# Patient Record
Sex: FEMALE | Race: BLACK OR AFRICAN AMERICAN | NOT HISPANIC OR LATINO | Employment: FULL TIME | URBAN - METROPOLITAN AREA
[De-identification: names, ages, dates, MRNs, and addresses within clinical notes are randomized per-mention and may not be internally consistent; named-entity substitution may affect disease eponyms.]

---

## 2020-10-21 ENCOUNTER — OFFICE VISIT (OUTPATIENT)
Dept: PRIMARY CARE CLINIC | Facility: CLINIC | Age: 40
End: 2020-10-21
Payer: MEDICAID

## 2020-10-21 VITALS
HEIGHT: 67 IN | BODY MASS INDEX: 38.63 KG/M2 | HEART RATE: 58 BPM | RESPIRATION RATE: 18 BRPM | SYSTOLIC BLOOD PRESSURE: 108 MMHG | WEIGHT: 246.13 LBS | OXYGEN SATURATION: 98 % | TEMPERATURE: 98 F | DIASTOLIC BLOOD PRESSURE: 64 MMHG

## 2020-10-21 DIAGNOSIS — Z11.4 ENCOUNTER FOR SCREENING FOR HIV: ICD-10-CM

## 2020-10-21 DIAGNOSIS — Z13.6 SCREENING FOR CARDIOVASCULAR CONDITION: ICD-10-CM

## 2020-10-21 DIAGNOSIS — M06.9 RHEUMATOID ARTHRITIS, INVOLVING UNSPECIFIED SITE, UNSPECIFIED WHETHER RHEUMATOID FACTOR PRESENT: Primary | ICD-10-CM

## 2020-10-21 DIAGNOSIS — L30.9 ECZEMA, UNSPECIFIED TYPE: ICD-10-CM

## 2020-10-21 DIAGNOSIS — L21.9 SEBORRHEIC DERMATITIS OF SCALP: ICD-10-CM

## 2020-10-21 DIAGNOSIS — Z00.00 WELL ADULT HEALTH CHECK: ICD-10-CM

## 2020-10-21 DIAGNOSIS — Z11.59 NEED FOR HEPATITIS C SCREENING TEST: ICD-10-CM

## 2020-10-21 DIAGNOSIS — E66.9 OBESITY, CLASS II, BMI 35-39.9: ICD-10-CM

## 2020-10-21 PROCEDURE — 99204 PR OFFICE/OUTPT VISIT, NEW, LEVL IV, 45-59 MIN: ICD-10-PCS | Mod: S$PBB,,, | Performed by: STUDENT IN AN ORGANIZED HEALTH CARE EDUCATION/TRAINING PROGRAM

## 2020-10-21 PROCEDURE — 99205 OFFICE O/P NEW HI 60 MIN: CPT | Mod: PBBFAC,PN | Performed by: STUDENT IN AN ORGANIZED HEALTH CARE EDUCATION/TRAINING PROGRAM

## 2020-10-21 PROCEDURE — 99999 PR PBB SHADOW E&M-NEW PATIENT-LVL V: ICD-10-PCS | Mod: PBBFAC,,, | Performed by: STUDENT IN AN ORGANIZED HEALTH CARE EDUCATION/TRAINING PROGRAM

## 2020-10-21 PROCEDURE — 99999 PR PBB SHADOW E&M-NEW PATIENT-LVL V: CPT | Mod: PBBFAC,,, | Performed by: STUDENT IN AN ORGANIZED HEALTH CARE EDUCATION/TRAINING PROGRAM

## 2020-10-21 PROCEDURE — 99204 OFFICE O/P NEW MOD 45 MIN: CPT | Mod: S$PBB,,, | Performed by: STUDENT IN AN ORGANIZED HEALTH CARE EDUCATION/TRAINING PROGRAM

## 2020-10-21 NOTE — PROGRESS NOTES
Subjective:           Patient ID: Giovanni Pena is a 40 y.o. female who presents today with a chief complaint of to establish care.    Chief Complaint:   Establish Care (Patient here to establish care, HX of RA)      History of Present Illness:    States that she was dx with RA in 2017, has been struggling with her weight.      Says that she was about 200lb in the last 6 months.    Today, was 246.    Working from home, tries to get to the gym for a hour 3 times a week. She is doing cardio at the gym and weights.     Diet: states that she usually does not eat in the morning, tried eggs in the AM, salads in the PM.  Feels her diet is pretty good.      States that her RA is affecting her elbows and knees.    Was previously taking Ibuprofen for the swelling and took about a month of Methotrexate, this was in 2017 in [place].    States that she gets a sharp pain in her mid-chest that lasts for a minute or two while at rest.       Review of Systems   Constitutional: Negative for chills, fatigue and fever.   HENT: Negative for congestion, postnasal drip, sinus pressure and sinus pain.    Respiratory: Negative for cough, shortness of breath and wheezing.    Cardiovascular: Positive for chest pain (gets a sharp pain in her chest). Negative for palpitations and leg swelling.   Gastrointestinal: Negative for constipation, diarrhea, nausea and vomiting.   Genitourinary: Negative for difficulty urinating, frequency, hematuria and menstrual problem.   Musculoskeletal: Positive for arthralgias (primarly to elbows and knees bilaterally). Negative for back pain, gait problem and joint swelling.   Skin: Positive for rash.   Neurological: Negative for dizziness, tremors, seizures, light-headedness, numbness and headaches.   Psychiatric/Behavioral: Negative for agitation, sleep disturbance and suicidal ideas. The patient is not nervous/anxious.            Objective:        Vitals:    10/21/20 1050   BP: 108/64   BP Location: Right arm  "  Patient Position: Sitting   BP Method: Large (Manual)   Pulse: (!) 58   Resp: 18   Temp: 97.8 °F (36.6 °C)   TempSrc: Temporal   SpO2: 98%   Weight: 111.7 kg (246 lb 2.3 oz)   Height: 5' 7" (1.702 m)       Body mass index is 38.55 kg/m².      Physical Exam  Constitutional:       General: She is not in acute distress.     Appearance: Normal appearance. She is obese.      Comments: As per BMI.   HENT:      Head: Normocephalic.      Right Ear: Tympanic membrane and external ear normal.      Left Ear: Tympanic membrane and external ear normal.      Nose: Nose normal. No rhinorrhea.      Mouth/Throat:      Mouth: Mucous membranes are moist.      Pharynx: No oropharyngeal exudate or posterior oropharyngeal erythema.   Eyes:      General:         Right eye: No discharge.         Left eye: No discharge.      Extraocular Movements: Extraocular movements intact.      Pupils: Pupils are equal, round, and reactive to light.   Cardiovascular:      Rate and Rhythm: Normal rate and regular rhythm.      Heart sounds: No murmur. No gallop.    Pulmonary:      Effort: Pulmonary effort is normal. No respiratory distress.      Breath sounds: Normal breath sounds. No wheezing.   Abdominal:      General: Abdomen is flat. Bowel sounds are normal. There is no distension.      Palpations: Abdomen is soft.   Musculoskeletal:         General: No swelling or deformity.      Right lower leg: No edema.      Left lower leg: No edema.   Skin:     General: Skin is warm.      Capillary Refill: Capillary refill takes less than 2 seconds.      Coloration: Skin is not jaundiced.   Neurological:      General: No focal deficit present.      Mental Status: She is alert and oriented to person, place, and time.      Motor: No weakness.   Psychiatric:         Mood and Affect: Mood normal.             No results found for: NA, K, CL, CO2, BUN, CREATININE, GLUCOSE, ANIONGAP  No results found for: HGBA1C  No results found for: BNP, BNPTRIAGEBLO    No results " found for: WBC, HGB, HCT, PLT, GRAN  No results found for: CHOL, HDL, LDLCALC, TRIG     No current outpatient medications on file.     No outpatient encounter medications on file as of 10/21/2020.     No facility-administered encounter medications on file as of 10/21/2020.           Assessment:       1. Rheumatoid arthritis, involving unspecified site, unspecified whether rheumatoid factor present    2. Eczema, unspecified type    3. Seborrheic dermatitis of scalp    4. Obesity, Class II, BMI 35-39.9    5. Screening for cardiovascular condition    6. Encounter for screening for HIV    7. Need for hepatitis C screening test    8. Well adult health check           Plan:       Rheumatoid arthritis, involving unspecified site, unspecified whether rheumatoid factor present  -     CBC auto differential; Future; Expected date: 10/21/2020  -     Comprehensive Metabolic Panel; Future; Expected date: 10/21/2020  -     Ambulatory referral/consult to Rheumatology; Future; Expected date: 10/28/2020   - patient states bilateral elbows and bilateral knees mostly affected.  Feels that tissue was worse due to her increased weight.  Is working on weight loss, going to the gym 3 times weekly.   - agrees to a referral to speak to our nutritionist.   - also requesting a referral to Rheumatology which has been sent.   - previously diagnosed and treated for rheumatoid arthritis in [place], patient is to call back to clinic with her previous provider's name her clinic pain so that we can elicit their notes.    Eczema, unspecified type   - historic issue not in current exacerbation.     Seborrheic dermatitis of scalp   - historic issue not in current exacerbation.     Obesity, Class II, BMI 35-39.9  -     Ambulatory referral/consult to Diabetes Education; Future; Expected date: 10/28/2020  -     TSH; Future; Expected date: 10/21/2020  -     T4, Free; Future; Expected date: 10/21/2020   - currently trying to lose weight, but has gained appx  50 lb in the last 6 months.   -     Screening for cardiovascular condition  -     Lipid Panel; Future; Expected date: 10/21/2020   - risk due to obesity, getting screening panel.    Encounter for screening for HIV  -     HIV 1/2 Ag/Ab (4th Gen); Future; Expected date: 10/21/2020   - obtaining for general health maintenance as well as to no of risks before starting DMARD.     Need for hepatitis C screening test  -     Hepatitis C Antibody; Future; Expected date: 10/21/2020   - obtaining for general health maintenance as well as to no of risks before starting DMARD.     Well adult health check  -     CBC auto differential; Future; Expected date: 10/21/2020  -     Comprehensive Metabolic Panel; Future; Expected date: 10/21/2020  -     TSH; Future; Expected date: 10/21/2020  -     T4, Free; Future; Expected date: 10/21/2020   - patient is due for Pap, scheduling for this appointment in 1 month in our clinic.

## 2020-10-21 NOTE — LETTER
October 21, 2020    Giovanni Pena  [address] [facility] [address]             [facility] [address]  Primary Care  [address], [facility] [address]  [facility] [address]  Phone: [phone]  Fax: [phone]   October 21, 2020     Patient: Giovanni Pena   YOB: 1980   Date of Visit: 10/21/2020       To Whom it May Concern:    Giovanni Pena was seen in my clinic on 10/21/2020. She may return to work on 10/21/2020.    Please excuse her from any  work missed.    If you have any questions or concerns, please don't hesitate to call.    Sincerely,         Miquel Mishra MD

## 2020-10-21 NOTE — PATIENT INSTRUCTIONS
Living with Rheumatoid Arthritis    Rheumatoid arthritis (RA) is a chronic disease, but it doesn't have to keep you from being active. It is an autoimmune disease and your immune system attacks the lining of your joints. You can help control RA with exercise and a healthy lifestyle. Be sure to see your healthcare provider for scheduled checkups and lab work. At some point, you may be referred to a rheumatologist (a healthcare provider who specializes in arthritis and related diseases).  Make exercise part of your life  Gentle exercise can help lessen your pain. Keep the following in mind:  · Choose exercises that improve joint motion and make your muscles stronger. Your healthcare provider or a physical therapist may suggest a few.  · Most people should exercise for at least 30 minutes a day on most days of the week. This can be broken up into shorter periods throughout the day.  · Try walking, riding a bike, or doing exercises in a warm pool. Look for programs in your community for people with arthritis.   · Dont push yourself too hard at first. Slowly build up over time.  · Make sure you warm up for 5 to 10 minutes each time you exercise. Stretching and flexibility exercises are often helpful.   · If pain and stiffness increase, don't exercise as hard or as long.  Watch your weight  If you weigh more than you should, your weight-bearing joints are under extra pressure. This makes your symptoms worse. To reduce pain and stiffness, try shedding a few of those extra pounds. The tips below may help:  · Start a weight-loss program with the help of your healthcare provider.  · Ask your friends and family for support.  · Join a weight-loss group.  Learn ways to cope  Most people with long-term conditions find it a challenge to deal with the emotions that often go along with their conditions. With rheumatoid arthritis, there is also pain.   · Work with your healthcare provider on ways to lessen pain. Medicines, use of  heat and cold, and other methods are available.  · Learn to relax. Although it may not be easy, it does help lessen stress, anxiety, and pain. Simple deep-breathing exercises, meditation, and yoga are examples of relaxation techniques.  · Depression is common with long-term conditions. If you feel depressed, make sure you talk with your healthcare provider. Again, treatments, like medicine and counseling, are available.  Try to make your day easier  There are things you can do every day to protect your joints:  · Learn to balance rest with activity. Even on days when you have few symptoms, rest is still important.  · Ask friends and family members for help. Help with simple things can make a big difference for you. For example, you might ask someone to change a light bulb, or take out your weekly garbage.  · Use assistive devices, which are special tools that reduce strain and protect joints. For example:  ¨ Long-handled reachers or grabbers for reaching high and low  ¨ Jar-openers, two-handled cups, and button --all of these devices help to protect your fingers, hands, and wrists  ¨ Large  for pencils, pens, kitchen and garden tools  The Arthritis Foundation has many additional suggestions about protecting your joints. Go to their website at http://www.arthritis.org.  Use mobility and other aids  People with arthritis and other problems affecting the joints often use mobility aids, to help with walking. For example, they may use canes or walkers. They may also use splints or braces to support joints. Talk with your healthcare provider or therapist about these aids. For instance, you might benefit from:  · Use a cane to ease knee or hip pain and help prevent falls  · Splints for your wrists or other joints  · A brace to support a weak knee joint  Date Last Reviewed: 2/14/2016  © 8855-4841 [employer]. [address] [place] [address], [place] [address]. All rights reserved. This information is not  intended as a substitute for professional medical care. Always follow your healthcare professional's instructions.

## 2020-10-22 ENCOUNTER — TELEPHONE (OUTPATIENT)
Dept: PRIMARY CARE CLINIC | Facility: CLINIC | Age: 40
End: 2020-10-22

## 2020-10-22 NOTE — TELEPHONE ENCOUNTER
----- Message from Neda José sent at 10/22/2020 12:47 PM CDT -----  Contact: Patient [phone]  Patient is requesting to speak with you but would not leave details as to what it is concerning.    Please call and advise.    Thank You

## 2020-11-06 ENCOUNTER — CLINICAL SUPPORT (OUTPATIENT)
Dept: DIABETES | Facility: CLINIC | Age: 40
End: 2020-11-06
Payer: MEDICAID

## 2020-11-06 VITALS — WEIGHT: 246.25 LBS | BODY MASS INDEX: 38.65 KG/M2 | HEIGHT: 67 IN

## 2020-11-06 DIAGNOSIS — E66.9 OBESITY, CLASS II, BMI 35-39.9: ICD-10-CM

## 2020-11-06 PROCEDURE — 99999 PR PBB SHADOW E&M-EST. PATIENT-LVL II: CPT | Mod: PBBFAC,,, | Performed by: DIETITIAN, REGISTERED

## 2020-11-06 PROCEDURE — 99212 OFFICE O/P EST SF 10 MIN: CPT | Mod: PBBFAC,PN | Performed by: DIETITIAN, REGISTERED

## 2020-11-06 PROCEDURE — 99999 PR PBB SHADOW E&M-EST. PATIENT-LVL II: ICD-10-PCS | Mod: PBBFAC,,, | Performed by: DIETITIAN, REGISTERED

## 2020-11-06 PROCEDURE — 97802 MEDICAL NUTRITION INDIV IN: CPT | Mod: PBBFAC,PN | Performed by: DIETITIAN, REGISTERED

## 2020-11-09 NOTE — PROGRESS NOTES
"Referring Physician:Miquel Mishra MD     Reason for visit:  Chief Complaint   Patient presents with    Obesity    Weight Loss        :1980     Allergies Reviewed  Meds Reviewed    Anthropometrics  Weight:111.7 kg (246 lb 4.1 oz)  Height:5' 7" (1.702 m)  BMI:Body mass index is 38.57 kg/m².   IBW: 135 lb    Meds:  No outpatient medications prior to visit.     No facility-administered medications prior to visit.          Labs: *  LABORATORY:   A1C: No results found for: HGBA1C  Cholesterol:   Lab Results   Component Value Date    CHOL 200 10/21/2020     HDL:    Lab Results   Component Value Date    HDL 50 10/21/2020     LDL:    Lab Results   Component Value Date    LDLCALC 135 (H) 10/21/2020     Trig:    Lab Results   Component Value Date    TRIG 76 10/21/2020     HDL Cholesterol:   Lab Results   Component Value Date    CHOLHDL 25.0 10/21/2020     BUN:      BUN   Date Value Ref Range Status   10/21/2020 12 6 - 20 mg/dL Final     Micro/Cr Ratio:    Creatinine   Date Value Ref Range Status   10/21/2020 0.7 0.5 - 1.4 mg/dL Final     Protein:    Total Protein   Date Value Ref Range Status   10/21/2020 7.9 6.0 - 8.4 g/dL Final     AST:    AST   Date Value Ref Range Status   10/21/2020 13 (L) 15 - 41 U/L Final         ALT:    ALT   Date Value Ref Range Status   10/21/2020 12 (L) 14 - 54 U/L Final     No current outpatient medications on file prior to visit.     No current facility-administered medications on file prior to visit.      Estimated Nutrition Needs: 1500 Kcals/day( 13.5kcal/kg),  113g protein( 1g/kg)     Diet Hx: Eating one meal a day, eating out often, snacking on junk food, cut back on soda, increased water intake     Breakfast: coffee  Lunch: skipped  Dinner: [employer]    Assessment: obesity    Nutrition Diagnosis:   Nutrition Problem  Obesity, Class II    Related to (etiology):   Decreased energy needs  Disordered eating pattern  Excessive energy intake  Food- and " nutrition-related knowledge deficit  Not ready for diet/lifestyle change    Signs and Symptoms (as evidenced by):   BMI more than normative standard for age and sex: Obese Class II: 35 to 39.9 (adults)   Waist circumference more than normative standard for age and sex    Interventions/Recommendations (treatment strategy):  Overconsumption of high-fat and/or energy-dense food or beverage  Large portions of food (portion size more than twice than recommended)  Estimated excessive energy intake    Nutrition Diagnosis Status:   New    Recommendations: Multiple small meals a day, 1200 kcal diet, continue exercise      Consultation Time:60 minutes.    Follow Up:1 months.

## 2020-11-16 ENCOUNTER — TELEPHONE (OUTPATIENT)
Dept: PRIMARY CARE CLINIC | Facility: CLINIC | Age: 40
End: 2020-11-16

## 2020-11-16 NOTE — TELEPHONE ENCOUNTER
----- Message from Juliette Jeong sent at 11/16/2020  3:58 PM CST -----  Contact: Patient, [phone]  Calling to get a note too not go to work due to she fell and has arthritis and is having trouble morving. Please advise. Thanks.

## 2020-11-17 ENCOUNTER — OFFICE VISIT (OUTPATIENT)
Dept: PRIMARY CARE CLINIC | Facility: CLINIC | Age: 40
End: 2020-11-17
Payer: MEDICAID

## 2020-11-17 VITALS
OXYGEN SATURATION: 99 % | HEART RATE: 65 BPM | DIASTOLIC BLOOD PRESSURE: 80 MMHG | TEMPERATURE: 98 F | RESPIRATION RATE: 18 BRPM | WEIGHT: 246.25 LBS | HEIGHT: 67 IN | BODY MASS INDEX: 38.65 KG/M2 | SYSTOLIC BLOOD PRESSURE: 110 MMHG

## 2020-11-17 DIAGNOSIS — Z00.00 HEALTH CARE MAINTENANCE: ICD-10-CM

## 2020-11-17 DIAGNOSIS — S63.501A WRIST SPRAIN, RIGHT, INITIAL ENCOUNTER: Primary | ICD-10-CM

## 2020-11-17 DIAGNOSIS — M25.562 ACUTE PAIN OF LEFT KNEE: ICD-10-CM

## 2020-11-17 PROCEDURE — 99999 PR PBB SHADOW E&M-EST. PATIENT-LVL IV: ICD-10-PCS | Mod: PBBFAC,,, | Performed by: STUDENT IN AN ORGANIZED HEALTH CARE EDUCATION/TRAINING PROGRAM

## 2020-11-17 PROCEDURE — 99214 OFFICE O/P EST MOD 30 MIN: CPT | Mod: S$PBB,,, | Performed by: STUDENT IN AN ORGANIZED HEALTH CARE EDUCATION/TRAINING PROGRAM

## 2020-11-17 PROCEDURE — 99214 PR OFFICE/OUTPT VISIT, EST, LEVL IV, 30-39 MIN: ICD-10-PCS | Mod: S$PBB,,, | Performed by: STUDENT IN AN ORGANIZED HEALTH CARE EDUCATION/TRAINING PROGRAM

## 2020-11-17 PROCEDURE — 99999 PR PBB SHADOW E&M-EST. PATIENT-LVL IV: CPT | Mod: PBBFAC,,, | Performed by: STUDENT IN AN ORGANIZED HEALTH CARE EDUCATION/TRAINING PROGRAM

## 2020-11-17 PROCEDURE — 99214 OFFICE O/P EST MOD 30 MIN: CPT | Mod: PBBFAC,PN | Performed by: STUDENT IN AN ORGANIZED HEALTH CARE EDUCATION/TRAINING PROGRAM

## 2020-11-17 RX ORDER — MELOXICAM 15 MG/1
15 TABLET ORAL DAILY
Qty: 30 TABLET | Refills: 0 | Status: SHIPPED | OUTPATIENT
Start: 2020-11-17 | End: 2020-12-14 | Stop reason: CLARIF

## 2020-11-17 NOTE — PATIENT INSTRUCTIONS
I would like you to take meloxicam 15 mg daily for the next 10 days to reduce inflammation.  You can continue taking this medication afterward if you are still having discomfort.  Do not take other NSAIDs such as ibuprofen while on this medication.    A wrist brace has been sent to [facility] which should be used while sleeping, driving or active.  It can be removed while your at rest to allow some movement in the wrist.  Avoid movements which cause sharp pain.    Area can be iced for 15 min 4-5 times daily for the 1st 48 hr.  After that you may use warm or cool compresses for pain relief.    Elevate above the level of heart periodically through the day to reduce inflammation.    Return to clinic for any new sharp pains.  For any progressive weakness in the extremities.  For any recurrence falls, new numbness tingling or persistent sharp pains.    Return to clinic if not improved in the next 7-10 days.

## 2020-11-17 NOTE — LETTER
November 17, 2020      [facility] [address]  [address], [facility] [address]  [place] [address]  Phone: [phone]  Fax: [phone]       Patient: Giovanni Pena   YOB: 1980  Date of Visit: 11/17/2020    To Whom It May Concern:    Barbie Pena  was at [facility] on 11/17/2020. She may return to work on 11/19/2020 without restrictions. If you have any questions or concerns, or if I can be of further assistance, please do not hesitate to contact me.    Sincerely,    Miquel Mishra MD

## 2020-11-17 NOTE — PROGRESS NOTES
Subjective:           Patient ID: Giovanni Pena is a 40 y.o. female who presents today with a chief complaint of fall.    Chief Complaint:   Fall (states fell yesterday and caught herself with her hands and legs and has alot of stiffness in hands and knees today, adds has hx of arthritis)    History of Present Illness:    Ms. Pena is a 40-year-old female presenting today for follow-up after a fall.  Reports she fell caught herself with her hands and legs has stiffness in joints.  Has history of arthritis to knees and hands previously.      At her last visit patient discussed disappointment at her weight gain.  Was 246 lb at that time and currently, reports that 6 months ago was only 200 lb.  Does work out 3 times a week.    Today, reports she tripped over a cord on the floor and tried to protect her face, feel forward and hit on her hands and knees.     Is having swelling and soreness to the area.  Use some ibuprofen that helped a bit.  But does not feel that is helping enough with her stiffness and having issues typing with the right hand due to pain/stiffness.     States her referral to Rheum was denied, stating they are not taking new patients.  Says also reports that her previous office where the tests were run is closed now.  She did get the labs done through [facility] so though perhaps she could get the labs from there.    Review of Systems   Constitutional: Negative for chills, fatigue and fever.   HENT: Negative for congestion, postnasal drip, sinus pressure and sinus pain.    Respiratory: Negative for cough, shortness of breath and wheezing.    Cardiovascular: Negative for chest pain (gets a sharp pain in her chest), palpitations and leg swelling.   Gastrointestinal: Negative for constipation, diarrhea, nausea and vomiting.   Genitourinary: Negative for difficulty urinating, frequency, hematuria and menstrual problem.   Musculoskeletal: Positive for arthralgias (primarly to elbows and knees bilaterally) and  "gait problem. Negative for back pain and joint swelling.   Skin: Positive for rash.   Neurological: Negative for dizziness, tremors, seizures, light-headedness, numbness and headaches.   Psychiatric/Behavioral: Negative for agitation, sleep disturbance and suicidal ideas. The patient is not nervous/anxious.            Objective:        Vitals:    11/17/20 0914   BP: 110/80   BP Location: Left arm   Patient Position: Sitting   BP Method: Large (Manual)   Pulse: 65   Resp: 18   Temp: 97.9 °F (36.6 °C)   TempSrc: Other (see comments)   SpO2: 99%   Weight: 111.7 kg (246 lb 4.1 oz)   Height: 5' 7" (1.702 m)       Body mass index is 38.57 kg/m².      Physical Exam  Constitutional:       General: She is not in acute distress.     Appearance: Normal appearance. She is obese.      Comments: As per BMI.   HENT:      Head: Normocephalic.      Right Ear: Tympanic membrane and external ear normal.      Left Ear: Tympanic membrane and external ear normal.      Nose: Nose normal. No rhinorrhea.      Mouth/Throat:      Mouth: Mucous membranes are moist.      Pharynx: No oropharyngeal exudate or posterior oropharyngeal erythema.   Eyes:      General:         Right eye: No discharge.         Left eye: No discharge.      Extraocular Movements: Extraocular movements intact.      Pupils: Pupils are equal, round, and reactive to light.   Cardiovascular:      Rate and Rhythm: Normal rate and regular rhythm.      Heart sounds: No murmur. No gallop.    Pulmonary:      Effort: Pulmonary effort is normal. No respiratory distress.      Breath sounds: Normal breath sounds. No wheezing.   Abdominal:      General: Abdomen is flat. Bowel sounds are normal. There is no distension.      Palpations: Abdomen is soft.   Musculoskeletal:         General: Tenderness present. No swelling or deformity.      Right lower leg: No edema.      Left lower leg: No edema.      Comments: Patient left hand and wrist exam within normal limits.  Right hand is tender " to palpation specifically over the 3rd digit.  Patient has weekend , 4/5.  Patient has pain with flexion at wrist and extension.  Does have good mobility in right thumb.  Has good pulses to the right hand.  In good capillary refill.  No specific point tenderness at any location, however is generally tender throughout.    Right knee exam was also normal, no crepitus, weakness or point tenderness.  Left knee exam was abnormal with generalized tenderness, worse inferior to the patella.  Patient's ACL PCL were both intact to exam.  No pain with grind testing gauging meniscus.  Patient does have a very slow gait, protecting the left knee from any flexion while weight-bearing.   Skin:     General: Skin is warm.      Capillary Refill: Capillary refill takes less than 2 seconds.      Coloration: Skin is not jaundiced.   Neurological:      General: No focal deficit present.      Mental Status: She is alert and oriented to person, place, and time.      Motor: No weakness.   Psychiatric:         Mood and Affect: Mood normal.             Lab Results   Component Value Date     10/21/2020    K 3.6 10/21/2020     10/21/2020    CO2 26 10/21/2020    BUN 12 10/21/2020    CREATININE 0.7 10/21/2020    ANIONGAP 8 10/21/2020     No results found for: HGBA1C  No results found for: BNP, BNPTRIAGEBLO    Lab Results   Component Value Date    WBC 6.00 10/21/2020    HGB 12.0 10/21/2020    HCT 36.0 (L) 10/21/2020     10/21/2020    GRAN 3.6 10/21/2020    GRAN 59.3 10/21/2020     Lab Results   Component Value Date    CHOL 200 10/21/2020    HDL 50 10/21/2020    LDLCALC 135 (H) 10/21/2020    TRIG 76 10/21/2020          Current Outpatient Medications:     meloxicam (MOBIC) 15 MG tablet, Take 1 tablet (15 mg total) by mouth once daily., Disp: 30 tablet, Rfl: 0     Outpatient Encounter Medications as of 11/17/2020   Medication Sig Dispense Refill    meloxicam (MOBIC) 15 MG tablet Take 1 tablet (15 mg total) by mouth once  daily. 30 tablet 0     No facility-administered encounter medications on file as of 11/17/2020.           Assessment:       1. Wrist sprain, right, initial encounter    2. Acute pain of left knee    3. Health care maintenance           Plan:       Wrist sprain, right, initial encounter  -     meloxicam (MOBIC) 15 MG tablet; Take 1 tablet (15 mg total) by mouth once daily.  Dispense: 30 tablet; Refill: 0  -     WRIST BRACE FOR HOME USE   -meloxicam 15 mg daily for the next 10 days to reduce inflammation.  You can continue taking this medication afterward if you are still having discomfort.  Do not take other NSAIDs such as ibuprofen while on this medication.   -  Wrist brace has been sent to [facility] which should be used while sleeping, driving or active.  It can be removed while your at rest to allow some movement in the wrist.  Avoid movements which cause sharp pain.   -  Area can be iced for 15 min 4-5 times daily for the 1st 48 hr.  After that you may use warm or cool compresses for pain relief.      Acute pain of left knee  -     meloxicam (MOBIC) 15 MG tablet; Take 1 tablet (15 mg total) by mouth once daily.  Dispense: 30 tablet; Refill: 0   - providing work note for the next 2 days for right wrist and left knee pain.   - advised to go easy on the walking and ice the knee periodically for 1st 48 hr use meloxicam as needed for pain inflammation.  Return to clinic if not improving in the next 7-10 days.     Health Maintenance:   - should get Pap done, scheduling for next month.

## 2020-11-19 ENCOUNTER — TELEPHONE (OUTPATIENT)
Dept: PRIMARY CARE CLINIC | Facility: CLINIC | Age: 40
End: 2020-11-19

## 2020-11-19 NOTE — TELEPHONE ENCOUNTER
----- Message from Juliette Jeong sent at 11/19/2020  8:26 AM CST -----  Contact: Patient, [phone]  Caller is requesting an earlier appt than we can schedule.  Caller declined first available appointment listed below. Caller will not accept being placed on the wait list and is requesting a message be sent to the provider.  When is the next available appointment:  11/24/2020  Reason for the appointment:  Follow up, still in pain  Patient preference of timeframe to be scheduled:  Today around 11:00 or 3:00  Comments:  Please call her. Thanks.

## 2020-11-19 NOTE — TELEPHONE ENCOUNTER
Spoke with patient, notified of work excuse for today and tomorrow. Verbalized to patient if she is not better by Monday to call for a follow up appointment to get imaging. Patient verbalized understanding. Work excuse completed and printed for patient to .

## 2020-11-19 NOTE — LETTER
November 19, 2020      [facility] [address]  [address], [address]  [place] [address]  Phone: [phone]  Fax: [phone]       Patient: Giovanni Pena   YOB: 1980  Date of Visit: 11/19/2020    To Whom It May Concern:    Barbie Pena  was at [facility] on 11/19/2020. Please excuse patient from work starting 11/19/2020 - 11/22/2020. She may return to work on 11/23/2020 with no restrictions. If you have any questions or concerns, or if I can be of further assistance, please do not hesitate to contact me.    Sincerely,    Alicia Banda LPN

## 2020-11-19 NOTE — TELEPHONE ENCOUNTER
Spoke with patient, she states that she is due to return back to work today and can not return because of her hand. She is in a lot of pain and can not work. Her shift starts at 4:00. Patient states she will make a follow up appointment with you but needs a work excuse. Please advise.

## 2020-11-30 ENCOUNTER — PATIENT OUTREACH (OUTPATIENT)
Dept: ADMINISTRATIVE | Facility: OTHER | Age: 40
End: 2020-11-30

## 2020-12-04 ENCOUNTER — PATIENT OUTREACH (OUTPATIENT)
Dept: ADMINISTRATIVE | Facility: HOSPITAL | Age: 40
End: 2020-12-04

## 2020-12-17 DIAGNOSIS — M25.562 ACUTE PAIN OF LEFT KNEE: ICD-10-CM

## 2020-12-17 DIAGNOSIS — S63.501A WRIST SPRAIN, RIGHT, INITIAL ENCOUNTER: ICD-10-CM

## 2020-12-18 RX ORDER — MELOXICAM 15 MG/1
TABLET ORAL
Qty: 30 TABLET | Refills: 0 | Status: SHIPPED | OUTPATIENT
Start: 2020-12-18 | End: 2021-09-11 | Stop reason: ALTCHOICE

## 2020-12-18 NOTE — PROGRESS NOTES
Refill Routing Note   Medication(s) are not appropriate for processing by [facility] for the following reason(s):     - Outside of protocol  [facility] action(s):  Route        Medication reconciliation completed: No   Automatic Epic Generated Protocol Data:        Requested Prescriptions   Pending Prescriptions Disp Refills    meloxicam (MOBIC) 15 MG tablet [Pharmacy Med Name: MELOXICAM 15 MG TABLET] 30 tablet 0     Sig: TAKE 1 TABLET BY MOUTH EVERY DAY       NSAIDs Protocol Failed - 12/17/2020  3:30 AM        Failed - Active on medication list        Passed - Patient not currently pregnant        Passed - No positive pregnancy test in past 12 months         Passed - Serum Creatinine less than 1.4 on file in the past 12 months     Lab Results   Component Value Date    CREATININE 0.7 10/21/2020     Lab Results   Component Value Date    ESTGFRAFRICA >60.0 10/21/2020               Passed - Visit with authorizing provider in past 12 months or upcoming 90 days        Passed - HGB greater than 10 or HCT greater than 30 in past 12 months        Passed - AST in past 12 months      Lab Results   Component Value Date    AST 13 (L) 10/21/2020              Passed - Serum Potassium less than 5.2 on file in the past 12 months     Lab Results   Component Value Date    K 3.6 10/21/2020                  Passed - Blood Pressure below 139/89 on file in past 12 months      BP Readings from Last 3 Encounters:   12/14/20 115/83   11/17/20 110/80   10/21/20 108/64             Passed - ALT less than 95 in past 12 months     Lab Results   Component Value Date    ALT 12 (L) 10/21/2020                    Appointments  past 12m or future 3m with PCP    Date Provider   Last Visit   11/17/2020 Miquel Mishra MD   Next Visit   12/30/2020 Miquel Mishra MD   ED visits in past 90 days: 1        Note composed:6:53 PM 12/17/2020

## 2020-12-30 ENCOUNTER — OFFICE VISIT (OUTPATIENT)
Dept: PRIMARY CARE CLINIC | Facility: CLINIC | Age: 40
End: 2020-12-30
Payer: MEDICAID

## 2020-12-30 ENCOUNTER — OFFICE VISIT (OUTPATIENT)
Dept: OBSTETRICS AND GYNECOLOGY | Facility: CLINIC | Age: 40
End: 2020-12-30
Payer: MEDICAID

## 2020-12-30 VITALS
OXYGEN SATURATION: 98 % | TEMPERATURE: 98 F | HEART RATE: 69 BPM | WEIGHT: 249.13 LBS | BODY MASS INDEX: 39.1 KG/M2 | RESPIRATION RATE: 18 BRPM | SYSTOLIC BLOOD PRESSURE: 108 MMHG | DIASTOLIC BLOOD PRESSURE: 64 MMHG | HEIGHT: 67 IN

## 2020-12-30 VITALS — DIASTOLIC BLOOD PRESSURE: 64 MMHG | WEIGHT: 249 LBS | SYSTOLIC BLOOD PRESSURE: 110 MMHG | BODY MASS INDEX: 39 KG/M2

## 2020-12-30 DIAGNOSIS — S63.501D SPRAIN OF RIGHT WRIST, SUBSEQUENT ENCOUNTER: Primary | ICD-10-CM

## 2020-12-30 DIAGNOSIS — Z01.419 ENCOUNTER FOR WELL WOMAN EXAM WITH ROUTINE GYNECOLOGICAL EXAM: Primary | ICD-10-CM

## 2020-12-30 DIAGNOSIS — M25.562 LEFT KNEE PAIN, UNSPECIFIED CHRONICITY: ICD-10-CM

## 2020-12-30 DIAGNOSIS — Z09 HOSPITAL DISCHARGE FOLLOW-UP: ICD-10-CM

## 2020-12-30 DIAGNOSIS — Z00.00 HEALTH CARE MAINTENANCE: ICD-10-CM

## 2020-12-30 PROCEDURE — 99385 PREV VISIT NEW AGE 18-39: CPT | Mod: S$PBB,,, | Performed by: NURSE PRACTITIONER

## 2020-12-30 PROCEDURE — 99385 PR PREVENTIVE VISIT,NEW,18-39: ICD-10-PCS | Mod: S$PBB,,, | Performed by: NURSE PRACTITIONER

## 2020-12-30 PROCEDURE — 99999 PR PBB SHADOW E&M-EST. PATIENT-LVL III: CPT | Mod: PBBFAC,,, | Performed by: NURSE PRACTITIONER

## 2020-12-30 PROCEDURE — 88175 CYTOPATH C/V AUTO FLUID REDO: CPT

## 2020-12-30 PROCEDURE — 87624 HPV HI-RISK TYP POOLED RSLT: CPT

## 2020-12-30 PROCEDURE — 99999 PR PBB SHADOW E&M-EST. PATIENT-LVL IV: ICD-10-PCS | Mod: PBBFAC,,, | Performed by: STUDENT IN AN ORGANIZED HEALTH CARE EDUCATION/TRAINING PROGRAM

## 2020-12-30 PROCEDURE — 99214 OFFICE O/P EST MOD 30 MIN: CPT | Mod: PBBFAC,PN | Performed by: STUDENT IN AN ORGANIZED HEALTH CARE EDUCATION/TRAINING PROGRAM

## 2020-12-30 PROCEDURE — 99214 OFFICE O/P EST MOD 30 MIN: CPT | Mod: S$PBB,,, | Performed by: STUDENT IN AN ORGANIZED HEALTH CARE EDUCATION/TRAINING PROGRAM

## 2020-12-30 PROCEDURE — 99999 PR PBB SHADOW E&M-EST. PATIENT-LVL III: ICD-10-PCS | Mod: PBBFAC,,, | Performed by: NURSE PRACTITIONER

## 2020-12-30 PROCEDURE — 99999 PR PBB SHADOW E&M-EST. PATIENT-LVL IV: CPT | Mod: PBBFAC,,, | Performed by: STUDENT IN AN ORGANIZED HEALTH CARE EDUCATION/TRAINING PROGRAM

## 2020-12-30 PROCEDURE — 99214 PR OFFICE/OUTPT VISIT, EST, LEVL IV, 30-39 MIN: ICD-10-PCS | Mod: S$PBB,,, | Performed by: STUDENT IN AN ORGANIZED HEALTH CARE EDUCATION/TRAINING PROGRAM

## 2020-12-30 PROCEDURE — 99213 OFFICE O/P EST LOW 20 MIN: CPT | Mod: PBBFAC,27,PN | Performed by: NURSE PRACTITIONER
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
## 2020-12-30 NOTE — PATIENT INSTRUCTIONS
Continue to use NSIADs and hydrate for your hand/wrist pain, but it appears to be doing well at this point.    Get your f/u with OBGYN to have your Pap completed.

## 2020-12-30 NOTE — PROGRESS NOTES
Chief Complaint: Well Woman Exam     HPI:      Giovanni Pena is a 40 y.o.  who presents today for well woman exam.  LMP: Patient's last menstrual period was 2020 (exact date).   Patient is currently sexually active with a single male partner. She is currently using bilateral tubal ligation for contraception. She declines STD screening today. Ms. Pena confirms that she is safe at home.  Ms. Pena denies abnormal vaginal bleeding, discharge, pelvic pain, urinary problems, or changes in appetite.    Previous Pap:  no abnormalities (~2 years ago) Denies hx of abnormal paps      Family History   Problem Relation Age of Onset    Hypertension Maternal Grandmother     Coronary artery disease Maternal Grandmother     Breast cancer Neg Hx     Colon cancer Neg Hx     Ovarian cancer Neg Hx     Endometrial cancer Neg Hx      OB History        5    Para   4    Term   4            AB   1    Living           SAB        TAB   1    Ectopic        Multiple        Live Births                     ROS:     GENERAL: Denies unintentional weight gain or weight loss. Feeling well overall.   SKIN: Denies rash or lesions.   HEENT: Denies headaches, or vision changes.   CARDIOVASCULAR: Denies palpitations or chest pain.   RESPIRATORY: Denies shortness of breath or dyspnea on exertion.  BREASTS: Denies pain, lumps, or nipple discharge.   ABDOMEN: Denies abdominal pain, constipation, diarrhea, nausea, vomiting, change in appetite.  URINARY: Denies frequency, dysuria, hematuria.  NEUROLOGIC: Denies syncope or weakness.   PSYCHIATRIC: Denies depression, anxiety or mood swings.    Physical Exam:      PHYSICAL EXAM:  /64   Wt 112.9 kg (249 lb 0.2 oz)   LMP 2020 (Exact Date)   BMI 39.00 kg/m²   Body mass index is 39 kg/m².     APPEARANCE: Well nourished, well developed, in no acute distress.  PSYCH: Appropriate mood and affect.  SKIN: No acne or hirsutism  NECK: Neck symmetric without masses or  thyromegaly  NODES: No inguinal, axillary, or supraclavicular lymph node enlargement  ABDOMEN: Soft.  No tenderness or masses.    CARDIOVASCULAR: No edema of peripheral extremities  BREASTS: Symmetrical, no skin changes or visible lesions.  No palpable masses or nipple discharge bilaterally.  PELVIC: Normal external genitalia without lesions.  Normal hair distribution.  Adequate perineal body, normal urethral meatus.  Vagina moist and well rugated without lesions. + copious clear/ cloudy discharge.  Cervix pink, without lesions, discharge or tenderness.  No significant cystocele or rectocele.  Bimanual exam shows uterus to be normal size, regular, mobile and nontender.  Adnexa without masses or tenderness.      Assessment/Plan:     Encounter for well woman exam with routine gynecological exam  -     Liquid-Based Pap Smear, Screening  -     HPV High Risk Genotypes, PCR    Health care maintenance  -     Ambulatory referral/consult to Obstetrics / Gynecology        Counseling:     Patient was counseled today on current ASCCP pap guidelines, the recommendation for yearly pelvic exams, healthy diet and exercise routines, breast self awareness.She is to see her PCP for other health maintenance.

## 2020-12-30 NOTE — LETTER
December 30, 2020      Miquel Mishra MD  [address]  [address]  [facility] [address]           [facility] [address]  [address] [facility] [address]  [facility] [address]  Phone: [phone]  Fax: [phone]          Patient: Giovanni Pena   MR Number: [id number]   YOB: 1980   Date of Visit: 12/30/2020       Dear Dr. Miquel Mishra:    Thank you for referring Giovanni Pena to me for evaluation. Attached you will find relevant portions of my assessment and plan of care.    If you have questions, please do not hesitate to call me. I look forward to following Giovanni Pena along with you.    Sincerely,    Beth Fields, JERZY    Enclosure  CC:  No Recipients    If you would like to receive this communication electronically, please contact [email or web address] or [phone] to request more information on [employer] Link access.    For providers and/or their staff who would like to refer a patient to [facility], please contact us through our one-stop-shop provider referral line, [facility], at [phone].    If you feel you have received this communication in error or would no longer like to receive these types of communications, please e-mail [email or web address]

## 2021-01-19 LAB
HPV HR 12 DNA SPEC QL NAA+PROBE: NEGATIVE
HPV16 AG SPEC QL: NEGATIVE
HPV18 DNA SPEC QL NAA+PROBE: NEGATIVE

## 2021-01-31 DIAGNOSIS — U07.1 COVID-19 VIRUS DETECTED: ICD-10-CM

## 2021-02-02 LAB
FINAL PATHOLOGIC DIAGNOSIS: NORMAL
Lab: NORMAL

## 2021-02-03 ENCOUNTER — TELEPHONE (OUTPATIENT)
Dept: OBSTETRICS AND GYNECOLOGY | Facility: CLINIC | Age: 41
End: 2021-02-03

## 2021-02-08 ENCOUNTER — TELEPHONE (OUTPATIENT)
Dept: PRIMARY CARE CLINIC | Facility: CLINIC | Age: 41
End: 2021-02-08

## 2021-03-26 ENCOUNTER — TELEPHONE (OUTPATIENT)
Dept: PRIMARY CARE CLINIC | Facility: CLINIC | Age: 41
End: 2021-03-26

## 2021-03-31 ENCOUNTER — IMMUNIZATION (OUTPATIENT)
Dept: PRIMARY CARE CLINIC | Facility: CLINIC | Age: 41
End: 2021-03-31
Payer: MEDICAID

## 2021-03-31 DIAGNOSIS — Z23 NEED FOR VACCINATION: Primary | ICD-10-CM

## 2021-03-31 PROCEDURE — 91300 COVID-19, MRNA, LNP-S, PF, 30 MCG/0.3 ML DOSE VACCINE: CPT | Mod: PBBFAC,PN

## 2021-04-22 ENCOUNTER — IMMUNIZATION (OUTPATIENT)
Dept: PRIMARY CARE CLINIC | Facility: CLINIC | Age: 41
End: 2021-04-22

## 2021-04-22 DIAGNOSIS — Z23 NEED FOR VACCINATION: Primary | ICD-10-CM

## 2021-04-22 PROCEDURE — 91300 COVID-19, MRNA, LNP-S, PF, 30 MCG/0.3 ML DOSE VACCINE: ICD-10-PCS | Mod: S$GLB,,, | Performed by: EMERGENCY MEDICINE

## 2021-04-22 PROCEDURE — 0002A COVID-19, MRNA, LNP-S, PF, 30 MCG/0.3 ML DOSE VACCINE: CPT | Mod: CV19,S$GLB,, | Performed by: EMERGENCY MEDICINE

## 2021-04-22 PROCEDURE — 0002A COVID-19, MRNA, LNP-S, PF, 30 MCG/0.3 ML DOSE VACCINE: ICD-10-PCS | Mod: CV19,S$GLB,, | Performed by: EMERGENCY MEDICINE

## 2021-04-22 PROCEDURE — 91300 COVID-19, MRNA, LNP-S, PF, 30 MCG/0.3 ML DOSE VACCINE: CPT | Mod: S$GLB,,, | Performed by: EMERGENCY MEDICINE

## 2021-08-03 ENCOUNTER — CLINICAL SUPPORT (OUTPATIENT)
Dept: PRIMARY CARE CLINIC | Facility: CLINIC | Age: 41
End: 2021-08-03
Payer: MEDICAID

## 2021-08-03 DIAGNOSIS — N30.90 CYSTITIS: Primary | ICD-10-CM

## 2021-08-03 LAB
B-HCG UR QL: NEGATIVE
BILIRUB SERPL-MCNC: ABNORMAL MG/DL
BLOOD URINE, POC: ABNORMAL
CLARITY, POC UA: CLEAR
COLOR, POC UA: YELLOW
CTP QC/QA: YES
GLUCOSE UR QL STRIP: NORMAL
KETONES UR QL STRIP: ABNORMAL
LEUKOCYTE ESTERASE URINE, POC: ABNORMAL
NITRITE, POC UA: ABNORMAL
PH, POC UA: 5
PROTEIN, POC: ABNORMAL
SPECIFIC GRAVITY, POC UA: 1.01
UROBILINOGEN, POC UA: NORMAL

## 2021-08-03 PROCEDURE — 81025 URINE PREGNANCY TEST: CPT | Mod: PBBFAC,PN

## 2021-08-03 PROCEDURE — 99211 OFF/OP EST MAY X REQ PHY/QHP: CPT | Mod: PBBFAC,PN

## 2021-08-03 PROCEDURE — 99999 PR PBB SHADOW E&M-EST. PATIENT-LVL I: CPT | Mod: PBBFAC,,,

## 2021-08-03 PROCEDURE — 99999 PR PBB SHADOW E&M-EST. PATIENT-LVL I: ICD-10-PCS | Mod: PBBFAC,,,

## 2021-08-03 PROCEDURE — 87086 URINE CULTURE/COLONY COUNT: CPT | Performed by: STUDENT IN AN ORGANIZED HEALTH CARE EDUCATION/TRAINING PROGRAM

## 2021-08-03 PROCEDURE — 81002 URINALYSIS NONAUTO W/O SCOPE: CPT | Mod: PBBFAC,PN

## 2021-08-03 RX ORDER — NITROFURANTOIN (MACROCRYSTALS) 100 MG/1
100 CAPSULE ORAL EVERY 12 HOURS
Qty: 10 CAPSULE | Refills: 0 | Status: SHIPPED | OUTPATIENT
Start: 2021-08-03 | End: 2021-08-08

## 2021-08-05 LAB
BACTERIA UR CULT: NORMAL
BACTERIA UR CULT: NORMAL

## 2021-09-10 ENCOUNTER — OFFICE VISIT (OUTPATIENT)
Dept: PRIMARY CARE CLINIC | Facility: CLINIC | Age: 41
End: 2021-09-10
Payer: MEDICAID

## 2021-09-10 VITALS
SYSTOLIC BLOOD PRESSURE: 90 MMHG | RESPIRATION RATE: 20 BRPM | DIASTOLIC BLOOD PRESSURE: 60 MMHG | TEMPERATURE: 99 F | BODY MASS INDEX: 39.81 KG/M2 | HEART RATE: 69 BPM | OXYGEN SATURATION: 95 % | WEIGHT: 254.19 LBS

## 2021-09-10 DIAGNOSIS — N30.00 ACUTE CYSTITIS WITHOUT HEMATURIA: Primary | ICD-10-CM

## 2021-09-10 DIAGNOSIS — B96.89 BACTERIAL VAGINOSIS: ICD-10-CM

## 2021-09-10 DIAGNOSIS — N76.0 BACTERIAL VAGINOSIS: ICD-10-CM

## 2021-09-10 PROCEDURE — 99213 OFFICE O/P EST LOW 20 MIN: CPT | Mod: PBBFAC,PN | Performed by: INTERNAL MEDICINE

## 2021-09-10 PROCEDURE — 99999 PR PBB SHADOW E&M-EST. PATIENT-LVL III: ICD-10-PCS | Mod: PBBFAC,,, | Performed by: INTERNAL MEDICINE

## 2021-09-10 PROCEDURE — 99213 PR OFFICE/OUTPT VISIT, EST, LEVL III, 20-29 MIN: ICD-10-PCS | Mod: S$PBB,,, | Performed by: INTERNAL MEDICINE

## 2021-09-10 PROCEDURE — 99999 PR PBB SHADOW E&M-EST. PATIENT-LVL III: CPT | Mod: PBBFAC,,, | Performed by: INTERNAL MEDICINE

## 2021-09-10 PROCEDURE — 99213 OFFICE O/P EST LOW 20 MIN: CPT | Mod: S$PBB,,, | Performed by: INTERNAL MEDICINE

## 2021-09-10 RX ORDER — METRONIDAZOLE 7.5 MG/G
1 GEL VAGINAL 2 TIMES DAILY
Qty: 70 G | Refills: 1 | Status: SHIPPED | OUTPATIENT
Start: 2021-09-10 | End: 2021-09-10

## 2021-09-10 RX ORDER — CIPROFLOXACIN 500 MG/1
500 TABLET ORAL 2 TIMES DAILY
Qty: 14 TABLET | Refills: 0 | Status: SHIPPED | OUTPATIENT
Start: 2021-09-10 | End: 2021-09-17

## 2021-09-10 RX ORDER — METRONIDAZOLE 500 MG/1
500 TABLET ORAL EVERY 12 HOURS
Qty: 20 TABLET | Refills: 0 | Status: SHIPPED | OUTPATIENT
Start: 2021-09-10 | End: 2021-11-15

## 2021-09-10 RX ORDER — CIPROFLOXACIN 500 MG/1
500 TABLET ORAL 2 TIMES DAILY
Qty: 14 TABLET | Refills: 0 | Status: SHIPPED | OUTPATIENT
Start: 2021-09-10 | End: 2021-09-10 | Stop reason: SDUPTHER

## 2021-09-10 RX ORDER — METRONIDAZOLE 500 MG/1
500 TABLET ORAL EVERY 12 HOURS
Qty: 20 TABLET | Refills: 0 | Status: SHIPPED | OUTPATIENT
Start: 2021-09-10 | End: 2021-09-10

## 2021-09-10 RX ORDER — METRONIDAZOLE 7.5 MG/G
1 GEL VAGINAL 2 TIMES DAILY
Qty: 70 G | Refills: 1 | Status: SHIPPED | OUTPATIENT
Start: 2021-09-10 | End: 2021-11-15

## 2021-09-11 RX ORDER — LACTOBACILLUS COMBINATION NO.4 3B CELL
CAPSULE ORAL
Qty: 90 CAPSULE | Refills: 3 | Status: SHIPPED | OUTPATIENT
Start: 2021-09-11 | End: 2021-12-27 | Stop reason: CLARIF

## 2021-10-28 ENCOUNTER — TELEPHONE (OUTPATIENT)
Dept: PRIMARY CARE CLINIC | Facility: CLINIC | Age: 41
End: 2021-10-28
Payer: MEDICAID

## 2021-10-28 DIAGNOSIS — Z01.818 PREOP EXAMINATION: Primary | ICD-10-CM

## 2021-11-11 ENCOUNTER — TELEPHONE (OUTPATIENT)
Dept: PRIMARY CARE CLINIC | Facility: CLINIC | Age: 41
End: 2021-11-11
Payer: MEDICAID

## 2021-11-11 DIAGNOSIS — Z01.818 PREOP TESTING: Primary | ICD-10-CM

## 2021-11-15 ENCOUNTER — OFFICE VISIT (OUTPATIENT)
Dept: PRIMARY CARE CLINIC | Facility: CLINIC | Age: 41
End: 2021-11-15
Payer: MEDICAID

## 2021-11-15 VITALS
BODY MASS INDEX: 41.16 KG/M2 | OXYGEN SATURATION: 100 % | WEIGHT: 262.25 LBS | HEIGHT: 67 IN | RESPIRATION RATE: 16 BRPM | HEART RATE: 74 BPM | TEMPERATURE: 98 F | DIASTOLIC BLOOD PRESSURE: 68 MMHG | SYSTOLIC BLOOD PRESSURE: 110 MMHG

## 2021-11-15 DIAGNOSIS — E16.1 INCREASED INSULIN LEVEL: Primary | ICD-10-CM

## 2021-11-15 DIAGNOSIS — L65.9 THINNING HAIR: ICD-10-CM

## 2021-11-15 PROCEDURE — 99214 OFFICE O/P EST MOD 30 MIN: CPT | Mod: S$PBB,,, | Performed by: STUDENT IN AN ORGANIZED HEALTH CARE EDUCATION/TRAINING PROGRAM

## 2021-11-15 PROCEDURE — 99999 PR PBB SHADOW E&M-EST. PATIENT-LVL III: CPT | Mod: PBBFAC,,, | Performed by: STUDENT IN AN ORGANIZED HEALTH CARE EDUCATION/TRAINING PROGRAM

## 2021-11-15 PROCEDURE — 99213 OFFICE O/P EST LOW 20 MIN: CPT | Mod: PBBFAC,PN | Performed by: STUDENT IN AN ORGANIZED HEALTH CARE EDUCATION/TRAINING PROGRAM

## 2021-11-15 PROCEDURE — 99999 PR PBB SHADOW E&M-EST. PATIENT-LVL III: ICD-10-PCS | Mod: PBBFAC,,, | Performed by: STUDENT IN AN ORGANIZED HEALTH CARE EDUCATION/TRAINING PROGRAM

## 2021-11-15 PROCEDURE — 99214 PR OFFICE/OUTPT VISIT, EST, LEVL IV, 30-39 MIN: ICD-10-PCS | Mod: S$PBB,,, | Performed by: STUDENT IN AN ORGANIZED HEALTH CARE EDUCATION/TRAINING PROGRAM

## 2022-01-03 ENCOUNTER — TELEPHONE (OUTPATIENT)
Dept: PRIMARY CARE CLINIC | Facility: CLINIC | Age: 42
End: 2022-01-03
Payer: MEDICAID

## 2022-01-03 NOTE — TELEPHONE ENCOUNTER
----- Message from Sri Tao sent at 1/3/2022  8:24 AM CST -----  Contact: Pt [phone]  Patient is requesting a call back to have a prescription authorized that she received from the ER ([place]) last night. RX [facility]     Please call and advise.    Thank You

## 2022-01-04 ENCOUNTER — TELEPHONE (OUTPATIENT)
Dept: PRIMARY CARE CLINIC | Facility: CLINIC | Age: 42
End: 2022-01-04
Payer: MEDICAID

## 2022-01-04 DIAGNOSIS — I82.3 EMBOLISM AND THROMBOSIS OF RENAL VEIN: Primary | ICD-10-CM

## 2022-01-04 NOTE — PROGRESS NOTES
Placing order for patient's Eliquis as she had clots noted in hospital, and was able to have medication filled from hospital Rx due to requirement of prior auth.    Strongly advise patient to keep appt with [facility] on 2/1/2022.

## 2022-01-04 NOTE — TELEPHONE ENCOUNTER
----- Message from Grace Riley sent at 1/4/2022  1:08 PM CST -----  Contact: pt  Pt calling to speak with tarsha ruiz about her pre authorization for her medication.Please advise

## 2022-01-04 NOTE — TELEPHONE ENCOUNTER
----- Message from Christophe Joshi sent at 1/4/2022  3:32 PM CST -----  Contact: [phone] pt  Pt needs an authorization for apixaban (ELIQUIS) 5 mg Tab, receive  authorization to [phone] or [employer]. Once approved the office can Fax it to [employer] # [phone]. Please call and advise

## 2022-01-04 NOTE — TELEPHONE ENCOUNTER
Patient was given #60 tablets on 1/1/22, but only authorized to take 1 tablet BID.    [facility] ED wanted her to take 2 tablets BID for 7 days which does need authorization.   And then go to 1 tablet BID.    I did receive authorization PA#[id number] today, but [facility] said it won't go through.  He will try again tomorrow.  Patient should have enough with the original fill to make it until her Hematology appt on 2/1/22.

## 2022-01-10 ENCOUNTER — PATIENT MESSAGE (OUTPATIENT)
Dept: PRIMARY CARE CLINIC | Facility: CLINIC | Age: 42
End: 2022-01-10
Payer: MEDICAID

## 2022-01-10 ENCOUNTER — TELEPHONE (OUTPATIENT)
Dept: PRIMARY CARE CLINIC | Facility: CLINIC | Age: 42
End: 2022-01-10
Payer: MEDICAID

## 2022-01-10 NOTE — TELEPHONE ENCOUNTER
----- Message from Linda Santizo sent at 1/10/2022  3:27 PM CST -----  [facility]  Contact: patient [phone]  Patient would like a call back to discuss medication that was prescribed.  Please call to discuss

## 2022-01-10 NOTE — TELEPHONE ENCOUNTER
Patient states that she is on her period , but today it is extra heavy & clotty.  She is wondering if it could be the Eliquis causing her cycle to be this way?

## 2022-01-19 ENCOUNTER — OFFICE VISIT (OUTPATIENT)
Dept: PRIMARY CARE CLINIC | Facility: CLINIC | Age: 42
End: 2022-01-19
Payer: MEDICAID

## 2022-01-19 VITALS
RESPIRATION RATE: 16 BRPM | HEIGHT: 67 IN | DIASTOLIC BLOOD PRESSURE: 66 MMHG | BODY MASS INDEX: 35.68 KG/M2 | SYSTOLIC BLOOD PRESSURE: 110 MMHG | TEMPERATURE: 98 F | HEART RATE: 90 BPM | WEIGHT: 227.31 LBS | OXYGEN SATURATION: 99 %

## 2022-01-19 DIAGNOSIS — E87.6 HYPOKALEMIA: ICD-10-CM

## 2022-01-19 DIAGNOSIS — Z98.890 STATUS POST GASTRIC SURGERY: Primary | ICD-10-CM

## 2022-01-19 DIAGNOSIS — I81 PORTAL VEIN THROMBOSIS: ICD-10-CM

## 2022-01-19 DIAGNOSIS — I82.3 EMBOLISM AND THROMBOSIS OF RENAL VEIN: ICD-10-CM

## 2022-01-19 PROCEDURE — 99214 OFFICE O/P EST MOD 30 MIN: CPT | Mod: PBBFAC,PN | Performed by: STUDENT IN AN ORGANIZED HEALTH CARE EDUCATION/TRAINING PROGRAM

## 2022-01-19 PROCEDURE — 99214 OFFICE O/P EST MOD 30 MIN: CPT | Mod: S$PBB,,, | Performed by: STUDENT IN AN ORGANIZED HEALTH CARE EDUCATION/TRAINING PROGRAM

## 2022-01-19 PROCEDURE — 3008F BODY MASS INDEX DOCD: CPT | Mod: CPTII,,, | Performed by: STUDENT IN AN ORGANIZED HEALTH CARE EDUCATION/TRAINING PROGRAM

## 2022-01-19 PROCEDURE — 3078F DIAST BP <80 MM HG: CPT | Mod: CPTII,,, | Performed by: STUDENT IN AN ORGANIZED HEALTH CARE EDUCATION/TRAINING PROGRAM

## 2022-01-19 PROCEDURE — 3078F PR MOST RECENT DIASTOLIC BLOOD PRESSURE < 80 MM HG: ICD-10-PCS | Mod: CPTII,,, | Performed by: STUDENT IN AN ORGANIZED HEALTH CARE EDUCATION/TRAINING PROGRAM

## 2022-01-19 PROCEDURE — 1160F PR REVIEW ALL MEDS BY PRESCRIBER/CLIN PHARMACIST DOCUMENTED: ICD-10-PCS | Mod: CPTII,,, | Performed by: STUDENT IN AN ORGANIZED HEALTH CARE EDUCATION/TRAINING PROGRAM

## 2022-01-19 PROCEDURE — 1160F RVW MEDS BY RX/DR IN RCRD: CPT | Mod: CPTII,,, | Performed by: STUDENT IN AN ORGANIZED HEALTH CARE EDUCATION/TRAINING PROGRAM

## 2022-01-19 PROCEDURE — 3074F SYST BP LT 130 MM HG: CPT | Mod: CPTII,,, | Performed by: STUDENT IN AN ORGANIZED HEALTH CARE EDUCATION/TRAINING PROGRAM

## 2022-01-19 PROCEDURE — 3074F PR MOST RECENT SYSTOLIC BLOOD PRESSURE < 130 MM HG: ICD-10-PCS | Mod: CPTII,,, | Performed by: STUDENT IN AN ORGANIZED HEALTH CARE EDUCATION/TRAINING PROGRAM

## 2022-01-19 PROCEDURE — 99214 PR OFFICE/OUTPT VISIT, EST, LEVL IV, 30-39 MIN: ICD-10-PCS | Mod: S$PBB,,, | Performed by: STUDENT IN AN ORGANIZED HEALTH CARE EDUCATION/TRAINING PROGRAM

## 2022-01-19 PROCEDURE — 99999 PR PBB SHADOW E&M-EST. PATIENT-LVL IV: ICD-10-PCS | Mod: PBBFAC,,, | Performed by: STUDENT IN AN ORGANIZED HEALTH CARE EDUCATION/TRAINING PROGRAM

## 2022-01-19 PROCEDURE — 1159F PR MEDICATION LIST DOCUMENTED IN MEDICAL RECORD: ICD-10-PCS | Mod: CPTII,,, | Performed by: STUDENT IN AN ORGANIZED HEALTH CARE EDUCATION/TRAINING PROGRAM

## 2022-01-19 PROCEDURE — 3008F PR BODY MASS INDEX (BMI) DOCUMENTED: ICD-10-PCS | Mod: CPTII,,, | Performed by: STUDENT IN AN ORGANIZED HEALTH CARE EDUCATION/TRAINING PROGRAM

## 2022-01-19 PROCEDURE — 99999 PR PBB SHADOW E&M-EST. PATIENT-LVL IV: CPT | Mod: PBBFAC,,, | Performed by: STUDENT IN AN ORGANIZED HEALTH CARE EDUCATION/TRAINING PROGRAM

## 2022-01-19 PROCEDURE — 1159F MED LIST DOCD IN RCRD: CPT | Mod: CPTII,,, | Performed by: STUDENT IN AN ORGANIZED HEALTH CARE EDUCATION/TRAINING PROGRAM

## 2022-01-19 RX ORDER — PANTOPRAZOLE SODIUM 40 MG/1
40 TABLET, DELAYED RELEASE ORAL DAILY
COMMUNITY
End: 2022-04-11

## 2022-01-19 NOTE — PROGRESS NOTES
"Subjective:           Patient ID: Giovanni Pena is a 41 y.o. female who presents today with a chief complaint of embolism.    Chief Complaint:   Follow-up (Embolism and thrombosis of renal vein)      History of Present Illness:    42yo female presenting for f/u of embolism and thrombosis.     Patient had gastric bypass in December 2021. Had a renal vein embolus, was started on apixaban twice a day, has appointment with Hematology on February 1, 2022.    CT abd and pelvis from [facility] on 12/30/2021 shows,   "Ill-defined hypoattenuation within the region of the joy hepatis most concerning for renal vein thrombosis with intrahepatic extension into the portal veins with additional thrombus apparent in the SMV and splenic vein. This appears to result in regional attenuation differences within the liver. A mass within the joy hepatis cannot be fully excluded. "      Elequis 5mg BID was started daily.   Has an appt with Hematology on 2/1/2022.    States was having a heavier mestural flow and was longer as well; but has since stopped.     States she is still getting used to dietary adjustments for her eating.       Review of Systems   Constitutional: Negative for activity change, fatigue, fever and unexpected weight change.   HENT: Negative for congestion, nosebleeds, sinus pressure and sneezing.    Respiratory: Negative for cough, shortness of breath and wheezing.    Cardiovascular: Negative for chest pain, palpitations and leg swelling.   Gastrointestinal: Positive for abdominal distention and abdominal pain. Negative for constipation, diarrhea and nausea.   Genitourinary: Negative for difficulty urinating and dysuria.   Musculoskeletal: Negative for back pain and gait problem.   Skin: Negative for pallor and rash.   Neurological: Negative for weakness, numbness and headaches.   Psychiatric/Behavioral: Negative for agitation. The patient is not nervous/anxious.            Objective:        Vitals:    01/19/22 1359   BP: " "110/66   BP Location: Right arm   Patient Position: Sitting   BP Method: Medium (Manual)   Pulse: 104   Resp: 16   Temp: 97.6 °F (36.4 °C)   TempSrc: Oral   SpO2: 99%   Weight: 103.1 kg (227 lb 4.7 oz)   Height: 5' 7" (1.702 m)       Body mass index is 35.6 kg/m².      Physical Exam  Vitals reviewed.   Constitutional:       General: She is not in acute distress.     Appearance: Normal appearance.   HENT:      Head: Normocephalic and atraumatic.      Right Ear: External ear normal.      Left Ear: External ear normal.      Nose: No rhinorrhea.      Mouth/Throat:      Mouth: Mucous membranes are moist.   Eyes:      Extraocular Movements: Extraocular movements intact.      Conjunctiva/sclera: Conjunctivae normal.   Cardiovascular:      Rate and Rhythm: Normal rate.   Pulmonary:      Effort: Pulmonary effort is normal. No respiratory distress.   Musculoskeletal:      Right lower leg: No edema.      Left lower leg: No edema.   Skin:     Coloration: Skin is not jaundiced.      Findings: No bruising.   Neurological:      General: No focal deficit present.      Mental Status: She is alert and oriented to person, place, and time.      Motor: No weakness.      Gait: Gait normal.   Psychiatric:         Mood and Affect: Mood normal.             Lab Results   Component Value Date     (L) 12/27/2021    K 3.9 12/27/2021     12/27/2021    CO2 17 (L) 12/27/2021    BUN 8 12/27/2021    CREATININE 0.9 12/27/2021    ANIONGAP 18 (H) 12/27/2021     Lab Results   Component Value Date    HGBA1C 5.0 11/18/2021     No results found for: BNP, BNPTRIAGEBLO    Lab Results   Component Value Date    WBC 7.27 12/27/2021    HGB 13.9 12/27/2021    HCT 43.7 12/27/2021     12/27/2021    GRAN 4.6 12/27/2021    GRAN 63.6 12/27/2021     Lab Results   Component Value Date    CHOL 184 12/30/2021    CHOL 200 10/21/2020    HDL 38 (L) 12/30/2021    HDL 50 10/21/2020    LDLCALC 130 (H) 12/30/2021    LDLCALC 135 (H) 10/21/2020    TRIG 78 " 12/30/2021    TRIG 76 10/21/2020          Current Outpatient Medications:     apixaban (ELIQUIS) 5 mg Tab, Take 1 tablet (5 mg total) by mouth 2 (two) times daily., Disp: 60 tablet, Rfl: 0    pantoprazole (PROTONIX) 40 MG tablet, Take 40 mg by mouth once daily., Disp: , Rfl:     prochlorperazine (COMPAZINE) 10 MG tablet, Take 1 tablet (10 mg total) by mouth every 6 (six) hours as needed., Disp: 15 tablet, Rfl: 0     Outpatient Encounter Medications as of 1/19/2022   Medication Sig Dispense Refill    apixaban (ELIQUIS) 5 mg Tab Take 1 tablet (5 mg total) by mouth 2 (two) times daily. 60 tablet 0    pantoprazole (PROTONIX) 40 MG tablet Take 40 mg by mouth once daily.      prochlorperazine (COMPAZINE) 10 MG tablet Take 1 tablet (10 mg total) by mouth every 6 (six) hours as needed. 15 tablet 0     No facility-administered encounter medications on file as of 1/19/2022.          Assessment:       1. Status post gastric surgery    2. 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 Hypokalemia           Plan:       Status post gastric surgery  -     Ambulatory referral/consult to Nutrition Services; Future; Expected date: 01/26/2022    Portal vein thrombosis    Embolism and thrombosis of renal vein    Hypokalemia  -     Comprehensive Metabolic Panel; Future; Expected date: 01/19/2022         Hx of Portal and Renal Vein Thrombus:    - will remain on the Elequis 5mg BID at this time.   - has an appt with Hepatology in 2 weeks.   - has been doing well to this point, had a heavier menstrual cycle form the anticoagulant, but has since resolved.     Hypokalemia:   - patient's potassium was low at last check at hospital.   - rechecking today. May consider supplementation if needed.    Status post gastric sleeve:   - patient had limited nutritional Education from surgical team performed gastric sleeve in [place].  Sending referral to nutritionist here to discuss how she should be proceeding with her  postsurgical diet at this time.

## 2022-01-19 NOTE — PATIENT INSTRUCTIONS
Hx of Portal and Renal Vein Thrombus:    - will remain on the Elequis 5mg BID at this time.   - has an appt with Hepatology in 2 weeks.   - has been doing well to this point, had a heavier menstrual cycle form the anticoagulant, but has since resolved.     Hypokalemia:   - patient's potassium was low at last check at hospital.   - rechecking today. May consider supplementation if needed.    Status post gastric sleeve:   - patient had limited nutritional Education from surgical team performed gastric sleeve in [place].  Sending referral to nutritionist here to discuss how she should be proceeding with her postsurgical diet at this time.

## 2022-02-08 ENCOUNTER — PATIENT OUTREACH (OUTPATIENT)
Dept: ADMINISTRATIVE | Facility: OTHER | Age: 42
End: 2022-02-08
Payer: MEDICAID

## 2022-02-09 ENCOUNTER — TELEPHONE (OUTPATIENT)
Dept: PRIMARY CARE CLINIC | Facility: CLINIC | Age: 42
End: 2022-02-09
Payer: MEDICAID

## 2022-02-09 NOTE — TELEPHONE ENCOUNTER
----- Message from Nettie Meng sent at 2/9/2022  3:07 PM CST -----  Contact: self/[phone]  Caller is requesting an earlier appointment then we can schedule.  Caller is requesting a message be sent to the provider.  If this is for urgent care symptoms, did you offer other providers at this location, providers at other locations, or [facility]? (yes, no, n/a):    If this is for the patients physical, did you offer to schedule next available and put on wait list, or to see NP or PA for their physical?  (yes, no, n/a):    When is the next available appointment with their provider:  7-18-22  Reason for the appointment:  pt has stomach pain after surgery  Patient preference of timeframe to be scheduled:  ASAP  Would the patient like a call back, or a response through their [facility] portal?:  call back  Comments:       Please advise

## 2022-02-10 ENCOUNTER — CLINICAL SUPPORT (OUTPATIENT)
Dept: DIABETES | Facility: CLINIC | Age: 42
End: 2022-02-10
Payer: MEDICAID

## 2022-02-10 DIAGNOSIS — E66.9 OBESITY, CLASS II, BMI 35-39.9: Primary | ICD-10-CM

## 2022-02-10 DIAGNOSIS — Z98.890 STATUS POST GASTRIC SURGERY: ICD-10-CM

## 2022-02-10 PROCEDURE — 99999 PR PBB SHADOW E&M-EST. PATIENT-LVL II: ICD-10-PCS | Mod: PBBFAC,,, | Performed by: DIETITIAN, REGISTERED

## 2022-02-10 PROCEDURE — 99212 OFFICE O/P EST SF 10 MIN: CPT | Mod: PBBFAC,PN | Performed by: DIETITIAN, REGISTERED

## 2022-02-10 PROCEDURE — 97802 MEDICAL NUTRITION INDIV IN: CPT | Mod: PBBFAC,PN | Performed by: DIETITIAN, REGISTERED

## 2022-02-10 PROCEDURE — 99999 PR PBB SHADOW E&M-EST. PATIENT-LVL II: CPT | Mod: PBBFAC,,, | Performed by: DIETITIAN, REGISTERED

## 2022-02-10 NOTE — LETTER
February 10, 2022      [facility]  [address]  [facility] [address]  Phone: [phone]  Fax: [phone]       Patient: Giovanni Pena   YOB: 1980  Date of Visit: 02/10/2022    To Whom It May Concern:    Barbie Pena  was at [facility] on 02/10/2022. The patient may return to work/school on 2/11/2022 with no restrictions. If you have any questions or concerns, or if I can be of further assistance, please do not hesitate to contact me.    Sincerely,    Jacqui Gil RD, CDE

## 2022-02-11 ENCOUNTER — OFFICE VISIT (OUTPATIENT)
Dept: PRIMARY CARE CLINIC | Facility: CLINIC | Age: 42
End: 2022-02-11
Payer: MEDICAID

## 2022-02-11 VITALS
OXYGEN SATURATION: 95 % | TEMPERATURE: 98 F | RESPIRATION RATE: 16 BRPM | SYSTOLIC BLOOD PRESSURE: 102 MMHG | BODY MASS INDEX: 35.14 KG/M2 | WEIGHT: 223.88 LBS | DIASTOLIC BLOOD PRESSURE: 72 MMHG | HEIGHT: 67 IN | HEART RATE: 71 BPM

## 2022-02-11 DIAGNOSIS — N89.8 DISCHARGE FROM THE VAGINA: ICD-10-CM

## 2022-02-11 DIAGNOSIS — Z98.890 H/O UMBILICAL HERNIA REPAIR: ICD-10-CM

## 2022-02-11 DIAGNOSIS — I81 PORTAL VEIN THROMBOSIS: ICD-10-CM

## 2022-02-11 DIAGNOSIS — I82.3 EMBOLISM AND THROMBOSIS OF RENAL VEIN: ICD-10-CM

## 2022-02-11 DIAGNOSIS — R10.13 EPIGASTRIC PAIN: Primary | ICD-10-CM

## 2022-02-11 DIAGNOSIS — Z87.19 H/O UMBILICAL HERNIA REPAIR: ICD-10-CM

## 2022-02-11 PROCEDURE — 3078F DIAST BP <80 MM HG: CPT | Mod: CPTII,,, | Performed by: STUDENT IN AN ORGANIZED HEALTH CARE EDUCATION/TRAINING PROGRAM

## 2022-02-11 PROCEDURE — 3008F BODY MASS INDEX DOCD: CPT | Mod: CPTII,,, | Performed by: STUDENT IN AN ORGANIZED HEALTH CARE EDUCATION/TRAINING PROGRAM

## 2022-02-11 PROCEDURE — 99214 OFFICE O/P EST MOD 30 MIN: CPT | Mod: S$PBB,,, | Performed by: STUDENT IN AN ORGANIZED HEALTH CARE EDUCATION/TRAINING PROGRAM

## 2022-02-11 PROCEDURE — 99214 OFFICE O/P EST MOD 30 MIN: CPT | Mod: PBBFAC,PN | Performed by: STUDENT IN AN ORGANIZED HEALTH CARE EDUCATION/TRAINING PROGRAM

## 2022-02-11 PROCEDURE — 1160F PR REVIEW ALL MEDS BY PRESCRIBER/CLIN PHARMACIST DOCUMENTED: ICD-10-PCS | Mod: CPTII,,, | Performed by: STUDENT IN AN ORGANIZED HEALTH CARE EDUCATION/TRAINING PROGRAM

## 2022-02-11 PROCEDURE — 87481 CANDIDA DNA AMP PROBE: CPT | Mod: 59 | Performed by: STUDENT IN AN ORGANIZED HEALTH CARE EDUCATION/TRAINING PROGRAM

## 2022-02-11 PROCEDURE — 1159F PR MEDICATION LIST DOCUMENTED IN MEDICAL RECORD: ICD-10-PCS | Mod: CPTII,,, | Performed by: STUDENT IN AN ORGANIZED HEALTH CARE EDUCATION/TRAINING PROGRAM

## 2022-02-11 PROCEDURE — 99214 PR OFFICE/OUTPT VISIT, EST, LEVL IV, 30-39 MIN: ICD-10-PCS | Mod: S$PBB,,, | Performed by: STUDENT IN AN ORGANIZED HEALTH CARE EDUCATION/TRAINING PROGRAM

## 2022-02-11 PROCEDURE — 99999 PR PBB SHADOW E&M-EST. PATIENT-LVL IV: ICD-10-PCS | Mod: PBBFAC,,, | Performed by: STUDENT IN AN ORGANIZED HEALTH CARE EDUCATION/TRAINING PROGRAM

## 2022-02-11 PROCEDURE — 3074F SYST BP LT 130 MM HG: CPT | Mod: CPTII,,, | Performed by: STUDENT IN AN ORGANIZED HEALTH CARE EDUCATION/TRAINING PROGRAM

## 2022-02-11 PROCEDURE — 3078F PR MOST RECENT DIASTOLIC BLOOD PRESSURE < 80 MM HG: ICD-10-PCS | Mod: CPTII,,, | Performed by: STUDENT IN AN ORGANIZED HEALTH CARE EDUCATION/TRAINING PROGRAM

## 2022-02-11 PROCEDURE — 3074F PR MOST RECENT SYSTOLIC BLOOD PRESSURE < 130 MM HG: ICD-10-PCS | Mod: CPTII,,, | Performed by: STUDENT IN AN ORGANIZED HEALTH CARE EDUCATION/TRAINING PROGRAM

## 2022-02-11 PROCEDURE — 1160F RVW MEDS BY RX/DR IN RCRD: CPT | Mod: CPTII,,, | Performed by: STUDENT IN AN ORGANIZED HEALTH CARE EDUCATION/TRAINING PROGRAM

## 2022-02-11 PROCEDURE — 1159F MED LIST DOCD IN RCRD: CPT | Mod: CPTII,,, | Performed by: STUDENT IN AN ORGANIZED HEALTH CARE EDUCATION/TRAINING PROGRAM

## 2022-02-11 PROCEDURE — 3008F PR BODY MASS INDEX (BMI) DOCUMENTED: ICD-10-PCS | Mod: CPTII,,, | Performed by: STUDENT IN AN ORGANIZED HEALTH CARE EDUCATION/TRAINING PROGRAM

## 2022-02-11 PROCEDURE — 99999 PR PBB SHADOW E&M-EST. PATIENT-LVL IV: CPT | Mod: PBBFAC,,, | Performed by: STUDENT IN AN ORGANIZED HEALTH CARE EDUCATION/TRAINING PROGRAM
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
## 2022-02-11 NOTE — LETTER
February 11, 2022      [facility] [address], [facility] [address]  [facility] [address]  Phone: [phone]  Fax: [phone]       Patient: Giovanni Pena   YOB: 1980  Date of Visit: 02/11/2022    To Whom It May Concern:    Barbie Pena  was at [facility] on 02/11/2022. The patient may return to work/school on 02/11/2022 with no restrictions. If you have any questions or concerns, or if I can be of further assistance, please do not hesitate to contact me.    Sincerely,    Dr. Miquel Mishra M.D.
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
## 2022-02-15 VITALS — BODY MASS INDEX: 34.84 KG/M2 | HEIGHT: 67 IN | WEIGHT: 222 LBS

## 2022-02-15 NOTE — PROGRESS NOTES
PCP: Miquel Mishra MD  REFERRING PROVIDER:  Miquel Mishra MD      HISTORY OF PRESENT ILLNESS:  41 y.o. female patient is in clinic today for bariatric surgery after visit. She was seen in the ED for evaluation of generalized weakness, dehydration, and a change in appetite beginning after she had undergone bariatric surgery performed in [place] December 8th. She states she is on regular foods and swallowing a MVI and Calcium Supplement when able to.    Patient denies polydipsia, polyuria, polyphagia, blurred vision and hypoglycemia.    VITAL SIGNS:  There were no vitals filed for this visit.  IBW: 135 lbs +/-10%, AdjIBW: 170 lbs/77.2kg and Actual Weight 222 lbs    ALLERGIES & MEDICATIONS: Reviewed and Reconciled  MEDICAL/SURGICAL & FAMILY HISTORY: Reviewed and Reconciled    LABORATORY:  A1C:   Hemoglobin A1C   Date Value Ref Range Status   11/18/2021 5.0 4.0 - 5.6 % Final     Comment:     ADA Screening Guidelines:  5.7-6.4%  Consistent with prediabetes  >or=6.5%  Consistent with diabetes    High levels of fetal hemoglobin interfere with the HbA1C  assay. Heterozygous hemoglobin variants (HbS, HgC, etc)do  not significantly interfere with this assay.   However, presence of multiple variants may affect accuracy.       Chol:   Cholesterol   Date Value Ref Range Status   12/30/2021 184 <200 mg/dL Final   10/21/2020 200 80 - 200 mg/dL Final     Comment:     The National Cholesterol Education Program (NCEP) has set the  following guidelines (reference ranges) for Cholesterol:  Optimal.....................<200 mg/dL  Borderline High.............200-239 mg/dL  High........................> or = 240 mg/dL       Trig:   Triglycerides   Date Value Ref Range Status   12/30/2021 78 <150 mg/dL Final   10/21/2020 76 30 - 150 mg/dL Final     Comment:     The National Cholesterol Education Program (NCEP) has set the  following guidelines (reference values) for triglycerides:  Normal......................<150  mg/dL  Borderline High.............150-199 mg/dL  High........................200-499 mg/dL       HDL:   HDL   Date Value Ref Range Status   12/30/2021 38 (L) 40 - 59 mg/dL Final   10/21/2020 50 40 - 75 mg/dL Final     Comment:     The National Cholesterol Education Program (NCEP) has set the  following guidelines (reference values) for HDL Cholesterol:  Low...............<40 mg/dL  Optimal...........>60 mg/dL       LDL: No components found for: LDL   BUN:      BUN   Date Value Ref Range Status   02/14/2022 5 (L) 6 - 20 mg/dL Final     AST:    AST   Date Value Ref Range Status   02/14/2022 19 10 - 40 U/L Final         ALT:    ALT   Date Value Ref Range Status   02/14/2022 13 10 - 44 U/L Final     Micro/Cr Ratio:    Creatinine   Date Value Ref Range Status   02/14/2022 0.7 0.5 - 1.4 mg/dL Final       ACTIVITY LEVEL: Aerobic none. Resistance none.    NUTRITION INTAKE: Meal patterns include 3 meals, Patient is not limiting carbohydrates, saturated fat or sodium. Inadequate intakes of fruits, vegetables, whole grains.  B - Eggs or nothing  S - nothing  L - occasionally a protein drink  S - nothing  D - meat, salad, starch  S - nothing  Beverages - occasionally a protein drink, water  DIning out - not often    MNT ASSESSMENT:   600-1200 calories,  grams protein daily  Less than 30 grams carb/meal, less than 15 grams carb/snack  increase fruit 2 serv/d, vegetables 2+ cups/d, whole grains 3+serv/d, lowfat dairy 3+serv/d  low-fat, low-sodium  150 min physical activity per week, moderate intensity, as tolerated    PLAN:    Reviewed MNT guidelines for obesity and weight management.   Encouraged daily self-monitoring of food & activity patterns.    Provided pre & post surgery meal-planning instruction via bariatric diet manual, foodlists, plate method, food models, food labels and/or ADA booklet. Reviewed micro/macronutrient effect on weight management. Discussed carbohydrate counting and spacing techniques with  emphasis on supplementation necessary for altered metabolism. Reviewed principles of energy metabolism to assist weight and health management.                                                          Discussed physical activity with review of benefits, methods, precautions.    Discussed bx strategies for improving, strategies for improving social & environmental support of lifestyle changes.     Education provided:   Discussed protein sources like eggs, low-fat cottage cheese, greek yogurt, sliced deli turkey, low-fat sliced cheese, protein drinks and protein bars, foods to avoid/limit such as starchy carbohydrates (bread, rice, pasta, potatoes, corn, grits, oatmeal, etc), planning to have 4-6 small meals a day rather than 3 large meals, measure portion sizes, use smaller bowls and plates, limit eating out to once a week and make low fat, low carbohydrate choices, include fruits and vegetables in daily meal plan, avoid/limit candy and desserts, low fat options (baked, broiled, grilled, and boiled instead of fried, sauteed, creamed), increase physical activity, chew food thoroughly before swallowing, sip beverages don't chug or gulp, no liquids 30 minutes before, during and 30 minutes after meals  Reviewed pre-op liquid diet, protein supplement suggestions, sugar free clear liquids allowed in unlimited amounts, progression of diet after Bariatric surgery, Phase 1 liquid (high protein liquids for 1-2 weeks after surgery), phase 2 pureed (for the next 2 weeks phase 1 items and add pureed foods and soft scrambled eggs, always focus on getting protein in first), phase 3 soft (all the items from phases 1 and 2 add soft foods that can easily be mashed with a fork, add cooked vegetables and fruits that are soft, no skins) and phase 4 solid (add back one food item at a time, focus on getting protein in first, can add back raw vegetables, lettuce, unsalted nuts and seeds, limit fruit to no more than 2 servings a day, and  protein bars), reviewed life long nutrition guidelines like eating slowly, eat and drink small amounts at a time, stop eating and drinking when you feel full, chew food thoroughly before swallowing, drink adequate fluids, eat protein rich foods first, keep food choices sugar free and low in fat, avoid starchy carbohydrates, reviewed common nutritional problem and prevention tips, encouraged physical activity with permission from physician, reviewed required vitamin/mineral supplements and where to purchase, resources for bariatric patients and helpful apps for bariatric patients, and ten tips for healthy and conscious eati ng.     · Reviewed Nutrition Before and After Surgery materials  · Reviewed Pre-op Liquid Protein Diet  · Reviewed protein supplement suggestions and where to purchase them  · Reviewed dietary progression after bariatric surgery  · Bariatric high protein liquid diet  · Bariatric high protein pureed diet  · Bariatric high protein soft diet  · Regular Bariatric diet  · Reviewed lifelong nutrition guidelines after weight loss surgery  · Reviewed common nutritional problems and prevention tips  · Reviewed physical activity recommendations  · Reviewed required vitamin/mineral supplements  · Reviewed resources for bariatric patients and helpful apps  · Reviewed tips for healthy and conscious eating  · Reviewed and patient verbalized understanding of and signed bariatric nutrition core points and contract agreement  · Patient completed [facility] nutrition and eating habits questionnaire      GOALS: Self monitoring: daily food & activity journal. Meal plan-90% accuracy, Physical activity-150 minutes per week.   FU: 3 months      Thank you for the opportunity to work with your patient.

## 2022-02-16 ENCOUNTER — PATIENT MESSAGE (OUTPATIENT)
Dept: PRIMARY CARE CLINIC | Facility: CLINIC | Age: 42
End: 2022-02-16
Payer: MEDICAID

## 2022-02-16 LAB
BACTERIAL VAGINOSIS DNA: POSITIVE
CANDIDA GLABRATA DNA: NEGATIVE
CANDIDA KRUSEI DNA: NEGATIVE
CANDIDA RRNA VAG QL PROBE: NEGATIVE
T VAGINALIS RRNA GENITAL QL PROBE: NEGATIVE

## 2022-02-17 DIAGNOSIS — N76.0 BACTERIAL VAGINOSIS: Primary | ICD-10-CM

## 2022-02-17 DIAGNOSIS — B96.89 BACTERIAL VAGINOSIS: Primary | ICD-10-CM

## 2022-02-17 RX ORDER — METRONIDAZOLE 500 MG/1
500 TABLET ORAL EVERY 12 HOURS
Qty: 14 TABLET | Refills: 0 | Status: SHIPPED | OUTPATIENT
Start: 2022-02-17 | End: 2022-02-24

## 2022-02-18 ENCOUNTER — PATIENT MESSAGE (OUTPATIENT)
Dept: PRIMARY CARE CLINIC | Facility: CLINIC | Age: 42
End: 2022-02-18
Payer: MEDICAID

## 2022-02-23 DIAGNOSIS — D84.9 IMMUNOSUPPRESSED STATUS: ICD-10-CM

## 2022-02-28 ENCOUNTER — OFFICE VISIT (OUTPATIENT)
Dept: PRIMARY CARE CLINIC | Facility: CLINIC | Age: 42
End: 2022-02-28
Payer: MEDICAID

## 2022-02-28 VITALS
DIASTOLIC BLOOD PRESSURE: 72 MMHG | OXYGEN SATURATION: 99 % | BODY MASS INDEX: 33.89 KG/M2 | HEIGHT: 67 IN | SYSTOLIC BLOOD PRESSURE: 114 MMHG | TEMPERATURE: 98 F | RESPIRATION RATE: 18 BRPM | HEART RATE: 68 BPM | WEIGHT: 215.94 LBS

## 2022-02-28 DIAGNOSIS — R17 ELEVATED BILIRUBIN: ICD-10-CM

## 2022-02-28 DIAGNOSIS — I82.3 EMBOLISM AND THROMBOSIS OF RENAL VEIN: ICD-10-CM

## 2022-02-28 DIAGNOSIS — I81 PORTAL VEIN THROMBOSIS: Primary | ICD-10-CM

## 2022-02-28 PROCEDURE — 3078F PR MOST RECENT DIASTOLIC BLOOD PRESSURE < 80 MM HG: ICD-10-PCS | Mod: CPTII,,, | Performed by: STUDENT IN AN ORGANIZED HEALTH CARE EDUCATION/TRAINING PROGRAM

## 2022-02-28 PROCEDURE — 3074F PR MOST RECENT SYSTOLIC BLOOD PRESSURE < 130 MM HG: ICD-10-PCS | Mod: CPTII,,, | Performed by: STUDENT IN AN ORGANIZED HEALTH CARE EDUCATION/TRAINING PROGRAM

## 2022-02-28 PROCEDURE — 3008F BODY MASS INDEX DOCD: CPT | Mod: CPTII,,, | Performed by: STUDENT IN AN ORGANIZED HEALTH CARE EDUCATION/TRAINING PROGRAM

## 2022-02-28 PROCEDURE — 1160F PR REVIEW ALL MEDS BY PRESCRIBER/CLIN PHARMACIST DOCUMENTED: ICD-10-PCS | Mod: CPTII,,, | Performed by: STUDENT IN AN ORGANIZED HEALTH CARE EDUCATION/TRAINING PROGRAM

## 2022-02-28 PROCEDURE — 99214 PR OFFICE/OUTPT VISIT, EST, LEVL IV, 30-39 MIN: ICD-10-PCS | Mod: S$PBB,,, | Performed by: STUDENT IN AN ORGANIZED HEALTH CARE EDUCATION/TRAINING PROGRAM

## 2022-02-28 PROCEDURE — 3074F SYST BP LT 130 MM HG: CPT | Mod: CPTII,,, | Performed by: STUDENT IN AN ORGANIZED HEALTH CARE EDUCATION/TRAINING PROGRAM

## 2022-02-28 PROCEDURE — 3008F PR BODY MASS INDEX (BMI) DOCUMENTED: ICD-10-PCS | Mod: CPTII,,, | Performed by: STUDENT IN AN ORGANIZED HEALTH CARE EDUCATION/TRAINING PROGRAM

## 2022-02-28 PROCEDURE — 99999 PR PBB SHADOW E&M-EST. PATIENT-LVL IV: ICD-10-PCS | Mod: PBBFAC,,, | Performed by: STUDENT IN AN ORGANIZED HEALTH CARE EDUCATION/TRAINING PROGRAM

## 2022-02-28 PROCEDURE — 3078F DIAST BP <80 MM HG: CPT | Mod: CPTII,,, | Performed by: STUDENT IN AN ORGANIZED HEALTH CARE EDUCATION/TRAINING PROGRAM

## 2022-02-28 PROCEDURE — 1159F MED LIST DOCD IN RCRD: CPT | Mod: CPTII,,, | Performed by: STUDENT IN AN ORGANIZED HEALTH CARE EDUCATION/TRAINING PROGRAM

## 2022-02-28 PROCEDURE — 1159F PR MEDICATION LIST DOCUMENTED IN MEDICAL RECORD: ICD-10-PCS | Mod: CPTII,,, | Performed by: STUDENT IN AN ORGANIZED HEALTH CARE EDUCATION/TRAINING PROGRAM

## 2022-02-28 PROCEDURE — 1160F RVW MEDS BY RX/DR IN RCRD: CPT | Mod: CPTII,,, | Performed by: STUDENT IN AN ORGANIZED HEALTH CARE EDUCATION/TRAINING PROGRAM

## 2022-02-28 PROCEDURE — 99214 OFFICE O/P EST MOD 30 MIN: CPT | Mod: PBBFAC,PN | Performed by: STUDENT IN AN ORGANIZED HEALTH CARE EDUCATION/TRAINING PROGRAM

## 2022-02-28 PROCEDURE — 99999 PR PBB SHADOW E&M-EST. PATIENT-LVL IV: CPT | Mod: PBBFAC,,, | Performed by: STUDENT IN AN ORGANIZED HEALTH CARE EDUCATION/TRAINING PROGRAM

## 2022-02-28 PROCEDURE — 99214 OFFICE O/P EST MOD 30 MIN: CPT | Mod: S$PBB,,, | Performed by: STUDENT IN AN ORGANIZED HEALTH CARE EDUCATION/TRAINING PROGRAM

## 2022-02-28 NOTE — PATIENT INSTRUCTIONS
Portal Vein Thrombus:    - patient with portal vein thrombus noted in Dec.   - taking Elequis BID for last 2 months, US showed interval improvement.   - checking CMP due to increased TBili and lower Albumin on last CMP.   - would recked CT at 90 days (end of March) then likely stop Elequis if resolved.   - trying to get patient in with [facility] but does not currently have an appt until May.

## 2022-02-28 NOTE — PROGRESS NOTES
Subjective:           Patient ID: Giovanni Pena is a 41 y.o. female who presents today with a chief complaint of abd pain and blood clot.    Chief Complaint:   Abdominal Pain      History of Present Illness:    40yo female presenting for f/u on blood clot in hepatic artery.     Was last seen for appt on 2/11, was having abdominal discomfort at that time.      Was then reported as having stomach pain at the midline.  States she can feel a buldge.  Has a firm feeling.  Pain 5/10.   Today states that it feels very similar.  Maybe a bit less firm, but similar.     Has been taking a daily Protonix since surgery .    Nausea has resolved, still not vomiting.     Had CT at [facility] on 12/30 showing renal vein thrombosis with intrahepatic extension into the portal veins with additional thrombus apparent in the SMV and splenic vein.  Was started on elequis 5mg BID at this time.     On 2/17 had US: Diminutive portal vein, nonspecific may be sequela of previous thrombosis.  No evidence for renal vein thrombosis.    Has an appt on 5/3/2022 with Hematology/Oncology.  Had a TE with them.       Review of Systems   Constitutional: Negative for activity change, fatigue and fever.   HENT: Negative for congestion, nosebleeds, sinus pressure and sneezing.    Respiratory: Negative for cough, shortness of breath and wheezing.    Cardiovascular: Negative for chest pain, palpitations and leg swelling.   Gastrointestinal: Positive for abdominal distention and abdominal pain. Negative for blood in stool, constipation, diarrhea and nausea.   Genitourinary: Negative for difficulty urinating, dysuria and hematuria.   Musculoskeletal: Negative for back pain and gait problem.   Skin: Negative for pallor and rash.   Neurological: Negative for weakness, numbness and headaches.   Psychiatric/Behavioral: Negative for agitation. The patient is not nervous/anxious.            Objective:        Vitals:    02/28/22 1128   BP: 114/72   BP Location: Right arm  "  Patient Position: Sitting   BP Method: Medium (Manual)   Pulse: 68   Resp: 18   Temp: 97.9 °F (36.6 °C)   TempSrc: Oral   SpO2: 99%   Weight: 98 kg (215 lb 15.1 oz)   Height: 5' 7" (1.702 m)       Body mass index is 33.82 kg/m².      Physical Exam  Constitutional:       General: She is not in acute distress.     Appearance: Normal appearance. She is obese.   HENT:      Head: Normocephalic and atraumatic.      Right Ear: External ear normal.      Left Ear: External ear normal.      Nose: No rhinorrhea.      Mouth/Throat:      Mouth: Mucous membranes are moist.   Eyes:      Extraocular Movements: Extraocular movements intact.      Conjunctiva/sclera: Conjunctivae normal.   Cardiovascular:      Rate and Rhythm: Normal rate and regular rhythm.      Heart sounds: No murmur heard.  Pulmonary:      Effort: Pulmonary effort is normal. No respiratory distress.   Abdominal:      General: There is distension.      Tenderness: There is abdominal tenderness. There is no right CVA tenderness, left CVA tenderness or guarding.      Comments: Tender at midline of abdomen.  No mass palpable.    Musculoskeletal:      Right lower leg: No edema.      Left lower leg: No edema.   Skin:     Capillary Refill: Capillary refill takes less than 2 seconds.      Coloration: Skin is not jaundiced.      Findings: No bruising.   Neurological:      General: No focal deficit present.      Mental Status: She is alert and oriented to person, place, and time.      Motor: No weakness.      Gait: Gait normal.   Psychiatric:         Mood and Affect: Mood normal.             Lab Results   Component Value Date     02/14/2022    K 3.0 (L) 02/14/2022     02/14/2022    CO2 26 02/14/2022    BUN 5 (L) 02/14/2022    CREATININE 0.7 02/14/2022    ANIONGAP 10 02/14/2022     Lab Results   Component Value Date    HGBA1C 5.0 11/18/2021     No results found for: BNP, BNPTRIAGEBLO    Lab Results   Component Value Date    WBC 6.15 02/14/2022    HGB 10.7 (L) " 02/14/2022    HCT 34.6 (L) 02/14/2022     02/14/2022    GRAN 3.8 02/14/2022    GRAN 61.1 02/14/2022     Lab Results   Component Value Date    CHOL 184 12/30/2021    CHOL 200 10/21/2020    HDL 38 (L) 12/30/2021    HDL 50 10/21/2020    LDLCALC 130 (H) 12/30/2021    LDLCALC 135 (H) 10/21/2020    TRIG 78 12/30/2021    TRIG 76 10/21/2020          Current Outpatient Medications:     HYDROcodone-acetaminophen (NORCO) 5-325 mg per tablet, Take 1 tablet by mouth every 8 (eight) hours as needed for Pain., Disp: 10 tablet, Rfl: 0    ondansetron (ZOFRAN-ODT) 4 MG TbDL, Take 1 tablet (4 mg total) by mouth 3 (three) times daily. (Patient taking differently: Take 4 mg by mouth every 8 (eight) hours as needed.), Disp: 10 tablet, Rfl: 0    pantoprazole (PROTONIX) 40 MG tablet, Take 40 mg by mouth once daily., Disp: , Rfl:     prochlorperazine (COMPAZINE) 10 MG tablet, Take 1 tablet (10 mg total) by mouth every 6 (six) hours as needed., Disp: 15 tablet, Rfl: 0    apixaban (ELIQUIS) 5 mg Tab, Take 1 tablet (5 mg total) by mouth 2 (two) times daily., Disp: 60 tablet, Rfl: 0     Outpatient Encounter Medications as of 2/28/2022   Medication Sig Dispense Refill    HYDROcodone-acetaminophen (NORCO) 5-325 mg per tablet Take 1 tablet by mouth every 8 (eight) hours as needed for Pain. 10 tablet 0    ondansetron (ZOFRAN-ODT) 4 MG TbDL Take 1 tablet (4 mg total) by mouth 3 (three) times daily. (Patient taking differently: Take 4 mg by mouth every 8 (eight) hours as needed.) 10 tablet 0    pantoprazole (PROTONIX) 40 MG tablet Take 40 mg by mouth once daily.      prochlorperazine (COMPAZINE) 10 MG tablet Take 1 tablet (10 mg total) by mouth every 6 (six) hours as needed. 15 tablet 0    [DISCONTINUED] apixaban (ELIQUIS) 5 mg Tab Take 1 tablet (5 mg total) by mouth 2 (two) times daily. 60 tablet 0    apixaban (ELIQUIS) 5 mg Tab Take 1 tablet (5 mg total) by mouth 2 (two) times daily. 60 tablet 0     No facility-administered  encounter medications on file as of 2/28/2022.          Assessment:       1. 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 Elevated bilirubin           Plan:       Portal vein thrombosis  -     apixaban (ELIQUIS) 5 mg Tab; Take 1 tablet (5 mg total) by mouth 2 (two) times daily.  Dispense: 60 tablet; Refill: 0  -     CT Abdomen Pelvis With Contrast; Future; Expected date: 03/28/2022  -     Comprehensive Metabolic Panel; Future; Expected date: 02/28/2022    Embolism and thrombosis of renal vein  -     apixaban (ELIQUIS) 5 mg Tab; Take 1 tablet (5 mg total) by mouth 2 (two) times daily.  Dispense: 60 tablet; Refill: 0  -     CT Abdomen Pelvis With Contrast; Future; Expected date: 03/28/2022    Elevated bilirubin  -     Comprehensive Metabolic Panel; Future; Expected date: 02/28/2022       Portal Vein Thrombus:    - patient with portal vein thrombus noted in Dec.   - taking Elequis BID for last 2 months, US showed interval improvement.   - checking CMP due to increased TBili and lower Albumin on last CMP.   - would recked CT at 90 days (end of March) then likely stop Elequis if resolved.   - trying to get patient in with [facility] but does not currently have an appt until May.

## 2022-03-07 DIAGNOSIS — E87.6 HYPOKALEMIA: Primary | ICD-10-CM

## 2022-03-07 RX ORDER — POTASSIUM CHLORIDE 20 MEQ/1
20 TABLET, EXTENDED RELEASE ORAL DAILY
Qty: 30 TABLET | Refills: 0 | Status: SHIPPED | OUTPATIENT
Start: 2022-03-07 | End: 2022-04-11

## 2022-03-29 DIAGNOSIS — I82.3 EMBOLISM AND THROMBOSIS OF RENAL VEIN: ICD-10-CM

## 2022-03-29 DIAGNOSIS — I81 PORTAL VEIN THROMBOSIS: ICD-10-CM

## 2022-03-31 RX ORDER — APIXABAN 5 MG/1
TABLET, FILM COATED ORAL
Qty: 60 TABLET | Refills: 0 | OUTPATIENT
Start: 2022-03-31

## 2022-04-02 DIAGNOSIS — E87.6 HYPOKALEMIA: ICD-10-CM

## 2022-04-02 NOTE — TELEPHONE ENCOUNTER
No new care gaps identified.  Powered by [employer]. Reference number: [id number].   4/02/2022 7:31:32 AM CDT

## 2022-04-04 RX ORDER — POTASSIUM CHLORIDE 20 MEQ/1
TABLET, EXTENDED RELEASE ORAL
Qty: 90 TABLET | Refills: 3 | OUTPATIENT
Start: 2022-04-04

## 2022-04-04 NOTE — TELEPHONE ENCOUNTER
Refill Routing Note   Medication(s) are not appropriate for processing by [facility] for the following reason(s):      - Medication is a new start (<3 months)    [facility] action(s):  Defer          Medication reconciliation completed: No     Appointments  past 12m or future 3m with PCP    Date Provider   Last Visit   2/28/2022 Miquel Mishra MD   Next Visit   4/11/2022 Miquel Mishra MD   ED visits in past 90 days: 1        Note composed:12:17 PM 04/04/2022

## 2022-04-11 ENCOUNTER — OFFICE VISIT (OUTPATIENT)
Dept: PRIMARY CARE CLINIC | Facility: CLINIC | Age: 42
End: 2022-04-11
Payer: MEDICAID

## 2022-04-11 VITALS
DIASTOLIC BLOOD PRESSURE: 70 MMHG | TEMPERATURE: 98 F | HEART RATE: 60 BPM | RESPIRATION RATE: 16 BRPM | OXYGEN SATURATION: 98 % | BODY MASS INDEX: 31.58 KG/M2 | HEIGHT: 67 IN | WEIGHT: 201.19 LBS | SYSTOLIC BLOOD PRESSURE: 98 MMHG

## 2022-04-11 DIAGNOSIS — K21.9 GASTROESOPHAGEAL REFLUX DISEASE, UNSPECIFIED WHETHER ESOPHAGITIS PRESENT: Primary | ICD-10-CM

## 2022-04-11 DIAGNOSIS — E87.6 HYPOKALEMIA: ICD-10-CM

## 2022-04-11 DIAGNOSIS — M79.89 LEFT LEG SWELLING: ICD-10-CM

## 2022-04-11 DIAGNOSIS — I82.3 EMBOLISM AND THROMBOSIS OF RENAL VEIN: ICD-10-CM

## 2022-04-11 PROCEDURE — 3008F BODY MASS INDEX DOCD: CPT | Mod: CPTII,,, | Performed by: STUDENT IN AN ORGANIZED HEALTH CARE EDUCATION/TRAINING PROGRAM

## 2022-04-11 PROCEDURE — 99214 OFFICE O/P EST MOD 30 MIN: CPT | Mod: PBBFAC,PN | Performed by: STUDENT IN AN ORGANIZED HEALTH CARE EDUCATION/TRAINING PROGRAM

## 2022-04-11 PROCEDURE — 3078F DIAST BP <80 MM HG: CPT | Mod: CPTII,,, | Performed by: STUDENT IN AN ORGANIZED HEALTH CARE EDUCATION/TRAINING PROGRAM

## 2022-04-11 PROCEDURE — 99999 PR PBB SHADOW E&M-EST. PATIENT-LVL IV: ICD-10-PCS | Mod: PBBFAC,,, | Performed by: STUDENT IN AN ORGANIZED HEALTH CARE EDUCATION/TRAINING PROGRAM

## 2022-04-11 PROCEDURE — 99999 PR PBB SHADOW E&M-EST. PATIENT-LVL IV: CPT | Mod: PBBFAC,,, | Performed by: STUDENT IN AN ORGANIZED HEALTH CARE EDUCATION/TRAINING PROGRAM

## 2022-04-11 PROCEDURE — 99214 OFFICE O/P EST MOD 30 MIN: CPT | Mod: S$PBB,,, | Performed by: STUDENT IN AN ORGANIZED HEALTH CARE EDUCATION/TRAINING PROGRAM

## 2022-04-11 PROCEDURE — 3074F PR MOST RECENT SYSTOLIC BLOOD PRESSURE < 130 MM HG: ICD-10-PCS | Mod: CPTII,,, | Performed by: STUDENT IN AN ORGANIZED HEALTH CARE EDUCATION/TRAINING PROGRAM

## 2022-04-11 PROCEDURE — 1159F MED LIST DOCD IN RCRD: CPT | Mod: CPTII,,, | Performed by: STUDENT IN AN ORGANIZED HEALTH CARE EDUCATION/TRAINING PROGRAM

## 2022-04-11 PROCEDURE — 3008F PR BODY MASS INDEX (BMI) DOCUMENTED: ICD-10-PCS | Mod: CPTII,,, | Performed by: STUDENT IN AN ORGANIZED HEALTH CARE EDUCATION/TRAINING PROGRAM

## 2022-04-11 PROCEDURE — 1159F PR MEDICATION LIST DOCUMENTED IN MEDICAL RECORD: ICD-10-PCS | Mod: CPTII,,, | Performed by: STUDENT IN AN ORGANIZED HEALTH CARE EDUCATION/TRAINING PROGRAM

## 2022-04-11 PROCEDURE — 3078F PR MOST RECENT DIASTOLIC BLOOD PRESSURE < 80 MM HG: ICD-10-PCS | Mod: CPTII,,, | Performed by: STUDENT IN AN ORGANIZED HEALTH CARE EDUCATION/TRAINING PROGRAM

## 2022-04-11 PROCEDURE — 99214 PR OFFICE/OUTPT VISIT, EST, LEVL IV, 30-39 MIN: ICD-10-PCS | Mod: S$PBB,,, | Performed by: STUDENT IN AN ORGANIZED HEALTH CARE EDUCATION/TRAINING PROGRAM

## 2022-04-11 PROCEDURE — 3074F SYST BP LT 130 MM HG: CPT | Mod: CPTII,,, | Performed by: STUDENT IN AN ORGANIZED HEALTH CARE EDUCATION/TRAINING PROGRAM

## 2022-04-11 RX ORDER — POTASSIUM CHLORIDE 20 MEQ/1
20 TABLET, EXTENDED RELEASE ORAL DAILY
Qty: 30 TABLET | Refills: 3 | Status: SHIPPED | OUTPATIENT
Start: 2022-04-11 | End: 2022-07-19

## 2022-04-11 RX ORDER — FAMOTIDINE 40 MG/1
40 TABLET, FILM COATED ORAL DAILY
Qty: 30 TABLET | Refills: 11 | Status: SHIPPED | OUTPATIENT
Start: 2022-04-11 | End: 2023-02-27 | Stop reason: SDUPTHER

## 2022-04-11 NOTE — PATIENT INSTRUCTIONS
History of embolus:   - patient's emboli appear improved on CT scan, was told she could stop her Eliquis.  Will recheck in 6 months to make sure that clot has not reaccumulated.    Left lower leg swelling:   - patient has swelling to left lower leg, right ankle 26 cm, left ankle 28 cm.    - has history of fracture to that ankle, which may be causing swelling, however also has history of emboli so will get ultrasound to be safe.    Hypokalemia:   - patient's potassium has been low to 2.9, will supplement with 20 mEq of potassium daily for the next month and recheck potassium level.

## 2022-04-11 NOTE — PROGRESS NOTES
"Subjective:           Patient ID: Giovanni Pena is a 41 y.o. female who presents today with a chief complaint of f/u on portal vein thrombus.    Chief Complaint:   Follow-up (Portal vein thrombus)      History of Present Illness:    42yo female had been taking Elequis for 6 months, stopped taking on March 29th after reassuring CT results.    Will recheck image in July.        Review of Systems   Constitutional: Negative for activity change, fatigue and fever.   HENT: Negative for congestion, nosebleeds, sinus pressure and sneezing.    Respiratory: Negative for cough, shortness of breath and wheezing.    Cardiovascular: Negative for chest pain, palpitations and leg swelling.   Gastrointestinal: Positive for abdominal distention and abdominal pain. Negative for blood in stool, constipation, diarrhea and nausea.   Genitourinary: Negative for difficulty urinating, dysuria and hematuria.   Musculoskeletal: Negative for back pain and gait problem.   Skin: Negative for pallor and rash.   Neurological: Negative for weakness, numbness and headaches.   Psychiatric/Behavioral: Negative for agitation. The patient is not nervous/anxious.            Objective:        Vitals:    04/11/22 1110 04/11/22 1153   BP: 98/60 98/70   BP Location: Right arm Right arm   Patient Position: Sitting Sitting   BP Method: Medium (Manual) Medium (Manual)   Pulse: 60    Resp: 16    Temp: 98.4 °F (36.9 °C)    TempSrc: Oral    SpO2: 98%    Weight: 91.3 kg (201 lb 2.7 oz)    Height: 5' 7" (1.702 m)        Body mass index is 31.51 kg/m².      Physical Exam  Constitutional:       General: She is not in acute distress.     Appearance: Normal appearance. She is obese.   HENT:      Head: Normocephalic and atraumatic.      Right Ear: External ear normal.      Left Ear: External ear normal.      Nose: No rhinorrhea.      Mouth/Throat:      Mouth: Mucous membranes are moist.   Eyes:      Extraocular Movements: Extraocular movements intact.      " Conjunctiva/sclera: Conjunctivae normal.   Cardiovascular:      Rate and Rhythm: Normal rate and regular rhythm.      Heart sounds: No murmur heard.  Pulmonary:      Effort: Pulmonary effort is normal. No respiratory distress.   Abdominal:      General: There is no distension.      Tenderness: There is no abdominal tenderness. There is no right CVA tenderness, left CVA tenderness or guarding.      Comments: Tender at midline of abdomen.  No mass palpable.    Musculoskeletal:      Right lower leg: No edema.      Left lower leg: No edema.   Skin:     Capillary Refill: Capillary refill takes less than 2 seconds.      Coloration: Skin is not jaundiced.      Findings: No bruising.   Neurological:      General: No focal deficit present.      Mental Status: She is alert and oriented to person, place, and time.      Motor: No weakness.      Gait: Gait normal.   Psychiatric:         Mood and Affect: Mood normal.             Lab Results   Component Value Date     03/04/2022    K 2.9 (L) 03/04/2022     03/04/2022    CO2 26 03/04/2022    BUN 6 03/04/2022    CREATININE 0.7 03/04/2022    ANIONGAP 11 03/04/2022     Lab Results   Component Value Date    HGBA1C 5.0 11/18/2021     No results found for: BNP, BNPTRIAGEBLO    Lab Results   Component Value Date    WBC 6.15 02/14/2022    HGB 10.7 (L) 02/14/2022    HCT 34.6 (L) 02/14/2022     02/14/2022    GRAN 3.8 02/14/2022    GRAN 61.1 02/14/2022     Lab Results   Component Value Date    CHOL 184 12/30/2021    CHOL 200 10/21/2020    HDL 38 (L) 12/30/2021    HDL 50 10/21/2020    LDLCALC 130 (H) 12/30/2021    LDLCALC 135 (H) 10/21/2020    TRIG 78 12/30/2021    TRIG 76 10/21/2020          Current Outpatient Medications:     famotidine (PEPCID) 40 MG tablet, Take 1 tablet (40 mg total) by mouth once daily., Disp: 30 tablet, Rfl: 11    potassium chloride SA (K-DUR,KLOR-CON) 20 MEQ tablet, Take 1 tablet (20 mEq total) by mouth once daily., Disp: 30 tablet, Rfl: 3      Outpatient Encounter Medications as of 4/11/2022   Medication Sig Dispense Refill    [DISCONTINUED] pantoprazole (PROTONIX) 40 MG tablet Take 40 mg by mouth once daily.      famotidine (PEPCID) 40 MG tablet Take 1 tablet (40 mg total) by mouth once daily. 30 tablet 11    potassium chloride SA (K-DUR,KLOR-CON) 20 MEQ tablet Take 1 tablet (20 mEq total) by mouth once daily. 30 tablet 3    [DISCONTINUED] apixaban (ELIQUIS) 5 mg Tab Take 1 tablet (5 mg total) by mouth 2 (two) times daily. 60 tablet 0    [DISCONTINUED] HYDROcodone-acetaminophen (NORCO) 5-325 mg per tablet Take 1 tablet by mouth every 8 (eight) hours as needed for Pain. 10 tablet 0    [DISCONTINUED] ondansetron (ZOFRAN-ODT) 4 MG TbDL Take 1 tablet (4 mg total) by mouth 3 (three) times daily. (Patient taking differently: Take 4 mg by mouth every 8 (eight) hours as needed.) 10 tablet 0    [DISCONTINUED] potassium chloride SA (K-DUR,KLOR-CON) 20 MEQ tablet Take 1 tablet (20 mEq total) by mouth once daily. 30 tablet 0    [DISCONTINUED] prochlorperazine (COMPAZINE) 10 MG tablet Take 1 tablet (10 mg total) by mouth every 6 (six) hours as needed. 15 tablet 0     No facility-administered encounter medications on file as of 4/11/2022.          Assessment:       1. 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 Left leg swelling    4. Hypokalemia           Plan:       Gastroesophageal reflux disease, unspecified whether esophagitis present  -     famotidine (PEPCID) 40 MG tablet; Take 1 tablet (40 mg total) by mouth once daily.  Dispense: 30 tablet; Refill: 11    Embolism and thrombosis of renal vein    Left leg swelling  -     US Lower Extremity Veins Left; Future; Expected date: 04/11/2022    Hypokalemia  -     potassium chloride SA (K-DUR,KLOR-CON) 20 MEQ tablet; Take 1 tablet (20 mEq total) by mouth once daily.  Dispense: 30 tablet; Refill: 3  -     Basic Metabolic Panel;  Future; Expected date: 04/11/2022      History of embolus:   - patient's emboli appear improved on CT scan, was told she could stop her Eliquis.  Will recheck in 6 months to make sure that clot has not reaccumulated.    Left lower leg swelling:   - patient has swelling to left lower leg, right ankle 26 cm, left ankle 28 cm.    - has history of fracture to that ankle, which may be causing swelling, however also has history of emboli so will get ultrasound to be safe.    Hypokalemia:   - patient's potassium has been low to 2.9, will supplement with 20 mEq of potassium daily for the next month and recheck potassium level.

## 2022-04-16 PROBLEM — J02.9 VIRAL PHARYNGITIS: Status: ACTIVE | Noted: 2022-04-16

## 2022-06-07 ENCOUNTER — TELEPHONE (OUTPATIENT)
Dept: PRIMARY CARE CLINIC | Facility: CLINIC | Age: 42
End: 2022-06-07
Payer: MEDICAID

## 2022-06-07 NOTE — TELEPHONE ENCOUNTER
----- Message from Mirtha Schofield sent at 6/7/2022  9:23 AM CDT -----  Contact: [phone]  Pt is calling to see if she needs a referral for podiatrist please advise and give return call

## 2022-06-08 DIAGNOSIS — M21.611 BILATERAL BUNIONS: Primary | ICD-10-CM

## 2022-06-08 DIAGNOSIS — M21.612 BILATERAL BUNIONS: Primary | ICD-10-CM

## 2022-06-08 NOTE — TELEPHONE ENCOUNTER
----- Message from Juliette Jean-Paul sent at 6/8/2022 10:50 AM CDT -----  Contact: Patient, [phone]  Patient would like to get a referral.  Referral to what specialty:  Podiatry  Does the patient want the referral with a specific physician:  Dr Lucio  [facility]  Reason (be specific):  [place]  Does the patient already have the specialty clinic appointment scheduled:  No  If yes, what date is the appointment scheduled:   N/A  Is the insurance listed in Epic correct? (this is important for a referral):  Yes  Advised patient that once provider approves this either a nurse or  will return their call?:   Would the patient like a call back, or a response through their [facility] portal?:   Call back  Comments:   Please advise. Thanks.

## 2022-06-09 ENCOUNTER — TELEPHONE (OUTPATIENT)
Dept: PODIATRY | Facility: CLINIC | Age: 42
End: 2022-06-09
Payer: MEDICAID

## 2022-06-10 ENCOUNTER — OFFICE VISIT (OUTPATIENT)
Dept: PODIATRY | Facility: CLINIC | Age: 42
End: 2022-06-10
Payer: MEDICAID

## 2022-06-10 VITALS
SYSTOLIC BLOOD PRESSURE: 108 MMHG | BODY MASS INDEX: 30.76 KG/M2 | HEIGHT: 67 IN | WEIGHT: 196 LBS | DIASTOLIC BLOOD PRESSURE: 71 MMHG | HEART RATE: 69 BPM

## 2022-06-10 DIAGNOSIS — M21.611 BILATERAL BUNIONS: ICD-10-CM

## 2022-06-10 DIAGNOSIS — M79.672 FOOT PAIN, BILATERAL: ICD-10-CM

## 2022-06-10 DIAGNOSIS — M21.612 BILATERAL BUNIONS: ICD-10-CM

## 2022-06-10 DIAGNOSIS — M79.671 FOOT PAIN, BILATERAL: ICD-10-CM

## 2022-06-10 DIAGNOSIS — M20.11 VALGUS DEFORMITY OF BOTH GREAT TOES: Primary | ICD-10-CM

## 2022-06-10 DIAGNOSIS — M20.12 VALGUS DEFORMITY OF BOTH GREAT TOES: Primary | ICD-10-CM

## 2022-06-10 PROCEDURE — 3078F DIAST BP <80 MM HG: CPT | Mod: CPTII,,, | Performed by: PODIATRIST

## 2022-06-10 PROCEDURE — 1160F PR REVIEW ALL MEDS BY PRESCRIBER/CLIN PHARMACIST DOCUMENTED: ICD-10-PCS | Mod: CPTII,,, | Performed by: PODIATRIST

## 2022-06-10 PROCEDURE — 3078F PR MOST RECENT DIASTOLIC BLOOD PRESSURE < 80 MM HG: ICD-10-PCS | Mod: CPTII,,, | Performed by: PODIATRIST

## 2022-06-10 PROCEDURE — 3008F PR BODY MASS INDEX (BMI) DOCUMENTED: ICD-10-PCS | Mod: CPTII,,, | Performed by: PODIATRIST

## 2022-06-10 PROCEDURE — 1159F PR MEDICATION LIST DOCUMENTED IN MEDICAL RECORD: ICD-10-PCS | Mod: CPTII,,, | Performed by: PODIATRIST

## 2022-06-10 PROCEDURE — 1159F MED LIST DOCD IN RCRD: CPT | Mod: CPTII,,, | Performed by: PODIATRIST

## 2022-06-10 PROCEDURE — 99999 PR PBB SHADOW E&M-EST. PATIENT-LVL IV: ICD-10-PCS | Mod: PBBFAC,,, | Performed by: PODIATRIST

## 2022-06-10 PROCEDURE — 3074F SYST BP LT 130 MM HG: CPT | Mod: CPTII,,, | Performed by: PODIATRIST

## 2022-06-10 PROCEDURE — 99999 PR PBB SHADOW E&M-EST. PATIENT-LVL IV: CPT | Mod: PBBFAC,,, | Performed by: PODIATRIST

## 2022-06-10 PROCEDURE — 3074F PR MOST RECENT SYSTOLIC BLOOD PRESSURE < 130 MM HG: ICD-10-PCS | Mod: CPTII,,, | Performed by: PODIATRIST

## 2022-06-10 PROCEDURE — 99214 OFFICE O/P EST MOD 30 MIN: CPT | Mod: PBBFAC,PN | Performed by: PODIATRIST

## 2022-06-10 PROCEDURE — 99204 PR OFFICE/OUTPT VISIT, NEW, LEVL IV, 45-59 MIN: ICD-10-PCS | Mod: S$PBB,,, | Performed by: PODIATRIST

## 2022-06-10 PROCEDURE — 1160F RVW MEDS BY RX/DR IN RCRD: CPT | Mod: CPTII,,, | Performed by: PODIATRIST

## 2022-06-10 PROCEDURE — 99204 OFFICE O/P NEW MOD 45 MIN: CPT | Mod: S$PBB,,, | Performed by: PODIATRIST

## 2022-06-10 PROCEDURE — 3008F BODY MASS INDEX DOCD: CPT | Mod: CPTII,,, | Performed by: PODIATRIST

## 2022-06-10 RX ORDER — DICLOFENAC SODIUM 10 MG/G
2 GEL TOPICAL 4 TIMES DAILY
Qty: 100 G | Refills: 2 | Status: SHIPPED | OUTPATIENT
Start: 2022-06-10 | End: 2022-08-23

## 2022-06-10 NOTE — PROGRESS NOTES
Subjective:      Patient ID: Giovanni Pena is a 42 y.o. female.    Chief Complaint: Bunions (Bilateral foot)    Sharp throbbing bump pain with bump by both big toes.  Gradual onset, worsening over past several monthss, aggravated by increased weight bearing, shoe gear, pressure.  No previous medical treatment.  Shoe change, tylenol, otc splints no help.     Review of Systems   Constitutional: Negative for chills, diaphoresis, fever, malaise/fatigue and night sweats.   Cardiovascular: Negative for claudication, cyanosis, leg swelling and syncope.   Skin: Negative for color change, dry skin, nail changes, rash, suspicious lesions and unusual hair distribution.   Musculoskeletal: Positive for joint pain. Negative for falls, joint swelling, muscle cramps, muscle weakness and stiffness.   Gastrointestinal: Negative for constipation, diarrhea, nausea and vomiting.   Neurological: Negative for brief paralysis, disturbances in coordination, focal weakness, numbness, paresthesias, sensory change and tremors.           Objective:      Physical Exam  Constitutional:       General: She is not in acute distress.     Appearance: She is well-developed. She is not diaphoretic.   Cardiovascular:      Pulses:           Popliteal pulses are 2+ on the right side and 2+ on the left side.        Dorsalis pedis pulses are 2+ on the right side and 2+ on the left side.        Posterior tibial pulses are 2+ on the right side and 2+ on the left side.      Comments: Capillary refill 3 seconds all toes/distal feet, all toes/both feet warm to touch.      Negative lymphadenopathy bilateral popliteal fossa and tarsal tunnel.      Negavie lower extremity edema bilateral.    Musculoskeletal:      Right ankle: No swelling, deformity, ecchymosis or lacerations. Normal range of motion. Normal pulse.      Right Achilles Tendon: Normal. No defects. Kelley's test negative.      Comments: Visible and palpable bunion with pain at dorsomedial 1st metatarsal  head right.  Hallux abducted right partially reducible, tracks laterally without being track bound.  No ecchymosis, erythema, edema, or cardinal signs infection or signs of trauma same foot.    Ankle dorsiflexion decreased at <10 degrees bilateral with moderate increase with knee flexion bilateral.    Otherwise, Normal angle, base, station of gait. All ten toes without clubbing, cyanosis, or signs of ischemia.  No pain to palpation bilateral lower extremities.  Range of motion, stability, muscle strength, and muscle tone normal bilateral feet and legs.    Lymphadenopathy:      Lower Body: No right inguinal adenopathy. No left inguinal adenopathy.      Comments: Negative lymphadenopathy bilateral popliteal fossa and tarsal tunnel.    Negative lymphangitic streaking bilateral feet/ankles/legs.   Skin:     General: Skin is warm and dry.      Capillary Refill: Capillary refill takes 2 to 3 seconds.      Coloration: Skin is not pale.      Findings: No abrasion, bruising, burn, ecchymosis, erythema, laceration, lesion or rash.      Nails: There is no clubbing.      Comments:   Skin is normal age and health appropriate color, turgor, texture, and temperature bilateral lower extremities without ulceration, hyperpigmentation, discoloration, masses nodules or cords palpated.  No ecchymosis, erythema, edema, or cardinal signs of infection bilateral lower extremities.     Neurological:      Mental Status: She is alert and oriented to person, place, and time.      Sensory: No sensory deficit.      Motor: No tremor, atrophy or abnormal muscle tone.      Gait: Gait normal.      Deep Tendon Reflexes:      Reflex Scores:       Patellar reflexes are 2+ on the right side and 2+ on the left side.       Achilles reflexes are 2+ on the right side and 2+ on the left side.     Comments: Negative tinel sign to percussion sural, superficial peroneal, deep peroneal, saphenous, and posterior tibial nerves right and left ankles and feet.      Psychiatric:         Behavior: Behavior is cooperative.               Assessment:       Encounter Diagnoses   Name Primary?    Bilateral bunions     Valgus deformity of both great toes Yes    Foot pain, bilateral          Plan:       Giovanni was seen today for bunions.    Diagnoses and all orders for this visit:    Valgus deformity of both great toes  -     X-Ray Foot Complete Bilateral; Future    Bilateral bunions  -     Ambulatory referral/consult to Podiatry  -     X-Ray Foot Complete Bilateral; Future    Foot pain, bilateral  -     X-Ray Foot Complete Bilateral; Future    Other orders  -     diclofenac sodium (VOLTAREN) 1 % Gel; Apply 2 g topically 4 (four) times daily.      I counseled the patient on her conditions, their implications and medical management.    I discussed the chances of recurrence and incomplete relief or greater less re-dress components of the flatfoot and tight ankle.  Patient is not amenable to dressing flat foot and tight ankle surgically this time due to the increased surgical risk and recovery time required.  She is interested in surgically addressing the bunion as soon as medically appropriate.  She does verbally acknowledged increased risk of recurrence and incomplete the in doing so.    Rx voltaren gel    Patient will stretch the tendo achilles complex three times daily as demonstrated in the office.  Literature was dispensed illustrating proper stretching technique.    Patient will obtain over the counter arch supports and wear them in shoes whenever possible.  Athletic shoes intended for walking or running are usually best.    Discussed conservative treatment with shoes of adequate dimensions, material, and style to alleviate symptoms and delay or prevent surgical intervention.    Recommend spot stretch shoes for bump relief.    xrays    1 month          Follow up in about 1 month (around 7/10/2022).

## 2022-06-27 ENCOUNTER — TELEPHONE (OUTPATIENT)
Dept: PODIATRY | Facility: CLINIC | Age: 42
End: 2022-06-27
Payer: MEDICAID

## 2022-06-27 RX ORDER — LIDOCAINE HYDROCHLORIDE 20 MG/ML
JELLY TOPICAL
Qty: 30 ML | Refills: 2 | Status: SHIPPED | OUTPATIENT
Start: 2022-06-27 | End: 2022-07-19

## 2022-06-27 NOTE — TELEPHONE ENCOUNTER
Staff informed patient that a message was sent to Dr. Pyle and waiting for a response.    Patient verbalized understanding.

## 2022-06-27 NOTE — TELEPHONE ENCOUNTER
Spoke with patient and informed her that per Dr. Pyle, he sent lidocaine gel to pharmacy.  Can use with voltaren gel.  Continue ibuprofen also.  Keep follow up.  Unfortunately, there is not really another medication that will make much difference for her condition.    Patient verbalized understanding.          ----- Message from Roshan Pyle DPM sent at 6/27/2022 11:51 AM CDT -----  Regarding: RE: Rx  I sent lidocaine gel to pharmacy.  Can use with voltaren gel.  Continue ibuprofen also.  Keep follow up.  Unfortunately, there is not really another medication that will make much difference for her condition.  ----- Message -----  From: Keara Paez MA  Sent: 6/27/2022  10:49 AM CDT  To: Roshan Pyle DPM  Subject: Rx                                               Please review and advice.    Patient stated that she have been using the diclofenac sodium (VOLTAREN) 1 % Gel and its not working. Is there any other medication that you can prescribe?

## 2022-06-27 NOTE — TELEPHONE ENCOUNTER
"----- Message from Yessenia Joseph sent at 6/27/2022 10:24 AM CDT -----  "Type:  Patient Call Back    Who Called:RACHELLE HENDRICKS [[id number]]    What is the reqeust in detail:Pt requesting call back would like to inform doctor that the medication diclofenac sodium (VOLTAREN) 1 % Gel) isn't working. Please advise    Can the clinic reply by [facility]?no    Best Call Back Number:[phone]      Additional Information:Pt was told to call if medication doesn't work.            "

## 2022-07-08 ENCOUNTER — TELEPHONE (OUTPATIENT)
Dept: PRIMARY CARE CLINIC | Facility: CLINIC | Age: 42
End: 2022-07-08
Payer: MEDICAID

## 2022-07-08 ENCOUNTER — OFFICE VISIT (OUTPATIENT)
Dept: PODIATRY | Facility: CLINIC | Age: 42
End: 2022-07-08
Payer: MEDICAID

## 2022-07-08 VITALS
SYSTOLIC BLOOD PRESSURE: 111 MMHG | HEIGHT: 67 IN | BODY MASS INDEX: 30.76 KG/M2 | DIASTOLIC BLOOD PRESSURE: 67 MMHG | WEIGHT: 196 LBS | HEART RATE: 80 BPM

## 2022-07-08 DIAGNOSIS — M79.671 FOOT PAIN, BILATERAL: ICD-10-CM

## 2022-07-08 DIAGNOSIS — M20.11 VALGUS DEFORMITY OF BOTH GREAT TOES: ICD-10-CM

## 2022-07-08 DIAGNOSIS — M21.612 BILATERAL BUNIONS: Primary | ICD-10-CM

## 2022-07-08 DIAGNOSIS — M21.611 BILATERAL BUNIONS: Primary | ICD-10-CM

## 2022-07-08 DIAGNOSIS — M79.672 FOOT PAIN, BILATERAL: ICD-10-CM

## 2022-07-08 DIAGNOSIS — M20.12 VALGUS DEFORMITY OF BOTH GREAT TOES: ICD-10-CM

## 2022-07-08 PROCEDURE — 3074F PR MOST RECENT SYSTOLIC BLOOD PRESSURE < 130 MM HG: ICD-10-PCS | Mod: CPTII,,, | Performed by: PODIATRIST

## 2022-07-08 PROCEDURE — 3008F PR BODY MASS INDEX (BMI) DOCUMENTED: ICD-10-PCS | Mod: CPTII,,, | Performed by: PODIATRIST

## 2022-07-08 PROCEDURE — 3078F PR MOST RECENT DIASTOLIC BLOOD PRESSURE < 80 MM HG: ICD-10-PCS | Mod: CPTII,,, | Performed by: PODIATRIST

## 2022-07-08 PROCEDURE — 3074F SYST BP LT 130 MM HG: CPT | Mod: CPTII,,, | Performed by: PODIATRIST

## 2022-07-08 PROCEDURE — 99213 PR OFFICE/OUTPT VISIT, EST, LEVL III, 20-29 MIN: ICD-10-PCS | Mod: S$PBB,,, | Performed by: PODIATRIST

## 2022-07-08 PROCEDURE — 1160F RVW MEDS BY RX/DR IN RCRD: CPT | Mod: CPTII,,, | Performed by: PODIATRIST

## 2022-07-08 PROCEDURE — 1160F PR REVIEW ALL MEDS BY PRESCRIBER/CLIN PHARMACIST DOCUMENTED: ICD-10-PCS | Mod: CPTII,,, | Performed by: PODIATRIST

## 2022-07-08 PROCEDURE — 99213 OFFICE O/P EST LOW 20 MIN: CPT | Mod: S$PBB,,, | Performed by: PODIATRIST

## 2022-07-08 PROCEDURE — 1159F MED LIST DOCD IN RCRD: CPT | Mod: CPTII,,, | Performed by: PODIATRIST

## 2022-07-08 PROCEDURE — 99999 PR PBB SHADOW E&M-EST. PATIENT-LVL III: ICD-10-PCS | Mod: PBBFAC,,, | Performed by: PODIATRIST

## 2022-07-08 PROCEDURE — 99999 PR PBB SHADOW E&M-EST. PATIENT-LVL III: CPT | Mod: PBBFAC,,, | Performed by: PODIATRIST

## 2022-07-08 PROCEDURE — 3078F DIAST BP <80 MM HG: CPT | Mod: CPTII,,, | Performed by: PODIATRIST

## 2022-07-08 PROCEDURE — 3008F BODY MASS INDEX DOCD: CPT | Mod: CPTII,,, | Performed by: PODIATRIST

## 2022-07-08 PROCEDURE — 99213 OFFICE O/P EST LOW 20 MIN: CPT | Mod: PBBFAC,PN | Performed by: PODIATRIST

## 2022-07-08 PROCEDURE — 1159F PR MEDICATION LIST DOCUMENTED IN MEDICAL RECORD: ICD-10-PCS | Mod: CPTII,,, | Performed by: PODIATRIST

## 2022-07-08 NOTE — TELEPHONE ENCOUNTER
----- Message from Araceli Wells sent at 7/8/2022  2:24 PM CDT -----  Contact: [phone]  Pt called to advise that she needed an appointment for surgery clearance. Date of surgery not scheduled. Surgery will be scheduled once surgery clearance is obtained. Please Advise

## 2022-07-08 NOTE — PROGRESS NOTES
Subjective:      Patient ID: Giovanni Pena is a 42 y.o. female.    Chief Complaint: Follow-up (Bilateral bunion)    Sharp throbbing bump pain with bump by both big toes.  Gradual onset, not improved over past several weeks-months, aggravated by increased weight bearing, shoe gear, pressure.   Shoe change, modfiicatgions, inserts, tylenol, otc splints, Rx nsaid no help.     X-rays show mild increase in 1st inter metatarsal angle increased hallux abductus angle significantly bunion formation with minimal arthritic changes of 1st MTPJ left more than right without signs of acute injury.    Review of Systems   Constitutional: Negative for chills, diaphoresis, fever, malaise/fatigue and night sweats.   Cardiovascular: Negative for claudication, cyanosis, leg swelling and syncope.   Skin: Negative for color change, dry skin, nail changes, rash, suspicious lesions and unusual hair distribution.   Musculoskeletal: Positive for joint pain. Negative for falls, joint swelling, muscle cramps, muscle weakness and stiffness.   Gastrointestinal: Negative for constipation, diarrhea, nausea and vomiting.   Neurological: Negative for brief paralysis, disturbances in coordination, focal weakness, numbness, paresthesias, sensory change and tremors.           Objective:      Physical Exam  Constitutional:       General: She is not in acute distress.     Appearance: She is well-developed. She is not diaphoretic.   Cardiovascular:      Pulses:           Popliteal pulses are 2+ on the right side and 2+ on the left side.        Dorsalis pedis pulses are 2+ on the right side and 2+ on the left side.        Posterior tibial pulses are 2+ on the right side and 2+ on the left side.      Comments: Capillary refill 3 seconds all toes/distal feet, all toes/both feet warm to touch.      Negative lymphadenopathy bilateral popliteal fossa and tarsal tunnel.      Negavie lower extremity edema bilateral.    Musculoskeletal:      Right ankle: No swelling,  deformity, ecchymosis or lacerations. Normal range of motion. Normal pulse.      Right Achilles Tendon: Normal. No defects. Kelley's test negative.      Comments: Visible and palpable bunion with pain at dorsomedial 1st metatarsal head left more than right.  Hallux abducted left more than right partially reducible, tracks laterally without being track bound.  No ecchymosis, erythema, edema, or cardinal signs infection or signs of trauma same foot.    Ankle dorsiflexion decreased at <10 degrees bilateral with moderate increase with knee flexion bilateral.    Otherwise, Normal angle, base, station of gait. All ten toes without clubbing, cyanosis, or signs of ischemia.  No pain to palpation bilateral lower extremities.  Range of motion, stability, muscle strength, and muscle tone normal bilateral feet and legs.    Lymphadenopathy:      Lower Body: No right inguinal adenopathy. No left inguinal adenopathy.      Comments: Negative lymphadenopathy bilateral popliteal fossa and tarsal tunnel.    Negative lymphangitic streaking bilateral feet/ankles/legs.   Skin:     General: Skin is warm and dry.      Capillary Refill: Capillary refill takes 2 to 3 seconds.      Coloration: Skin is not pale.      Findings: No abrasion, bruising, burn, ecchymosis, erythema, laceration, lesion or rash.      Nails: There is no clubbing.      Comments:   Skin is normal age and health appropriate color, turgor, texture, and temperature bilateral lower extremities without ulceration, hyperpigmentation, discoloration, masses nodules or cords palpated.  No ecchymosis, erythema, edema, or cardinal signs of infection bilateral lower extremities.     Neurological:      Mental Status: She is alert and oriented to person, place, and time.      Sensory: No sensory deficit.      Motor: No tremor, atrophy or abnormal muscle tone.      Gait: Gait normal.      Deep Tendon Reflexes:      Reflex Scores:       Patellar reflexes are 2+ on the right side and  2+ on the left side.       Achilles reflexes are 2+ on the right side and 2+ on the left side.     Comments: Negative tinel sign to percussion sural, superficial peroneal, deep peroneal, saphenous, and posterior tibial nerves right and left ankles and feet.     Psychiatric:         Behavior: Behavior is cooperative.               Assessment:       Encounter Diagnoses   Name Primary?    Bilateral bunions Yes    Valgus deformity of both great toes     Foot pain, bilateral          Plan:       Giovanni was seen today for follow-up.    Diagnoses and all orders for this visit:    Bilateral bunions    Valgus deformity of both great toes    Foot pain, bilateral      I counseled the patient on her conditions, their implications and medical management.    I discussed the chances of recurrence and incomplete relief or greater complications unless we address components of the flatfoot and tight ankle.  Patient is not amenable to dressing flat foot and tight ankle surgically this time due to the increased surgical risk and recovery time required.  She is interested in surgically addressing the bunion as soon as medically appropriate.  She does verbally acknowledged increased risk of recurrence and incomplete resolution of symptoms and deformity in doing so.    Continue Voltaren gel, stretches, inserts, shoe modifications, activity modifications as needed for pain relief and to facilitate function until surgical scheduling can occur.    Counseled on conservative treatments including custom orthotics, shoe and activity change, physical therapy, antiinflammatory, and pain medication, and sometimes injections for symptomatic relief.    Counseled on surgical correction - distal osteotomy 1st metatarsal left foot and hallux osteotomy left foot, risks and benefits, including, but not limited to recurrence, infection, pain, scar, poor cosmesis, loss of function, arthritis, healing in poor position, new or different ulceration or pre  ulcerative callus, nerve pain, nerve damage, numbness, syndromes (RSD), need for future surgical procedures, and or long term use of orthotics, blood clots of leg, lung, heart, brain, death.    Consider carefully, appoint with pcp for surgical clearance, follow here prn as symptoms dictate for consent, post op Rx, and scheduling.     Informed consent obtained.  Will schedule procedure on clearance.          No follow-ups on file.

## 2022-07-19 ENCOUNTER — OFFICE VISIT (OUTPATIENT)
Dept: PRIMARY CARE CLINIC | Facility: CLINIC | Age: 42
End: 2022-07-19
Payer: MEDICAID

## 2022-07-19 VITALS
RESPIRATION RATE: 16 BRPM | HEART RATE: 72 BPM | WEIGHT: 177.38 LBS | BODY MASS INDEX: 27.84 KG/M2 | HEIGHT: 67 IN | DIASTOLIC BLOOD PRESSURE: 68 MMHG | SYSTOLIC BLOOD PRESSURE: 96 MMHG | OXYGEN SATURATION: 95 % | TEMPERATURE: 98 F

## 2022-07-19 DIAGNOSIS — Z12.31 SCREENING MAMMOGRAM, ENCOUNTER FOR: ICD-10-CM

## 2022-07-19 DIAGNOSIS — Z01.818 PREOP EXAMINATION: Primary | ICD-10-CM

## 2022-07-19 DIAGNOSIS — K21.9 GASTROESOPHAGEAL REFLUX DISEASE, UNSPECIFIED WHETHER ESOPHAGITIS PRESENT: ICD-10-CM

## 2022-07-19 DIAGNOSIS — Z23 NEED FOR VACCINATION: ICD-10-CM

## 2022-07-19 DIAGNOSIS — Z13.6 SCREENING FOR CARDIOVASCULAR CONDITION: ICD-10-CM

## 2022-07-19 DIAGNOSIS — K08.89 TOOTH ACHE: ICD-10-CM

## 2022-07-19 PROCEDURE — 3078F DIAST BP <80 MM HG: CPT | Mod: CPTII,,, | Performed by: STUDENT IN AN ORGANIZED HEALTH CARE EDUCATION/TRAINING PROGRAM

## 2022-07-19 PROCEDURE — 3008F BODY MASS INDEX DOCD: CPT | Mod: CPTII,,, | Performed by: STUDENT IN AN ORGANIZED HEALTH CARE EDUCATION/TRAINING PROGRAM

## 2022-07-19 PROCEDURE — 90715 TDAP VACCINE 7 YRS/> IM: CPT | Mod: PBBFAC,PN

## 2022-07-19 PROCEDURE — 1159F MED LIST DOCD IN RCRD: CPT | Mod: CPTII,,, | Performed by: STUDENT IN AN ORGANIZED HEALTH CARE EDUCATION/TRAINING PROGRAM

## 2022-07-19 PROCEDURE — 99999 PR PBB SHADOW E&M-EST. PATIENT-LVL IV: ICD-10-PCS | Mod: PBBFAC,,, | Performed by: STUDENT IN AN ORGANIZED HEALTH CARE EDUCATION/TRAINING PROGRAM

## 2022-07-19 PROCEDURE — 93010 ELECTROCARDIOGRAM REPORT: CPT | Mod: S$PBB,,, | Performed by: INTERNAL MEDICINE

## 2022-07-19 PROCEDURE — 1159F PR MEDICATION LIST DOCUMENTED IN MEDICAL RECORD: ICD-10-PCS | Mod: CPTII,,, | Performed by: STUDENT IN AN ORGANIZED HEALTH CARE EDUCATION/TRAINING PROGRAM

## 2022-07-19 PROCEDURE — 3074F SYST BP LT 130 MM HG: CPT | Mod: CPTII,,, | Performed by: STUDENT IN AN ORGANIZED HEALTH CARE EDUCATION/TRAINING PROGRAM

## 2022-07-19 PROCEDURE — 1160F PR REVIEW ALL MEDS BY PRESCRIBER/CLIN PHARMACIST DOCUMENTED: ICD-10-PCS | Mod: CPTII,,, | Performed by: STUDENT IN AN ORGANIZED HEALTH CARE EDUCATION/TRAINING PROGRAM

## 2022-07-19 PROCEDURE — 3008F PR BODY MASS INDEX (BMI) DOCUMENTED: ICD-10-PCS | Mod: CPTII,,, | Performed by: STUDENT IN AN ORGANIZED HEALTH CARE EDUCATION/TRAINING PROGRAM

## 2022-07-19 PROCEDURE — 99214 OFFICE O/P EST MOD 30 MIN: CPT | Mod: PBBFAC,PN | Performed by: STUDENT IN AN ORGANIZED HEALTH CARE EDUCATION/TRAINING PROGRAM

## 2022-07-19 PROCEDURE — 99999 PR PBB SHADOW E&M-EST. PATIENT-LVL IV: CPT | Mod: PBBFAC,,, | Performed by: STUDENT IN AN ORGANIZED HEALTH CARE EDUCATION/TRAINING PROGRAM

## 2022-07-19 PROCEDURE — 93010 EKG 12-LEAD: ICD-10-PCS | Mod: S$PBB,,, | Performed by: INTERNAL MEDICINE

## 2022-07-19 PROCEDURE — 93005 ELECTROCARDIOGRAM TRACING: CPT | Mod: PBBFAC,PN | Performed by: INTERNAL MEDICINE

## 2022-07-19 PROCEDURE — 99214 PR OFFICE/OUTPT VISIT, EST, LEVL IV, 30-39 MIN: ICD-10-PCS | Mod: S$PBB,,, | Performed by: STUDENT IN AN ORGANIZED HEALTH CARE EDUCATION/TRAINING PROGRAM

## 2022-07-19 PROCEDURE — 3074F PR MOST RECENT SYSTOLIC BLOOD PRESSURE < 130 MM HG: ICD-10-PCS | Mod: CPTII,,, | Performed by: STUDENT IN AN ORGANIZED HEALTH CARE EDUCATION/TRAINING PROGRAM

## 2022-07-19 PROCEDURE — 3078F PR MOST RECENT DIASTOLIC BLOOD PRESSURE < 80 MM HG: ICD-10-PCS | Mod: CPTII,,, | Performed by: STUDENT IN AN ORGANIZED HEALTH CARE EDUCATION/TRAINING PROGRAM

## 2022-07-19 PROCEDURE — 1160F RVW MEDS BY RX/DR IN RCRD: CPT | Mod: CPTII,,, | Performed by: STUDENT IN AN ORGANIZED HEALTH CARE EDUCATION/TRAINING PROGRAM

## 2022-07-19 PROCEDURE — 99214 OFFICE O/P EST MOD 30 MIN: CPT | Mod: S$PBB,,, | Performed by: STUDENT IN AN ORGANIZED HEALTH CARE EDUCATION/TRAINING PROGRAM

## 2022-07-19 RX ORDER — PANTOPRAZOLE SODIUM 40 MG/1
40 TABLET, DELAYED RELEASE ORAL DAILY
Qty: 30 TABLET | Refills: 0 | Status: SHIPPED | OUTPATIENT
Start: 2022-07-19 | End: 2022-08-10 | Stop reason: CLARIF

## 2022-07-19 NOTE — PROGRESS NOTES
Subjective:           Patient ID: Giovanni Pena is a 42 y.o. female who presents today with a chief complaint of pre-op exam.    Chief Complaint:   Pre-op Exam (Left bunion )      History of Present Illness:    43yo female presenting for pre-op clearance for a left foot bunionectomy.    Has tried creams, and orthotics for the feet but that has not adequately addressed her issues.     Has been using Famotidine for GERD and since Gastric Bypass..     Was provided Ibuprofen 800mg recently for a tooth ache and will be getting the tooth pulled later this month.  Advised would increase the H2 blocker to PPI while taking NSAIDs, then after tooth being pulled would wean back down to the H2 blocker (Pepcid/Famotidine).        Any prior reaction to anaesthesia:  No  History of prolonged bleeding after surgery or injury?:  No  High risk surgery?:  No   History of MI, abnormal stress, anginal chest pain, stent, NTG use?: No   History of CHF?: No  History of TIA or stroke?:  No  On insulin?:  No  Pre-op Cr >2 mg/dL?: 0.7    Has had a clot to right kidney after surgery last year, started on 6 months of elequis.      Mallampati score:  Class 4  (class 1 complete visualization to class 4 obscure)    Gupra Perioperative Risk for MI or Cardiac Event:       Review of Systems   Constitutional: Negative for activity change, fatigue, fever and unexpected weight change.   HENT: Positive for mouth sores (tooth pain). Negative for congestion, nosebleeds, sinus pressure and sneezing.    Respiratory: Negative for cough, shortness of breath and wheezing.    Cardiovascular: Negative for chest pain, palpitations and leg swelling.   Gastrointestinal: Negative for abdominal distention, constipation, diarrhea and nausea.   Genitourinary: Negative for difficulty urinating and dysuria.   Musculoskeletal: Positive for gait problem and myalgias (left foot pain when wearing shoes.). Negative for back pain.   Skin: Negative for pallor and rash.  "  Neurological: Negative for weakness, numbness and headaches.   Psychiatric/Behavioral: Negative for agitation. The patient is not nervous/anxious.            Objective:        Vitals:    07/19/22 1412   BP: 96/68   BP Location: Right arm   Patient Position: Sitting   BP Method: Medium (Manual)   Pulse: 72   Resp: 16   Temp: 97.9 °F (36.6 °C)   TempSrc: Oral   SpO2: 95%   Weight: 80.4 kg (177 lb 5.8 oz)   Height: 5' 7" (1.702 m)       Body mass index is 27.78 kg/m².      Physical Exam  Vitals reviewed.   Constitutional:       General: She is not in acute distress.     Appearance: Normal appearance. She is not ill-appearing.   HENT:      Head: Normocephalic and atraumatic.      Right Ear: External ear normal.      Left Ear: External ear normal.      Nose: No rhinorrhea.      Mouth/Throat:      Mouth: Mucous membranes are moist.   Eyes:      Extraocular Movements: Extraocular movements intact.      Conjunctiva/sclera: Conjunctivae normal.   Cardiovascular:      Rate and Rhythm: Normal rate and regular rhythm.      Pulses: Normal pulses.      Heart sounds: No murmur heard.  Pulmonary:      Effort: Pulmonary effort is normal. No respiratory distress.   Musculoskeletal:      Right lower leg: No edema.      Left lower leg: No edema.   Lymphadenopathy:      Cervical: No cervical adenopathy.   Skin:     Capillary Refill: Capillary refill takes less than 2 seconds.      Coloration: Skin is not jaundiced.      Findings: Lesion (bump over MCP on left great toe.) present. No bruising.   Neurological:      General: No focal deficit present.      Mental Status: She is alert and oriented to person, place, and time.      Motor: No weakness.      Gait: Gait normal.   Psychiatric:         Mood and Affect: Mood normal.             Lab Results   Component Value Date     04/11/2022    K 2.8 (L) 04/11/2022     04/11/2022    CO2 24 04/11/2022    BUN 5 (L) 04/11/2022    CREATININE 0.7 04/11/2022    ANIONGAP 12 04/11/2022 "     Lab Results   Component Value Date    HGBA1C 5.0 11/18/2021     No results found for: BNP, BNPTRIAGEBLO    Lab Results   Component Value Date    WBC 6.15 02/14/2022    HGB 10.7 (L) 02/14/2022    HCT 34.6 (L) 02/14/2022     02/14/2022    GRAN 3.8 02/14/2022    GRAN 61.1 02/14/2022     Lab Results   Component Value Date    CHOL 184 12/30/2021    CHOL 200 10/21/2020    HDL 38 (L) 12/30/2021    HDL 50 10/21/2020    LDLCALC 130 (H) 12/30/2021    LDLCALC 135 (H) 10/21/2020    TRIG 78 12/30/2021    TRIG 76 10/21/2020          Current Outpatient Medications:     diclofenac sodium (VOLTAREN) 1 % Gel, Apply 2 g topically 4 (four) times daily., Disp: 100 g, Rfl: 2    famotidine (PEPCID) 40 MG tablet, Take 1 tablet (40 mg total) by mouth once daily., Disp: 30 tablet, Rfl: 11    ibuprofen (ADVIL,MOTRIN) 800 MG tablet, Take 1 tablet (800 mg total) by mouth every 6 (six) hours as needed for Pain., Disp: 20 tablet, Rfl: 0    pantoprazole (PROTONIX) 40 MG tablet, Take 1 tablet (40 mg total) by mouth once daily., Disp: 30 tablet, Rfl: 0     Outpatient Encounter Medications as of 7/19/2022   Medication Sig Dispense Refill    diclofenac sodium (VOLTAREN) 1 % Gel Apply 2 g topically 4 (four) times daily. 100 g 2    famotidine (PEPCID) 40 MG tablet Take 1 tablet (40 mg total) by mouth once daily. 30 tablet 11    ibuprofen (ADVIL,MOTRIN) 800 MG tablet Take 1 tablet (800 mg total) by mouth every 6 (six) hours as needed for Pain. 20 tablet 0    pantoprazole (PROTONIX) 40 MG tablet Take 1 tablet (40 mg total) by mouth once daily. 30 tablet 0    [DISCONTINUED] LIDOcaine HCL 2% (XYLOCAINE) 2 % jelly Apply topically as needed. Apply topically once nightly to affected part of foot/feet. 30 mL 2    [DISCONTINUED] potassium chloride SA (K-DUR,KLOR-CON) 20 MEQ tablet Take 1 tablet (20 mEq total) by mouth once daily. 30 tablet 3     No facility-administered encounter medications on file as of 7/19/2022.          Assessment:        1. Preop examination    2. Tooth ache    3. Gastroesophageal reflux disease, unspecified whether esophagitis present    4. Screening for cardiovascular condition    5. Screening mammogram, encounter for    6. Need for vaccination           Plan:       Preop examination  -     EKG 12-lead  -     CBC Auto Differential; Future; Expected date: 07/19/2022  -     PROTIME-INR; Future; Expected date: 07/19/2022  -     APTT; Future; Expected date: 07/19/2022  -     Comprehensive Metabolic Panel; Future; Expected date: 07/19/2022  -     HCG, QUANTITATIVE, PREGNANCY; Future; Expected date: 07/19/2022    Tooth ache  -     pantoprazole (PROTONIX) 40 MG tablet; Take 1 tablet (40 mg total) by mouth once daily.  Dispense: 30 tablet; Refill: 0    Gastroesophageal reflux disease, unspecified whether esophagitis present  -     pantoprazole (PROTONIX) 40 MG tablet; Take 1 tablet (40 mg total) by mouth once daily.  Dispense: 30 tablet; Refill: 0    Screening for cardiovascular condition  -     EKG 12-lead  -     Comprehensive Metabolic Panel; Future; Expected date: 07/19/2022    Screening mammogram, encounter for  -     Mammo Digital Screening Bilat; Future; Expected date: 07/19/2022    Need for vaccination  -     (In Office Administered) Tdap Vaccine        PreOp Exam:   - patient with left foot pain.   - bunion that is wanting to have removed.  Bunion is noted on exam.   - hx of clot after gastric bypass, but was much more significant surgery.  Will check PT, INR, CBC, CMP and get EKG before clearance.   - getting pregnancy test but has tubal ligation.    - if labs reassuring, would consider patient an acceptable candidate for surgery.    - labs today show a persistent stable anemia, but kidney function is normal, PT/PTT/INR are normal.   - Patient's physical is reassuring.  Labs reassuring.  Medically cleared for surgery on her foot at this time and for next 60 days.     Toothache:   - patient having tooth pain.   - was advised to  use NSAID by dental provider until tooth could be removed.    - advised patient that due to her gastric bypass should increase her H2 blocker to PPI while taking NSAID to reduce change of stomach injury.    - ordered Pantoprazole 40mg daily but says will need prior auth.     Vaccine:   - getting Tdap up to date.    Screening:   - getting mammogram.

## 2022-07-19 NOTE — PROGRESS NOTES
Verified pt ID using name and . PCN allergy. Administered TDAP in right deltoid per physician order using aseptic technique. Aspirated and no blood return noted. Pt tolerated well with no adverse reactions noted.

## 2022-07-20 ENCOUNTER — TELEPHONE (OUTPATIENT)
Dept: PODIATRY | Facility: CLINIC | Age: 42
End: 2022-07-20
Payer: MEDICAID

## 2022-07-20 NOTE — TELEPHONE ENCOUNTER
Staff informed patient that a call will be made when the surgery is scheduled.    Patient verbalized understanding.

## 2022-07-20 NOTE — TELEPHONE ENCOUNTER
Staff informed patient that she will get a callback for the date of surgery after scheduled.    Patient verbalized understanding.

## 2022-07-20 NOTE — TELEPHONE ENCOUNTER
----- Message from Karely Tolentino sent at 7/20/2022 10:37 AM CDT -----  Regarding: Procedure Date  Name of Who is Calling:RACHELLE HENDRICKS [[id number]]          What is the request in detail: Patient is requesting a call back in reference to procedure date           Can the clinic reply by [facility]: no           What Number to Call Back if not in [facility]: [phone]

## 2022-07-22 RX ORDER — SODIUM CHLORIDE 0.9 % (FLUSH) 0.9 %
3 SYRINGE (ML) INJECTION EVERY 6 HOURS PRN
Status: DISCONTINUED | OUTPATIENT
Start: 2022-07-22 | End: 2023-05-08

## 2022-07-22 RX ORDER — CLINDAMYCIN PHOSPHATE 900 MG/50ML
900 INJECTION, SOLUTION INTRAVENOUS
Status: CANCELLED | OUTPATIENT
Start: 2022-07-22

## 2022-07-25 ENCOUNTER — TELEPHONE (OUTPATIENT)
Dept: PRIMARY CARE CLINIC | Facility: CLINIC | Age: 42
End: 2022-07-25
Payer: MEDICAID

## 2022-07-25 NOTE — TELEPHONE ENCOUNTER
----- Message from Malik Riley sent at 7/25/2022  1:10 PM CDT -----  Contact: self [phone]  Pt requesting a call for prior aut stated y'all know.    Please call and advise

## 2022-08-03 ENCOUNTER — HOSPITAL ENCOUNTER (OUTPATIENT)
Dept: RADIOLOGY | Facility: HOSPITAL | Age: 42
Discharge: HOME | End: 2022-08-03
Attending: STUDENT IN AN ORGANIZED HEALTH CARE EDUCATION/TRAINING PROGRAM
Payer: MEDICAID

## 2022-08-03 DIAGNOSIS — Z12.31 SCREENING MAMMOGRAM, ENCOUNTER FOR: ICD-10-CM

## 2022-08-03 PROCEDURE — 77063 BREAST TOMOSYNTHESIS BI: CPT | Mod: TC,PO

## 2022-08-03 PROCEDURE — 77067 SCR MAMMO BI INCL CAD: CPT | Mod: TC,PO

## 2022-08-10 ENCOUNTER — HOSPITAL ENCOUNTER (OUTPATIENT)
Dept: PREADMISSION TESTING | Facility: OTHER | Age: 42
Discharge: HOME | End: 2022-08-10
Attending: PODIATRIST
Payer: MEDICAID

## 2022-08-10 ENCOUNTER — ANESTHESIA EVENT (OUTPATIENT)
Dept: SURGERY | Facility: OTHER | Age: 42
End: 2022-08-10
Payer: MEDICAID

## 2022-08-10 VITALS
OXYGEN SATURATION: 100 % | HEIGHT: 67 IN | TEMPERATURE: 98 F | RESPIRATION RATE: 16 BRPM | DIASTOLIC BLOOD PRESSURE: 66 MMHG | SYSTOLIC BLOOD PRESSURE: 116 MMHG | WEIGHT: 175 LBS | BODY MASS INDEX: 27.47 KG/M2 | HEART RATE: 69 BPM

## 2022-08-10 RX ORDER — SODIUM CHLORIDE, SODIUM LACTATE, POTASSIUM CHLORIDE, CALCIUM CHLORIDE 600; 310; 30; 20 MG/100ML; MG/100ML; MG/100ML; MG/100ML
INJECTION, SOLUTION INTRAVENOUS CONTINUOUS
Status: CANCELLED | OUTPATIENT
Start: 2022-08-10

## 2022-08-10 NOTE — ANESTHESIA PREPROCEDURE EVALUATION
08/10/2022  Giovanni Pena is a 42 y.o., female.      Pre-op Assessment    I have reviewed the Patient Summary Reports.     I have reviewed the Nursing Notes. I have reviewed the NPO Status.   I have reviewed the Medications.     Review of Systems  Anesthesia Hx:  No problems with previous Anesthesia  Denies Family Hx of Anesthesia complications.   Denies Personal Hx of Anesthesia complications.   Social:  Non-Smoker    Hematology/Oncology:     Oncology Normal    -- Anemia: Hematology Comments: hgb 10    Cardiovascular:   Exercise tolerance: good    Pulmonary:  Pulmonary Normal    Renal/:  Renal/ Normal     Hepatic/GI:   GERD, well controlled Finished blood thinners for portal vein thrombosis   Musculoskeletal:   Rheumatoid Arthritis -- no cervical issues   Neurological:  Neurology Normal    Endocrine:  Endocrine Normal    Psych:  Psychiatric Normal           Physical Exam  General: Well nourished and Cooperative    Airway:  Mallampati: I   Mouth Opening: Normal  TM Distance: Normal  Neck ROM: Normal ROM    Dental:  Intact        Anesthesia Plan  Type of Anesthesia, risks & benefits discussed:    Anesthesia Type: MAC  Intra-op Monitoring Plan: Standard ASA Monitors  Post Op Pain Control Plan: multimodal analgesia  Induction:  IV  Airway Plan: Video  Informed Consent: Informed consent signed with the Patient and all parties understand the risks and agree with anesthesia plan.  All questions answered.   ASA Score: 2  Day of Surgery Review of History & Physical: H&P Update referred to the surgeon/provider.    Ready For Surgery From Anesthesia Perspective.     .

## 2022-08-10 NOTE — DISCHARGE INSTRUCTIONS
Information to Prepare you for your Surgery    PRE-ADMIT TESTING -  [phone]    [address] [facility]          Your surgery has been scheduled at [facility]. We are pleased to have the opportunity to serve you. For Further Information please call [phone].    On the day of surgery please report to the Information Desk on the 1st floor.    CONTACT YOUR PHYSICIAN'S OFFICE THE DAY PRIOR TO YOUR SURGERY TO OBTAIN YOUR ARRIVAL TIME.     The evening before surgery do not eat anything after 9 p.m. ( this includes hard candy, chewing gum and mints).  You may only have GATORADE, POWERADE AND WATER  from 9 p.m. until you leave your home.   DO NOT DRINK ANY LIQUIDS ON THE WAY TO THE HOSPITAL.      Why does your anesthesiologist allow you to drink Gatorade/Powerade before surgery?  Gatorade/Powerade helps to increase your comfort before surgery and to decrease your nausea after surgery. The carbohydrates in Gatorade/Powerade help reduce your body's stress response to surgery.  If you are a diabetic-drink only water prior to surgery.      Current Visitor policy(12/27/2021) - Patients may have 2 visitors pre and post procedure. Only 2 visitors will be allowed in the Surgical building with the patient.     SPECIAL MEDICATION INSTRUCTIONS: TAKE medications checked off by the Anesthesiologist on your Medication List.    Angiogram Patients: Take medications as instructed by your physician, including aspirin.     Surgery Patients:    If you take ASPIRIN - Your PHYSICIAN/SURGEON will need to inform you IF/OR when you need to stop taking aspirin prior to your surgery.     Do Not take any medications containing IBUPROFEN.    Do Not Wear any make-up (especially eye make-up) to surgery. Please remove any false eyelashes or eyelash extensions. If you arrive the day of surgery with makeup/eyelashes on you will be required to remove prior to surgery. (There is a risk of corneal  abrasions if eye makeup/eyelash extensions are not removed)      Leave all valuables at home.   Do Not wear any jewelry or watches, including any metal in body piercings. Jewelry must be removed prior to coming to the hospital.  There is a possibility that rings that are unable to be removed may be cut off if they are on the surgical extremity.    Please remove all hair extensions, wigs, clips and any other metal accessories/ ornaments from your hair.  These items may pose a flammable/fire risk in Surgery and must be removed.    Do not shave your surgical area at least 5 days prior to your surgery. The surgical prep will be performed at the hospital according to Infection Control regulations.    Contact Lens must be removed before surgery. Either do not wear the contact lens or bring a case and solution for storage.  Please bring a container for eyeglasses or dentures as required.  Bring any paperwork your physician has provided, such as consent forms,  history and physicals, doctor's orders, etc.   Bring comfortable clothes that are loose fitting to wear upon discharge. Take into consideration the type of surgery being performed.  Maintain your diet as advised per your physician the day prior to surgery.      Adequate rest the night before surgery is advised.   Park in the Parking lot behind the hospital or in the [facility] across the street from the parking lot. Parking is complimentary.  If you will be discharged the same day as your procedure, please arrange for a responsible adult to drive you home or to accompany you if traveling by taxi.   YOU WILL NOT BE PERMITTED TO DRIVE OR TO LEAVE THE HOSPITAL ALONE AFTER SURGERY.   If you are being discharged the same day, it is strongly recommended that you arrange for someone to remain with you for the first 24 hrs following your surgery.    The Surgeon will speak to your family/visitor after your surgery regarding the outcome of your surgery and post op  care.  The Surgeon may speak to you after your surgery, but there is a possibility you may not remember the details.  Please check with your family members regarding the conversation with the Surgeon.    We strongly recommend whoever is bringing you home be present for discharge instructions.  This will ensure a thorough understanding for your post op home care.    ALL CHILDREN MUST ALWAYS BE ACCOMPANIED BY AN ADULT.    Visitors-Refer to current Visitor policy handouts.    Thank you for your cooperation.  The Staff of [facility].            Bathing Instructions with Hibiclens    Shower the evening before and morning of your procedure with Hibiclens:  Wash your face with water and your regular face wash/soap  Apply Hibiclens directly on your skin or on a wet washcloth and wash gently. When showering: Move away from the shower stream when applying Hibiclens to avoid rinsing off too soon.  Rinse thoroughly with warm water  Do not dilute Hibiclens        Dry off as usual, do not use any deodorant, powder, body lotions, perfume, after shave or cologne.

## 2022-08-17 ENCOUNTER — HOSPITAL ENCOUNTER (OUTPATIENT)
Facility: OTHER | Age: 42
Discharge: HOME | End: 2022-08-17
Attending: PODIATRIST | Admitting: PODIATRIST
Payer: MEDICAID

## 2022-08-17 ENCOUNTER — ANESTHESIA (OUTPATIENT)
Dept: SURGERY | Facility: OTHER | Age: 42
End: 2022-08-17
Payer: MEDICAID

## 2022-08-17 VITALS
HEART RATE: 74 BPM | RESPIRATION RATE: 16 BRPM | WEIGHT: 175 LBS | DIASTOLIC BLOOD PRESSURE: 64 MMHG | SYSTOLIC BLOOD PRESSURE: 110 MMHG | TEMPERATURE: 98 F | HEIGHT: 67 IN | OXYGEN SATURATION: 100 % | BODY MASS INDEX: 27.47 KG/M2

## 2022-08-17 DIAGNOSIS — M79.671 FOOT PAIN, BILATERAL: ICD-10-CM

## 2022-08-17 DIAGNOSIS — M20.12 VALGUS DEFORMITY OF BOTH GREAT TOES: ICD-10-CM

## 2022-08-17 DIAGNOSIS — M21.611 BILATERAL BUNIONS: ICD-10-CM

## 2022-08-17 DIAGNOSIS — M79.672 FOOT PAIN, BILATERAL: ICD-10-CM

## 2022-08-17 DIAGNOSIS — M21.612 BILATERAL BUNIONS: ICD-10-CM

## 2022-08-17 DIAGNOSIS — M20.11 VALGUS DEFORMITY OF BOTH GREAT TOES: ICD-10-CM

## 2022-08-17 LAB
B-HCG UR QL: NEGATIVE
CTP QC/QA: YES

## 2022-08-17 PROCEDURE — 88311 DECALCIFY TISSUE: CPT | Performed by: PATHOLOGY

## 2022-08-17 PROCEDURE — 88305 TISSUE EXAM BY PATHOLOGIST: CPT | Performed by: PATHOLOGY

## 2022-08-17 PROCEDURE — 88311 PR  DECALCIFY TISSUE: ICD-10-PCS | Mod: 26,,, | Performed by: PATHOLOGY

## 2022-08-17 PROCEDURE — 27201423 OPTIME MED/SURG SUP & DEVICES STERILE SUPPLY: Performed by: PODIATRIST

## 2022-08-17 PROCEDURE — 28299 PR CORRECT BUNION,DOUBLE OSTEOTOMY: ICD-10-PCS | Mod: LT,,, | Performed by: PODIATRIST

## 2022-08-17 PROCEDURE — 88305 TISSUE EXAM BY PATHOLOGIST: ICD-10-PCS | Mod: 26,,, | Performed by: PATHOLOGY

## 2022-08-17 PROCEDURE — 25000003 PHARM REV CODE 250: Performed by: PODIATRIST

## 2022-08-17 PROCEDURE — 71000016 HC POSTOP RECOV ADDL HR: Performed by: PODIATRIST

## 2022-08-17 PROCEDURE — 88311 DECALCIFY TISSUE: CPT | Mod: 26,,, | Performed by: PATHOLOGY

## 2022-08-17 PROCEDURE — 81025 URINE PREGNANCY TEST: CPT | Performed by: ANESTHESIOLOGY

## 2022-08-17 PROCEDURE — 37000009 HC ANESTHESIA EA ADD 15 MINS: Performed by: PODIATRIST

## 2022-08-17 PROCEDURE — 36000707: Performed by: PODIATRIST

## 2022-08-17 PROCEDURE — 88305 TISSUE EXAM BY PATHOLOGIST: CPT | Mod: 26,,, | Performed by: PATHOLOGY

## 2022-08-17 PROCEDURE — 63600175 PHARM REV CODE 636 W HCPCS: Performed by: NURSE ANESTHETIST, CERTIFIED REGISTERED

## 2022-08-17 PROCEDURE — 25000003 PHARM REV CODE 250: Performed by: NURSE ANESTHETIST, CERTIFIED REGISTERED

## 2022-08-17 PROCEDURE — 28299 COR HLX VLGS DOUBLE OSTEOT: CPT | Mod: LT,,, | Performed by: PODIATRIST

## 2022-08-17 PROCEDURE — 36000706: Performed by: PODIATRIST

## 2022-08-17 PROCEDURE — 01480 ANES OPEN PX LOWER L/A/F NOS: CPT | Performed by: PODIATRIST

## 2022-08-17 PROCEDURE — 37000008 HC ANESTHESIA 1ST 15 MINUTES: Performed by: PODIATRIST

## 2022-08-17 PROCEDURE — 71000015 HC POSTOP RECOV 1ST HR: Performed by: PODIATRIST

## 2022-08-17 PROCEDURE — 63600175 PHARM REV CODE 636 W HCPCS: Performed by: ANESTHESIOLOGY

## 2022-08-17 PROCEDURE — C1713 ANCHOR/SCREW BN/BN,TIS/BN: HCPCS | Performed by: PODIATRIST

## 2022-08-17 DEVICE — SCREW VARIAX NON LOK 2.4X14MM: Type: IMPLANTABLE DEVICE | Site: FOOT | Status: FUNCTIONAL

## 2022-08-17 DEVICE — IMPLANTABLE DEVICE: Type: IMPLANTABLE DEVICE | Site: FOOT | Status: FUNCTIONAL

## 2022-08-17 RX ORDER — PROPOFOL 10 MG/ML
VIAL (ML) INTRAVENOUS
Status: DISCONTINUED | OUTPATIENT
Start: 2022-08-17 | End: 2022-08-17

## 2022-08-17 RX ORDER — CLINDAMYCIN PHOSPHATE 900 MG/50ML
900 INJECTION, SOLUTION INTRAVENOUS
Status: COMPLETED | OUTPATIENT
Start: 2022-08-17 | End: 2022-08-17

## 2022-08-17 RX ORDER — HYDROCODONE BITARTRATE AND ACETAMINOPHEN 7.5; 325 MG/1; MG/1
1 TABLET ORAL EVERY 6 HOURS PRN
Qty: 28 TABLET | Refills: 0 | Status: SHIPPED | OUTPATIENT
Start: 2022-08-17 | End: 2023-04-12

## 2022-08-17 RX ORDER — FENTANYL CITRATE 50 UG/ML
INJECTION, SOLUTION INTRAMUSCULAR; INTRAVENOUS
Status: DISCONTINUED | OUTPATIENT
Start: 2022-08-17 | End: 2022-08-17

## 2022-08-17 RX ORDER — PHENYLEPHRINE HYDROCHLORIDE 10 MG/ML
INJECTION INTRAVENOUS
Status: DISCONTINUED | OUTPATIENT
Start: 2022-08-17 | End: 2022-08-17

## 2022-08-17 RX ORDER — BUPIVACAINE HYDROCHLORIDE 2.5 MG/ML
INJECTION, SOLUTION EPIDURAL; INFILTRATION; INTRACAUDAL
Status: DISCONTINUED | OUTPATIENT
Start: 2022-08-17 | End: 2022-08-17 | Stop reason: HOSPADM

## 2022-08-17 RX ORDER — LIDOCAINE HCL/PF 100 MG/5ML
SYRINGE (ML) INTRAVENOUS
Status: DISCONTINUED | OUTPATIENT
Start: 2022-08-17 | End: 2022-08-17

## 2022-08-17 RX ORDER — SODIUM CHLORIDE, SODIUM LACTATE, POTASSIUM CHLORIDE, CALCIUM CHLORIDE 600; 310; 30; 20 MG/100ML; MG/100ML; MG/100ML; MG/100ML
INJECTION, SOLUTION INTRAVENOUS CONTINUOUS
Status: DISCONTINUED | OUTPATIENT
Start: 2022-08-17 | End: 2022-08-17 | Stop reason: HOSPADM

## 2022-08-17 RX ORDER — PROPOFOL 10 MG/ML
VIAL (ML) INTRAVENOUS CONTINUOUS PRN
Status: DISCONTINUED | OUTPATIENT
Start: 2022-08-17 | End: 2022-08-17

## 2022-08-17 RX ORDER — LIDOCAINE HYDROCHLORIDE 10 MG/ML
INJECTION, SOLUTION EPIDURAL; INFILTRATION; INTRACAUDAL; PERINEURAL
Status: DISCONTINUED | OUTPATIENT
Start: 2022-08-17 | End: 2022-08-17 | Stop reason: HOSPADM

## 2022-08-17 RX ORDER — MIDAZOLAM HYDROCHLORIDE 1 MG/ML
INJECTION INTRAMUSCULAR; INTRAVENOUS
Status: DISCONTINUED | OUTPATIENT
Start: 2022-08-17 | End: 2022-08-17

## 2022-08-17 RX ADMIN — PHENYLEPHRINE HYDROCHLORIDE 100 MCG: 10 INJECTION INTRAVENOUS at 01:08

## 2022-08-17 RX ADMIN — PROPOFOL 50 MG: 10 INJECTION, EMULSION INTRAVENOUS at 01:08

## 2022-08-17 RX ADMIN — SODIUM CHLORIDE, SODIUM LACTATE, POTASSIUM CHLORIDE, AND CALCIUM CHLORIDE: 600; 310; 30; 20 INJECTION, SOLUTION INTRAVENOUS at 02:08

## 2022-08-17 RX ADMIN — PROPOFOL 75 MCG/KG/MIN: 10 INJECTION, EMULSION INTRAVENOUS at 01:08

## 2022-08-17 RX ADMIN — GLYCOPYRROLATE 0.2 MG: 0.2 INJECTION, SOLUTION INTRAMUSCULAR; INTRAVITREAL at 01:08

## 2022-08-17 RX ADMIN — PHENYLEPHRINE HYDROCHLORIDE 100 MCG: 10 INJECTION INTRAVENOUS at 02:08

## 2022-08-17 RX ADMIN — SODIUM CHLORIDE, SODIUM LACTATE, POTASSIUM CHLORIDE, AND CALCIUM CHLORIDE: 600; 310; 30; 20 INJECTION, SOLUTION INTRAVENOUS at 12:08

## 2022-08-17 RX ADMIN — LIDOCAINE HYDROCHLORIDE 20 MG: 20 INJECTION, SOLUTION INTRAVENOUS at 01:08

## 2022-08-17 RX ADMIN — FENTANYL CITRATE 100 MCG: 50 INJECTION, SOLUTION INTRAMUSCULAR; INTRAVENOUS at 01:08

## 2022-08-17 RX ADMIN — MIDAZOLAM HYDROCHLORIDE 2 MG: 1 INJECTION, SOLUTION INTRAMUSCULAR; INTRAVENOUS at 01:08

## 2022-08-17 RX ADMIN — CLINDAMYCIN PHOSPHATE 900 MG: 18 INJECTION, SOLUTION INTRAVENOUS at 01:08

## 2022-08-17 NOTE — H&P
Reviewed encounter 7/8/2022 - no changes .    Consent updated.    Left foot marked as surgical site with agreement from patient, nursing, doctor.

## 2022-08-17 NOTE — OP NOTE
08/17/2022    Surgeon Lashanda    No assist     Preop diagnosis hallux valgus left, bunion left foot, pain left foot    Postop diagnosis same     Pathology bone left foot     Procedure bunion correction left foot with osteotomy of 1st metatarsal and toe (double osteotomy)    Anesthesia local with monitored anesthesia care     Hemostasis pneumatic ankle tourniquet left and anatomic dissection     Estimated blood loss less than 1 mL     Materials 14 mm cortical bone screws x3, 12 mm cortical bone screw x1 left foot    Injection 9 mL each 1% lidocaine plain 0.5% Marcaine plain mixed 50 50     Condition stable     Complications none apparent    Procedure in detail:    Under mild sedation the patient was brought in the operating room and placed on the operating table in the supine position.  Time-out was performed all patient identifiers site markings and procedures were noted to be in agreement all present.      Following IV sedation local anesthesia was obtained about the medial column of the patient's left foot utilizing local injection consisting of a total of 18 mL of one-to-one mixture of 1% lidocaine plain 0.5% Marcaine plain.      The left foot was scrubbed prepped and draped in the usual aseptic manner.  An Esmarch bandage was utilized to exsanguinate the patient's left foot and the pneumatic ankle tourniquet was inflated.      Attention was directed to the dorsal aspect of the patient's left foot where approximately 7 cm linear longitudinal incision was made medial and parallel to the tendon of the extensor hallucis longus.  This was deepened to subcutaneous tissues care being taken to identify and retract all vital neural and vascular structures all bleeders were ligated and cauterized as necessary.      Linear longitudinal capsulotomy was then performed over the 1st metatarsophalangeal joint exposing the head of the 1st metatarsal and the proximal phalanx in the surgical field.      This time a through and  through V type osteotomy was made in the distal metaphyseal region the 1st metatarsal.  The superior arm was made longer to accommodate rigid internal fixation.  The distal fragment was then distracted shifted laterally into a more corrected position and impacted on the shaft of 1st metatarsal.  Following standard AO principles and techniques to by cortical 14 mm bone screws were placed in lag fashion across the osteotomy with excellent compression noted    At this time a long oblique medially based wedge was removed from the proximal phalanx with sagittal saw moving the angulation of the hallux medially into a more corrected position.  This was fixated with 2 bicortical bone screws in lag fashion 114 mm 112 mm.  Excellent compression position was noted.      Intraoperative position was noted with C-arm noted to be acceptable.    Surgical wound was irrigated with copious amounts sterile normal saline the dorsomedial prominence of bone was removed from the distal 1st metatarsal with sagittal bone saw and all rough edges smoothed with a bone rasp.  The wound was again irrigated with copious amounts sterile normal saline.      A medial capsulorrhaphy was performed at the medial aspect of the 1st metatarsal phalangeal joint left this was repaired with 3-0 Vicryl.  Periosteal and capsular structures were then repaired with 3-0 Vicryl.  Subcutaneous tissues were closed with 4-0 Vicryl and the skin closed with 4-0 nylon.      The patient appeared to tolerate the procedure and anesthesia well the pneumatic ankle tourniquet was deflated and a prompt capillary refill was noted to all toes of the patient's left foot.  The wound was dressed with Adaptic covered with a sterile compressive dressing consisting of 4x4s Kerlix and Ace bandage foot and ankle neutral position.  A below-knee Cam boot was placed on the patient's left lower extremity.    She appeared to tolerate the procedure and anesthesia well.  Following a period of  postoperative monitoring should be discharged home on the following written and oral postoperative instructions.  Keep the dressing dry and intact avoid excessive ambulation ice and elevate the left foot when at rest.  Contact Dr. Pyle for all postoperative follow-up care if any problems should arise.  She is discharged home today under her own care with right from a friend with a prescription for Norco 7.5 mg take 1 tab p.o. q.6 hours p.r.n. pain total of 28 tablets to be dispensed.    She will follow up 1 week in the top to this office for re-evaluation, sooner p.r.n..

## 2022-08-17 NOTE — PLAN OF CARE
Giovanni Pena has met all discharge criteria from Phase II. Vital Signs are stable, ambulating  without difficulty. Discharge instructions given, patient verbalized understanding. Discharged from facility via wheelchair in stable condition.

## 2022-08-17 NOTE — BRIEF OP NOTE
[facility] - Surgery ([facility])  Brief Operative Note    Surgery Date: 8/17/2022     Surgeon(s) and Role:     * Roshan Pyle DPM - Primary    Assisting Surgeon: None    Pre-op Diagnosis:  Bilateral bunions [M21.611, M21.612]  Valgus deformity of both great toes [M20.11, M20.12]  Foot pain, bilateral [M79.671, M79.672]    Post-op Diagnosis:  Post-Op Diagnosis Codes:     * Bilateral bunions [M21.611, M21.612]     * Valgus deformity of both great toes [M20.11, M20.12]     * Foot pain, bilateral [M79.671, M79.672]    Procedure(s) (LRB):  BUNIONECTOMY, WITH METATARSAL OSTEOTOMY  distal osteotomy left 1st metatarsal (Left)    Anesthesia: Local MAC    Operative Findings: bunion, hallux valgus    Estimated Blood Loss: * No values recorded between 8/17/2022  1:18 PM and 8/17/2022  3:15 PM *         Specimens:   Specimen (24h ago, onward)             Start     Ordered    08/17/22 1405  Specimen to Pathology, Surgery Orthopedics  Once        Comments: Pre-op Diagnosis: Bilateral bunions [M21.611, M21.612]Valgus deformity of both great toes [M20.11, M20.12]Foot pain, bilateral [M79.671, M79.672]Procedure(s):BUNIONECTOMY, WITH METATARSAL OSTEOTOMY  distal osteotomy left 1st metatarsalOSTEOTOMY, ADELITA  left hallux Number of specimens: 1Name of specimens: 1.  BONE LEFT FOOT     References:    Click here for ordering Quick Tip   Question Answer Comment   Procedure Type: Orthopedics    Specimen Class: Routine/Screening    Which provider would you like to cc? ROSHAN PYLE    Release to patient Immediate        08/17/22 1405                  Discharge Note    OUTCOME: Patient tolerated treatment/procedure well without complication and is now ready for discharge.    DISPOSITION: Home or Self Care    FINAL DIAGNOSIS:  <principal problem not specified>    FOLLOWUP: In clinic    DISCHARGE INSTRUCTIONS:    Discharge Procedure Orders   CAM walker for home use   Order Comments: Nwb left all times     Order Specific Question Answer Comments  "  Height: 5' 7" (1.702 m)    Weight: 79.4 kg (175 lb)    Quantity: 1    Size: l    Length of need (1-99 months): 2      Diet general     Keep surgical extremity elevated     Ice to affected area     Call MD for:  extreme fatigue     Call MD for:  persistent dizziness or light-headedness     Call MD for:  hives     Call MD for:  redness, tenderness, or signs of infection (pain, swelling, redness, odor or green/yellow discharge around incision site)     Call MD for:  difficulty breathing, headache or visual disturbances     Call MD for:  severe uncontrolled pain     Call MD for:  persistent nausea and vomiting     Call MD for:  temperature >100.4     Leave dressing on - Keep it clean, dry, and intact until clinic visit     Weight bearing restrictions (specify)   Order Comments: Total non weight bearing left foot all times in fracture boot with crutches.     "

## 2022-08-17 NOTE — ANESTHESIA POSTPROCEDURE EVALUATION
Anesthesia Post Evaluation    Patient: Giovanni Pena    Procedure(s) Performed: Procedure(s) (LRB):  BUNIONECTOMY, WITH METATARSAL OSTEOTOMY  distal osteotomy left 1st metatarsal (Left)    Final Anesthesia Type: general      Patient location during evaluation: [facility]  Patient participation: Yes- Able to Participate  Level of consciousness: awake and alert and oriented  Post-procedure vital signs: reviewed and stable  Pain management: adequate  Airway patency: patent    PONV status at discharge: No PONV  Anesthetic complications: no      Cardiovascular status: stable, hemodynamically stable and blood pressure returned to baseline  Respiratory status: unassisted, spontaneous ventilation and room air  Hydration status: euvolemic  Follow-up not needed.          Vitals Value Taken Time   /69 08/17/22 1126   Temp 37.3 °C (99.1 °F) 08/17/22 1126   Pulse 74 08/17/22 1126   Resp 16 08/17/22 1126   SpO2 100 % 08/17/22 1126         No case tracking events are documented in the log.      Pain/Jim Score: No data recorded

## 2022-08-19 ENCOUNTER — PATIENT MESSAGE (OUTPATIENT)
Dept: PODIATRY | Facility: CLINIC | Age: 42
End: 2022-08-19
Payer: MEDICAID

## 2022-08-23 ENCOUNTER — APPOINTMENT (OUTPATIENT)
Dept: RADIOLOGY | Facility: OTHER | Age: 42
End: 2022-08-23
Attending: PODIATRIST
Payer: MEDICAID

## 2022-08-23 ENCOUNTER — OFFICE VISIT (OUTPATIENT)
Dept: PODIATRY | Facility: CLINIC | Age: 42
End: 2022-08-23
Payer: MEDICAID

## 2022-08-23 VITALS
HEIGHT: 67 IN | SYSTOLIC BLOOD PRESSURE: 117 MMHG | BODY MASS INDEX: 27.47 KG/M2 | WEIGHT: 175 LBS | HEART RATE: 64 BPM | DIASTOLIC BLOOD PRESSURE: 57 MMHG

## 2022-08-23 DIAGNOSIS — Z98.890 POST-OPERATIVE STATE: ICD-10-CM

## 2022-08-23 DIAGNOSIS — Z98.890 POST-OPERATIVE STATE: Primary | ICD-10-CM

## 2022-08-23 PROCEDURE — 3078F PR MOST RECENT DIASTOLIC BLOOD PRESSURE < 80 MM HG: ICD-10-PCS | Mod: CPTII,,, | Performed by: PODIATRIST

## 2022-08-23 PROCEDURE — 3008F BODY MASS INDEX DOCD: CPT | Mod: CPTII,,, | Performed by: PODIATRIST

## 2022-08-23 PROCEDURE — 73630 X-RAY EXAM OF FOOT: CPT | Mod: TC,LT

## 2022-08-23 PROCEDURE — 99024 POSTOP FOLLOW-UP VISIT: CPT | Mod: ,,, | Performed by: PODIATRIST

## 2022-08-23 PROCEDURE — 3074F PR MOST RECENT SYSTOLIC BLOOD PRESSURE < 130 MM HG: ICD-10-PCS | Mod: CPTII,,, | Performed by: PODIATRIST

## 2022-08-23 PROCEDURE — 1159F PR MEDICATION LIST DOCUMENTED IN MEDICAL RECORD: ICD-10-PCS | Mod: CPTII,,, | Performed by: PODIATRIST

## 2022-08-23 PROCEDURE — 3008F PR BODY MASS INDEX (BMI) DOCUMENTED: ICD-10-PCS | Mod: CPTII,,, | Performed by: PODIATRIST

## 2022-08-23 PROCEDURE — 3078F DIAST BP <80 MM HG: CPT | Mod: CPTII,,, | Performed by: PODIATRIST

## 2022-08-23 PROCEDURE — 99999 PR PBB SHADOW E&M-EST. PATIENT-LVL III: ICD-10-PCS | Mod: PBBFAC,,, | Performed by: PODIATRIST

## 2022-08-23 PROCEDURE — 1159F MED LIST DOCD IN RCRD: CPT | Mod: CPTII,,, | Performed by: PODIATRIST

## 2022-08-23 PROCEDURE — 73630 X-RAY EXAM OF FOOT: CPT | Mod: 26,LT,, | Performed by: RADIOLOGY

## 2022-08-23 PROCEDURE — 73630 XR FOOT COMPLETE 3 VIEW LEFT: ICD-10-PCS | Mod: 26,LT,, | Performed by: RADIOLOGY

## 2022-08-23 PROCEDURE — 99024 PR POST-OP FOLLOW-UP VISIT: ICD-10-PCS | Mod: ,,, | Performed by: PODIATRIST

## 2022-08-23 PROCEDURE — 99999 PR PBB SHADOW E&M-EST. PATIENT-LVL III: CPT | Mod: PBBFAC,,, | Performed by: PODIATRIST

## 2022-08-23 PROCEDURE — 3074F SYST BP LT 130 MM HG: CPT | Mod: CPTII,,, | Performed by: PODIATRIST

## 2022-08-23 PROCEDURE — 99213 OFFICE O/P EST LOW 20 MIN: CPT | Mod: PBBFAC,PN | Performed by: PODIATRIST

## 2022-08-23 RX ORDER — APIXABAN 5 MG/1
TABLET, FILM COATED ORAL
COMMUNITY
Start: 2022-08-02 | End: 2023-05-08

## 2022-08-23 RX ORDER — CLINDAMYCIN HYDROCHLORIDE 150 MG/1
150 CAPSULE ORAL 4 TIMES DAILY
COMMUNITY
Start: 2022-07-17 | End: 2023-05-02

## 2022-08-23 RX ORDER — IBUPROFEN 800 MG/1
800 TABLET ORAL 3 TIMES DAILY
Qty: 90 TABLET | Refills: 0 | Status: SHIPPED | OUTPATIENT
Start: 2022-08-23 | End: 2023-04-12

## 2022-08-23 NOTE — PROGRESS NOTES
Subjective:      Patient ID: Giovanni Pena is a 42 y.o. female.    Chief Complaint: Post-op Evaluation (Bunionectomy)    Minor discomfort stiffness left 1st MTP 1 week status post distal osteotomy and hallux osteotomy left.  Patient is weight-bearing in fracture boot with minimal discomfort swelling.  She she is not been total nonweightbearing as instructed postoperatively.  Has crutches at home.  Denies adverse event pain swelling signs of infection since surgery.  Pain is well managed on current medications.  She is ready to step down to ibuprofen at her own request.    Review of Systems   Constitutional: Negative for chills, decreased appetite, diaphoresis, fever, malaise/fatigue and night sweats.   Musculoskeletal: Positive for joint pain and joint swelling.           Objective:      Physical Exam  Constitutional:       General: She is not in acute distress.     Appearance: She is well-developed. She is not diaphoretic.   Cardiovascular:      Pulses:           Popliteal pulses are 2+ on the right side and 2+ on the left side.        Dorsalis pedis pulses are 2+ on the right side and 2+ on the left side.        Posterior tibial pulses are 2+ on the right side and 2+ on the left side.      Comments: Capillary refill 3 seconds all toes/distal feet, all toes/both feet warm to touch.      Negative lymphadenopathy bilateral popliteal fossa and tarsal tunnel.      Negavie lower extremity edema bilateral.    Musculoskeletal:      Right ankle: No swelling, deformity, ecchymosis or lacerations. Normal range of motion. Normal pulse.      Right Achilles Tendon: Normal. No defects. Kelley's test negative.      Comments: Good gross alignment range of motion one-week status post bunionectomy left no apparent complication.  No gross deformity, no signs of trauma.   Lymphadenopathy:      Lower Body: No right inguinal adenopathy. No left inguinal adenopathy.      Comments: Negative lymphadenopathy bilateral popliteal  fossa and tarsal tunnel.    Negative lymphangitic streaking bilateral feet/ankles/legs.   Skin:     General: Skin is warm and dry.      Capillary Refill: Capillary refill takes 2 to 3 seconds.      Coloration: Skin is not pale.      Findings: No abrasion, bruising, burn, ecchymosis, erythema, laceration, lesion or rash.      Nails: There is no clubbing.      Comments:   Incision dorsal first ray left foot well approximated, good skin apposition, sutures intact without gaps, drainage, pus, tracking, fluctuance, malodor, or signs of infection.    Neurological:      Mental Status: She is alert and oriented to person, place, and time.      Sensory: No sensory deficit.      Motor: No tremor, atrophy or abnormal muscle tone.      Gait: Gait normal.      Deep Tendon Reflexes:      Reflex Scores:       Patellar reflexes are 2+ on the right side and 2+ on the left side.       Achilles reflexes are 2+ on the right side and 2+ on the left side.     Comments: Negative allodynia both feet   Psychiatric:         Behavior: Behavior is cooperative.               Assessment:       Encounter Diagnosis   Name Primary?    Post-operative state Yes         Plan:       Giovanni was seen today for post-op evaluation.    Diagnoses and all orders for this visit:    Post-operative state  -     WALKER FOR HOME USE    Other orders  -     ibuprofen (ADVIL,MOTRIN) 800 MG tablet; Take 1 tablet (800 mg total) by mouth 3 (three) times daily.      I counseled the patient on her conditions, their implications and medical management.        Continue total nonweightbearing left all times in fracture boot.  Today we swabbed the wound with alcohol covered with wound foam and sterile dry sterile dressing foot and ankle neutral position.      Prescription for knee walker.  Crutches dispensed today.    We discussed that she should not be bearing weight on the left foot.  She acknowledges she is been doing so against medical advice.  Says she uses crutches at  home.  I have made her aware of the potential complications and she verbally acknowledges them.          Follow up in about 1 week (around 8/30/2022).

## 2022-08-24 LAB
FINAL PATHOLOGIC DIAGNOSIS: NORMAL
Lab: NORMAL

## 2022-08-29 ENCOUNTER — TELEPHONE (OUTPATIENT)
Dept: PODIATRY | Facility: CLINIC | Age: 42
End: 2022-08-29
Payer: MEDICAID

## 2022-08-29 NOTE — TELEPHONE ENCOUNTER
----- Message from Thierno Brand sent at 8/29/2022 10:25 AM CDT -----  Regarding: Change Time  Contact: RACHELLE HENDRICKS [[id number]]  Name of Who is Calling: RACHELLE HENDRICKS [[id number]]      What is the request in detail: Would like to speak with staff in regards to moving time tomorrow. Please advise      Can the clinic reply by [facility]: yes      What [facility] Back [facility]: [phone]

## 2022-08-29 NOTE — TELEPHONE ENCOUNTER
Patient was calling to make sure that she have enough time to make appointment on 8/30 for 10a, do to daughter having appointment on 8/30 for 7:45a at [facility].    Staff informed patient that if she have a problem with making appointment on time to call and reschedule.    Patient verbalized understanding.

## 2022-08-30 ENCOUNTER — OFFICE VISIT (OUTPATIENT)
Dept: PODIATRY | Facility: CLINIC | Age: 42
End: 2022-08-30
Payer: MEDICAID

## 2022-08-30 VITALS
BODY MASS INDEX: 27.47 KG/M2 | HEIGHT: 67 IN | HEART RATE: 72 BPM | WEIGHT: 175 LBS | DIASTOLIC BLOOD PRESSURE: 55 MMHG | SYSTOLIC BLOOD PRESSURE: 118 MMHG

## 2022-08-30 DIAGNOSIS — Z98.890 POST-OPERATIVE STATE: Primary | ICD-10-CM

## 2022-08-30 PROCEDURE — 99999 PR PBB SHADOW E&M-EST. PATIENT-LVL III: CPT | Mod: PBBFAC,,, | Performed by: PODIATRIST

## 2022-08-30 PROCEDURE — 3078F PR MOST RECENT DIASTOLIC BLOOD PRESSURE < 80 MM HG: ICD-10-PCS | Mod: CPTII,,, | Performed by: PODIATRIST

## 2022-08-30 PROCEDURE — 1159F PR MEDICATION LIST DOCUMENTED IN MEDICAL RECORD: ICD-10-PCS | Mod: CPTII,,, | Performed by: PODIATRIST

## 2022-08-30 PROCEDURE — 3074F SYST BP LT 130 MM HG: CPT | Mod: CPTII,,, | Performed by: PODIATRIST

## 2022-08-30 PROCEDURE — 99213 OFFICE O/P EST LOW 20 MIN: CPT | Mod: PBBFAC,PN | Performed by: PODIATRIST

## 2022-08-30 PROCEDURE — 99024 PR POST-OP FOLLOW-UP VISIT: ICD-10-PCS | Mod: ,,, | Performed by: PODIATRIST

## 2022-08-30 PROCEDURE — 99999 PR PBB SHADOW E&M-EST. PATIENT-LVL III: ICD-10-PCS | Mod: PBBFAC,,, | Performed by: PODIATRIST

## 2022-08-30 PROCEDURE — 1159F MED LIST DOCD IN RCRD: CPT | Mod: CPTII,,, | Performed by: PODIATRIST

## 2022-08-30 PROCEDURE — 3078F DIAST BP <80 MM HG: CPT | Mod: CPTII,,, | Performed by: PODIATRIST

## 2022-08-30 PROCEDURE — 99024 POSTOP FOLLOW-UP VISIT: CPT | Mod: ,,, | Performed by: PODIATRIST

## 2022-08-30 PROCEDURE — 3074F PR MOST RECENT SYSTOLIC BLOOD PRESSURE < 130 MM HG: ICD-10-PCS | Mod: CPTII,,, | Performed by: PODIATRIST

## 2022-08-30 PROCEDURE — 3008F BODY MASS INDEX DOCD: CPT | Mod: CPTII,,, | Performed by: PODIATRIST

## 2022-08-30 PROCEDURE — 3008F PR BODY MASS INDEX (BMI) DOCUMENTED: ICD-10-PCS | Mod: CPTII,,, | Performed by: PODIATRIST

## 2022-08-30 NOTE — PROGRESS NOTES
Subjective:      Patient ID: Giovanni Pena is a 42 y.o. female.    Chief Complaint: Post Op (Post Op Bunionectomy)    Minor discomfort stiffness left 1st MTP 2 week status post distal osteotomy and hallux osteotomy left.  Patient is NON weight-bearing in fracture boot with minimal discomfort swelling. USING CRUTCHES.  Denies adverse event pain swelling signs of infection since surgery.  Pain is well managed on current medications.  She is ready to step down to ibuprofen at her own request.    Review of Systems   Constitutional: Negative for chills, decreased appetite, diaphoresis, fever, malaise/fatigue and night sweats.   Musculoskeletal:  Positive for joint pain and joint swelling.         Objective:      Physical Exam  Constitutional:       General: She is not in acute distress.     Appearance: She is well-developed. She is not diaphoretic.   Cardiovascular:      Pulses:           Popliteal pulses are 2+ on the right side and 2+ on the left side.        Dorsalis pedis pulses are 2+ on the right side and 2+ on the left side.        Posterior tibial pulses are 2+ on the right side and 2+ on the left side.      Comments: Capillary refill 3 seconds all toes/distal feet, all toes/both feet warm to touch.      Negative lymphadenopathy bilateral popliteal fossa and tarsal tunnel.      Negavie lower extremity edema bilateral.    Musculoskeletal:      Right ankle: No swelling, deformity, ecchymosis or lacerations. Normal range of motion. Normal pulse.      Right Achilles Tendon: Normal. No defects. Kelley's test negative.      Comments: Good gross alignment range of motion one-week status post bunionectomy left no apparent complication.  No gross deformity, no signs of trauma.   Lymphadenopathy:      Lower Body: No right inguinal adenopathy. No left inguinal adenopathy.      Comments: Negative lymphadenopathy bilateral popliteal fossa and tarsal tunnel.    Negative lymphangitic streaking bilateral  feet/ankles/legs.   Skin:     General: Skin is warm and dry.      Capillary Refill: Capillary refill takes 2 to 3 seconds.      Coloration: Skin is not pale.      Findings: No abrasion, bruising, burn, ecchymosis, erythema, laceration, lesion or rash.      Nails: There is no clubbing.      Comments:   Incision dorsal first ray left foot well approximated, good skin apposition, sutures intact without gaps, drainage, pus, tracking, fluctuance, malodor, or signs of infection.    Neurological:      Mental Status: She is alert and oriented to person, place, and time.      Sensory: No sensory deficit.      Motor: No tremor, atrophy or abnormal muscle tone.      Gait: Gait normal.      Deep Tendon Reflexes:      Reflex Scores:       Patellar reflexes are 2+ on the right side and 2+ on the left side.       Achilles reflexes are 2+ on the right side and 2+ on the left side.     Comments: Negative allodynia both feet   Psychiatric:         Behavior: Behavior is cooperative.             Assessment:       Encounter Diagnosis   Name Primary?    Post-operative state Yes         Plan:       Giovanni was seen today for post op.    Diagnoses and all orders for this visit:    Post-operative state  -     X-Ray Foot Complete Left; Future    I counseled the patient on her conditions, their implications and medical management.    Removed sutures without event.    Continue total nonweightbearing left all times in fracture boot.        Xrays next visit              No follow-ups on file.

## 2022-09-06 ENCOUNTER — TELEPHONE (OUTPATIENT)
Dept: PRIMARY CARE CLINIC | Facility: CLINIC | Age: 42
End: 2022-09-06
Payer: MEDICAID

## 2022-09-06 DIAGNOSIS — I81 PORTAL VEIN THROMBOSIS: Primary | ICD-10-CM

## 2022-09-06 NOTE — TELEPHONE ENCOUNTER
----- Message from Miquel Mishra MD sent at 3/31/2022  5:21 PM CDT -----  Regarding: repeat CT for clearance of portal vein clot      repeat CT for clearance of portal vein clot at 6 months from last test in September.

## 2022-09-06 NOTE — TELEPHONE ENCOUNTER
Patient had portal vein clot.  CT scan in Feb of 2022 showed resolution, was recommended to repeat in 6 months.  Should have CT scan in to evaluate this month.

## 2022-09-12 ENCOUNTER — PATIENT MESSAGE (OUTPATIENT)
Dept: PRIMARY CARE CLINIC | Facility: CLINIC | Age: 42
End: 2022-09-12
Payer: MEDICAID

## 2022-09-13 ENCOUNTER — TELEPHONE (OUTPATIENT)
Dept: PODIATRY | Facility: CLINIC | Age: 42
End: 2022-09-13

## 2022-09-13 ENCOUNTER — OFFICE VISIT (OUTPATIENT)
Dept: PODIATRY | Facility: CLINIC | Age: 42
End: 2022-09-13
Payer: MEDICAID

## 2022-09-13 ENCOUNTER — APPOINTMENT (OUTPATIENT)
Dept: RADIOLOGY | Facility: OTHER | Age: 42
End: 2022-09-13
Attending: PODIATRIST
Payer: MEDICAID

## 2022-09-13 VITALS
HEART RATE: 80 BPM | BODY MASS INDEX: 27.47 KG/M2 | SYSTOLIC BLOOD PRESSURE: 107 MMHG | WEIGHT: 175 LBS | DIASTOLIC BLOOD PRESSURE: 63 MMHG | HEIGHT: 67 IN

## 2022-09-13 DIAGNOSIS — Z98.890 POST-OPERATIVE STATE: Primary | ICD-10-CM

## 2022-09-13 DIAGNOSIS — Z98.890 POST-OPERATIVE STATE: ICD-10-CM

## 2022-09-13 PROCEDURE — 73630 X-RAY EXAM OF FOOT: CPT | Mod: TC,LT

## 2022-09-13 PROCEDURE — 1159F PR MEDICATION LIST DOCUMENTED IN MEDICAL RECORD: ICD-10-PCS | Mod: CPTII,,, | Performed by: PODIATRIST

## 2022-09-13 PROCEDURE — 99024 PR POST-OP FOLLOW-UP VISIT: ICD-10-PCS | Mod: ,,, | Performed by: PODIATRIST

## 2022-09-13 PROCEDURE — 1160F RVW MEDS BY RX/DR IN RCRD: CPT | Mod: CPTII,,, | Performed by: PODIATRIST

## 2022-09-13 PROCEDURE — 73630 XR FOOT COMPLETE 3 VIEW LEFT: ICD-10-PCS | Mod: 26,LT,, | Performed by: RADIOLOGY

## 2022-09-13 PROCEDURE — 3008F BODY MASS INDEX DOCD: CPT | Mod: CPTII,,, | Performed by: PODIATRIST

## 2022-09-13 PROCEDURE — 3008F PR BODY MASS INDEX (BMI) DOCUMENTED: ICD-10-PCS | Mod: CPTII,,, | Performed by: PODIATRIST

## 2022-09-13 PROCEDURE — 3078F PR MOST RECENT DIASTOLIC BLOOD PRESSURE < 80 MM HG: ICD-10-PCS | Mod: CPTII,,, | Performed by: PODIATRIST

## 2022-09-13 PROCEDURE — 3078F DIAST BP <80 MM HG: CPT | Mod: CPTII,,, | Performed by: PODIATRIST

## 2022-09-13 PROCEDURE — 99999 PR PBB SHADOW E&M-EST. PATIENT-LVL III: CPT | Mod: PBBFAC,,, | Performed by: PODIATRIST

## 2022-09-13 PROCEDURE — 1159F MED LIST DOCD IN RCRD: CPT | Mod: CPTII,,, | Performed by: PODIATRIST

## 2022-09-13 PROCEDURE — 3074F PR MOST RECENT SYSTOLIC BLOOD PRESSURE < 130 MM HG: ICD-10-PCS | Mod: CPTII,,, | Performed by: PODIATRIST

## 2022-09-13 PROCEDURE — 73630 X-RAY EXAM OF FOOT: CPT | Mod: 26,LT,, | Performed by: RADIOLOGY

## 2022-09-13 PROCEDURE — 99213 OFFICE O/P EST LOW 20 MIN: CPT | Mod: PBBFAC,PN | Performed by: PODIATRIST

## 2022-09-13 PROCEDURE — 99024 POSTOP FOLLOW-UP VISIT: CPT | Mod: ,,, | Performed by: PODIATRIST

## 2022-09-13 PROCEDURE — 99999 PR PBB SHADOW E&M-EST. PATIENT-LVL III: ICD-10-PCS | Mod: PBBFAC,,, | Performed by: PODIATRIST

## 2022-09-13 PROCEDURE — 3074F SYST BP LT 130 MM HG: CPT | Mod: CPTII,,, | Performed by: PODIATRIST

## 2022-09-13 PROCEDURE — 1160F PR REVIEW ALL MEDS BY PRESCRIBER/CLIN PHARMACIST DOCUMENTED: ICD-10-PCS | Mod: CPTII,,, | Performed by: PODIATRIST

## 2022-09-13 NOTE — PROGRESS NOTES
Subjective:      Patient ID: Giovanni Pena is a 42 y.o. female.    Chief Complaint: Post Op (Post Op Bunionectomy)    Minor discomfort stiffness left 1st MTP 4 week status post distal osteotomy and hallux osteotomy left.  Patient is NON weight-bearing in fracture boot with minimal discomfort swelling. USING CRUTCHES.  Denies adverse event pain swelling signs of infection since surgery.  Pain is minimal and well managed with over-the-counter medication.  Today's x-rays show good position bone and hardware with bridging of osteotomy sites    Review of Systems   Constitutional: Negative for chills, decreased appetite, diaphoresis, fever, malaise/fatigue and night sweats.   Musculoskeletal:  Positive for joint swelling. Negative for joint pain.         Objective:      Physical Exam  Constitutional:       General: She is not in acute distress.     Appearance: She is well-developed. She is not diaphoretic.   Cardiovascular:      Pulses:           Popliteal pulses are 2+ on the right side and 2+ on the left side.        Dorsalis pedis pulses are 2+ on the right side and 2+ on the left side.        Posterior tibial pulses are 2+ on the right side and 2+ on the left side.      Comments: Capillary refill 3 seconds all toes/distal feet, all toes/both feet warm to touch.      Negative lymphadenopathy bilateral popliteal fossa and tarsal tunnel.      Negavie lower extremity edema bilateral.    Musculoskeletal:      Right ankle: No swelling, deformity, ecchymosis or lacerations. Normal range of motion. Normal pulse.      Right Achilles Tendon: Normal. No defects. Kelley's test negative.      Comments: Good gross alignment range of motion status post bunionectomy left no apparent complication.  No gross deformity, no signs of trauma.   Lymphadenopathy:      Lower Body: No right inguinal adenopathy. No left inguinal adenopathy.      Comments: Negative lymphadenopathy bilateral popliteal fossa and tarsal tunnel.    Negative  lymphangitic streaking bilateral feet/ankles/legs.   Skin:     General: Skin is warm and dry.      Capillary Refill: Capillary refill takes 2 to 3 seconds.      Coloration: Skin is not pale.      Findings: No abrasion, bruising, burn, ecchymosis, erythema, laceration, lesion or rash.      Nails: There is no clubbing.      Comments:   Incision dorsal first ray left foot well healed withotu symptom.   Neurological:      Mental Status: She is alert and oriented to person, place, and time.      Sensory: No sensory deficit.      Motor: No tremor, atrophy or abnormal muscle tone.      Gait: Gait normal.      Deep Tendon Reflexes:      Reflex Scores:       Patellar reflexes are 2+ on the right side and 2+ on the left side.       Achilles reflexes are 2+ on the right side and 2+ on the left side.     Comments: Negative allodynia both feet   Psychiatric:         Behavior: Behavior is cooperative.             Assessment:       Encounter Diagnosis   Name Primary?    Post-operative state Yes         Plan:       Giovanni was seen today for post op.    Diagnoses and all orders for this visit:    Post-operative state    I counseled the patient on her conditions, their implications and medical management.    Patient may begin gradual return to weight-bearing activity without crutches in the fracture boot.    Two weeks.    Xrays next visit              No follow-ups on file.

## 2022-09-13 NOTE — TELEPHONE ENCOUNTER
[facility] stated that the patient could not take Diclofenac and Ibuprofen 800 together.    Staff informed Pancho that Diclofenac could be discontinued.     [facility]        ----- Message from Salvatore Kelley sent at 9/13/2022 12:31 PM CDT -----   Name of Who is Calling:     What is the request in detail:  pharmacy calling in reference to  drug /condition interaction for medication  Diclofenac  / patient has  EVT  and is taking  ibuprofen    800 Please contact to further discuss and advise      Can the clinic reply by [facility]:     What Number to Call Back if not in [facility]:  pancho LORA/ [phone]

## 2022-09-14 ENCOUNTER — TELEPHONE (OUTPATIENT)
Dept: PRIMARY CARE CLINIC | Facility: CLINIC | Age: 42
End: 2022-09-14
Payer: MEDICAID

## 2022-09-27 ENCOUNTER — PATIENT MESSAGE (OUTPATIENT)
Dept: PRIMARY CARE CLINIC | Facility: CLINIC | Age: 42
End: 2022-09-27
Payer: MEDICAID

## 2023-02-22 ENCOUNTER — TELEPHONE (OUTPATIENT)
Dept: PRIMARY CARE CLINIC | Facility: CLINIC | Age: 43
End: 2023-02-22
Payer: MEDICAID

## 2023-02-22 NOTE — TELEPHONE ENCOUNTER
Patient requesting an appointment for Friday or Monday.  I do not have one either day.  She said she will try something over the counter instead.

## 2023-02-22 NOTE — TELEPHONE ENCOUNTER
----- Message from Araceli Wells sent at 2/22/2023 11:27 AM CST -----  Contact: [phone]  Pt called to speak to the provider or nurse in reference to getting something for a yeast infection. Please Advise       [id number] [place] - [address]  [address] [place] [address]  Phone: [phone] Fax: [phone]

## 2023-02-27 DIAGNOSIS — K21.9 GASTROESOPHAGEAL REFLUX DISEASE, UNSPECIFIED WHETHER ESOPHAGITIS PRESENT: ICD-10-CM

## 2023-02-27 RX ORDER — FAMOTIDINE 40 MG/1
40 TABLET, FILM COATED ORAL DAILY
Qty: 30 TABLET | Refills: 11 | Status: SHIPPED | OUTPATIENT
Start: 2023-02-27 | End: 2023-04-12

## 2023-02-27 NOTE — TELEPHONE ENCOUNTER
No new care gaps identified.  [facility] Embedded Care Gaps. Reference number: [id number]. 2/27/2023   2:47:10 PM CST

## 2023-02-27 NOTE — TELEPHONE ENCOUNTER
----- Message from Malik Osvaldo sent at 2/27/2023 10:54 AM CST -----  Contact: self [phone]  Requesting an RX refill or new RX.  Is this a refill or new RX: refill  RX name and strength (copy/paste from chart):  famotidine (PEPCID) 40 MG tablet  Is this a 30 day or 90 day RX:   Pharmacy name and phone # (copy/paste from chart):    [id number] - [place] - [address]  [address] [place] [address]  Phone: [phone] Fax: [phone]      The doctors have asked that we provide their patients with the following 2 reminders -- prescription refills can take up to 72 hours, and a friendly reminder that in the future you can use your [facility] account to request refills: yes    Please call and advise

## 2023-03-27 ENCOUNTER — E-VISIT (OUTPATIENT)
Dept: PRIMARY CARE CLINIC | Facility: CLINIC | Age: 43
End: 2023-03-27
Payer: MEDICAID

## 2023-03-27 ENCOUNTER — TELEPHONE (OUTPATIENT)
Dept: PRIMARY CARE CLINIC | Facility: CLINIC | Age: 43
End: 2023-03-27
Payer: MEDICAID

## 2023-03-27 DIAGNOSIS — N89.8 VAGINAL DISCHARGE: Primary | ICD-10-CM

## 2023-03-27 PROCEDURE — 99423 OL DIG E/M SVC 21+ MIN: CPT | Mod: ,,, | Performed by: STUDENT IN AN ORGANIZED HEALTH CARE EDUCATION/TRAINING PROGRAM

## 2023-03-27 PROCEDURE — 99423 PR E&M, ONLINE DIGIT, EST, < 7 DAYS,  21+ MINS: ICD-10-PCS | Mod: ,,, | Performed by: STUDENT IN AN ORGANIZED HEALTH CARE EDUCATION/TRAINING PROGRAM

## 2023-03-27 NOTE — TELEPHONE ENCOUNTER
Deamerlene Davila,         We received your message requesting treatment. To manage this, we can offer to see you in person, through a virtual visit on video, or through an E-Visit. Based on your message, I feel that your condition can be managed quickly and easily through the E-Visit if this is agreeable with you.  To initiate the E-Visit process in [facility], click here.         What is an E-Visit?  An E-Visit offers clinic care without scheduling a virtual or in-person visit.    What can I expect during an E-Visit?  Once you have agreed to an E-Visit, you will be prompted to complete the E-Visit questionnaire, which will include clinically based questions about yourself, symptoms, and needs. This can take 10-20 minutes to complete.  Like an in-person visit, your care team will evaluate, make a diagnosis, and respond with a care plan and recommendations including testing and medications as indicated within 24 business hours Monday to Friday.  If it becomes clear that you need to be seen virtually or in-person after the E-Visit, your care team will communicate further steps.    Can I use E-Visits for urgent care?  E-Visits should be used only for non-urgent medical conditions or requests.  If you need urgent medical care, please contact your clinic by phone, schedule a same-day appointment online or find a nearby [facility].  For serious medical emergencies, please call 911 immediately.    Will I be billed for an E-Visit?  If the E-Visit results in a clinical evaluation, it will be billable to your insurance.  Because this could be a billable visit, you may also be asked for your insurance details and credit card information, as you would for any other visit or copay.  Much of this is stored in your account, so it should be easy to access or update if needed. You may receive a bill for this service, including any applicable copay amount, after the service is rendered.  If your insurance does not cover the  E-Visit, you will receive a bill after the service is rendered    Please allow up to 24 business hours (Monday-Friday) for a reply. At any point in the E-Visit process, if you have not received a response within 72 hours, please call your clinic.      To initiate the E-Visit process in [facility], click here.

## 2023-03-27 NOTE — TELEPHONE ENCOUNTER
----- Message from Sri Tao sent at 3/27/2023  8:09 AM CDT -----  Contact: Pt [phone]  1MEDICALADVICE     Patient is calling for Medical Advice regarding: bladder infection    How long has patient had these symptoms: 3 days    Pharmacy name and phone#:   [id number] [place]  [address]  [address] [address]  Phone: [phone] Fax: [phone]    Would like response via [employer]:  Call back    Comments:    Please call and advise.    Thank You

## 2023-03-27 NOTE — Clinical Note
Orders placed for urine, and swabs to be collected in clinic.  Please call to set this collection on 3/28.

## 2023-03-28 NOTE — TELEPHONE ENCOUNTER
----- Message from Miquel Mishra MD sent at 3/28/2023 12:03 AM CDT -----  Orders placed for urine, and swabs to be collected in clinic.  Please call to set this collection on 3/28.

## 2023-03-28 NOTE — PROGRESS NOTES
"    Patient ID: Giovanni Pena is a 42 y.o. female.    Chief Complaint: No chief complaint on file.    The patient initiated a request through [employer] on 3/27/2023 for evaluation and management with a chief complaint of No chief complaint on file.     I evaluated the questionnaire submission on vaginal discharge.    No questionnaires on file.    Recent Labs Obtained:  No visits with results within 7 Day(s) from this visit.   Latest known visit with results is:   Admission on 08/17/2022, Discharged on 08/17/2022   Component Date Value Ref Range Status    POC Preg Test, Ur 08/17/2022 Negative  Negative Final     Acceptable 08/17/2022 Yes   Final    Final Pathologic Diagnosis 08/17/2022    Final                    Value:[facility] DIAGNOSIS:  BONE, LEFT FOOT, BUNIONECTOMY WITH OSTEOTOMY:  Fragments of benign bone and fibrocartilagenous tissue.  Negative for malignancy.  Josesito Logan M.D.  Report attached.  Performing site:  [place] [address]  "Disclaimer:  This case diagnosis was rendered completely by the outside  consultation pathologist and the case is electronically signed by an [facility]  pathologist listed below solely to release the report into the medical  record."      Interp By Kervin Henriquez M.D., Signed on 08/24/2022 at 12:44    Disclaimer 08/17/2022    Final                    Value:Unless the case is a 'gross only' or additional testing only, the final  diagnosis for each specimen is based on a microscopic examination of  appropriate tissue sections.         Encounter Diagnosis   Name Primary?    Vaginal discharge Yes        Orders Placed This Encounter   Procedures    VAGINOSIS SCREEN BY DNA PROBE           Order Specific Question:   Release to patient     Answer:   Immediate    C. trachomatis/N. gonorrhoeae by AMP DNA [facility]; Vagina     Order Specific Question:   Sources by Resulting Lab:     Answer:   [facility]     Order Specific Question:   Source:    "  Answer:   Vagina    Urinalysis, Reflex to Urine Culture Urine, Clean Catch     Standing Status:   Future     Standing Expiration Date:   2024     Order Specific Question:   Preferred Collection Type     Answer:   Urine, Clean Catch     Order Specific Question:   Specimen Source     Answer:   Urine            No follow-ups on file.    Plan:    Vaginal Discharge:   - starting on Floconazole 150mg on day 1 and day 3.   - Patient with possible yeast infection, Urinalysis does not strongly suggest cystitis.  Will still have result of G/C and affirm result and adjust treatment if needed.       E-Visit Time Trackin min on 3/27, 5 min on 3/28 and 8 min on 3/29.  21 minutes.

## 2023-03-29 ENCOUNTER — TELEPHONE (OUTPATIENT)
Dept: PODIATRY | Facility: CLINIC | Age: 43
End: 2023-03-29
Payer: MEDICAID

## 2023-03-29 ENCOUNTER — CLINICAL SUPPORT (OUTPATIENT)
Dept: PRIMARY CARE CLINIC | Facility: CLINIC | Age: 43
End: 2023-03-29
Payer: MEDICAID

## 2023-03-29 DIAGNOSIS — N89.8 VAGINAL DISCHARGE: ICD-10-CM

## 2023-03-29 DIAGNOSIS — R30.0 DYSURIA: Primary | ICD-10-CM

## 2023-03-29 LAB
BACTERIA #/AREA URNS HPF: NORMAL /HPF
BILIRUB UR QL STRIP: NEGATIVE
CLARITY UR: CLEAR
COLOR UR: YELLOW
GLUCOSE UR QL STRIP: NEGATIVE
HGB UR QL STRIP: ABNORMAL
HYALINE CASTS #/AREA URNS LPF: 1 /LPF
KETONES UR QL STRIP: NEGATIVE
LEUKOCYTE ESTERASE UR QL STRIP: ABNORMAL
MICROSCOPIC COMMENT: NORMAL
NITRITE UR QL STRIP: NEGATIVE
PH UR STRIP: 6 [PH] (ref 5–8)
PROT UR QL STRIP: ABNORMAL
RBC #/AREA URNS HPF: 1 /HPF (ref 0–4)
SP GR UR STRIP: 1.02 (ref 1–1.03)
SQUAMOUS #/AREA URNS HPF: 5 /HPF
URN SPEC COLLECT METH UR: ABNORMAL
UROBILINOGEN UR STRIP-ACNC: ABNORMAL EU/DL
WBC #/AREA URNS HPF: 5 /HPF (ref 0–5)

## 2023-03-29 PROCEDURE — 87491 CHLMYD TRACH DNA AMP PROBE: CPT | Performed by: STUDENT IN AN ORGANIZED HEALTH CARE EDUCATION/TRAINING PROGRAM

## 2023-03-29 PROCEDURE — 81514 NFCT DS BV&VAGINITIS DNA ALG: CPT | Performed by: STUDENT IN AN ORGANIZED HEALTH CARE EDUCATION/TRAINING PROGRAM

## 2023-03-29 PROCEDURE — 87591 N.GONORRHOEAE DNA AMP PROB: CPT | Performed by: STUDENT IN AN ORGANIZED HEALTH CARE EDUCATION/TRAINING PROGRAM

## 2023-03-29 RX ORDER — FLUCONAZOLE 150 MG/1
TABLET ORAL
Qty: 2 TABLET | Refills: 0 | Status: SHIPPED | OUTPATIENT
Start: 2023-03-29 | End: 2023-05-08

## 2023-03-29 NOTE — PROGRESS NOTES
Patient came in on the nurse schedule for a BV swab, GC swab, and UA. Patient was identified by name and . Specimens were collected and processed for lab.

## 2023-03-29 NOTE — LETTER
March 29, 2023      [facility] [address], [facility] [address]  [facility] [address]  Phone: [phone]  Fax: [phone]       Patient: Giovanni Pena   YOB: 1980  Date of Visit: 03/29/2023    To Whom It May Concern:    Barbie Pena  was at [facility] on 03/29/2023. The patient may return to work/school on 03/30/2023 with no restrictions. If you have any questions or concerns, or if I can be of further assistance, please do not hesitate to contact me.    Sincerely,    Dr. Miquel Mishra M.D.

## 2023-03-31 LAB
BACTERIAL VAGINOSIS DNA: POSITIVE
C TRACH DNA SPEC QL NAA+PROBE: DETECTED
CANDIDA GLABRATA DNA: NEGATIVE
CANDIDA KRUSEI DNA: NEGATIVE
CANDIDA RRNA VAG QL PROBE: NEGATIVE
N GONORRHOEA DNA SPEC QL NAA+PROBE: NOT DETECTED
T VAGINALIS RRNA GENITAL QL PROBE: POSITIVE

## 2023-04-03 ENCOUNTER — PATIENT MESSAGE (OUTPATIENT)
Dept: PRIMARY CARE CLINIC | Facility: CLINIC | Age: 43
End: 2023-04-03
Payer: MEDICAID

## 2023-04-03 DIAGNOSIS — A59.9 TRICHOMONAS INFECTION: ICD-10-CM

## 2023-04-03 DIAGNOSIS — B96.89 BACTERIAL VAGINOSIS: ICD-10-CM

## 2023-04-03 DIAGNOSIS — N76.0 BACTERIAL VAGINOSIS: ICD-10-CM

## 2023-04-03 DIAGNOSIS — A74.9 CHLAMYDIA INFECTION: Primary | ICD-10-CM

## 2023-04-03 RX ORDER — METRONIDAZOLE 500 MG/1
500 TABLET ORAL EVERY 12 HOURS
Qty: 14 TABLET | Refills: 0 | Status: SHIPPED | OUTPATIENT
Start: 2023-04-03 | End: 2023-04-10

## 2023-04-03 RX ORDER — AZITHROMYCIN 500 MG/1
1000 TABLET, FILM COATED ORAL DAILY
Qty: 2 TABLET | Refills: 0 | Status: SHIPPED | OUTPATIENT
Start: 2023-04-03 | End: 2023-04-04

## 2023-04-04 ENCOUNTER — PATIENT MESSAGE (OUTPATIENT)
Dept: PRIMARY CARE CLINIC | Facility: CLINIC | Age: 43
End: 2023-04-04
Payer: MEDICAID

## 2023-04-12 ENCOUNTER — E-VISIT (OUTPATIENT)
Dept: PRIMARY CARE CLINIC | Facility: CLINIC | Age: 43
End: 2023-04-12
Payer: MEDICAID

## 2023-04-12 DIAGNOSIS — K44.9 HIATAL HERNIA: ICD-10-CM

## 2023-04-12 DIAGNOSIS — R07.89 MID STERNAL CHEST PAIN: ICD-10-CM

## 2023-04-12 DIAGNOSIS — K21.9 GASTROESOPHAGEAL REFLUX DISEASE, UNSPECIFIED WHETHER ESOPHAGITIS PRESENT: Primary | ICD-10-CM

## 2023-04-12 PROCEDURE — 99421 OL DIG E/M SVC 5-10 MIN: CPT | Mod: 95,,, | Performed by: STUDENT IN AN ORGANIZED HEALTH CARE EDUCATION/TRAINING PROGRAM

## 2023-04-12 PROCEDURE — 99421 PR E&M, ONLINE DIGIT, EST, < 7 DAYS, 5-10 MINS: ICD-10-PCS | Mod: 95,,, | Performed by: STUDENT IN AN ORGANIZED HEALTH CARE EDUCATION/TRAINING PROGRAM

## 2023-04-12 RX ORDER — OMEPRAZOLE 40 MG/1
40 CAPSULE, DELAYED RELEASE ORAL DAILY
Qty: 30 CAPSULE | Refills: 2 | Status: SHIPPED | OUTPATIENT
Start: 2023-04-12 | End: 2023-06-20

## 2023-04-12 NOTE — PATIENT INSTRUCTIONS
"    GERD:    - starting on PPI daily for now.    - Symptoms suggestive of acid reflux.   - Recommend patient limit or eliminate triggers including:   Carbonated beverages, caffeine, alcohol, tobacco use, spicy foods, fatty foods or large meals.    - Additionally patient should remain upright for 30 minutes after eating.    - Should attempt to maintain or achieve a healthy body weight as extra weight puts pressure on the stomach and increases reflux symptoms.    - Treatment options include H2 blockers (such as Famotidine/Pepcid or Ranitidone/Zantac) or PPI (such as Omeprazole/Prilosec, Pantoprazole/Protonix, or Esomepraxole/Nexium), or Sucralfate/Carafate as treatment options.  - The preferred first line treatment would be an H2 blocker at 40mg daily, as these meds have less side effects and can be taken chronically with less concern.  - If H2 blockers do not work, could try a PPI as an alternative, but if using chronically should supplement calcium and Vitamin D to limit risk of osteoporosis.     Patient can try nonprescription supplements over the counter to see if they help - Papaya enzyme or Aloe Vera concentrate    Consider stopping DAIRY (milk, yogurt, cheese) for a month to see if this is causing your symptoms.     Let provider know if s/s do not improve on therapy.    If you are not better, we may consider a combination of meds or a referral to gastroenterology for further testing or  EGD     ===========================    There have been some concerns taking these PPI (proton pump inhibitor) medications (Prilosec, Nexium, Protonix) daily for years on end.     Every now & then , see if you can take a "holiday" from the PPI medications & try the less strong over the counter antacid medications like Pepcid or TUMS.     "

## 2023-04-12 NOTE — PROGRESS NOTES
Patient ID: Giovanni Pena is a 42 y.o. female.    Chief Complaint: Gastroesophageal Reflux    The patient initiated a request through [employer] on 4/12/2023 for evaluation and management with a chief complaint of Gastroesophageal Reflux     Hx of GERD, discussed with patient on 4/11/2022 at previous appt.    Will trial PPI for a time, but is not ideal long term med.    It needing as chronic med will add vitamin D and Calcium supplements.       I evaluated the questionnaire submission on 4/12/23.    [facility] P[facility] Evisit Heartburn    4/12/2023 12:55 PM CDT - Filed by Patient   Do you agree to participate in an E-Visit? Yes   If you have any of the following symptoms, please present to your local ER or call 911:  I acknowledge   What is the main issue that you would like for your doctor to address today? Bad heartburn   Are you able to take your vital signs? No   Are you currently pregnant, could you be pregnant, or are you breast feeding? None of the above   How long have you had heartburn? A month to several months   How often do you experience heartburn? Several times a day   Where do you feel the heartburn? In the middle of my chest   How long does your heartburn last? It lasts less than 20 minutes   Does your heartburn wake you from sleep? Yes   Does your heartburn limit your ability to do thing you need to do? No   Does your heartburn change depending on whether you are sitting, standing, or lying down? Yes   Which of the following are you experiencing: Frequent belching;  Stomach fluid entering the back of your throat   Do you have any of the following? None of the above   Do you have trouble or pain with  swallowing? No   While eating, do you feel full quicker than usual? Yes   Do you have any of the following? None of the above   Which of the following makes the heartburn worse? Eating certain foods;  Lying down   Do you have any of the following? None of the above   Have you lost weight lately without  trying? No   Have you ever been told you have the following? Hiatal hernia   Have you seen a healthcare provider for your heartburn? I have never seen a provider for heartburn   Have you taken any medicines to relieve your heartburn in the past? Antacids (Tums, Rolaids, Maalox, other);  H2 Blockers (Pepcid, Tagament, Zantac, Axid)   Have the medicines provided relief? No   Have you had any of the following for heartburn? None of the above   Provide any information you feel is important to your history not asked above    Please attach any relevant images or files          Recent Labs Obtained:  No visits with results within 7 Day(s) from this visit.   Latest known visit with results is:   Clinical Support on 03/29/2023   Component Date Value Ref Range Status    Specimen UA 03/29/2023 Urine, Clean Catch   Final    Color, UA 03/29/2023 Yellow  Yellow, Straw, Iza Final    Appearance, UA 03/29/2023 Clear  Clear Final    pH, UA 03/29/2023 6.0  5.0 - 8.0 Final    Specific Gravity, UA 03/29/2023 1.025  1.005 - 1.030 Final    Protein, UA 03/29/2023 1+ (A)  Negative Final    Comment: Recommend a 24 hour urine protein or a urine   protein/creatinine ratio if globulin induced proteinuria is  clinically suspected.      Glucose, UA 03/29/2023 Negative  Negative Final    Ketones, UA 03/29/2023 Negative  Negative Final    Bilirubin (UA) 03/29/2023 Negative  Negative Final    Occult Blood UA 03/29/2023 2+ (A)  Negative Final    Nitrite, UA 03/29/2023 Negative  Negative Final    Urobilinogen, UA 03/29/2023 2.0-3.0 (A)  Negative EU/dL Final    Leukocytes, UA 03/29/2023 Trace (A)  Negative Final    RBC, UA 03/29/2023 1  0 - 4 /hpf Final    WBC, UA 03/29/2023 5  0 - 5 /hpf Final    Bacteria 03/29/2023 Occasional  None-Occ /hpf Final    Squam Epithel, UA 03/29/2023 5  /hpf Final    Hyaline Casts, UA 03/29/2023 1  0-1/lpf /lpf Final    Microscopic Comment 03/29/2023 SEE COMMENT   Final    Comment: Other formed elements not mentioned in  the report are not   present in the microscopic examination.          Encounter Diagnoses   Name Primary?    Gastroesophageal reflux disease, unspecified whether esophagitis present Yes    Mid sternal chest pain     Hiatal hernia         No orders of the defined types were placed in this encounter.     Medications Ordered This Encounter   Medications    omeprazole (PRILOSEC) 40 MG capsule     Sig: Take 1 capsule (40 mg total) by mouth once daily.     Dispense:  30 capsule     Refill:  2        Follow up if symptoms worsen or fail to improve.      E-Visit Time Tracking: 10 minutes

## 2023-05-02 ENCOUNTER — OFFICE VISIT (OUTPATIENT)
Dept: OBSTETRICS AND GYNECOLOGY | Facility: CLINIC | Age: 43
End: 2023-05-02
Payer: MEDICAID

## 2023-05-02 ENCOUNTER — LAB VISIT (OUTPATIENT)
Dept: LAB | Facility: OTHER | Age: 43
End: 2023-05-02
Attending: OBSTETRICS & GYNECOLOGY
Payer: MEDICAID

## 2023-05-02 VITALS
DIASTOLIC BLOOD PRESSURE: 68 MMHG | BODY MASS INDEX: 25.26 KG/M2 | WEIGHT: 160.94 LBS | SYSTOLIC BLOOD PRESSURE: 110 MMHG | HEIGHT: 67 IN

## 2023-05-02 DIAGNOSIS — Z11.3 SCREENING FOR STD (SEXUALLY TRANSMITTED DISEASE): ICD-10-CM

## 2023-05-02 DIAGNOSIS — Z11.3 SCREENING FOR STD (SEXUALLY TRANSMITTED DISEASE): Primary | ICD-10-CM

## 2023-05-02 LAB
HAV IGM SERPL QL IA: NORMAL
HBV CORE IGM SERPL QL IA: NORMAL
HBV SURFACE AG SERPL QL IA: NORMAL
HCV AB SERPL QL IA: NORMAL
HIV 1+2 AB+HIV1 P24 AG SERPL QL IA: NORMAL
RPR SER QL: NORMAL

## 2023-05-02 PROCEDURE — 1159F PR MEDICATION LIST DOCUMENTED IN MEDICAL RECORD: ICD-10-PCS | Mod: CPTII,,, | Performed by: OBSTETRICS & GYNECOLOGY

## 2023-05-02 PROCEDURE — 3008F PR BODY MASS INDEX (BMI) DOCUMENTED: ICD-10-PCS | Mod: CPTII,,, | Performed by: OBSTETRICS & GYNECOLOGY

## 2023-05-02 PROCEDURE — 99999 PR PBB SHADOW E&M-EST. PATIENT-LVL III: ICD-10-PCS | Mod: PBBFAC,,, | Performed by: OBSTETRICS & GYNECOLOGY

## 2023-05-02 PROCEDURE — 3078F PR MOST RECENT DIASTOLIC BLOOD PRESSURE < 80 MM HG: ICD-10-PCS | Mod: CPTII,,, | Performed by: OBSTETRICS & GYNECOLOGY

## 2023-05-02 PROCEDURE — 86592 SYPHILIS TEST NON-TREP QUAL: CPT | Performed by: OBSTETRICS & GYNECOLOGY

## 2023-05-02 PROCEDURE — 80074 ACUTE HEPATITIS PANEL: CPT | Performed by: OBSTETRICS & GYNECOLOGY

## 2023-05-02 PROCEDURE — 3008F BODY MASS INDEX DOCD: CPT | Mod: CPTII,,, | Performed by: OBSTETRICS & GYNECOLOGY

## 2023-05-02 PROCEDURE — 3074F SYST BP LT 130 MM HG: CPT | Mod: CPTII,,, | Performed by: OBSTETRICS & GYNECOLOGY

## 2023-05-02 PROCEDURE — 87591 N.GONORRHOEAE DNA AMP PROB: CPT | Performed by: OBSTETRICS & GYNECOLOGY

## 2023-05-02 PROCEDURE — 99213 PR OFFICE/OUTPT VISIT, EST, LEVL III, 20-29 MIN: ICD-10-PCS | Mod: S$PBB,,, | Performed by: OBSTETRICS & GYNECOLOGY

## 2023-05-02 PROCEDURE — 3078F DIAST BP <80 MM HG: CPT | Mod: CPTII,,, | Performed by: OBSTETRICS & GYNECOLOGY

## 2023-05-02 PROCEDURE — 81514 NFCT DS BV&VAGINITIS DNA ALG: CPT | Performed by: OBSTETRICS & GYNECOLOGY

## 2023-05-02 PROCEDURE — 99213 OFFICE O/P EST LOW 20 MIN: CPT | Mod: S$PBB,,, | Performed by: OBSTETRICS & GYNECOLOGY

## 2023-05-02 PROCEDURE — 36415 COLL VENOUS BLD VENIPUNCTURE: CPT | Performed by: OBSTETRICS & GYNECOLOGY

## 2023-05-02 PROCEDURE — 99213 OFFICE O/P EST LOW 20 MIN: CPT | Mod: PBBFAC,PN | Performed by: OBSTETRICS & GYNECOLOGY

## 2023-05-02 PROCEDURE — 1159F MED LIST DOCD IN RCRD: CPT | Mod: CPTII,,, | Performed by: OBSTETRICS & GYNECOLOGY

## 2023-05-02 PROCEDURE — 99999 PR PBB SHADOW E&M-EST. PATIENT-LVL III: CPT | Mod: PBBFAC,,, | Performed by: OBSTETRICS & GYNECOLOGY

## 2023-05-02 PROCEDURE — 87389 HIV-1 AG W/HIV-1&-2 AB AG IA: CPT | Performed by: OBSTETRICS & GYNECOLOGY

## 2023-05-02 PROCEDURE — 3074F PR MOST RECENT SYSTOLIC BLOOD PRESSURE < 130 MM HG: ICD-10-PCS | Mod: CPTII,,, | Performed by: OBSTETRICS & GYNECOLOGY

## 2023-05-02 NOTE — PROGRESS NOTES
"Past medical, surgical, social, family, and obstetric histories; medications; prior records and results; and available outside records were reviewed and updated in the EMR.  Pertinent findings were noted below.    Reason for Visit   Vaginal Discharge and Vulvar Itch    HPI   42 y.o. female     Patient's last menstrual period was 2023.    C/o vaginal discharge and itching x2 weeks. Used new soap. Desires to get STD check.    Contraception: s/p BTL  Pap: 2021, NILM/HPV(-)  Mammogram:  BIRADS1, T-C=11%    Exam   /68   Ht 5' 7" (1.702 m)   Wt 73 kg (160 lb 15 oz)   LMP 2023   BMI 25.21 kg/m²     Physical Exam  Genitourinary:      Vulva normal.      Right Labia: No rash, lesions or Bartholin's cyst.     Left Labia: No lesions, Bartholin's cyst or rash.     Vaginal discharge present.      No vaginal bleeding.        Right Adnexa: not tender and not full.     Left Adnexa: not tender and not full.     No cervical motion tenderness, discharge or lesion.      Uterus is not enlarged or tender.      Pelvic exam was performed with patient in the lithotomy position.   Exam conducted with a chaperone present.     Assessment and Plan   Screening for STD (sexually transmitted disease)  -     Vaginosis Screen by DNA Probe  -     C. trachomatis/N. gonorrhoeae by AMP DNA [facility]; Cervicovaginal  -     HIV 1/2 Ag/Ab (4th Gen); Future; Expected date: 2023  -     RPR; Future; Expected date: 2023  -     Hepatitis Panel, Acute; Future; Expected date: 2023      Desires full STD testing (blood + swabs). Will treat based on results. Vulvar care guidelines discussed.    "

## 2023-05-03 LAB
BACTERIAL VAGINOSIS DNA: NEGATIVE
C TRACH DNA SPEC QL NAA+PROBE: NOT DETECTED
CANDIDA GLABRATA DNA: NEGATIVE
CANDIDA KRUSEI DNA: NEGATIVE
CANDIDA RRNA VAG QL PROBE: NEGATIVE
N GONORRHOEA DNA SPEC QL NAA+PROBE: NOT DETECTED
T VAGINALIS RRNA GENITAL QL PROBE: NEGATIVE

## 2023-05-08 ENCOUNTER — PATIENT MESSAGE (OUTPATIENT)
Dept: PRIMARY CARE CLINIC | Facility: CLINIC | Age: 43
End: 2023-05-08

## 2023-05-08 ENCOUNTER — OFFICE VISIT (OUTPATIENT)
Dept: PRIMARY CARE CLINIC | Facility: CLINIC | Age: 43
End: 2023-05-08
Payer: MEDICAID

## 2023-05-08 VITALS
RESPIRATION RATE: 18 BRPM | DIASTOLIC BLOOD PRESSURE: 72 MMHG | HEIGHT: 67 IN | TEMPERATURE: 98 F | HEART RATE: 69 BPM | BODY MASS INDEX: 25.64 KG/M2 | WEIGHT: 163.38 LBS | SYSTOLIC BLOOD PRESSURE: 122 MMHG | OXYGEN SATURATION: 96 %

## 2023-05-08 DIAGNOSIS — N89.8 VAGINAL DISCHARGE: Primary | ICD-10-CM

## 2023-05-08 PROBLEM — J02.9 VIRAL PHARYNGITIS: Status: RESOLVED | Noted: 2022-04-16 | Resolved: 2023-05-08

## 2023-05-08 LAB
BILIRUB SERPL-MCNC: NEGATIVE MG/DL
BLOOD URINE, POC: NORMAL
COLOR, POC UA: NORMAL
GLUCOSE UR QL STRIP: NORMAL
KETONES UR QL STRIP: NEGATIVE
LEUKOCYTE ESTERASE URINE, POC: NEGATIVE
NITRITE, POC UA: NEGATIVE
PH, POC UA: 5
PROTEIN, POC: 30
SPECIFIC GRAVITY, POC UA: 1.02
UROBILINOGEN, POC UA: NORMAL

## 2023-05-08 PROCEDURE — 3078F DIAST BP <80 MM HG: CPT | Mod: CPTII,,, | Performed by: FAMILY MEDICINE

## 2023-05-08 PROCEDURE — 1159F PR MEDICATION LIST DOCUMENTED IN MEDICAL RECORD: ICD-10-PCS | Mod: CPTII,,, | Performed by: FAMILY MEDICINE

## 2023-05-08 PROCEDURE — 1160F PR REVIEW ALL MEDS BY PRESCRIBER/CLIN PHARMACIST DOCUMENTED: ICD-10-PCS | Mod: CPTII,,, | Performed by: FAMILY MEDICINE

## 2023-05-08 PROCEDURE — 1160F RVW MEDS BY RX/DR IN RCRD: CPT | Mod: CPTII,,, | Performed by: FAMILY MEDICINE

## 2023-05-08 PROCEDURE — 3008F BODY MASS INDEX DOCD: CPT | Mod: CPTII,,, | Performed by: FAMILY MEDICINE

## 2023-05-08 PROCEDURE — 3078F PR MOST RECENT DIASTOLIC BLOOD PRESSURE < 80 MM HG: ICD-10-PCS | Mod: CPTII,,, | Performed by: FAMILY MEDICINE

## 2023-05-08 PROCEDURE — 3074F SYST BP LT 130 MM HG: CPT | Mod: CPTII,,, | Performed by: FAMILY MEDICINE

## 2023-05-08 PROCEDURE — 81001 URINALYSIS AUTO W/SCOPE: CPT | Mod: PBBFAC,PN | Performed by: FAMILY MEDICINE

## 2023-05-08 PROCEDURE — 1159F MED LIST DOCD IN RCRD: CPT | Mod: CPTII,,, | Performed by: FAMILY MEDICINE

## 2023-05-08 PROCEDURE — 99999 PR PBB SHADOW E&M-EST. PATIENT-LVL III: ICD-10-PCS | Mod: PBBFAC,,, | Performed by: FAMILY MEDICINE

## 2023-05-08 PROCEDURE — 99213 OFFICE O/P EST LOW 20 MIN: CPT | Mod: PBBFAC,PN | Performed by: FAMILY MEDICINE

## 2023-05-08 PROCEDURE — 3008F PR BODY MASS INDEX (BMI) DOCUMENTED: ICD-10-PCS | Mod: CPTII,,, | Performed by: FAMILY MEDICINE

## 2023-05-08 PROCEDURE — 99214 OFFICE O/P EST MOD 30 MIN: CPT | Mod: S$PBB,,, | Performed by: FAMILY MEDICINE

## 2023-05-08 PROCEDURE — 81514 NFCT DS BV&VAGINITIS DNA ALG: CPT | Performed by: FAMILY MEDICINE

## 2023-05-08 PROCEDURE — 99999 PR PBB SHADOW E&M-EST. PATIENT-LVL III: CPT | Mod: PBBFAC,,, | Performed by: FAMILY MEDICINE

## 2023-05-08 PROCEDURE — 99214 PR OFFICE/OUTPT VISIT, EST, LEVL IV, 30-39 MIN: ICD-10-PCS | Mod: S$PBB,,, | Performed by: FAMILY MEDICINE

## 2023-05-08 PROCEDURE — 3074F PR MOST RECENT SYSTOLIC BLOOD PRESSURE < 130 MM HG: ICD-10-PCS | Mod: CPTII,,, | Performed by: FAMILY MEDICINE

## 2023-05-08 PROCEDURE — 87086 URINE CULTURE/COLONY COUNT: CPT | Performed by: FAMILY MEDICINE

## 2023-05-08 RX ORDER — METRONIDAZOLE 500 MG/1
500 TABLET ORAL EVERY 12 HOURS
Qty: 14 TABLET | Refills: 0 | Status: SHIPPED | OUTPATIENT
Start: 2023-05-08 | End: 2023-05-15

## 2023-05-08 NOTE — PROGRESS NOTES
"Subjective:       Patient ID: Giovanni Pena is a 42 y.o. female.    Chief Complaint: Vaginal Discharge (Started on 5/5) and Cloudy urine    BV, chlamydia and trich in late March, treated. No sexual activity in 3-4 weeks. Repeat testing by GYN last week negative. Says she gets frequent recurrences of BV    Vaginal Discharge  The patient's primary symptoms include vaginal discharge. The patient's pertinent negatives include no genital itching, genital lesions, genital odor, genital rash or vaginal bleeding. This is a recurrent problem. The current episode started in the past 7 days. The patient is experiencing no pain. She is not pregnant. Associated symptoms include discolored urine. Pertinent negatives include no abdominal pain, dysuria, fever, frequency, hematuria, nausea or vomiting.   Review of Systems   Constitutional:  Negative for fever.   Gastrointestinal:  Negative for abdominal pain, nausea and vomiting.   Genitourinary:  Positive for vaginal discharge. Negative for dysuria, frequency and hematuria.     Objective:      Vitals:    05/08/23 1441   BP: 122/72   BP Location: Left arm   Patient Position: Sitting   BP Method: Medium (Manual)   Pulse: 69   Resp: 18   Temp: 98.3 °F (36.8 °C)   TempSrc: Temporal   SpO2: 96%   Weight: 74.1 kg (163 lb 5.8 oz)   Height: 5' 7" (1.702 m)     Physical Exam  Vitals and nursing note reviewed.   Constitutional:       General: She is not in acute distress.     Appearance: Normal appearance. She is well-developed.   HENT:      Head: Normocephalic and atraumatic.   Cardiovascular:      Rate and Rhythm: Normal rate and regular rhythm.      Heart sounds: Normal heart sounds.   Pulmonary:      Effort: Pulmonary effort is normal.      Breath sounds: Normal breath sounds.   Abdominal:      General: Bowel sounds are normal. There is no distension.      Tenderness: There is no abdominal tenderness. There is no right CVA tenderness or left CVA tenderness.   Musculoskeletal:      " Right lower leg: No edema.      Left lower leg: No edema.   Skin:     General: Skin is warm and dry.   Neurological:      Mental Status: She is alert and oriented to person, place, and time.   Psychiatric:         Mood and Affect: Mood normal.         Behavior: Behavior normal.       Lab Results   Component Value Date    WBC 4.04 07/19/2022    HGB 10.4 (L) 07/19/2022    HCT 32.7 (L) 07/19/2022     07/19/2022    CHOL 184 12/30/2021    TRIG 78 12/30/2021    HDL 38 (L) 12/30/2021    ALT 20 07/19/2022    AST 22 07/19/2022     07/19/2022    K 3.9 07/19/2022     07/19/2022    CREATININE 0.8 07/19/2022    BUN 12 07/19/2022    CO2 24 07/19/2022    TSH 0.796 11/18/2021    INR 1.1 07/19/2022    HGBA1C 5.0 11/18/2021      Assessment:       1. Vaginal discharge        Plan:       Vaginal discharge  -     POCT urinalysis, dipstick or tablet reag  -     VAGINOSIS SCREEN BY DNA PROBE  -     metroNIDAZOLE (FLAGYL) 500 MG tablet; Take 1 tablet (500 mg total) by mouth every 12 (twelve) hours. for 7 days  Dispense: 14 tablet; Refill: 0  -     CULTURE, URINE  Given history of recurrent BV, will treat empirically with 7-day course of metronidazole    Medication List with Changes/Refills   New Medications    METRONIDAZOLE (FLAGYL) 500 MG TABLET    Take 1 tablet (500 mg total) by mouth every 12 (twelve) hours. for 7 days   Current Medications    OMEPRAZOLE (PRILOSEC) 40 MG CAPSULE    Take 1 capsule (40 mg total) by mouth once daily.   Discontinued Medications    ELIQUIS 5 MG TAB    TAKE 1 TABLET 2 (TWO) TIMES DAILY BY MOUTH INDICATIONS: PORTAL VEIN THROMBOSIS    FLUCONAZOLE (DIFLUCAN) 150 MG TAB    Take 1 tablet on day 1 and a 2nd tablet on day 3.

## 2023-05-08 NOTE — LETTER
May 8, 2023      [facility] [address], [place] [address]  [facility] [address]  Phone: [phone]  Fax: [phone]       Patient: Giovanni Pena   YOB: 1980  Date of Visit: 05/08/2023    To Whom It May Concern:    Barbie Pena  was at [facility] on 05/08/2023. She may return to work on 05/09/2023 with no restrictions. If you have any questions or concerns, or if I can be of further assistance, please do not hesitate to contact me.    Sincerely,    Nayla Snider MA

## 2023-05-10 LAB
BACTERIA UR CULT: NO GROWTH
BACTERIAL VAGINOSIS DNA: NEGATIVE
CANDIDA GLABRATA DNA: NEGATIVE
CANDIDA KRUSEI DNA: NEGATIVE
CANDIDA RRNA VAG QL PROBE: NEGATIVE
T VAGINALIS RRNA GENITAL QL PROBE: NEGATIVE

## 2023-06-12 ENCOUNTER — OCCUPATIONAL HEALTH (OUTPATIENT)
Dept: URGENT CARE | Facility: CLINIC | Age: 43
End: 2023-06-12

## 2023-06-12 PROCEDURE — 80305 DRUG TEST PRSMV DIR OPT OBS: CPT | Mod: S$GLB,,,

## 2023-06-12 PROCEDURE — 80305 PR DRUG SCREEN - 1: ICD-10-PCS | Mod: S$GLB,,,

## 2023-06-20 DIAGNOSIS — K44.9 HIATAL HERNIA: ICD-10-CM

## 2023-06-20 DIAGNOSIS — R07.89 MID STERNAL CHEST PAIN: ICD-10-CM

## 2023-06-20 RX ORDER — OMEPRAZOLE 40 MG/1
CAPSULE, DELAYED RELEASE ORAL
Qty: 90 CAPSULE | Refills: 0 | Status: SHIPPED | OUTPATIENT
Start: 2023-06-20 | End: 2023-10-11 | Stop reason: SDUPTHER

## 2023-06-20 NOTE — TELEPHONE ENCOUNTER
No care due was identified.  [facility] Embedded Care Due Messages. Reference number: [id number].   6/20/2023 2:44:03 PM CDT

## 2023-06-20 NOTE — TELEPHONE ENCOUNTER
Refill Routing Note   Medication(s) are not appropriate for processing by [facility] for the following reason(s):      New or recently adjusted medication    [facility] action(s):  Defer Care Due:  None identified          Appointments  past 12m or future 3m with PCP    Date Provider   Last Visit   7/19/2022 Miquel Mishra MD   Next Visit   Visit date not found Miquel Mishra MD   ED visits in past 90 days: 0        Note composed:5:25 PM 06/20/2023           Eucrisa Counseling: Patient may experience a mild burning sensation during topical application. Eucrisa is not approved in children less than 2 years of age.

## 2023-07-03 ENCOUNTER — TELEPHONE (OUTPATIENT)
Dept: PRIMARY CARE CLINIC | Facility: CLINIC | Age: 43
End: 2023-07-03
Payer: MEDICAID

## 2023-07-03 NOTE — TELEPHONE ENCOUNTER
----- Message from Cherise Crook sent at 7/3/2023  9:51 AM CDT -----  Contact: [phone] Patient  1MEDICALADVICE     Patient is calling for Medical Advice regarding: px UTI, Burning and painful when urinate    How long has patient had these symptoms: 2 days    Pharmacy name and phone#:  [id number] [place] - [address]  [address] [address]  Phone: [phone] Fax: [phone]        Would like response via [employer]:  Call Back Please. Pt is asking if the doctor can call something in please. Thank you.     Comments:

## 2023-07-03 NOTE — TELEPHONE ENCOUNTER
----- Message from Cherise Crook sent at 7/3/2023  9:47 AM CDT -----  Contact: [phone] Patient  Pt is calling in regards to omeprazole (PRILOSEC) 40 MG capsule needing a PA. Please call and advise. Thank you

## 2023-07-05 ENCOUNTER — OFFICE VISIT (OUTPATIENT)
Dept: PRIMARY CARE CLINIC | Facility: CLINIC | Age: 43
End: 2023-07-05
Payer: MEDICAID

## 2023-07-05 DIAGNOSIS — N89.8 VAGINAL DISCHARGE: ICD-10-CM

## 2023-07-05 DIAGNOSIS — R30.0 DYSURIA: Primary | ICD-10-CM

## 2023-07-05 PROCEDURE — 99214 OFFICE O/P EST MOD 30 MIN: CPT | Mod: 95,,, | Performed by: STUDENT IN AN ORGANIZED HEALTH CARE EDUCATION/TRAINING PROGRAM

## 2023-07-05 PROCEDURE — 1160F RVW MEDS BY RX/DR IN RCRD: CPT | Mod: CPTII,95,, | Performed by: STUDENT IN AN ORGANIZED HEALTH CARE EDUCATION/TRAINING PROGRAM

## 2023-07-05 PROCEDURE — 1159F MED LIST DOCD IN RCRD: CPT | Mod: CPTII,95,, | Performed by: STUDENT IN AN ORGANIZED HEALTH CARE EDUCATION/TRAINING PROGRAM

## 2023-07-05 PROCEDURE — 99214 PR OFFICE/OUTPT VISIT, EST, LEVL IV, 30-39 MIN: ICD-10-PCS | Mod: 95,,, | Performed by: STUDENT IN AN ORGANIZED HEALTH CARE EDUCATION/TRAINING PROGRAM

## 2023-07-05 PROCEDURE — 1159F PR MEDICATION LIST DOCUMENTED IN MEDICAL RECORD: ICD-10-PCS | Mod: CPTII,95,, | Performed by: STUDENT IN AN ORGANIZED HEALTH CARE EDUCATION/TRAINING PROGRAM

## 2023-07-05 PROCEDURE — 1160F PR REVIEW ALL MEDS BY PRESCRIBER/CLIN PHARMACIST DOCUMENTED: ICD-10-PCS | Mod: CPTII,95,, | Performed by: STUDENT IN AN ORGANIZED HEALTH CARE EDUCATION/TRAINING PROGRAM

## 2023-07-05 RX ORDER — SULFAMETHOXAZOLE AND TRIMETHOPRIM 800; 160 MG/1; MG/1
1 TABLET ORAL 2 TIMES DAILY
Qty: 6 TABLET | Refills: 0 | Status: SHIPPED | OUTPATIENT
Start: 2023-07-05 | End: 2023-07-08

## 2023-07-05 NOTE — PROGRESS NOTES
The patient location is: [place]  The chief complaint leading to consultation is: Concerns for UTI    Visit type: audiovisual    Face to Face time with patient: 7 minutes  9 minutes of total time spent on the encounter, which includes face to face time and non-face to face time preparing to see the patient (eg, review of tests), Obtaining and/or reviewing separately obtained history, Documenting clinical information in the electronic or other health record, Independently interpreting results (not separately reported) and communicating results to the patient/family/caregiver, or Care coordination (not separately reported).         Each patient to whom he or she provides medical services by telemedicine is:  (1) informed of the relationship between the physician and patient and the respective role of any other health care provider with respect to management of the patient; and (2) notified that he or she may decline to receive medical services by telemedicine and may withdraw from such care at any time.    Notes:     HPI:  Patient reports that when using rest room having burning. Symptoms began 2 days ago. No blood in urine. Hasn't tried anything to date for relief. Patient reports white discharge that began about 3 days ago. Not thick or clumpy.       Onset?: 2 days ago  Pain with urination?: burning  Increased frequency/urgency?: Yes  Recurrent?: Yes, has been a while  Bubble bath, bath bomb, douching?: None  Urine color?: Dark yellow  Blood in urine?: No  Constipation?: No  Interventions/medications?: None  Reliable birth control?: has tubal liagation      Review of Systems   Constitutional:  Negative for chills, diaphoresis, fatigue and fever.   Respiratory:  Negative for chest tightness and shortness of breath.    Cardiovascular:  Negative for chest pain and palpitations.   Gastrointestinal:  Negative for abdominal pain, diarrhea, nausea and vomiting.   Skin:  Negative for rash and wound.      Physical  Exam  Vitals reviewed.   Constitutional:       General: She is not in acute distress.     Appearance: Normal appearance. She is not ill-appearing or diaphoretic.   HENT:      Head: Normocephalic and atraumatic.   Eyes:      General:         Right eye: No discharge.         Left eye: No discharge.      Conjunctiva/sclera: Conjunctivae normal.   Cardiovascular:      Rate and Rhythm: Normal rate.   Pulmonary:      Effort: Pulmonary effort is normal.   Musculoskeletal:         General: No deformity.      Cervical back: Neck supple. No rigidity.   Skin:     Coloration: Skin is not jaundiced or pale.   Neurological:      General: No focal deficit present.      Mental Status: She is alert and oriented to person, place, and time.   Psychiatric:         Mood and Affect: Mood normal.         Behavior: Behavior normal.         Thought Content: Thought content normal.         Judgment: Judgment normal.           Plan:  Dysuria  Vaginal discharge  -     VAGINOSIS SCREEN BY DNA PROBE  -     C. trachomatis/N. gonorrhoeae by AMP DNA [facility]; Vagina; Future; Expected date: 07/05/2023  -     URINALYSIS; Future; Expected date: 07/05/2023  -     Urine culture; Future; Expected date: 07/05/2023  -     sulfamethoxazole-trimethoprim 800-160mg (BACTRIM DS) 800-160 mg Tab; Take 1 tablet by mouth 2 (two) times daily. for 3 days  Dispense: 6 tablet; Refill: 0  - Will have nurse visit scheduled for self swab with vaginal discharge  - Will culture urine to ensure sensitivity to chosen antibiotic    RTC PRN

## 2023-07-10 ENCOUNTER — CLINICAL SUPPORT (OUTPATIENT)
Dept: PRIMARY CARE CLINIC | Facility: CLINIC | Age: 43
End: 2023-07-10
Payer: MEDICAID

## 2023-07-10 DIAGNOSIS — N89.8 VAGINAL DISCHARGE: ICD-10-CM

## 2023-07-10 DIAGNOSIS — R30.0 DYSURIA: ICD-10-CM

## 2023-07-10 LAB
BACTERIA #/AREA URNS HPF: NORMAL /HPF
BILIRUB UR QL STRIP: NEGATIVE
CLARITY UR: CLEAR
COLOR UR: YELLOW
GLUCOSE UR QL STRIP: NEGATIVE
HGB UR QL STRIP: NEGATIVE
KETONES UR QL STRIP: NEGATIVE
LEUKOCYTE ESTERASE UR QL STRIP: ABNORMAL
MICROSCOPIC COMMENT: NORMAL
NITRITE UR QL STRIP: NEGATIVE
PH UR STRIP: 6 [PH] (ref 5–8)
PROT UR QL STRIP: NEGATIVE
RBC #/AREA URNS HPF: 1 /HPF (ref 0–4)
SP GR UR STRIP: 1.01 (ref 1–1.03)
SQUAMOUS #/AREA URNS HPF: 4 /HPF
URN SPEC COLLECT METH UR: ABNORMAL
UROBILINOGEN UR STRIP-ACNC: NEGATIVE EU/DL
WBC #/AREA URNS HPF: 2 /HPF (ref 0–5)

## 2023-07-10 PROCEDURE — 87591 N.GONORRHOEAE DNA AMP PROB: CPT | Performed by: STUDENT IN AN ORGANIZED HEALTH CARE EDUCATION/TRAINING PROGRAM

## 2023-07-10 PROCEDURE — 87086 URINE CULTURE/COLONY COUNT: CPT | Performed by: STUDENT IN AN ORGANIZED HEALTH CARE EDUCATION/TRAINING PROGRAM

## 2023-07-10 NOTE — PROGRESS NOTES
Patient was seen by virtual visit with Dr. Freire and was told to come into office for test. Patient came in and did a Gc/chlamydia swab, urinalysis, urine culture and BV swab. No complications with specimens per patient.

## 2023-07-11 LAB
C TRACH DNA SPEC QL NAA+PROBE: NOT DETECTED
N GONORRHOEA DNA SPEC QL NAA+PROBE: DETECTED

## 2023-07-12 ENCOUNTER — PATIENT MESSAGE (OUTPATIENT)
Dept: PRIMARY CARE CLINIC | Facility: CLINIC | Age: 43
End: 2023-07-12
Payer: MEDICAID

## 2023-07-12 DIAGNOSIS — A54.9 GONORRHEA: Primary | ICD-10-CM

## 2023-07-12 DIAGNOSIS — Z88.0 PENICILLIN ALLERGY: ICD-10-CM

## 2023-07-12 LAB — BACTERIA UR CULT: NORMAL

## 2023-07-12 RX ORDER — CEFTRIAXONE 500 MG/1
500 INJECTION, POWDER, FOR SOLUTION INTRAMUSCULAR; INTRAVENOUS
Status: COMPLETED | OUTPATIENT
Start: 2023-07-12 | End: 2023-07-13

## 2023-07-12 RX ORDER — AZITHROMYCIN 500 MG/1
1000 TABLET, FILM COATED ORAL ONCE
Qty: 2 TABLET | Refills: 0 | Status: SHIPPED | OUTPATIENT
Start: 2023-07-12 | End: 2023-07-12

## 2023-07-12 RX ORDER — DIPHENHYDRAMINE HCL 25 MG
25 CAPSULE ORAL
Status: COMPLETED | OUTPATIENT
Start: 2023-07-12 | End: 2023-07-13

## 2023-07-13 ENCOUNTER — CLINICAL SUPPORT (OUTPATIENT)
Dept: PRIMARY CARE CLINIC | Facility: CLINIC | Age: 43
End: 2023-07-13
Payer: MEDICAID

## 2023-07-13 DIAGNOSIS — A54.9 GONORRHEA: Primary | ICD-10-CM

## 2023-07-13 PROCEDURE — 96372 THER/PROPH/DIAG INJ SC/IM: CPT | Mod: PBBFAC,PN

## 2023-07-13 RX ADMIN — CEFTRIAXONE SODIUM 500 MG: 1 INJECTION, POWDER, FOR SOLUTION INTRAMUSCULAR; INTRAVENOUS at 09:07

## 2023-07-13 RX ADMIN — DIPHENHYDRAMINE HYDROCHLORIDE 25 MG: 25 CAPSULE ORAL at 09:07

## 2023-07-13 NOTE — PROGRESS NOTES
After obtaining consent, and per orders of Dr. Freire, injection of Rocephin 500mg given by Ruba Ward. Patient instructed to remain in clinic for 20 minutes afterwards, and to report any adverse reaction to me immediately. No complications with injection or site.    Also gave patient Dephenhydramine HCI 25mg table. No problems or complications taking medication.

## 2023-07-20 ENCOUNTER — TELEPHONE (OUTPATIENT)
Dept: PRIMARY CARE CLINIC | Facility: CLINIC | Age: 43
End: 2023-07-20

## 2023-07-20 DIAGNOSIS — Z12.31 BREAST CANCER SCREENING BY MAMMOGRAM: Primary | ICD-10-CM

## 2023-07-20 NOTE — TELEPHONE ENCOUNTER
----- Message from Bonny Sanchez sent at 7/19/2023  1:36 PM CDT -----  Contact: Mobile [phone]  Caller is requesting to schedule their annual screening mammogram appointment. Order is not listed in Epic.  Please enter order and contact patient to schedule.  Would the patient like a call back, or a response through their [facility] portal?:   Call

## 2023-08-09 ENCOUNTER — HOSPITAL ENCOUNTER (OUTPATIENT)
Dept: RADIOLOGY | Facility: CLINIC | Age: 43
Discharge: HOME | End: 2023-08-09
Attending: STUDENT IN AN ORGANIZED HEALTH CARE EDUCATION/TRAINING PROGRAM
Payer: MEDICAID

## 2023-08-09 DIAGNOSIS — Z12.31 BREAST CANCER SCREENING BY MAMMOGRAM: ICD-10-CM

## 2023-08-09 PROCEDURE — 77063 BREAST TOMOSYNTHESIS BI: CPT | Mod: 26,,, | Performed by: RADIOLOGY

## 2023-08-09 PROCEDURE — 77067 SCR MAMMO BI INCL CAD: CPT | Mod: TC,PO

## 2023-08-09 PROCEDURE — 77063 MAMMO DIGITAL SCREENING BILAT WITH TOMO: ICD-10-PCS | Mod: 26,,, | Performed by: RADIOLOGY

## 2023-08-09 PROCEDURE — 77067 MAMMO DIGITAL SCREENING BILAT WITH TOMO: ICD-10-PCS | Mod: 26,,, | Performed by: RADIOLOGY

## 2023-08-09 PROCEDURE — 77067 SCR MAMMO BI INCL CAD: CPT | Mod: 26,,, | Performed by: RADIOLOGY

## 2023-08-25 ENCOUNTER — E-VISIT (OUTPATIENT)
Dept: FAMILY MEDICINE | Facility: CLINIC | Age: 43
End: 2023-08-25
Payer: MEDICAID

## 2023-08-25 DIAGNOSIS — N76.0 ACUTE VAGINITIS: Primary | ICD-10-CM

## 2023-08-25 PROCEDURE — 99421 PR E&M, ONLINE DIGIT, EST, < 7 DAYS, 5-10 MINS: ICD-10-PCS | Mod: ,,, | Performed by: STUDENT IN AN ORGANIZED HEALTH CARE EDUCATION/TRAINING PROGRAM

## 2023-08-25 PROCEDURE — 99421 OL DIG E/M SVC 5-10 MIN: CPT | Mod: ,,, | Performed by: STUDENT IN AN ORGANIZED HEALTH CARE EDUCATION/TRAINING PROGRAM

## 2023-08-25 NOTE — PROGRESS NOTES
Patient ID: Giovanni Pena is a 43 y.o. female.    Chief Complaint: Vaginal Discharge (Entered automatically based on patient selection in Patient Portal.)          274}  The patient initiated a request through [facility] on 8/25/2023 for evaluation and management with a chief complaint of Vaginal Discharge (Entered automatically based on patient selection in Patient Portal.)     I evaluated the questionnaire submission on 08/25/2023 .    [facility]s Peq Evisit Vaginal Discharge    8/25/2023  8:33 AM CDT - Filed by Patient   Do you agree to participate in an E-Visit? Yes   If you have any of the following symptoms,  please do not complete an E-Visit,  schedule an appointment with your provider: I acknowledge   What is the main issue that you would like for your doctor to address today? Vaginal discharge/odor   Are you able to take your vital signs? No   Are you currently pregnant, could you be pregnant, or are you breast feeding? None of the above   Which of the following are you experiencing? Vaginal discharge    Are you having pain while passing urine? No, I have no pain while urinating.   Which of the following applies to your vaginal discharge? I have a yellow/green discharge.;  I have a foul-smelling discharge.    Which of the following are you experiencing? Pain on intercourse   Do you have any sores on your genitals? No    Have you taken antibiotics recently? I have not been on any antibiotics    Do you use any of the following? Douches   Which of the following applies to your menstrual period? I had a menstrual period in the last 2 weeks.   Have you had similar symptoms in the past? Yes, I have had had similar symptoms more than once before.   When you had similar symptoms in the past, did any of the following work? None of the above   Have you had a fever? No   During the last 2 months, have you had sexual contact with a specific person for the first time? Yes   Has a person with whom you have had sexual contact  been recently told they have a disease possibly acquired through sex? No   Provide any information you feel is important to your history not asked above The vaginal discharge/odor has not changed since  my last visit. Same symtoms no change.   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   Latest known visit with results is:   Clinical Support on 07/10/2023   Component Date Value Ref Range Status    Urine Culture, Routine 07/10/2023 No significant growth   Final    Specimen UA 07/10/2023 Urine, Clean Catch   Final    Color, UA 07/10/2023 Yellow  Yellow, Straw, Iza Final    Appearance, UA 07/10/2023 Clear  Clear Final    pH, UA 07/10/2023 6.0  5.0 - 8.0 Final    Specific Gravity, UA 07/10/2023 1.015  1.005 - 1.030 Final    Protein, UA 07/10/2023 Negative  Negative Final    Comment: Recommend a 24 hour urine protein or a urine   protein/creatinine ratio if globulin induced proteinuria is  clinically suspected.      Glucose, UA 07/10/2023 Negative  Negative Final    Ketones, UA 07/10/2023 Negative  Negative Final    Bilirubin (UA) 07/10/2023 Negative  Negative Final    Occult Blood UA 07/10/2023 Negative  Negative Final    Nitrite, UA 07/10/2023 Negative  Negative Final    Urobilinogen, UA 07/10/2023 Negative  Negative EU/dL Final    Leukocytes, UA 07/10/2023 2+ (A)  Negative Final    Chlamydia, Amplified DNA 07/10/2023 Not Detected  Not Detected Final    N gonorrhoeae, amplified DNA 07/10/2023 Detected (A)  Not Detected Final    RBC, UA 07/10/2023 1  0 - 4 /hpf Final    WBC, UA 07/10/2023 2   0 - 5 /hpf Final    Bacteria 07/10/2023 Rare  None-Occ /hpf Final    Squam Epithel, UA 07/10/2023 4  /hpf Final    Microscopic Comment 07/10/2023 SEE COMMENT   Final    Comment: Other formed elements not mentioned in the report are not   present in the microscopic examination.          Encounter Diagnosis   Name Primary?    Acute vaginitis Yes        Orders Placed This Encounter   Procedures    C. trachomatis/N. gonorrhoeae by AMP DNA     Standing Status:   Future     Standing Expiration Date:   10/23/2024     Order Specific Question:   Source:     Answer:   Urine    Vaginosis Screen by DNA Probe     Standing Status:   Future     Standing Expiration Date:   10/23/2024     Order Specific Question:   Release to patient     Answer:   Immediate            E-Visit Time Tracking:    Day 1 Time (in minutes): 8     Total Time (in minutes): 8       274}

## 2023-09-08 ENCOUNTER — TELEPHONE (OUTPATIENT)
Dept: OBSTETRICS AND GYNECOLOGY | Facility: CLINIC | Age: 43
End: 2023-09-08
Payer: MEDICAID

## 2023-09-08 ENCOUNTER — E-VISIT (OUTPATIENT)
Dept: FAMILY MEDICINE | Facility: CLINIC | Age: 43
End: 2023-09-08
Payer: MEDICAID

## 2023-09-08 DIAGNOSIS — N92.2 EXCESSIVE MENSTRUATION AT PUBERTY: Primary | ICD-10-CM

## 2023-09-08 DIAGNOSIS — N76.0 ACUTE VAGINITIS: ICD-10-CM

## 2023-09-08 PROCEDURE — 99422 PR E&M, ONLINE DIGIT, EST, < 7 DAYS,  11-20 MINS: ICD-10-PCS | Mod: ,,, | Performed by: STUDENT IN AN ORGANIZED HEALTH CARE EDUCATION/TRAINING PROGRAM

## 2023-09-08 PROCEDURE — 99422 OL DIG E/M SVC 11-20 MIN: CPT | Mod: ,,, | Performed by: STUDENT IN AN ORGANIZED HEALTH CARE EDUCATION/TRAINING PROGRAM

## 2023-09-08 NOTE — TELEPHONE ENCOUNTER
Spoke with pt ans she says she call  to the time that Beth comes back and schedule then when she opens her schedule. Pt LAN

## 2023-09-08 NOTE — PROGRESS NOTES
Patient ID: Giovanni Pena is a 43 y.o. female.    Chief Complaint: Vaginal Discharge (Entered automatically based on patient selection in Patient Portal.)          274}  The patient initiated a request through [facility] on 9/8/2023 for evaluation and management with a chief complaint of Vaginal Discharge (Entered automatically based on patient selection in Patient Portal.)     I evaluated the questionnaire submission on 09/08/2023 .    Ohs Peq Evisit Vaginal Discharge    9/8/2023  7:20 AM CDT - Filed by Patient   Do you agree to participate in an E-Visit? Yes   If you have any of the following symptoms,  please do not complete an E-Visit,  schedule an appointment with your provider: I acknowledge   What is the main issue that you would like for your doctor to address today? Menstral cycle really heavy, clotting like   Are you able to take your vital signs? No   Are you currently pregnant, could you be pregnant, or are you breast feeding? None of the above   Which of the following are you experiencing? Vaginal bleeding    Are you having pain while passing urine? No, I have no pain while urinating.   Which of the following applies to your vaginal discharge? I have no discharge.    Which of the following are you experiencing? None of the above   Do you have any sores on your genitals? No    Have you taken antibiotics recently? I have not been on any antibiotics    Do you use any of the following? None of the above   Which of the following applies to your menstrual period? I am having a menstrual period now.   Have you had similar symptoms in the past? No, I have never had these symptoms.   Have you had a fever? No   During the last 2 months, have you had sexual contact with a specific person for the first time? Yes   Has a person with whom you have had sexual contact been recently told they have a disease possibly acquired through sex? No   Provide any information you feel is important to your history not asked  above This e-visit is for extremely heavy menstrual bleeding/clotting.   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   Latest known visit with results is:   Lab Visit on 08/25/2023   Component Date Value Ref Range Status    Chlamydia, Amplified DNA 08/25/2023 Not Detected  Not Detected Final    N gonorrhoeae, amplified DNA 08/25/2023 Not Detected  Not Detected Final       Encounter Diagnoses   Name Primary?    Acute vaginitis     Excessive menstruation at puberty Yes        Orders Placed This Encounter   Procedures    US Pelvis Complete Non OB     Standing Status:   Future     Standing Expiration Date:   9/8/2024     Order Specific Question:   May the Radiologist modify the order per protocol to meet the clinical needs of the patient?     Answer:   Yes     Order Specific Question:   Release to patient     Answer:   Immediate    Pregnancy, urine rapid     Standing Status:   Future     Standing Expiration Date:   11/6/2024     Order Specific Question:   Specimen Source     Answer:   Urine    CBC Auto Differential     Standing Status:   Future     Standing Expiration Date:   9/8/2024    Ferritin     Standing Status:   Future     Standing Expiration Date:   9/7/2024    Basic Metabolic Panel     Standing Status:   Future     Standing Expiration Date:   9/8/2024    Iron and TIBC     Standing Status:   Future     Standing Expiration Date:   9/8/2024    TSH     Standing Status:   Future     Standing Expiration Date:   11/6/2024    HCG, QUANTITATIVE, PREGNANCY     Standing Status:   Future      Standing Expiration Date:   11/6/2024     Order Specific Question:   Release to patient     Answer:   Immediate    Ambulatory referral/consult to Gynecology     Standing Status:   Future     Standing Expiration Date:   10/8/2024     Referral Priority:   Routine     Referral Type:   Consultation     Referral Reason:   Specialty Services Required     Requested Specialty:   Gynecology    Ambulatory referral/consult to Gynecology     Standing Status:   Future     Standing Expiration Date:   10/8/2024     Referral Priority:   Routine     Referral Type:   Consultation     Referral Reason:   Specialty Services Required     Requested Specialty:   Gynecology     Number of Visits Requested:   1            E-Visit Time Tracking:    Day 1 Time (in minutes): 12     Total Time (in minutes): 12       274}

## 2023-09-18 ENCOUNTER — PATIENT MESSAGE (OUTPATIENT)
Dept: PRIMARY CARE CLINIC | Facility: CLINIC | Age: 43
End: 2023-09-18
Payer: MEDICAID

## 2023-09-20 ENCOUNTER — APPOINTMENT (OUTPATIENT)
Dept: RADIOLOGY | Facility: OTHER | Age: 43
End: 2023-09-20
Attending: PODIATRIST
Payer: MEDICAID

## 2023-09-20 ENCOUNTER — OFFICE VISIT (OUTPATIENT)
Dept: PODIATRY | Facility: CLINIC | Age: 43
End: 2023-09-20
Payer: MEDICAID

## 2023-09-20 VITALS
DIASTOLIC BLOOD PRESSURE: 51 MMHG | SYSTOLIC BLOOD PRESSURE: 112 MMHG | HEIGHT: 67 IN | BODY MASS INDEX: 25.64 KG/M2 | HEART RATE: 80 BPM | WEIGHT: 163.38 LBS

## 2023-09-20 DIAGNOSIS — M20.10 VALGUS DEFORMITY OF GREAT TOE, UNSPECIFIED LATERALITY: ICD-10-CM

## 2023-09-20 DIAGNOSIS — M79.671 FOOT PAIN, RIGHT: ICD-10-CM

## 2023-09-20 DIAGNOSIS — M20.10 VALGUS DEFORMITY OF GREAT TOE, UNSPECIFIED LATERALITY: Primary | ICD-10-CM

## 2023-09-20 DIAGNOSIS — M21.611 BUNION, RIGHT: ICD-10-CM

## 2023-09-20 PROCEDURE — 99999 PR PBB SHADOW E&M-EST. PATIENT-LVL III: ICD-10-PCS | Mod: PBBFAC,,, | Performed by: PODIATRIST

## 2023-09-20 PROCEDURE — 99214 PR OFFICE/OUTPT VISIT, EST, LEVL IV, 30-39 MIN: ICD-10-PCS | Mod: S$PBB,,, | Performed by: PODIATRIST

## 2023-09-20 PROCEDURE — 3074F PR MOST RECENT SYSTOLIC BLOOD PRESSURE < 130 MM HG: ICD-10-PCS | Mod: CPTII,,, | Performed by: PODIATRIST

## 2023-09-20 PROCEDURE — 73630 X-RAY EXAM OF FOOT: CPT | Mod: TC,RT

## 2023-09-20 PROCEDURE — 73630 X-RAY EXAM OF FOOT: CPT | Mod: 26,RT,, | Performed by: RADIOLOGY

## 2023-09-20 PROCEDURE — 99213 OFFICE O/P EST LOW 20 MIN: CPT | Mod: PBBFAC,PN | Performed by: PODIATRIST

## 2023-09-20 PROCEDURE — 3008F PR BODY MASS INDEX (BMI) DOCUMENTED: ICD-10-PCS | Mod: CPTII,,, | Performed by: PODIATRIST

## 2023-09-20 PROCEDURE — 3078F DIAST BP <80 MM HG: CPT | Mod: CPTII,,, | Performed by: PODIATRIST

## 2023-09-20 PROCEDURE — 99214 OFFICE O/P EST MOD 30 MIN: CPT | Mod: S$PBB,,, | Performed by: PODIATRIST

## 2023-09-20 PROCEDURE — 73630 XR FOOT COMPLETE 3 VIEW RIGHT: ICD-10-PCS | Mod: 26,RT,, | Performed by: RADIOLOGY

## 2023-09-20 PROCEDURE — 1159F MED LIST DOCD IN RCRD: CPT | Mod: CPTII,,, | Performed by: PODIATRIST

## 2023-09-20 PROCEDURE — 3074F SYST BP LT 130 MM HG: CPT | Mod: CPTII,,, | Performed by: PODIATRIST

## 2023-09-20 PROCEDURE — 99999 PR PBB SHADOW E&M-EST. PATIENT-LVL III: CPT | Mod: PBBFAC,,, | Performed by: PODIATRIST

## 2023-09-20 PROCEDURE — 1159F PR MEDICATION LIST DOCUMENTED IN MEDICAL RECORD: ICD-10-PCS | Mod: CPTII,,, | Performed by: PODIATRIST

## 2023-09-20 PROCEDURE — 3078F PR MOST RECENT DIASTOLIC BLOOD PRESSURE < 80 MM HG: ICD-10-PCS | Mod: CPTII,,, | Performed by: PODIATRIST

## 2023-09-20 PROCEDURE — 3008F BODY MASS INDEX DOCD: CPT | Mod: CPTII,,, | Performed by: PODIATRIST

## 2023-09-20 RX ORDER — FERROUS SULFATE 325(65) MG
1 TABLET ORAL EVERY OTHER DAY
COMMUNITY
Start: 2023-09-09 | End: 2024-09-12

## 2023-09-20 RX ORDER — MELOXICAM 15 MG/1
15 TABLET ORAL DAILY
Qty: 30 TABLET | Refills: 0 | Status: ON HOLD | OUTPATIENT
Start: 2023-09-20 | End: 2023-09-25 | Stop reason: HOSPADM

## 2023-09-20 NOTE — PROGRESS NOTES
Subjective:      Patient ID: Giovanni Pena is a 43 y.o. female.    Chief Complaint: Foot Pain (R foot)    Sharp deep aching pain right big toe joint.  Present for years chronically with good and bad periods.  Most recent exacerbation Gradual onset, worsening over past several months, aggravated by increased weight bearing, shoe gear, pressure.  Ibuprofen periodically over-the-counter use no pain relief.  She is tried the previous medical management for the left side of the fullness stretches inserts athletic type shoes none of which have given significant relief.  Patient had prior bunionectomy left with which she is pleased.  Shoe like to have same procedure on the right if possible.    Review of Systems   Constitutional: Negative for chills, diaphoresis, fever, malaise/fatigue and night sweats.   Cardiovascular:  Negative for claudication, cyanosis, leg swelling and syncope.   Skin:  Negative for color change, dry skin, nail changes, rash, suspicious lesions and unusual hair distribution.   Musculoskeletal:  Positive for joint pain. Negative for falls, joint swelling, muscle cramps, muscle weakness and stiffness.   Gastrointestinal:  Negative for constipation, diarrhea, nausea and vomiting.   Neurological:  Negative for brief paralysis, disturbances in coordination, focal weakness, numbness, paresthesias, sensory change and tremors.         Objective:      Physical Exam  Constitutional:       General: She is not in acute distress.     Appearance: She is well-developed. She is not diaphoretic.   Cardiovascular:      Pulses:           Popliteal pulses are 2+ on the right side and 2+ on the left side.        Dorsalis pedis pulses are 2+ on the right side and 2+ on the left side.        Posterior tibial pulses are 2+ on the right side and 2+ on the left side.      Comments: Capillary refill 3 seconds all toes/distal feet, all toes/both feet warm to touch.      Negative lymphadenopathy bilateral popliteal fossa  and tarsal tunnel.      Negavie lower extremity edema bilateral.    Musculoskeletal:      Right ankle: No swelling, deformity, ecchymosis or lacerations. Normal range of motion. Normal pulse.      Right Achilles Tendon: Normal. No defects. Kelley's test negative.      Comments: Visible and palpable bunion with pain at dorsomedial 1st metatarsal head right.  Hallux abducted right partially reducible, tracks laterally without being track bound.  No ecchymosis, erythema, edema, or cardinal signs infection or signs of trauma same foot.    Otherwise, Normal angle, base, station of gait. All ten toes without clubbing, cyanosis, or signs of ischemia.  No pain to palpation bilateral lower extremities.  Range of motion, stability, muscle strength, and muscle tone normal bilateral feet and legs.    Lymphadenopathy:      Lower Body: No right inguinal adenopathy. No left inguinal adenopathy.      Comments: Negative lymphadenopathy bilateral popliteal fossa and tarsal tunnel.    Negative lymphangitic streaking bilateral feet/ankles/legs.   Skin:     General: Skin is warm and dry.      Capillary Refill: Capillary refill takes 2 to 3 seconds.      Coloration: Skin is not pale.      Findings: No abrasion, bruising, burn, ecchymosis, erythema, laceration, lesion or rash.      Nails: There is no clubbing.      Comments:   Skin is normal age and health appropriate color, turgor, texture, and temperature bilateral lower extremities without ulceration, hyperpigmentation, discoloration, masses nodules or cords palpated.  No ecchymosis, erythema, edema, or cardinal signs of infection bilateral lower extremities.     Neurological:      Mental Status: She is alert and oriented to person, place, and time.      Sensory: No sensory deficit.      Motor: No tremor, atrophy or abnormal muscle tone.      Gait: Gait normal.      Deep Tendon Reflexes:      Reflex Scores:       Patellar reflexes are 2+ on the right side and 2+ on the left side.        Achilles reflexes are 2+ on the right side and 2+ on the left side.     Comments: Negative tinel sign to percussion sural, superficial peroneal, deep peroneal, saphenous, and posterior tibial nerves right and left ankles and feet.    Negative allodynia both feet   Psychiatric:         Behavior: Behavior is cooperative.           Assessment:       Encounter Diagnoses   Name Primary?    Valgus deformity of great toe, unspecified laterality Yes    Foot pain, right          Plan:       Giovanni was seen today for foot pain.    Diagnoses and all orders for this visit:    Valgus deformity of great toe, unspecified laterality  -     X-Ray Foot Complete Right; Future    Foot pain, right  -     X-Ray Foot Complete Right; Future      I counseled the patient on her conditions, their implications and medical management.        Patient will stretch the tendo achilles complex three times daily as demonstrated in the office.  Literature was dispensed illustrating proper stretching technique.    Patient will obtain over the counter arch supports and wear them in shoes whenever possible.  Athletic shoes intended for walking or running are usually best.    The patient was advised that NSAID-type medications have two very important potential side effects: gastrointestinal irritation including hemorrhage and renal injuries. She was asked to take the medication with food and to stop if she experiences any GI upset. I asked her to call for vomiting, abdominal pain or black/bloody stools. The patient expresses understanding of these issues and questions were answered.    Discussed conservative treatment with shoes of adequate dimensions, material, and style to alleviate symptoms and delay or prevent surgical intervention.    Meloxicam    Xrays right    Patient verbally verified not pregnant, not nursing, not trying.          No follow-ups on file.

## 2023-09-21 ENCOUNTER — TELEPHONE (OUTPATIENT)
Dept: PRIMARY CARE CLINIC | Facility: CLINIC | Age: 43
End: 2023-09-21
Payer: MEDICAID

## 2023-09-21 ENCOUNTER — HOSPITAL ENCOUNTER (INPATIENT)
Facility: HOSPITAL | Age: 43
LOS: 4 days | Discharge: HOME | DRG: 443 | End: 2023-09-25
Attending: EMERGENCY MEDICINE | Admitting: HOSPITALIST
Payer: MEDICAID

## 2023-09-21 DIAGNOSIS — D64.9 ANEMIA, UNSPECIFIED TYPE: Primary | ICD-10-CM

## 2023-09-21 DIAGNOSIS — R17 JAUNDICE: ICD-10-CM

## 2023-09-21 DIAGNOSIS — R07.9 CHEST PAIN: ICD-10-CM

## 2023-09-21 DIAGNOSIS — D50.9 MICROCYTIC HYPOCHROMIC ANEMIA: ICD-10-CM

## 2023-09-21 PROBLEM — E87.6 HYPOKALEMIA: Status: ACTIVE | Noted: 2023-09-21

## 2023-09-21 PROBLEM — D73.5 SPLENIC INFARCT: Status: ACTIVE | Noted: 2023-09-21

## 2023-09-21 LAB
ABO + RH BLD: NORMAL
ALBUMIN SERPL BCP-MCNC: 3.3 G/DL (ref 3.5–5.2)
ALP SERPL-CCNC: 97 U/L (ref 55–135)
ALT SERPL W/O P-5'-P-CCNC: 20 U/L (ref 10–44)
ANION GAP SERPL CALC-SCNC: 10 MMOL/L (ref 8–16)
APTT PPP: 23 SEC (ref 21–32)
AST SERPL-CCNC: 60 U/L (ref 10–40)
B-HCG UR QL: NEGATIVE
BASOPHILS # BLD AUTO: 0.02 K/UL (ref 0–0.2)
BASOPHILS # BLD AUTO: 0.03 K/UL (ref 0–0.2)
BASOPHILS NFR BLD: 0.4 % (ref 0–1.9)
BASOPHILS NFR BLD: 0.7 % (ref 0–1.9)
BILIRUB DIRECT SERPL-MCNC: 0.4 MG/DL (ref 0.1–0.3)
BILIRUB SERPL-MCNC: 7 MG/DL (ref 0.1–1)
BLD GP AB SCN CELLS X3 SERPL QL: NORMAL
BUN SERPL-MCNC: 8 MG/DL (ref 6–20)
CALCIUM SERPL-MCNC: 8.7 MG/DL (ref 8.7–10.5)
CHLORIDE SERPL-SCNC: 103 MMOL/L (ref 95–110)
CO2 SERPL-SCNC: 24 MMOL/L (ref 23–29)
CREAT SERPL-MCNC: 0.8 MG/DL (ref 0.5–1.4)
CTP QC/QA: YES
DAT IGG-SP REAG RBC-IMP: NORMAL
DIFFERENTIAL METHOD: ABNORMAL
DIFFERENTIAL METHOD: ABNORMAL
EOSINOPHIL # BLD AUTO: 0 K/UL (ref 0–0.5)
EOSINOPHIL # BLD AUTO: 0 K/UL (ref 0–0.5)
EOSINOPHIL NFR BLD: 0.2 % (ref 0–8)
EOSINOPHIL NFR BLD: 0.4 % (ref 0–8)
ERYTHROCYTE [DISTWIDTH] IN BLOOD BY AUTOMATED COUNT: 21.2 % (ref 11.5–14.5)
ERYTHROCYTE [DISTWIDTH] IN BLOOD BY AUTOMATED COUNT: 21.3 % (ref 11.5–14.5)
EST. GFR  (NO RACE VARIABLE): >60 ML/MIN/1.73 M^2
FIBRINOGEN PPP-MCNC: 404 MG/DL (ref 182–400)
GLUCOSE SERPL-MCNC: 94 MG/DL (ref 70–110)
HCT VFR BLD AUTO: 21.5 % (ref 37–48.5)
HCT VFR BLD AUTO: 22.2 % (ref 37–48.5)
HGB BLD-MCNC: 6.4 G/DL (ref 12–16)
HGB BLD-MCNC: 6.7 G/DL (ref 12–16)
IMM GRANULOCYTES # BLD AUTO: 0.01 K/UL (ref 0–0.04)
IMM GRANULOCYTES # BLD AUTO: 0.02 K/UL (ref 0–0.04)
IMM GRANULOCYTES NFR BLD AUTO: 0.2 % (ref 0–0.5)
IMM GRANULOCYTES NFR BLD AUTO: 0.4 % (ref 0–0.5)
INR PPP: 1 (ref 0.8–1.2)
IRON SERPL-MCNC: 98 UG/DL (ref 30–160)
LDH SERPL L TO P-CCNC: 922 U/L (ref 110–260)
LIPASE SERPL-CCNC: 18 U/L (ref 4–60)
LYMPHOCYTES # BLD AUTO: 0.8 K/UL (ref 1–4.8)
LYMPHOCYTES # BLD AUTO: 0.8 K/UL (ref 1–4.8)
LYMPHOCYTES NFR BLD: 16.8 % (ref 18–48)
LYMPHOCYTES NFR BLD: 18.5 % (ref 18–48)
MCH RBC QN AUTO: 20.4 PG (ref 27–31)
MCH RBC QN AUTO: 20.5 PG (ref 27–31)
MCHC RBC AUTO-ENTMCNC: 29.8 G/DL (ref 32–36)
MCHC RBC AUTO-ENTMCNC: 30.2 G/DL (ref 32–36)
MCV RBC AUTO: 68 FL (ref 82–98)
MCV RBC AUTO: 69 FL (ref 82–98)
MONOCYTES # BLD AUTO: 0.4 K/UL (ref 0.3–1)
MONOCYTES # BLD AUTO: 0.4 K/UL (ref 0.3–1)
MONOCYTES NFR BLD: 9 % (ref 4–15)
MONOCYTES NFR BLD: 9 % (ref 4–15)
NEUTROPHILS # BLD AUTO: 3 K/UL (ref 1.8–7.7)
NEUTROPHILS # BLD AUTO: 3.6 K/UL (ref 1.8–7.7)
NEUTROPHILS NFR BLD: 71.4 % (ref 38–73)
NEUTROPHILS NFR BLD: 73 % (ref 38–73)
NRBC BLD-RTO: 0 /100 WBC
NRBC BLD-RTO: 0 /100 WBC
PLATELET # BLD AUTO: 101 K/UL (ref 150–450)
PLATELET # BLD AUTO: 103 K/UL (ref 150–450)
PMV BLD AUTO: 9.9 FL (ref 9.2–12.9)
PMV BLD AUTO: ABNORMAL FL (ref 9.2–12.9)
POTASSIUM SERPL-SCNC: 3.3 MMOL/L (ref 3.5–5.1)
PROT SERPL-MCNC: 7.6 G/DL (ref 6–8.4)
PROTHROMBIN TIME: 11.7 SEC (ref 9–12.5)
RBC # BLD AUTO: 3.13 M/UL (ref 4–5.4)
RBC # BLD AUTO: 3.27 M/UL (ref 4–5.4)
RETICS/RBC NFR AUTO: 3 % (ref 0.5–2.5)
SATURATED IRON: 21 % (ref 20–50)
SODIUM SERPL-SCNC: 137 MMOL/L (ref 136–145)
SPECIMEN OUTDATE: NORMAL
TOTAL IRON BINDING CAPACITY: 463 UG/DL (ref 250–450)
TRANSFERRIN SERPL-MCNC: 331 MG/DL (ref 200–375)
WBC # BLD AUTO: 4.22 K/UL (ref 3.9–12.7)
WBC # BLD AUTO: 4.87 K/UL (ref 3.9–12.7)

## 2023-09-21 PROCEDURE — 86880 COOMBS TEST DIRECT: CPT | Performed by: NURSE PRACTITIONER

## 2023-09-21 PROCEDURE — 85730 THROMBOPLASTIN TIME PARTIAL: CPT | Performed by: EMERGENCY MEDICINE

## 2023-09-21 PROCEDURE — 36415 COLL VENOUS BLD VENIPUNCTURE: CPT | Performed by: PHYSICIAN ASSISTANT

## 2023-09-21 PROCEDURE — 96375 TX/PRO/DX INJ NEW DRUG ADDON: CPT

## 2023-09-21 PROCEDURE — 11000001 HC ACUTE MED/SURG PRIVATE ROOM

## 2023-09-21 PROCEDURE — 36415 COLL VENOUS BLD VENIPUNCTURE: CPT | Performed by: EMERGENCY MEDICINE

## 2023-09-21 PROCEDURE — G0378 HOSPITAL OBSERVATION PER HR: HCPCS

## 2023-09-21 PROCEDURE — 25000003 PHARM REV CODE 250: Performed by: NURSE PRACTITIONER

## 2023-09-21 PROCEDURE — 63600175 PHARM REV CODE 636 W HCPCS: Performed by: EMERGENCY MEDICINE

## 2023-09-21 PROCEDURE — 84466 ASSAY OF TRANSFERRIN: CPT | Performed by: EMERGENCY MEDICINE

## 2023-09-21 PROCEDURE — 85384 FIBRINOGEN ACTIVITY: CPT | Performed by: EMERGENCY MEDICINE

## 2023-09-21 PROCEDURE — 96374 THER/PROPH/DIAG INJ IV PUSH: CPT

## 2023-09-21 PROCEDURE — 25500020 PHARM REV CODE 255

## 2023-09-21 PROCEDURE — 83010 ASSAY OF HAPTOGLOBIN QUANT: CPT | Performed by: EMERGENCY MEDICINE

## 2023-09-21 PROCEDURE — 99285 EMERGENCY DEPT VISIT HI MDM: CPT | Mod: 25

## 2023-09-21 PROCEDURE — 82248 BILIRUBIN DIRECT: CPT | Performed by: EMERGENCY MEDICINE

## 2023-09-21 PROCEDURE — 86920 COMPATIBILITY TEST SPIN: CPT | Performed by: EMERGENCY MEDICINE

## 2023-09-21 PROCEDURE — 96376 TX/PRO/DX INJ SAME DRUG ADON: CPT

## 2023-09-21 PROCEDURE — 80053 COMPREHEN METABOLIC PANEL: CPT | Performed by: PHYSICIAN ASSISTANT

## 2023-09-21 PROCEDURE — 86901 BLOOD TYPING SEROLOGIC RH(D): CPT | Performed by: EMERGENCY MEDICINE

## 2023-09-21 PROCEDURE — 36415 COLL VENOUS BLD VENIPUNCTURE: CPT | Performed by: NURSE PRACTITIONER

## 2023-09-21 PROCEDURE — 85610 PROTHROMBIN TIME: CPT | Performed by: EMERGENCY MEDICINE

## 2023-09-21 PROCEDURE — 85045 AUTOMATED RETICULOCYTE COUNT: CPT | Performed by: EMERGENCY MEDICINE

## 2023-09-21 PROCEDURE — 81025 URINE PREGNANCY TEST: CPT | Performed by: PHYSICIAN ASSISTANT

## 2023-09-21 PROCEDURE — 63600175 PHARM REV CODE 636 W HCPCS: Performed by: NURSE PRACTITIONER

## 2023-09-21 PROCEDURE — 85025 COMPLETE CBC W/AUTO DIFF WBC: CPT | Performed by: PHYSICIAN ASSISTANT

## 2023-09-21 PROCEDURE — 83615 LACTATE (LD) (LDH) ENZYME: CPT | Performed by: EMERGENCY MEDICINE

## 2023-09-21 PROCEDURE — 83690 ASSAY OF LIPASE: CPT | Performed by: PHYSICIAN ASSISTANT

## 2023-09-21 PROCEDURE — 85025 COMPLETE CBC W/AUTO DIFF WBC: CPT | Mod: 91 | Performed by: EMERGENCY MEDICINE

## 2023-09-21 PROCEDURE — 83540 ASSAY OF IRON: CPT | Performed by: EMERGENCY MEDICINE

## 2023-09-21 RX ORDER — ACETAMINOPHEN 325 MG/1
650 TABLET ORAL EVERY 8 HOURS PRN
Status: DISCONTINUED | OUTPATIENT
Start: 2023-09-21 | End: 2023-09-25 | Stop reason: HOSPADM

## 2023-09-21 RX ORDER — TALC
6 POWDER (GRAM) TOPICAL NIGHTLY PRN
Status: DISCONTINUED | OUTPATIENT
Start: 2023-09-21 | End: 2023-09-25 | Stop reason: HOSPADM

## 2023-09-21 RX ORDER — SODIUM CHLORIDE, SODIUM LACTATE, POTASSIUM CHLORIDE, CALCIUM CHLORIDE 600; 310; 30; 20 MG/100ML; MG/100ML; MG/100ML; MG/100ML
INJECTION, SOLUTION INTRAVENOUS CONTINUOUS
Status: DISCONTINUED | OUTPATIENT
Start: 2023-09-21 | End: 2023-09-23

## 2023-09-21 RX ORDER — LANOLIN ALCOHOL/MO/W.PET/CERES
800 CREAM (GRAM) TOPICAL
Status: DISCONTINUED | OUTPATIENT
Start: 2023-09-21 | End: 2023-09-25 | Stop reason: HOSPADM

## 2023-09-21 RX ORDER — PANTOPRAZOLE SODIUM 40 MG/1
40 TABLET, DELAYED RELEASE ORAL DAILY
Status: DISCONTINUED | OUTPATIENT
Start: 2023-09-22 | End: 2023-09-25 | Stop reason: HOSPADM

## 2023-09-21 RX ORDER — METHYLPREDNISOLONE SOD SUCC 125 MG
125 VIAL (EA) INJECTION
Status: COMPLETED | OUTPATIENT
Start: 2023-09-21 | End: 2023-09-21

## 2023-09-21 RX ORDER — LANOLIN ALCOHOL/MO/W.PET/CERES
1 CREAM (GRAM) TOPICAL DAILY
Status: DISCONTINUED | OUTPATIENT
Start: 2023-09-22 | End: 2023-09-25 | Stop reason: HOSPADM

## 2023-09-21 RX ORDER — NALOXONE HCL 0.4 MG/ML
0.02 VIAL (ML) INJECTION
Status: DISCONTINUED | OUTPATIENT
Start: 2023-09-21 | End: 2023-09-25 | Stop reason: HOSPADM

## 2023-09-21 RX ORDER — DOXYCYCLINE 100 MG/1
100 CAPSULE ORAL 2 TIMES DAILY
COMMUNITY
Start: 2023-09-20 | End: 2023-10-04

## 2023-09-21 RX ORDER — IBUPROFEN 200 MG
16 TABLET ORAL
Status: DISCONTINUED | OUTPATIENT
Start: 2023-09-21 | End: 2023-09-25 | Stop reason: HOSPADM

## 2023-09-21 RX ORDER — AMOXICILLIN 250 MG
1 CAPSULE ORAL 2 TIMES DAILY
Status: DISCONTINUED | OUTPATIENT
Start: 2023-09-21 | End: 2023-09-25 | Stop reason: HOSPADM

## 2023-09-21 RX ORDER — POLYETHYLENE GLYCOL 3350 17 G/17G
17 POWDER, FOR SOLUTION ORAL 2 TIMES DAILY PRN
Status: DISCONTINUED | OUTPATIENT
Start: 2023-09-21 | End: 2023-09-25 | Stop reason: HOSPADM

## 2023-09-21 RX ORDER — MAG HYDROX/ALUMINUM HYD/SIMETH 200-200-20
30 SUSPENSION, ORAL (FINAL DOSE FORM) ORAL 4 TIMES DAILY PRN
Status: DISCONTINUED | OUTPATIENT
Start: 2023-09-21 | End: 2023-09-25 | Stop reason: HOSPADM

## 2023-09-21 RX ORDER — DOXYCYCLINE HYCLATE 100 MG
100 TABLET ORAL 2 TIMES DAILY
Status: DISCONTINUED | OUTPATIENT
Start: 2023-09-21 | End: 2023-09-25 | Stop reason: HOSPADM

## 2023-09-21 RX ORDER — KETOCONAZOLE 20 MG/ML
1 SHAMPOO, SUSPENSION TOPICAL
Status: ON HOLD | COMMUNITY
End: 2023-09-25 | Stop reason: HOSPADM

## 2023-09-21 RX ORDER — HYDROCODONE BITARTRATE AND ACETAMINOPHEN 500; 5 MG/1; MG/1
TABLET ORAL
Status: DISCONTINUED | OUTPATIENT
Start: 2023-09-21 | End: 2023-09-25 | Stop reason: HOSPADM

## 2023-09-21 RX ORDER — SODIUM CHLORIDE 0.9 % (FLUSH) 0.9 %
10 SYRINGE (ML) INJECTION EVERY 12 HOURS PRN
Status: DISCONTINUED | OUTPATIENT
Start: 2023-09-21 | End: 2023-09-25 | Stop reason: HOSPADM

## 2023-09-21 RX ORDER — NORETHINDRONE 5 MG/1
1 TABLET ORAL 2 TIMES DAILY
COMMUNITY
Start: 2023-09-21 | End: 2023-10-04

## 2023-09-21 RX ORDER — HYDROCODONE BITARTRATE AND ACETAMINOPHEN 5; 325 MG/1; MG/1
1 TABLET ORAL EVERY 6 HOURS PRN
Status: DISCONTINUED | OUTPATIENT
Start: 2023-09-21 | End: 2023-09-25 | Stop reason: HOSPADM

## 2023-09-21 RX ORDER — GLUCAGON 1 MG
1 KIT INJECTION
Status: DISCONTINUED | OUTPATIENT
Start: 2023-09-21 | End: 2023-09-25 | Stop reason: HOSPADM

## 2023-09-21 RX ORDER — ONDANSETRON 2 MG/ML
4 INJECTION INTRAMUSCULAR; INTRAVENOUS EVERY 6 HOURS PRN
Status: DISCONTINUED | OUTPATIENT
Start: 2023-09-21 | End: 2023-09-22

## 2023-09-21 RX ORDER — IBUPROFEN 200 MG
24 TABLET ORAL
Status: DISCONTINUED | OUTPATIENT
Start: 2023-09-21 | End: 2023-09-25 | Stop reason: HOSPADM

## 2023-09-21 RX ORDER — FOLIC ACID 1 MG/1
1 TABLET ORAL DAILY
Status: DISCONTINUED | OUTPATIENT
Start: 2023-09-22 | End: 2023-09-25 | Stop reason: HOSPADM

## 2023-09-21 RX ADMIN — POTASSIUM BICARBONATE 35 MEQ: 391 TABLET, EFFERVESCENT ORAL at 09:09

## 2023-09-21 RX ADMIN — METHYLPREDNISOLONE SODIUM SUCCINATE 125 MG: 40 INJECTION, POWDER, FOR SOLUTION INTRAMUSCULAR; INTRAVENOUS at 11:09

## 2023-09-21 RX ADMIN — IOHEXOL 75 ML: 350 INJECTION, SOLUTION INTRAVENOUS at 06:09

## 2023-09-21 RX ADMIN — MELATONIN TAB 3 MG 6 MG: 3 TAB at 11:09

## 2023-09-21 RX ADMIN — ONDANSETRON 4 MG: 2 INJECTION INTRAMUSCULAR; INTRAVENOUS at 10:09

## 2023-09-21 RX ADMIN — DOXYCYCLINE HYCLATE 100 MG: 100 TABLET, COATED ORAL at 09:09

## 2023-09-21 RX ADMIN — SODIUM CHLORIDE, POTASSIUM CHLORIDE, SODIUM LACTATE AND CALCIUM CHLORIDE: 600; 310; 30; 20 INJECTION, SOLUTION INTRAVENOUS at 09:09

## 2023-09-21 RX ADMIN — METHYLPREDNISOLONE SODIUM SUCCINATE 125 MG: 125 INJECTION, POWDER, FOR SOLUTION INTRAMUSCULAR; INTRAVENOUS at 04:09

## 2023-09-21 RX ADMIN — POTASSIUM BICARBONATE 35 MEQ: 391 TABLET, EFFERVESCENT ORAL at 11:09

## 2023-09-21 NOTE — ED PROVIDER NOTES
Encounter Date: 2023       History     Chief Complaint   Patient presents with    Jaundice     Pt noticed her eyes were yellow this morning, brown colored urine, vomiting yellow and brown fluids.  Pt also having ABD pain.     Patient is a 43-year-old female with a past medical history of gastroplasty in [place] complicated by portal vein thrombosis in  rheumatoid arthritis who presents the emergency room for evaluation of jaundice icterus and fatigued.  She was seen at [facility] on  diagnosed with dysfunctional uterine bleeding and received a blood transfusion on .  Her menstrual cycle stopped on the .  She noticed her icterus yesterday.  She has mild left upper quadrant abdominal pain.  She denies any fevers altered mental status dysuria bloody urine chest pain shortness of breath syncope.  She has no history of thalassemia ITP TTP HUS.  She denies any alcohol abuse, acetaminophen use, recent sexual encounters, raw foods, IV drug use, history of hepatitis.      Review of patient's allergies indicates:   Allergen Reactions    Penicillin Hives     Past Medical History:   Diagnosis Date    Embolism and thrombosis of renal vein     RA (rheumatoid arthritis)      Past Surgical History:   Procedure Laterality Date     SECTION  , , , 2014    CHOLECYSTECTOMY  2014    SLEEVE GASTROPLASTY  2021    in [place]    SURGICAL REMOVAL OF BUNION WITH OSTEOTOMY OF METATARSAL BONE Left 2022    Procedure: BUNIONECTOMY, WITH METATARSAL OSTEOTOMY  ;  Surgeon: Roshan Pyle DPM;  Location: [facility];  Service: Podiatry;  Laterality: Left;  distal osteotomy left 1st metatarsal    TUBAL LIGATION       Family History   Problem Relation Age of Onset    Breast cancer Maternal Aunt     Hypertension Maternal Grandmother     Coronary artery disease Maternal Grandmother     Colon cancer Neg Hx     Ovarian cancer Neg Hx     Endometrial cancer Neg Hx       Social History     Tobacco Use    Smoking status: Never    Smokeless tobacco: Never   Substance Use Topics    Alcohol use: Yes     Comment: rare    Drug use: Never     Review of Systems   Constitutional:  Positive for fatigue. Negative for appetite change and fever.   HENT:  Negative for congestion, ear pain, rhinorrhea and sore throat.    Eyes:  Negative for pain and visual disturbance.        Yellowing of the eyes   Respiratory:  Negative for cough and shortness of breath.    Cardiovascular:  Negative for chest pain.   Gastrointestinal:  Positive for abdominal pain. Negative for diarrhea, nausea and vomiting.   Genitourinary:  Negative for difficulty urinating, dysuria and flank pain.   Musculoskeletal:  Negative for arthralgias, back pain and myalgias.   Skin:  Negative for rash.   Neurological:  Negative for weakness, numbness and headaches.   All other systems reviewed and are negative.      Physical Exam     Initial Vitals   BP Pulse Resp Temp SpO2   09/21/23 1402 09/21/23 1402 09/21/23 1402 09/21/23 1401 --   (!) 107/52 93 16 98.3 °F (36.8 °C)       MAP       --                Physical Exam    Nursing note and vitals reviewed.  Constitutional: She appears well-developed and well-nourished.  Non-toxic appearance. No distress.   HENT:   Head: Normocephalic and atraumatic.   Mouth/Throat: Oropharynx is clear and moist.   Eyes: EOM are normal. Pupils are equal, round, and reactive to light. Scleral icterus is present.   Neck: Neck supple. No JVD present.   Normal range of motion.  Cardiovascular:  Normal rate, regular rhythm, normal heart sounds and intact distal pulses.     Exam reveals no gallop and no friction rub.       No murmur heard.  Pulmonary/Chest: Breath sounds normal. No respiratory distress. She has no decreased breath sounds. She has no wheezes. She has no rhonchi. She has no rales.   Abdominal: Abdomen is soft. Bowel sounds are normal. She exhibits no distension. There is no abdominal  tenderness.   Splenomegaly   Musculoskeletal:         General: No edema. Normal range of motion.      Cervical back: Normal range of motion and neck supple.     Neurological: She is alert and oriented to person, place, and time. She has normal strength. No sensory deficit. GCS score is 15. GCS eye subscore is 4. GCS verbal subscore is 5. GCS motor subscore is 6.   Skin: Skin is warm and dry.   Psychiatric: She has a normal mood and affect.         ED Course   Critical Care    Date/Time: 9/21/2023 1:23 PM    Performed by: Jerardo Royal MD  Authorized by: Jerardo Royal MD  Direct patient critical care time: 20 minutes  Additional history critical care time: 10 minutes  Ordering / reviewing critical care time: 10 minutes  Documentation critical care time: 10 minutes  Consulting other physicians critical care time: 10 minutes  Total critical care time (exclusive of procedural time) : 60 minutes  Critical care was necessary to treat or prevent imminent or life-threatening deterioration of the following conditions: hemolytic anemia?  Critical care was time spent personally by me on the following activities: discussions with consultants, examination of patient, ordering and review of laboratory studies and review of old charts.        Labs Reviewed   CBC W/ AUTO DIFFERENTIAL - Abnormal; Notable for the following components:       Result Value    RBC 3.27 (*)     Hemoglobin 6.7 (*)     Hematocrit 22.2 (*)     MCV 68 (*)     MCH 20.5 (*)     MCHC 30.2 (*)     RDW 21.3 (*)     Platelets 103 (*)     Lymph # 0.8 (*)     Lymph % 16.8 (*)     All other components within normal limits    Narrative:      HGB  critical result(s) called and verbal readback obtained from LIZZ LIMON by TN3 09/21/2023 15:28   COMPREHENSIVE METABOLIC PANEL - Abnormal; Notable for the following components:    Potassium 3.3 (*)     Albumin 3.3 (*)     Total Bilirubin 7.0 (*)     AST 60 (*)     All other components within normal limits   CBC W/  AUTO DIFFERENTIAL - Abnormal; Notable for the following components:    RBC 3.13 (*)     Hemoglobin 6.4 (*)     Hematocrit 21.5 (*)     MCV 69 (*)     MCH 20.4 (*)     MCHC 29.8 (*)     RDW 21.2 (*)     Platelets 101 (*)     Lymph # 0.8 (*)     All other components within normal limits    Narrative:     HGB   critical result(s) called and verbal readback obtained from DR. VAZQUEZ  by TN3 09/21/2023 17:01   LACTATE DEHYDROGENASE - Abnormal; Notable for the following components:     (*)     All other components within normal limits   RETICULOCYTES - Abnormal; Notable for the following components:    Retic 3.0 (*)     All other components within normal limits   BILIRUBIN, DIRECT - Abnormal; Notable for the following components:    Bilirubin, Direct 0.4 (*)     All other components within normal limits   FIBRINOGEN - Abnormal; Notable for the following components:    Fibrinogen 404 (*)     All other components within normal limits   LIPASE   PROTIME-INR   APTT   URINALYSIS, REFLEX TO URINE CULTURE   PATHOLOGIST INTERPRETATION DIFFERENTIAL   HAPTOGLOBIN   IRON AND TIBC   POCT URINE PREGNANCY   TYPE & SCREEN   DIRECT ANTIGLOBULIN TEST    Narrative:     direct ashley  Release to patient->Immediate          Imaging Results    None          Medications   folic acid tablet 1 mg (has no administration in time range)   iohexoL (OMNIPAQUE 350) 350 mg iodine/mL injection (has no administration in time range)   methylPREDNISolone sodium succinate injection 125 mg (125 mg Intravenous Given 9/21/23 1612)     Medical Decision Making  Amount and/or Complexity of Data Reviewed  Labs: ordered.  Radiology: ordered.    Risk  Prescription drug management.  Decision regarding hospitalization.               ED Course as of 09/21/23 1830   Thu Sep 21, 2023   1827 Patient has been seen in consultation by Hospital Medicine in the emergency department.  They will consult with hematology.  CT abdomen pelvis still pending.  Direct Ashley  test is negative.  LDH is elevated at 922 making hemolysis it concern.  Hematology will consult to determine whether not she needs a transfusion.  Patient does not appear to be septic or toxic.  She is not febrile.  She has no leukocytosis or significant leukopenia.  Care is transferred to Hospital Medicine with imaging pending.  They understand to follow up on image.  They understand to follow up with Hematology. [JS]      ED Course User Index  [JS] Jerardo Royal MD                    Clinical Impression:   Final diagnoses:  [D64.9] Anemia, unspecified type (Primary)        ED Disposition Condition    Admit Stable                Jerardo Royal MD  09/21/23 9394

## 2023-09-21 NOTE — FIRST PROVIDER EVALUATION
Emergency Department TeleTriage Encounter Note      CHIEF COMPLAINT    Chief Complaint   Patient presents with    Jaundice     Pt noticed her eyes were yellow this morning, brown colored urine, vomiting yellow and brown fluids.  Pt also having ABD pain.       VITAL SIGNS   Initial Vitals   BP Pulse Resp Temp SpO2   09/21/23 1402 09/21/23 1402 09/21/23 1402 09/21/23 1401 --   (!) 107/52 93 16 98.3 °F (36.8 °C)       MAP       --                   ALLERGIES    Review of patient's allergies indicates:   Allergen Reactions    Penicillin Hives       PROVIDER TRIAGE NOTE  Patient presents with lower abdominal pain, dark urine and yellowing to the eyes since this morning.       ORDERS  Labs Reviewed - No data to display    ED Orders (720h ago, onward)      None              Virtual Visit Note: The provider triage portion of this emergency department evaluation and documentation was performed via [employer], a HIPAA-compliant telemedicine application, in concert with a tele-presenter in the room. A face to face patient evaluation with one of my colleagues will occur once the patient is placed in an emergency department room.      DISCLAIMER: This note was prepared with [employer] voice recognition transcription software. Garbled syntax, mangled pronouns, and other bizarre constructions may be attributed to that software system.

## 2023-09-21 NOTE — TELEPHONE ENCOUNTER
----- Message from Cheryl Ridley sent at 9/21/2023 12:06 PM CDT -----  Contact: [phone]  Patient is calling for Medical Advice regarding: Patient would like a call back about some issues she is having.      Comments: Please call and advise.

## 2023-09-21 NOTE — TELEPHONE ENCOUNTER
----- Message from Cheryl Ridley sent at 9/21/2023 12:06 PM CDT -----  Contact: [phone]  Patient is calling for Medical Advice regarding: Patient would like a call back about some issues she is having.      Comments: Please call and advise.

## 2023-09-21 NOTE — LETTER
September 25, 2023         [address] [place] [address] DR FRANKIE ESTRADA [id number]       Patient: Giovanni Pena   YOB: 1980  Date of Visit: 09/25/2023    To Whom It May Concern:    Barbie Pena  was at [facility] on 09/21/2023-09/25/2023. The patient may return to work/school on 09/27/2023 with no restrictions. If you have any questions or concerns, or if I can be of further assistance, please do not hesitate to contact me.    Sincerely,    Sylvie Rolle RN

## 2023-09-21 NOTE — TELEPHONE ENCOUNTER
Pt states that  this morning was yellow   Urine became brown and reddish color   Threw up yellow and brown fluids      Pt was advise to go to ed

## 2023-09-21 NOTE — TELEPHONE ENCOUNTER
----- Message from Cheryl Ridley sent at 9/21/2023 12:06 PM CDT -----  Contact: [phone]  Patient is calling for Medical Advice regarding: Patient would like a call back about some issues she is having.      Comments: Please call and advise.

## 2023-09-21 NOTE — PHARMACY MED REC
"Admission Medication History     The home medication history was taken by Rashid Crocker.    You may go to "Admission" then "Reconcile Home Medications" tabs to review and/or act upon these items.     The home medication list has been updated by the Pharmacy department.   Please read ALL comments highlighted in yellow.   Please address this information as you see fit.    Feel free to contact us if you have any questions or require assistance.        Medications listed below were obtained from: Patient/family and Analytic software- [employer]  No current facility-administered medications on file prior to encounter.     Current Outpatient Medications on File Prior to Encounter   Medication Sig Dispense Refill    doxycycline (VIBRAMYCIN) 100 MG Cap Take 100 mg by mouth 2 (two) times daily.      ferrous sulfate (FEOSOL) 325 mg (65 mg iron) Tab tablet Take 1 tablet by mouth every other day.      ketoconazole (NIZORAL) 2 % shampoo Apply 1 application  topically every 7 days.      omeprazole (PRILOSEC) 40 MG capsule TAKE 1 CAPSULE BY MOUTH EVERY DAY (Patient taking differently: Take 40 mg by mouth Daily.) 90 capsule 0    meloxicam (MOBIC) 15 MG tablet Take 1 tablet (15 mg total) by mouth once daily. 30 tablet 0    norethindrone (AYGESTIN) 5 mg Tab Take 1 tablet by mouth 2 (two) times a day.           Rashid Crocker  EXT 1924                 .          "

## 2023-09-21 NOTE — CARE UPDATE
43 years old female was admitted to the ED with jaundice recent packed red blood cell transfusion.  Hematology called for assistance evaluation and recommendation.  Formal consult will be placed tomorrow a.m..    I have reviewed blood work as well as spoken to ED attending    Recommend the following:    > Peripheral blood smear (I have spoken to hematology they do not see significant cell fragmentation on smear.  At this point recommending repeating peripheral blood smear tomorrow morning to be reviewed again.)  > Nehemiah study (Direct antiglobulin test)   > LDH  > haptoglobin  > CT abdomen pelvis with contrast  > PT PTT INR  > fibrinogen  > iron study  > ferritin  > folic acid 1 mg daily  > reticulocyte  > Solu-Medrol 120 IV 3 times a day  > type and screen for need of central future transfusion requirement   > +/- GI consult based off above study

## 2023-09-21 NOTE — TELEPHONE ENCOUNTER
----- Message from Vonnie Ragland sent at 9/21/2023 12:43 PM CDT -----  Contact: [phone] Patient    Patient is returning a phone call.  Who left a message for the patient: Fabi Bravo MA  Does patient know what this is regarding:    Would you like a call back, or a response through your [facility] portal?:   call back  Comments:

## 2023-09-22 LAB
ALBUMIN SERPL BCP-MCNC: 3 G/DL (ref 3.5–5.2)
ALP SERPL-CCNC: 90 U/L (ref 55–135)
ALT SERPL W/O P-5'-P-CCNC: 19 U/L (ref 10–44)
ANION GAP SERPL CALC-SCNC: 7 MMOL/L (ref 8–16)
AST SERPL-CCNC: 37 U/L (ref 10–40)
BACTERIA #/AREA URNS HPF: ABNORMAL /HPF
BASOPHILS # BLD AUTO: 0 K/UL (ref 0–0.2)
BASOPHILS NFR BLD: 0 % (ref 0–1.9)
BILIRUB SERPL-MCNC: 4.2 MG/DL (ref 0.1–1)
BILIRUB UR QL STRIP: NEGATIVE
BLD GP AB SCN CELLS X3 SERPL QL: NORMAL
BUN SERPL-MCNC: 11 MG/DL (ref 6–20)
CALCIUM SERPL-MCNC: 8.6 MG/DL (ref 8.7–10.5)
CHLORIDE SERPL-SCNC: 104 MMOL/L (ref 95–110)
CLARITY UR: ABNORMAL
CO2 SERPL-SCNC: 26 MMOL/L (ref 23–29)
COLOR UR: YELLOW
CREAT SERPL-MCNC: 0.8 MG/DL (ref 0.5–1.4)
DIFFERENTIAL METHOD: ABNORMAL
EOSINOPHIL # BLD AUTO: 0 K/UL (ref 0–0.5)
EOSINOPHIL NFR BLD: 0 % (ref 0–8)
ERYTHROCYTE [DISTWIDTH] IN BLOOD BY AUTOMATED COUNT: 21.3 % (ref 11.5–14.5)
EST. GFR  (NO RACE VARIABLE): >60 ML/MIN/1.73 M^2
GLUCOSE SERPL-MCNC: 158 MG/DL (ref 70–110)
GLUCOSE UR QL STRIP: ABNORMAL
HCT VFR BLD AUTO: 21.2 % (ref 37–48.5)
HGB BLD-MCNC: 6.4 G/DL (ref 12–16)
HGB UR QL STRIP: ABNORMAL
IMM GRANULOCYTES # BLD AUTO: 0.02 K/UL (ref 0–0.04)
IMM GRANULOCYTES NFR BLD AUTO: 0.5 % (ref 0–0.5)
KETONES UR QL STRIP: NEGATIVE
LDH SERPL L TO P-CCNC: 857 U/L (ref 110–260)
LEUKOCYTE ESTERASE UR QL STRIP: NEGATIVE
LYMPHOCYTES # BLD AUTO: 0.6 K/UL (ref 1–4.8)
LYMPHOCYTES NFR BLD: 13.9 % (ref 18–48)
MAGNESIUM SERPL-MCNC: 1.7 MG/DL (ref 1.6–2.6)
MCH RBC QN AUTO: 20.6 PG (ref 27–31)
MCHC RBC AUTO-ENTMCNC: 30.2 G/DL (ref 32–36)
MCV RBC AUTO: 68 FL (ref 82–98)
MICROSCOPIC COMMENT: ABNORMAL
MONOCYTES # BLD AUTO: 0.1 K/UL (ref 0.3–1)
MONOCYTES NFR BLD: 2.2 % (ref 4–15)
NEUTROPHILS # BLD AUTO: 3.5 K/UL (ref 1.8–7.7)
NEUTROPHILS NFR BLD: 83.4 % (ref 38–73)
NITRITE UR QL STRIP: NEGATIVE
NRBC BLD-RTO: 0 /100 WBC
PH UR STRIP: 7 [PH] (ref 5–8)
PLATELET # BLD AUTO: 91 K/UL (ref 150–450)
PMV BLD AUTO: ABNORMAL FL (ref 9.2–12.9)
POTASSIUM SERPL-SCNC: 4.3 MMOL/L (ref 3.5–5.1)
PROT SERPL-MCNC: 7.2 G/DL (ref 6–8.4)
PROT UR QL STRIP: ABNORMAL
RBC # BLD AUTO: 3.11 M/UL (ref 4–5.4)
RBC #/AREA URNS HPF: 8 /HPF (ref 0–4)
SODIUM SERPL-SCNC: 137 MMOL/L (ref 136–145)
SP GR UR STRIP: 1.02 (ref 1–1.03)
SQUAMOUS #/AREA URNS HPF: 10 /HPF
URN SPEC COLLECT METH UR: ABNORMAL
UROBILINOGEN UR STRIP-ACNC: ABNORMAL EU/DL
WBC # BLD AUTO: 4.17 K/UL (ref 3.9–12.7)
WBC #/AREA URNS HPF: 12 /HPF (ref 0–5)

## 2023-09-22 PROCEDURE — 82955 ASSAY OF G6PD ENZYME: CPT | Performed by: NURSE PRACTITIONER

## 2023-09-22 PROCEDURE — 99223 1ST HOSP IP/OBS HIGH 75: CPT | Mod: ,,, | Performed by: INTERNAL MEDICINE

## 2023-09-22 PROCEDURE — 96376 TX/PRO/DX INJ SAME DRUG ADON: CPT

## 2023-09-22 PROCEDURE — 80074 ACUTE HEPATITIS PANEL: CPT | Performed by: NURSE PRACTITIONER

## 2023-09-22 PROCEDURE — 88185 FLOWCYTOMETRY/TC ADD-ON: CPT | Performed by: NURSE PRACTITIONER

## 2023-09-22 PROCEDURE — 25000003 PHARM REV CODE 250: Performed by: NURSE PRACTITIONER

## 2023-09-22 PROCEDURE — 81000 URINALYSIS NONAUTO W/SCOPE: CPT | Performed by: NURSE PRACTITIONER

## 2023-09-22 PROCEDURE — 83615 LACTATE (LD) (LDH) ENZYME: CPT | Performed by: NURSE PRACTITIONER

## 2023-09-22 PROCEDURE — 36415 COLL VENOUS BLD VENIPUNCTURE: CPT | Performed by: NURSE PRACTITIONER

## 2023-09-22 PROCEDURE — 85025 COMPLETE CBC W/AUTO DIFF WBC: CPT | Performed by: NURSE PRACTITIONER

## 2023-09-22 PROCEDURE — 63600175 PHARM REV CODE 636 W HCPCS: Performed by: NURSE PRACTITIONER

## 2023-09-22 PROCEDURE — 83735 ASSAY OF MAGNESIUM: CPT | Performed by: NURSE PRACTITIONER

## 2023-09-22 PROCEDURE — 87389 HIV-1 AG W/HIV-1&-2 AB AG IA: CPT | Performed by: NURSE PRACTITIONER

## 2023-09-22 PROCEDURE — 86850 RBC ANTIBODY SCREEN: CPT | Performed by: HOSPITALIST

## 2023-09-22 PROCEDURE — 11000001 HC ACUTE MED/SURG PRIVATE ROOM

## 2023-09-22 PROCEDURE — 80053 COMPREHEN METABOLIC PANEL: CPT | Performed by: NURSE PRACTITIONER

## 2023-09-22 PROCEDURE — 99223 PR INITIAL HOSPITAL CARE,LEVL III: ICD-10-PCS | Mod: ,,, | Performed by: INTERNAL MEDICINE

## 2023-09-22 PROCEDURE — 36415 COLL VENOUS BLD VENIPUNCTURE: CPT | Performed by: HOSPITALIST

## 2023-09-22 RX ORDER — PREDNISONE 20 MG/1
60 TABLET ORAL DAILY
Status: DISCONTINUED | OUTPATIENT
Start: 2023-09-23 | End: 2023-09-23

## 2023-09-22 RX ADMIN — DOCUSATE SODIUM AND SENNOSIDES 1 TABLET: 8.6; 5 TABLET, FILM COATED ORAL at 08:09

## 2023-09-22 RX ADMIN — METHYLPREDNISOLONE SODIUM SUCCINATE 125 MG: 40 INJECTION, POWDER, FOR SOLUTION INTRAMUSCULAR; INTRAVENOUS at 02:09

## 2023-09-22 RX ADMIN — ALUMINUM HYDROXIDE, MAGNESIUM HYDROXIDE, AND SIMETHICONE 30 ML: 200; 200; 20 SUSPENSION ORAL at 11:09

## 2023-09-22 RX ADMIN — METHYLPREDNISOLONE SODIUM SUCCINATE 125 MG: 40 INJECTION, POWDER, FOR SOLUTION INTRAMUSCULAR; INTRAVENOUS at 06:09

## 2023-09-22 RX ADMIN — FOLIC ACID 1 MG: 1 TABLET ORAL at 08:09

## 2023-09-22 RX ADMIN — SODIUM CHLORIDE, POTASSIUM CHLORIDE, SODIUM LACTATE AND CALCIUM CHLORIDE: 600; 310; 30; 20 INJECTION, SOLUTION INTRAVENOUS at 06:09

## 2023-09-22 RX ADMIN — DOXYCYCLINE HYCLATE 100 MG: 100 TABLET, COATED ORAL at 08:09

## 2023-09-22 RX ADMIN — MELATONIN TAB 3 MG 6 MG: 3 TAB at 08:09

## 2023-09-22 RX ADMIN — PANTOPRAZOLE SODIUM 40 MG: 40 TABLET, DELAYED RELEASE ORAL at 08:09

## 2023-09-22 RX ADMIN — ONDANSETRON 4 MG: 2 INJECTION INTRAMUSCULAR; INTRAVENOUS at 04:09

## 2023-09-22 RX ADMIN — FERROUS SULFATE TAB 325 MG (65 MG ELEMENTAL FE) 1 EACH: 325 (65 FE) TAB at 08:09

## 2023-09-22 RX ADMIN — ALUMINUM HYDROXIDE, MAGNESIUM HYDROXIDE, AND SIMETHICONE 30 ML: 200; 200; 20 SUSPENSION ORAL at 04:09

## 2023-09-22 NOTE — NURSING
Notified Nancy Kramer NP of patient and situation, ISBAR provided, explained to provider that patient's HNH is 6.4+21.5 from ED labs. Provider voiced understanding, no new orders or transfusion orders obtained due to waiting hematology consult for possible transfusion orders tomorrow. Awaiting consult.

## 2023-09-22 NOTE — PROGRESS NOTES
"[facility] Medicine  Progress Note    Patient Name: Giovanni Pena  MRN: [id number]  Patient Class: OP- Observation   Admission Date: 9/21/2023  Length of Stay: 0 days  Attending Physician: Ally Harrington MD  Primary Care Provider: Miquel Mishra MD        Subjective:     Principal Problem:Jaundice        HPI:  Ms. 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 It is moderate. It is associated with vomiting bilious fluid, dark urine, and LLQ abd pain. She denies fever, chills, chest pain, diarrhea, dizziness, palpitations, weakness, or LOC. She noticed the dark urine yesterday and the jaundice in there eyes this morning. She had a blood transfusion on 9/8/23 at an outside facility for acute blood loss anemia for menorrhagia associated with uterine fibroids. She just completed another menstrual cycle that lasted for about 10 days going through roughly 10 pads per day. In the ED, hgb 6.4 and Hct 21.5, Tbili 7, AST 60, ALT 20, Lipase WNL and alk phos WNL. CT abd/pelvis: "Interval development of splenomegaly and multiple peripheral hypodensities in the spleen suspicious for splenic infarct.  New compared to the prior study 03/28/2022 but otherwise indeterminate in age.  No fat stranding or fluid collections around the spleen to suggest this is acute." Dr. Gibbons was consulted per ED MD and agreed to consult on patient. Hospital medicine is consulted for admission.       Overview/Hospital Course:  No notes on file    Interval History:  Patient seen and examined.  Admitted for new onset jaundice, splenomegaly.  Hematology following.  On IV Solu-Medrol.  Patient reports abdominal pain and nausea but no vomiting.  States Zofran is not working.  We will change to Phenergan.    Review of Systems "   Constitutional:  Negative for activity change, appetite change, chills, diaphoresis, fatigue, fever and unexpected weight change.   HENT:  Negative for congestion, ear pain, facial swelling, hearing loss, sore throat and trouble swallowing.    Eyes:  Negative for pain and discharge.        Scleral icterus   Respiratory:  Negative for cough, chest tightness, shortness of breath and wheezing.    Cardiovascular:  Negative for chest pain, palpitations and leg swelling.   Gastrointestinal:  Positive for abdominal pain and nausea. Negative for blood in stool, diarrhea and vomiting.   Endocrine: Negative for polydipsia, polyphagia and polyuria.   Genitourinary:  Negative for difficulty urinating, dysuria, flank pain, frequency and urgency.   Musculoskeletal:  Negative for arthralgias, back pain, joint swelling, neck pain and neck stiffness.   Skin:  Positive for color change. Negative for rash and wound.   Allergic/Immunologic: Negative for environmental allergies and immunocompromised state.   Neurological:  Negative for dizziness, seizures, syncope, speech difficulty, weakness, light-headedness, numbness and headaches.   Hematological:  Negative for adenopathy.   Psychiatric/Behavioral:  Negative for sleep disturbance and suicidal ideas. The patient is not nervous/anxious.    All other systems reviewed and are negative.    Objective:     Vital Signs (Most Recent):  Temp: 98 °F (36.7 °C) (09/22/23 1151)  Pulse: 80 (09/22/23 1151)  Resp: 19 (09/22/23 1151)  BP: 110/62 (09/22/23 1151)  SpO2: 99 % (09/22/23 1151) Vital Signs (24h Range):  Temp:  [97.7 °F (36.5 °C)-98.4 °F (36.9 °C)] 98 °F (36.7 °C)  Pulse:  [69-93] 80  Resp:  [16-20] 19  SpO2:  [98 %-100 %] 99 %  BP: ()/(52-68) 110/62     Weight: 77.1 kg (170 lb)  Body mass index is 26.63 kg/m².    Intake/Output Summary (Last 24 hours) at 9/22/2023 1158  Last data filed at 9/22/2023 0630  Gross per 24 hour   Intake 1240.47 ml   Output --   Net 1240.47 ml          Physical Exam  Vitals and nursing note reviewed.   Constitutional:       Appearance: Normal appearance.   HENT:      Head: Normocephalic and atraumatic.      Nose: Nose normal.      Mouth/Throat:      Mouth: Mucous membranes are moist.      Pharynx: Oropharynx is clear.   Eyes:      General: Scleral icterus present.      Extraocular Movements: Extraocular movements intact.      Pupils: Pupils are equal, round, and reactive to light.   Cardiovascular:      Rate and Rhythm: Normal rate and regular rhythm.      Pulses: Normal pulses.      Heart sounds: Normal heart sounds.   Pulmonary:      Effort: Pulmonary effort is normal.      Breath sounds: Normal breath sounds.   Abdominal:      General: Bowel sounds are normal.      Palpations: Abdomen is soft.      Tenderness: There is abdominal tenderness.      Comments: Palpable spleen   Musculoskeletal:         General: Normal range of motion.      Cervical back: Normal range of motion and neck supple.   Skin:     General: Skin is warm and dry.      Capillary Refill: Capillary refill takes less than 2 seconds.   Neurological:      General: No focal deficit present.      Mental Status: She is alert and oriented to person, place, and time.   Psychiatric:         Mood and Affect: Mood normal.         Behavior: Behavior normal.             Significant Labs: All pertinent labs within the past 24 hours have been reviewed.    Significant Imaging: I have reviewed all pertinent imaging results/findings within the past 24 hours.      Assessment/Plan:      * Jaundice  Concern for delayed hemolytic transfusion reaction   Hematology following  IV solumedrol 125mg q8h ordered  Direct ashley is negative   IV hydration   No CBD dilatation on CT   Trend CMP      Hypokalemia  Replete per electrolyte SS         Microcytic hypochromic anemia  Chronic d/t menorrhagia, but now complicated by jaundice  T&S   Will defer to hematology for transfusion recommendations  Daily CBC      Splenic  infarct  Does not appear acute per radiologist report         History of Portal vein thrombosis  Had hypercoag work up in 2021/2022 with elevated factor VIII assay  Had repeat imaging of portal vein with resolution and was taken off of eliquis after 3 months per PCP's note (wrote off of eliquis on 3/29/22, but also wrote 6 months of therapy?)      Rheumatoid arthritis  aware        VTE Risk Mitigation (From admission, onward)         Ordered     IP VTE HIGH RISK PATIENT  Once         09/21/23 1914     Place sequential compression device  Until discontinued         09/21/23 1914                Discharge Planning   ZACHARY:      Code Status: Full Code   Is the patient medically ready for discharge?:     Reason for patient still in hospital (select all that apply): Patient trending condition and Treatment  Discharge Plan A: Home                  Ally Harrington MD  Department of Hospital Medicine   [facility]/Surg

## 2023-09-22 NOTE — H&P
"Formerly [facility], [facility] Medicine  History & Physical    Patient Name: Giovanni Pena  MRN: [id number]  Patient Class: OP- Observation  Admission Date: 9/21/2023  Attending Physician: Dr. Harrington  Primary Care Provider: Miquel Mishra MD         Patient information was obtained from patient, relative(s), past medical records and ER records.     Subjective:     Principal Problem:Jaundice    Chief Complaint:   Chief Complaint   Patient presents with    Jaundice     Pt noticed her eyes were yellow this morning, brown colored urine, vomiting yellow and brown fluids.  Pt also having ABD pain.        HPI: Ms. 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 It is moderate. It is associated with vomiting bilious fluid, dark urine, and LLQ abd pain. She denies fever, chills, chest pain, diarrhea, dizziness, palpitations, weakness, or LOC. She noticed the dark urine yesterday and the jaundice in there eyes this morning. She had a blood transfusion on 9/8/23 at an outside facility for acute blood loss anemia for menorrhagia associated with uterine fibroids. She just completed another menstrual cycle that lasted for about 10 days going through roughly 10 pads per day. In the ED, hgb 6.4 and Hct 21.5, Tbili 7, AST 60, ALT 20, Lipase WNL and alk phos WNL. CT abd/pelvis: "Interval development of splenomegaly and multiple peripheral hypodensities in the spleen suspicious for splenic infarct.  New compared to the prior study 03/28/2022 but otherwise indeterminate in age.  No fat stranding or fluid collections around the spleen to suggest this is acute." Dr. Gibbons was consulted per ED MD and agreed to consult on patient. Hospital medicine is consulted for admission.       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     RA (rheumatoid arthritis)        Past Surgical History:   Procedure Laterality Date     SECTION  , , , 2014    CHOLECYSTECTOMY  2014    SLEEVE GASTROPLASTY  2021    in [place]    SURGICAL REMOVAL OF BUNION WITH OSTEOTOMY OF METATARSAL BONE Left 2022    Procedure: BUNIONECTOMY, WITH METATARSAL OSTEOTOMY  ;  Surgeon: Roshan Pyle DPM;  Location: [facility];  Service: Podiatry;  Laterality: Left;  distal osteotomy left 1st metatarsal    TUBAL LIGATION         Review of patient's allergies indicates:   Allergen Reactions    Penicillin Hives       No current facility-administered medications on file prior to encounter.     Current Outpatient Medications on File Prior to Encounter   Medication Sig    doxycycline (VIBRAMYCIN) 100 MG Cap Take 100 mg by mouth 2 (two) times daily.    ferrous sulfate (FEOSOL) 325 mg (65 mg iron) Tab tablet Take 1 tablet by mouth every other day.    ketoconazole (NIZORAL) 2 % shampoo Apply 1 application  topically every 7 days.    omeprazole (PRILOSEC) 40 MG capsule TAKE 1 CAPSULE BY MOUTH EVERY DAY (Patient taking differently: Take 40 mg by mouth Daily.)    meloxicam (MOBIC) 15 MG tablet Take 1 tablet (15 mg total) by mouth once daily.    norethindrone (AYGESTIN) 5 mg Tab Take 1 tablet by mouth 2 (two) times a day.     Family History       Problem Relation (Age of Onset)    Breast cancer Maternal Aunt    Coronary artery disease Maternal Grandmother    Hypertension Maternal Grandmother          Tobacco Use    Smoking status: Never    Smokeless tobacco: Never   Substance and Sexual Activity    Alcohol use: Yes     Comment: rare    Drug use: Never    Sexual activity: Yes     Partners: Male     Review of Systems   Constitutional:  Negative for activity change, appetite change, chills, diaphoresis, fatigue, fever and unexpected weight change.   HENT:  Negative for congestion, ear pain, facial swelling, hearing  loss, sore throat and trouble swallowing.    Eyes:  Negative for pain and discharge.        Scleral icterus   Respiratory:  Negative for cough, chest tightness, shortness of breath and wheezing.    Cardiovascular:  Negative for chest pain, palpitations and leg swelling.   Gastrointestinal:  Positive for abdominal pain, nausea and vomiting. Negative for blood in stool and diarrhea.   Endocrine: Negative for polydipsia, polyphagia and polyuria.   Genitourinary:  Negative for difficulty urinating, dysuria, flank pain, frequency and urgency.   Musculoskeletal:  Negative for arthralgias, back pain, joint swelling, neck pain and neck stiffness.   Skin:  Positive for color change. Negative for rash and wound.   Allergic/Immunologic: Negative for environmental allergies and immunocompromised state.   Neurological:  Negative for dizziness, seizures, syncope, speech difficulty, weakness, light-headedness, numbness and headaches.   Hematological:  Negative for adenopathy.   Psychiatric/Behavioral:  Negative for sleep disturbance and suicidal ideas. The patient is not nervous/anxious.    All other systems reviewed and are negative.    Objective:     Vital Signs (Most Recent):  Temp: 98.4 °F (36.9 °C) (09/21/23 2124)  Pulse: 81 (09/21/23 2124)  Resp: 20 (09/21/23 2124)  BP: (!) 112/55 (09/21/23 2124)  SpO2: 98 % (09/21/23 2124) Vital Signs (24h Range):  Temp:  [98.3 °F (36.8 °C)-98.4 °F (36.9 °C)] 98.4 °F (36.9 °C)  Pulse:  [81-93] 81  Resp:  [16-20] 20  SpO2:  [98 %] 98 %  BP: ()/(52-55) 112/55     Weight: 77.1 kg (170 lb)  Body mass index is 26.63 kg/m².     Physical Exam  Vitals and nursing note reviewed.   Constitutional:       Appearance: Normal appearance.   HENT:      Head: Normocephalic and atraumatic.      Nose: Nose normal.      Mouth/Throat:      Mouth: Mucous membranes are moist.      Pharynx: Oropharynx is clear.   Eyes:      General: Scleral icterus present.      Extraocular Movements: Extraocular movements  intact.      Pupils: Pupils are equal, round, and reactive to light.   Cardiovascular:      Rate and Rhythm: Normal rate and regular rhythm.      Pulses: Normal pulses.      Heart sounds: Normal heart sounds.   Pulmonary:      Effort: Pulmonary effort is normal.      Breath sounds: Normal breath sounds.   Abdominal:      General: Bowel sounds are normal.      Palpations: Abdomen is soft.      Tenderness: There is abdominal tenderness.      Comments: LLQ tenderness   Musculoskeletal:         General: Normal range of motion.      Cervical back: Normal range of motion and neck supple.   Skin:     General: Skin is warm and dry.      Capillary Refill: Capillary refill takes less than 2 seconds.   Neurological:      General: No focal deficit present.      Mental Status: She is alert and oriented to person, place, and time.   Psychiatric:         Mood and Affect: Mood normal.         Behavior: Behavior normal.              CRANIAL NERVES     CN III, IV, VI   Pupils are equal, round, and reactive to light.       Significant Labs: All pertinent labs within the past 24 hours have been reviewed.  CBC:   Recent Labs   Lab 09/21/23  1500 09/21/23  1608   WBC 4.87 4.22   HGB 6.7* 6.4*   HCT 22.2* 21.5*   * 101*     CMP:   Recent Labs   Lab 09/21/23  1500      K 3.3*      CO2 24   GLU 94   BUN 8   CREATININE 0.8   CALCIUM 8.7   PROT 7.6   ALBUMIN 3.3*   BILITOT 7.0*   ALKPHOS 97   AST 60*   ALT 20   ANIONGAP 10     Lipase:   Recent Labs   Lab 09/21/23  1500   LIPASE 18         Significant Imaging: I have reviewed all pertinent imaging results/findings within the past 24 hours.    CT Abdomen Pelvis With Contrast    Result Date: 9/21/2023  EXAMINATION: CT ABDOMEN PELVIS WITH CONTRAST CLINICAL HISTORY: Abdominal pain, acute, nonlocalized;evalute hemolysis liver and splenomegaly; TECHNIQUE: Low dose axial images, sagittal and coronal reformations were obtained from the lung bases to the pubic symphysis following the  IV administration of 75 mL of Omnipaque 350 no p.o. contrast COMPARISON: 03/28/2022 FINDINGS: Postoperative changes of gastric reduction surgery and hiatal hernia or similar hernia possibly paraesophageal. Liver; hypodensity near the falciform ligament suggesting focal fatty change.  Similar in appearance to the prior study.  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 Cholecystectomy clips.  No biliary duct dilatation Spleen enlarged measuring 15 cm, increased compared to the prior exam where it measured 12 mm.  A few peripheral hypodensities in the spleen, 1 wedge-shaped measuring 2.3 cm axial series 2 image 29 suggesting splenic infarct.  Another measures 15 mm also peripheral series 2, image 47 and a few much smaller ill-defined areas of low density seen peripherally for example series 2, image 18.  Findings nonspecific, new compared to the prior exam.  Peripheral location questions infarct. Appendix is not identified with certainty but no abnormal appendix inflammatory changes appendiceal region is seen.  Mild nonspecific dilatation of fluid-filled bowel in the left side of the abdomen.  No free intraperitoneal air.  Small amount of free fluid the pelvis although nonspecific is not more so than can be seen on a physiologic basis. Reproductive organs; multiple uterine masses suggesting uterine fibroids.  Tubal ligation structures anterior in the pelvis bilaterally and another or similar metallic density posterior in the right side of the pelvis, not significantly changed. Adrenal glands unremarkable appearance Pancreas unremarkable appearance Abdominal aorta no aneurysm Kidneys, ureters, bladder; symmetrical renal enhancement.  No hydronephrosis.  Urinary bladder mildly distended at time of the exam and as visualized unremarkable appearance     Interval development of splenomegaly and multiple peripheral hypodensities in the spleen suspicious for splenic infarct.  New  compared to the prior study 03/28/2022 but otherwise indeterminate in age.  No fat stranding or fluid collections around the spleen to suggest this is acute. Postoperative changes of gastric reduction surgery, hiatal hernia.  Cholecystectomy. Uterine fibroids.  Small amount of free fluid the pelvis not more so than can be seen on a physiologic basis Findings detailed above findings of cavernous transformation of the portal vein corresponding as was also seen on the prior exam.  Nonspecific mild dilatation of small bowel in the left side of the abdomen. Electronically signed by: Lexi Starr MD Date:    09/21/2023 Time:    18:59      Assessment/Plan:     * Jaundice  Admit to med/surg  Concern for delayed hemolytic transfusion reaction   Hematology consulted per ED MD, appreciate recs  IV solumedrol 125mg q8h ordered  Direct ashley is negative   IV hydration   No CBD dilatation on CT   Trend CMP      Hypokalemia  Replete per electrolyte SS   Repeat chemistries in AM      Microcytic hypochromic anemia  Chronic d/t menorrhagia, but now complicated by jaundice  T&S   Will defer to hematology for transfusion recommendations  Daily CBC      Splenic infarct  Does not appear acute per radiologist report         History of Portal vein thrombosis  Had hypercoag work up in 2021/2022 with elevated factor VIII assay  Had repeat imaging of portal vein with resolution and was taken off of eliquis after 3 months per PCP's note (wrote off of eliquis on 3/29/22, but also wrote 6 months of therapy?)      Rheumatoid arthritis  aware        VTE Risk Mitigation (From admission, onward)         Ordered     IP VTE HIGH RISK PATIENT  Once         09/21/23 1914     Place sequential compression device  Until discontinued         09/21/23 1914                           Nori Keith NP  Department of Hospital Medicine  [facility] Med/Surg

## 2023-09-22 NOTE — PLAN OF CARE
[facility] - Med/Surg  Initial Discharge Assessment       Primary Care Provider: Miquel Mishra MD    Admission Diagnosis: Jaundice [R17]    Admission Date: 9/21/2023  Expected Discharge Date:     Pt confirmed home address and denied HH and DME. Pt confirmed PCP , insurance and pharmacy as [facility] [address]. Pts Mother Ruchi Lenz [phone] will provide transport home. CM following for additional needs.     Transition of Care Barriers: None    Payor: MEDICAID / Plan: [facility] / Product Type: Managed Medicaid /     Extended Emergency Contact Information  Primary Emergency Contact: Ruchi Pleitez  Mobile Phone: [phone]  Relation: Mother  Preferred language: English   needed? No    Discharge Plan A: Home  Discharge Plan B: Home with family      [id number] [place] - [address]  [address] [place] [address]  Phone: [phone] Fax: [phone]    [id number] [place] - [address]  [address] [place] [address]  Phone: [phone] Fax: [phone]      Initial Assessment (most recent)       Adult Discharge Assessment - 09/22/23 0931          Discharge Assessment    Assessment Type Discharge Planning Assessment     Confirmed/corrected address, phone number and insurance Yes     Confirmed Demographics Correct on Facesheet     Source of Information patient     Does patient/caregiver understand observation status Yes     Communicated ZACHARY with patient/caregiver Yes     Reason For Admission Jaundice     People in Home child(ponce), dependent     Facility Arrived From: Home     Do you expect to return to your current living situation? Yes     Do you have help at home or someone to help you manage your care at home? Yes     Who are your caregiver(s) and their phone number(s)? Mother Ruchi Lenz [phone]     Prior to hospitilization cognitive status: Alert/Oriented     Current cognitive status: Alert/Oriented      Home Layout Able to live on 1st floor     Equipment Currently Used at Home none     Readmission within 30 days? No     Patient currently being followed by outpatient case management? No     Do you currently have service(s) that help you manage your care at home? No     Do you take prescription medications? Yes     Do you have prescription coverage? Yes     Do you have any problems affording any of your prescribed medications? No     Is the patient taking medications as prescribed? yes     Who is going to help you get home at discharge? Mother Ruchi Lenz [phone]     How do you get to doctors appointments? car, drives self;family or friend will provide     Are you on dialysis? No     Do you take coumadin? No     DME Needed Upon Discharge  none     Discharge Plan discussed with: Patient     Transition of Care Barriers None     Discharge Plan A Home     Discharge Plan B Home with family        Physical Activity    On average, how many days per week do you engage in moderate to strenuous exercise (like a brisk walk)? 1 day     On average, how many minutes do you engage in exercise at this level? 10 min        Financial Resource Strain    How hard is it for you to pay for the very basics like food, housing, medical care, and heating? Not very hard        Housing Stability    In the last 12 months, was there a time when you were not able to pay the mortgage or rent on time? No     In the last 12 months, was there a time when you did not have a steady place to sleep or slept in a shelter (including now)? No        Transportation Needs    In the past 12 months, has lack of transportation kept you from medical appointments or from getting medications? No     In the past 12 months, has lack of transportation kept you from meetings, work, or from getting things needed for daily living? No        Food Insecurity    Within the past 12 months, you worried that your food would run out before you got the money to buy more.  Never true     Within the past 12 months, the food you bought just didn't last and you didn't have money to get more. Never true        Stress    Do you feel stress - tense, restless, nervous, or anxious, or unable to sleep at night because your mind is troubled all the time - these days? Only a little        Social Connections    In a typical week, how many times do you talk on the phone with family, friends, or neighbors? Twice a week     How often do you get together with friends or relatives? Twice a week     How often do you attend Restorationist or Buddhism services? 1 to 4 times per year     Do you belong to any clubs or organizations such as Restorationist groups, unions, fraternal or athletic groups, or school groups? No     How often do you attend meetings of the clubs or organizations you belong to? Never     Are you , , , , never , or living with a partner? Patient refused        Alcohol Use    Q1: How often do you have a drink containing alcohol? Never     Q2: How many drinks containing alcohol do you have on a typical day when you are drinking? Patient does not drink     Q3: How often do you have six or more drinks on one occasion? Never

## 2023-09-22 NOTE — ASSESSMENT & PLAN NOTE
Chronic d/t menorrhagia, but now complicated by jaundice  T&S   Will defer to hematology for transfusion recommendations  Daily CBC

## 2023-09-22 NOTE — NURSING
Notified Nancy Kramer NP that patient's HNH this morning is 6.4+21.2. Provider voiced understanding, no new orders obtained.

## 2023-09-22 NOTE — NURSING
Nurses Note -- 4 Eyes      9/22/2023   1:38 AM      Skin assessed during: Admit      [x] No Altered Skin Integrity Present    [x]Prevention Measures Documented      [] Yes- Altered Skin Integrity Present or Discovered   [] LDA Added if Not in Epic (Describe Wound)   [] New Altered Skin Integrity was Present on Admit and Documented in LDA   [] Wound Image Taken    Wound Care Consulted? No    Attending Nurse:  Rosalind Zepeda RN/Staff Member:  Alicia Coffey RN

## 2023-09-22 NOTE — SUBJECTIVE & OBJECTIVE
Past Medical History:   Diagnosis Date    Embolism and thrombosis of renal vein     RA (rheumatoid arthritis) 2017       Past Surgical History:   Procedure Laterality Date     SECTION  , , , 2014    CHOLECYSTECTOMY  2014    SLEEVE GASTROPLASTY  2021    in [place]    SURGICAL REMOVAL OF BUNION WITH OSTEOTOMY OF METATARSAL BONE Left 2022    Procedure: BUNIONECTOMY, WITH METATARSAL OSTEOTOMY  ;  Surgeon: Roshan Pyle DPM;  Location: [facility];  Service: Podiatry;  Laterality: Left;  distal osteotomy left 1st metatarsal    TUBAL LIGATION         Review of patient's allergies indicates:   Allergen Reactions    Penicillin Hives       No current facility-administered medications on file prior to encounter.     Current Outpatient Medications on File Prior to Encounter   Medication Sig    doxycycline (VIBRAMYCIN) 100 MG Cap Take 100 mg by mouth 2 (two) times daily.    ferrous sulfate (FEOSOL) 325 mg (65 mg iron) Tab tablet Take 1 tablet by mouth every other day.    ketoconazole (NIZORAL) 2 % shampoo Apply 1 application  topically every 7 days.    omeprazole (PRILOSEC) 40 MG capsule TAKE 1 CAPSULE BY MOUTH EVERY DAY (Patient taking differently: Take 40 mg by mouth Daily.)    meloxicam (MOBIC) 15 MG tablet Take 1 tablet (15 mg total) by mouth once daily.    norethindrone (AYGESTIN) 5 mg Tab Take 1 tablet by mouth 2 (two) times a day.     Family History       Problem Relation (Age of Onset)    Breast cancer Maternal Aunt    Coronary artery disease Maternal Grandmother    Hypertension Maternal Grandmother          Tobacco Use    Smoking status: Never    Smokeless tobacco: Never   Substance and Sexual Activity    Alcohol use: Yes     Comment: rare    Drug use: Never    Sexual activity: Yes     Partners: Male     Review of Systems   Constitutional:  Negative for activity change, appetite change, chills, diaphoresis, fatigue, fever and unexpected weight change.   HENT:  Negative for congestion,  ear pain, facial swelling, hearing loss, sore throat and trouble swallowing.    Eyes:  Negative for pain and discharge.        Scleral icterus   Respiratory:  Negative for cough, chest tightness, shortness of breath and wheezing.    Cardiovascular:  Negative for chest pain, palpitations and leg swelling.   Gastrointestinal:  Positive for abdominal pain, nausea and vomiting. Negative for blood in stool and diarrhea.   Endocrine: Negative for polydipsia, polyphagia and polyuria.   Genitourinary:  Negative for difficulty urinating, dysuria, flank pain, frequency and urgency.   Musculoskeletal:  Negative for arthralgias, back pain, joint swelling, neck pain and neck stiffness.   Skin:  Positive for color change. Negative for rash and wound.   Allergic/Immunologic: Negative for environmental allergies and immunocompromised state.   Neurological:  Negative for dizziness, seizures, syncope, speech difficulty, weakness, light-headedness, numbness and headaches.   Hematological:  Negative for adenopathy.   Psychiatric/Behavioral:  Negative for sleep disturbance and suicidal ideas. The patient is not nervous/anxious.    All other systems reviewed and are negative.    Objective:     Vital Signs (Most Recent):  Temp: 98.4 °F (36.9 °C) (09/21/23 2124)  Pulse: 81 (09/21/23 2124)  Resp: 20 (09/21/23 2124)  BP: (!) 112/55 (09/21/23 2124)  SpO2: 98 % (09/21/23 2124) Vital Signs (24h Range):  Temp:  [98.3 °F (36.8 °C)-98.4 °F (36.9 °C)] 98.4 °F (36.9 °C)  Pulse:  [81-93] 81  Resp:  [16-20] 20  SpO2:  [98 %] 98 %  BP: ()/(52-55) 112/55     Weight: 77.1 kg (170 lb)  Body mass index is 26.63 kg/m².     Physical Exam  Vitals and nursing note reviewed.   Constitutional:       Appearance: Normal appearance.   HENT:      Head: Normocephalic and atraumatic.      Nose: Nose normal.      Mouth/Throat:      Mouth: Mucous membranes are moist.      Pharynx: Oropharynx is clear.   Eyes:      General: Scleral icterus present.      Extraocular  Movements: Extraocular movements intact.      Pupils: Pupils are equal, round, and reactive to light.   Cardiovascular:      Rate and Rhythm: Normal rate and regular rhythm.      Pulses: Normal pulses.      Heart sounds: Normal heart sounds.   Pulmonary:      Effort: Pulmonary effort is normal.      Breath sounds: Normal breath sounds.   Abdominal:      General: Bowel sounds are normal.      Palpations: Abdomen is soft.      Tenderness: There is abdominal tenderness.      Comments: LLQ tenderness   Musculoskeletal:         General: Normal range of motion.      Cervical back: Normal range of motion and neck supple.   Skin:     General: Skin is warm and dry.      Capillary Refill: Capillary refill takes less than 2 seconds.   Neurological:      General: No focal deficit present.      Mental Status: She is alert and oriented to person, place, and time.   Psychiatric:         Mood and Affect: Mood normal.         Behavior: Behavior normal.              CRANIAL NERVES     CN III, IV, VI   Pupils are equal, round, and reactive to light.       Significant Labs: All pertinent labs within the past 24 hours have been reviewed.  CBC:   Recent Labs   Lab 09/21/23  1500 09/21/23  1608   WBC 4.87 4.22   HGB 6.7* 6.4*   HCT 22.2* 21.5*   * 101*     CMP:   Recent Labs   Lab 09/21/23  1500      K 3.3*      CO2 24   GLU 94   BUN 8   CREATININE 0.8   CALCIUM 8.7   PROT 7.6   ALBUMIN 3.3*   BILITOT 7.0*   ALKPHOS 97   AST 60*   ALT 20   ANIONGAP 10     Lipase:   Recent Labs   Lab 09/21/23  1500   LIPASE 18         Significant Imaging: I have reviewed all pertinent imaging results/findings within the past 24 hours.    CT Abdomen Pelvis With Contrast    Result Date: 9/21/2023  EXAMINATION: CT ABDOMEN PELVIS WITH CONTRAST CLINICAL HISTORY: Abdominal pain, acute, nonlocalized;evalute hemolysis liver and splenomegaly; TECHNIQUE: Low dose axial images, sagittal and coronal reformations were obtained from the lung bases to  the pubic symphysis following the IV administration of 75 mL of Omnipaque 350 no p.o. contrast COMPARISON: 03/28/2022 FINDINGS: Postoperative changes of gastric reduction surgery and hiatal hernia or similar hernia possibly paraesophageal. Liver; hypodensity near the falciform ligament suggesting focal fatty change.  Similar in appearance to the prior study.  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 Cholecystectomy clips.  No biliary duct dilatation Spleen enlarged measuring 15 cm, increased compared to the prior exam where it measured 12 mm.  A few peripheral hypodensities in the spleen, 1 wedge-shaped measuring 2.3 cm axial series 2 image 29 suggesting splenic infarct.  Another measures 15 mm also peripheral series 2, image 47 and a few much smaller ill-defined areas of low density seen peripherally for example series 2, image 18.  Findings nonspecific, new compared to the prior exam.  Peripheral location questions infarct. Appendix is not identified with certainty but no abnormal appendix inflammatory changes appendiceal region is seen.  Mild nonspecific dilatation of fluid-filled bowel in the left side of the abdomen.  No free intraperitoneal air.  Small amount of free fluid the pelvis although nonspecific is not more so than can be seen on a physiologic basis. Reproductive organs; multiple uterine masses suggesting uterine fibroids.  Tubal ligation structures anterior in the pelvis bilaterally and another or similar metallic density posterior in the right side of the pelvis, not significantly changed. Adrenal glands unremarkable appearance Pancreas unremarkable appearance Abdominal aorta no aneurysm Kidneys, ureters, bladder; symmetrical renal enhancement.  No hydronephrosis.  Urinary bladder mildly distended at time of the exam and as visualized unremarkable appearance     Interval development of splenomegaly and multiple peripheral hypodensities in the spleen  suspicious for splenic infarct.  New compared to the prior study 03/28/2022 but otherwise indeterminate in age.  No fat stranding or fluid collections around the spleen to suggest this is acute. Postoperative changes of gastric reduction surgery, hiatal hernia.  Cholecystectomy. Uterine fibroids.  Small amount of free fluid the pelvis not more so than can be seen on a physiologic basis Findings detailed above findings of cavernous transformation of the portal vein corresponding as was also seen on the prior exam.  Nonspecific mild dilatation of small bowel in the left side of the abdomen. Electronically signed by: Lexi Starr MD Date:    09/21/2023 Time:    18:59

## 2023-09-22 NOTE — SUBJECTIVE & OBJECTIVE
Interval History:  Patient seen and examined.  Admitted for new onset jaundice, splenomegaly.  Hematology following.  On IV Solu-Medrol.  Patient reports abdominal pain and nausea but no vomiting.  States Zofran is not working.  We will change to Phenergan.    Review of Systems   Constitutional:  Negative for activity change, appetite change, chills, diaphoresis, fatigue, fever and unexpected weight change.   HENT:  Negative for congestion, ear pain, facial swelling, hearing loss, sore throat and trouble swallowing.    Eyes:  Negative for pain and discharge.        Scleral icterus   Respiratory:  Negative for cough, chest tightness, shortness of breath and wheezing.    Cardiovascular:  Negative for chest pain, palpitations and leg swelling.   Gastrointestinal:  Positive for abdominal pain and nausea. Negative for blood in stool, diarrhea and vomiting.   Endocrine: Negative for polydipsia, polyphagia and polyuria.   Genitourinary:  Negative for difficulty urinating, dysuria, flank pain, frequency and urgency.   Musculoskeletal:  Negative for arthralgias, back pain, joint swelling, neck pain and neck stiffness.   Skin:  Positive for color change. Negative for rash and wound.   Allergic/Immunologic: Negative for environmental allergies and immunocompromised state.   Neurological:  Negative for dizziness, seizures, syncope, speech difficulty, weakness, light-headedness, numbness and headaches.   Hematological:  Negative for adenopathy.   Psychiatric/Behavioral:  Negative for sleep disturbance and suicidal ideas. The patient is not nervous/anxious.    All other systems reviewed and are negative.    Objective:     Vital Signs (Most Recent):  Temp: 98 °F (36.7 °C) (09/22/23 1151)  Pulse: 80 (09/22/23 1151)  Resp: 19 (09/22/23 1151)  BP: 110/62 (09/22/23 1151)  SpO2: 99 % (09/22/23 1151) Vital Signs (24h Range):  Temp:  [97.7 °F (36.5 °C)-98.4 °F (36.9 °C)] 98 °F (36.7 °C)  Pulse:  [69-93] 80  Resp:  [16-20] 19  SpO2:  [98  %-100 %] 99 %  BP: ()/(52-68) 110/62     Weight: 77.1 kg (170 lb)  Body mass index is 26.63 kg/m².    Intake/Output Summary (Last 24 hours) at 9/22/2023 1158  Last data filed at 9/22/2023 0630  Gross per 24 hour   Intake 1240.47 ml   Output --   Net 1240.47 ml         Physical Exam  Vitals and nursing note reviewed.   Constitutional:       Appearance: Normal appearance.   HENT:      Head: Normocephalic and atraumatic.      Nose: Nose normal.      Mouth/Throat:      Mouth: Mucous membranes are moist.      Pharynx: Oropharynx is clear.   Eyes:      General: Scleral icterus present.      Extraocular Movements: Extraocular movements intact.      Pupils: Pupils are equal, round, and reactive to light.   Cardiovascular:      Rate and Rhythm: Normal rate and regular rhythm.      Pulses: Normal pulses.      Heart sounds: Normal heart sounds.   Pulmonary:      Effort: Pulmonary effort is normal.      Breath sounds: Normal breath sounds.   Abdominal:      General: Bowel sounds are normal.      Palpations: Abdomen is soft.      Tenderness: There is abdominal tenderness.      Comments: Palpable spleen   Musculoskeletal:         General: Normal range of motion.      Cervical back: Normal range of motion and neck supple.   Skin:     General: Skin is warm and dry.      Capillary Refill: Capillary refill takes less than 2 seconds.   Neurological:      General: No focal deficit present.      Mental Status: She is alert and oriented to person, place, and time.   Psychiatric:         Mood and Affect: Mood normal.         Behavior: Behavior normal.             Significant Labs: All pertinent labs within the past 24 hours have been reviewed.    Significant Imaging: I have reviewed all pertinent imaging results/findings within the past 24 hours.

## 2023-09-22 NOTE — ASSESSMENT & PLAN NOTE
Admit to med/surg  Concern for delayed hemolytic transfusion reaction   Hematology consulted per ED MD, appreciate recs  IV solumedrol 125mg q8h ordered  Direct ashley is negative   IV hydration   No CBD dilatation on CT   Trend CMP

## 2023-09-22 NOTE — PLAN OF CARE
Problem: Adult Inpatient Plan of Care  Goal: Plan of Care Review  Outcome: Ongoing, Progressing  Goal: Patient-Specific Goal (Individualized)  Outcome: Ongoing, Progressing  Goal: Absence of Hospital-Acquired Illness or Injury  Outcome: Ongoing, Progressing  Goal: Optimal Comfort and Wellbeing  Outcome: Ongoing, Progressing  Goal: Readiness for Transition of Care  Outcome: Ongoing, Progressing     Problem: Anemia  Goal: Anemia Symptom Improvement  Outcome: Ongoing, Progressing     Problem: Electrolyte Imbalance  Goal: Electrolyte Balance  Outcome: Ongoing, Progressing     Problem: Infection  Goal: Absence of Infection Signs and Symptoms  Outcome: Ongoing, Progressing

## 2023-09-22 NOTE — HPI
"Ms. 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 It is moderate. It is associated with vomiting bilious fluid, dark urine, and LLQ abd pain. She denies fever, chills, chest pain, diarrhea, dizziness, palpitations, weakness, or LOC. She noticed the dark urine yesterday and the jaundice in there eyes this morning. She had a blood transfusion on 9/8/23 at an outside facility for acute blood loss anemia for menorrhagia associated with uterine fibroids. She just completed another menstrual cycle that lasted for about 10 days going through roughly 10 pads per day. In the ED, hgb 6.4 and Hct 21.5, Tbili 7, AST 60, ALT 20, Lipase WNL and alk phos WNL. CT abd/pelvis: "Interval development of splenomegaly and multiple peripheral hypodensities in the spleen suspicious for splenic infarct.  New compared to the prior study 03/28/2022 but otherwise indeterminate in age.  No fat stranding or fluid collections around the spleen to suggest this is acute." Dr. Gibbons was consulted per ED MD and agreed to consult on patient. Hospital medicine is consulted for admission.   "

## 2023-09-22 NOTE — HOSPITAL COURSE
Patient admitted to the hospital medicine service for jaundice.  Concern for delayed hemolytic transfusion reaction.  Had splenomegaly on exam.  Hematology was consulted.  Direct/indirect  Nehemiah test was negative.  She was started on IV Solu-Medrol and switched to oral prednisone. H/H continues to decrease. D/w hematology - hold transfusion for now, try IV iron x 3 days. A GI  workup not done as this looks to be a stronger GYN case of fibroids. No reported changes in bowels. She has follow up appt on 9/26/23 with GYN. Transfuse if hemoglobin drops further to 5 or less.     Complete IV iron therapy tomorrow and check CBC. Prepare for dc home with follow up GYN, hematology.   Discharge H/H 6.3/21.1, platelets 91k. No blood transfusion given. She will complete her doxycyline prescription given prior to admission.

## 2023-09-22 NOTE — ASSESSMENT & PLAN NOTE
Had hypercoag work up in 2021/2022 with elevated factor VIII assay  Had repeat imaging of portal vein with resolution and was taken off of eliquis after 3 months per PCP's note (wrote off of eliquis on 3/29/22, but also wrote 6 months of therapy?)

## 2023-09-22 NOTE — ASSESSMENT & PLAN NOTE
Concern for delayed hemolytic transfusion reaction   Hematology following  IV solumedrol 125mg q8h ordered  Direct ashley is negative   IV hydration   No CBD dilatation on CT   Trend CMP

## 2023-09-22 NOTE — CONSULTS
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 It is moderate. It is associated with vomiting bilious fluid, dark urine, and LLQ abd pain. She denies fever, chills, chest pain, diarrhea, dizziness, palpitations, weakness, or LOC. She noticed the dark urine yesterday and the jaundice in there eyes this morning. She had a blood transfusion on 23 at an outside facility for acute blood loss anemia for menorrhagia associated with uterine fibroids. She just completed another menstrual cycle that lasted for about 10 days going through roughly 10 pads per day. In the ED, hgb 6.4 and Hct 21.5, Tbili 7, AST 60, ALT 20, Lipase WNL and alk phos WNL. CT abd/pelvis: "Interval development of splenomegaly and multiple peripheral hypodensities in the spleen suspicious for splenic infarct.  New compared to the prior study 2022 but otherwise indeterminate in age.  No fat stranding or fluid collections around the spleen to suggest this is acute."     Hematology consult for above evaluation.    Past Medical History:   Diagnosis Date    Embolism and thrombosis of renal vein     RA (rheumatoid arthritis) 2017     Past Surgical History:   Procedure Laterality Date     SECTION  , , 2004, 2014    CHOLECYSTECTOMY  2014    SLEEVE GASTROPLASTY  2021    in [place]    SURGICAL REMOVAL OF BUNION WITH OSTEOTOMY OF METATARSAL BONE Left 2022    Procedure: BUNIONECTOMY, WITH METATARSAL OSTEOTOMY  ;  Surgeon: Roshan Pyle DPM;  Location: [facility];  Service: Podiatry;  Laterality: Left;  distal osteotomy left 1st metatarsal    TUBAL LIGATION       Social History     Socioeconomic History    Marital status:    Tobacco Use    Smoking status: Never    Smokeless tobacco: Never   Substance and Sexual Activity    Alcohol use: Yes    "  Comment: rare    Drug use: Never    Sexual activity: Yes     Partners: Male     Social Determinants of Health     Financial Resource Strain: Low Risk  (9/22/2023)    Overall Financial Resource Strain (CARDIA)     Difficulty of Paying Living Expenses: Not very hard   Food Insecurity: No Food Insecurity (9/22/2023)    Hunger Vital Sign     Worried About Running Out of Food in the Last Year: Never true     Ran Out of Food in the Last Year: Never true   Transportation Needs: No Transportation Needs (9/22/2023)    PRAPARE - Transportation     Lack of Transportation (Medical): No     Lack of Transportation (Non-Medical): No   Physical Activity: Insufficiently Active (9/22/2023)    Exercise Vital Sign     Days of Exercise per Week: 1 day     Minutes of Exercise per Session: 10 min   Stress: No Stress Concern Present (9/22/2023)    [employer] - Occupational Stress Questionnaire     Feeling of Stress : Only a little   Social Connections: Unknown (9/22/2023)    Social Connection and Isolation Panel [NHANES]     Frequency of Communication with Friends and Family: Twice a week     Frequency of Social Gatherings with Friends and Family: Twice a week     Attends Worship Services: 1 to 4 times per year     Active Member of Clubs or Organizations: No     Attends Club or Organization Meetings: Never     Marital Status: Patient refused   Housing Stability: Unknown (9/22/2023)    Housing Stability Vital Sign     Unable to Pay for Housing in the Last Year: No     Unstable Housing in the Last Year: No     Physical exam    Vitals:    09/22/23 0758   BP: 113/61   Pulse: 69   Resp: 19   Temp: 97.9 °F (36.6 °C)     Physical Exam  Vitals and nursing note reviewed.   Constitutional:       Appearance: Normal appearance.   HENT:      Head: Normocephalic and atraumatic.      Nose: Nose normal.      Mouth/Throat:      Mouth: Mucous membranes are moist.      Pharynx: Oropharynx is clear.   Eyes:      General:  Scleral icterus present.      Extraocular Movements: Extraocular movements intact.      Pupils: Pupils are equal, round, and reactive to light.   Cardiovascular:      Rate and Rhythm: Normal rate and regular rhythm.      Pulses: Normal pulses.      Heart sounds: Normal heart sounds.   Pulmonary:      Effort: Pulmonary effort is normal.      Breath sounds: Normal breath sounds.   Abdominal:      General: Bowel sounds are normal.      Palpations: Abdomen is soft.      Tenderness: There is abdominal tenderness.      Comments: LLQ tenderness   Musculoskeletal:         General: Normal range of motion.      Cervical back: Normal range of motion and neck supple.   Skin:     General: Skin is warm and dry.      Capillary Refill: Capillary refill takes less than 2 seconds.   Neurological:      General: No focal deficit present.      Mental Status: She is alert and oriented to person, place, and time.   Psychiatric:         Mood and Affect: Mood normal.         Behavior: Behavior normal.      CMP  Sodium   Date Value Ref Range Status   09/22/2023 137 136 - 145 mmol/L Final     Potassium   Date Value Ref Range Status   09/22/2023 4.3 3.5 - 5.1 mmol/L Final     Chloride   Date Value Ref Range Status   09/22/2023 104 95 - 110 mmol/L Final     CO2   Date Value Ref Range Status   09/22/2023 26 23 - 29 mmol/L Final     Glucose   Date Value Ref Range Status   09/22/2023 158 (H) 70 - 110 mg/dL Final     BUN   Date Value Ref Range Status   09/22/2023 11 6 - 20 mg/dL Final     Creatinine   Date Value Ref Range Status   09/22/2023 0.8 0.5 - 1.4 mg/dL Final     Calcium   Date Value Ref Range Status   09/22/2023 8.6 (L) 8.7 - 10.5 mg/dL Final     Total Protein   Date Value Ref Range Status   09/22/2023 7.2 6.0 - 8.4 g/dL Final     Albumin   Date Value Ref Range Status   09/22/2023 3.0 (L) 3.5 - 5.2 g/dL Final     Total Bilirubin   Date Value Ref Range Status   09/22/2023 4.2 (H) 0.1 - 1.0 mg/dL Final     Comment:     For infants and  newborns, interpretation of results should be based  on gestational age, weight and in agreement with clinical  observations.    Premature Infant recommended reference ranges:  Up to 24 hours.............<8.0 mg/dL  Up to 48 hours............<12.0 mg/dL  3-5 days..................<15.0 mg/dL  6-29 days.................<15.0 mg/dL       Alkaline Phosphatase   Date Value Ref Range Status   09/22/2023 90 55 - 135 U/L Final     AST   Date Value Ref Range Status   09/22/2023 37 10 - 40 U/L Final     ALT   Date Value Ref Range Status   09/22/2023 19 10 - 44 U/L Final     Anion Gap   Date Value Ref Range Status   09/22/2023 7 (L) 8 - 16 mmol/L Final     eGFR   Date Value Ref Range Status   09/22/2023 >60 >60 mL/min/1.73 m^2 Final     Lab Results   Component Value Date    WBC 4.17 09/22/2023    HGB 6.4 (LL) 09/22/2023    HCT 21.2 (L) 09/22/2023    MCV 68 (L) 09/22/2023    PLT 91 (L) 09/22/2023     Assessment and plan     Microcytic anemia MCV of 68 with Hb of 6.4g/dL  No evidence of schistocyte on peripheral blood smear.  Smear has sent to [facility] path for 2nd look.  Doubt TTP.    Thrombocytopenia consumptive I do not believe this is hit    Suspect some form of hemolysis - Elevated total bilirubin - jaundice.  LDH is elevated.  Haptoglobin pending.     Most recent RBC transfusion at [facility] between 1-2 weeks ago.  Reason for transfusion is anemia heavy menstrual cycle.  Patient reports that had a biopsy with endometriosis as of a cause    Ferritin 4.2 @ 9 days ago    Two weeks ago low low iron study indicates iron deficiency anemia    Total bilirubin  7.0 at the time of admission.  Improved over past 24 hours.    Direct bilirubin 0.4.  (indirect 6.6) which suggestive of hemolysis components    Direct Nehemiah study negative    PT PTT INR fibrinogen did not indicate DIC    HCG quant 09/09/2023 was less than 5.  Urine pregnancy on this hospitalization was negative    Recommend Solu-Medrol 120 mg IV q8h started in the emergency room.       Interval development of splenomegaly - multiple peripheral hypodensity in the spleen suspicious for splenic infract.  CT scan demonstrate multiple uterine mass suggest a uterine fibroids.    Prior history of thromboembolism portal vein thrombosis SMV and splenic vein thrombosis gastric sleeve.    > HIV (prior study negative)  > PNH  > indirect Nehemiah' study  > hepatitis panel  > UA  > G6PD study  > change Solu-Medrol to prednisone 60 mg daily  > follow LDH CBC  > daily folic acid 1mg  > Ok with oral iron for now  > hematology will follow

## 2023-09-23 LAB
ALBUMIN SERPL BCP-MCNC: 2.7 G/DL (ref 3.5–5.2)
ALP SERPL-CCNC: 69 U/L (ref 55–135)
ALT SERPL W/O P-5'-P-CCNC: 14 U/L (ref 10–44)
ANION GAP SERPL CALC-SCNC: 8 MMOL/L (ref 8–16)
AST SERPL-CCNC: 20 U/L (ref 10–40)
BASOPHILS # BLD AUTO: 0 K/UL (ref 0–0.2)
BASOPHILS NFR BLD: 0 % (ref 0–1.9)
BILIRUB SERPL-MCNC: 1.4 MG/DL (ref 0.1–1)
BUN SERPL-MCNC: 10 MG/DL (ref 6–20)
CALCIUM SERPL-MCNC: 8.3 MG/DL (ref 8.7–10.5)
CHLORIDE SERPL-SCNC: 107 MMOL/L (ref 95–110)
CO2 SERPL-SCNC: 26 MMOL/L (ref 23–29)
CREAT SERPL-MCNC: 0.7 MG/DL (ref 0.5–1.4)
DIFFERENTIAL METHOD: ABNORMAL
EOSINOPHIL # BLD AUTO: 0 K/UL (ref 0–0.5)
EOSINOPHIL NFR BLD: 0 % (ref 0–8)
ERYTHROCYTE [DISTWIDTH] IN BLOOD BY AUTOMATED COUNT: 22.3 % (ref 11.5–14.5)
EST. GFR  (NO RACE VARIABLE): >60 ML/MIN/1.73 M^2
GLUCOSE SERPL-MCNC: 101 MG/DL (ref 70–110)
HCT VFR BLD AUTO: 19.2 % (ref 37–48.5)
HGB BLD-MCNC: 5.6 G/DL (ref 12–16)
HIV 1+2 AB+HIV1 P24 AG SERPL QL IA: NEGATIVE
HYPOCHROMIA BLD QL SMEAR: ABNORMAL
IMM GRANULOCYTES # BLD AUTO: 0.02 K/UL (ref 0–0.04)
IMM GRANULOCYTES NFR BLD AUTO: 0.3 % (ref 0–0.5)
LYMPHOCYTES # BLD AUTO: 0.7 K/UL (ref 1–4.8)
LYMPHOCYTES NFR BLD: 11.9 % (ref 18–48)
MAGNESIUM SERPL-MCNC: 1.8 MG/DL (ref 1.6–2.6)
MAGNESIUM SERPL-MCNC: 2.1 MG/DL (ref 1.6–2.6)
MCH RBC QN AUTO: 20.7 PG (ref 27–31)
MCHC RBC AUTO-ENTMCNC: 29.2 G/DL (ref 32–36)
MCV RBC AUTO: 71 FL (ref 82–98)
MONOCYTES # BLD AUTO: 0.3 K/UL (ref 0.3–1)
MONOCYTES NFR BLD: 5.6 % (ref 4–15)
NEUTROPHILS # BLD AUTO: 5 K/UL (ref 1.8–7.7)
NEUTROPHILS NFR BLD: 82.2 % (ref 38–73)
NRBC BLD-RTO: 0 /100 WBC
PLATELET # BLD AUTO: 85 K/UL (ref 150–450)
PLATELET BLD QL SMEAR: ABNORMAL
PMV BLD AUTO: ABNORMAL FL (ref 9.2–12.9)
POTASSIUM SERPL-SCNC: 4 MMOL/L (ref 3.5–5.1)
PROT SERPL-MCNC: 6.2 G/DL (ref 6–8.4)
RBC # BLD AUTO: 2.7 M/UL (ref 4–5.4)
SODIUM SERPL-SCNC: 141 MMOL/L (ref 136–145)
WBC # BLD AUTO: 6.04 K/UL (ref 3.9–12.7)

## 2023-09-23 PROCEDURE — 80053 COMPREHEN METABOLIC PANEL: CPT | Performed by: NURSE PRACTITIONER

## 2023-09-23 PROCEDURE — 99233 PR SUBSEQUENT HOSPITAL CARE,LEVL III: ICD-10-PCS | Mod: ,,, | Performed by: INTERNAL MEDICINE

## 2023-09-23 PROCEDURE — 85025 COMPLETE CBC W/AUTO DIFF WBC: CPT | Performed by: NURSE PRACTITIONER

## 2023-09-23 PROCEDURE — 83735 ASSAY OF MAGNESIUM: CPT | Performed by: NURSE PRACTITIONER

## 2023-09-23 PROCEDURE — 25000003 PHARM REV CODE 250: Performed by: NURSE PRACTITIONER

## 2023-09-23 PROCEDURE — 99233 SBSQ HOSP IP/OBS HIGH 50: CPT | Mod: ,,, | Performed by: INTERNAL MEDICINE

## 2023-09-23 PROCEDURE — 63600175 PHARM REV CODE 636 W HCPCS: Performed by: NURSE PRACTITIONER

## 2023-09-23 PROCEDURE — 63600175 PHARM REV CODE 636 W HCPCS: Performed by: INTERNAL MEDICINE

## 2023-09-23 PROCEDURE — 36415 COLL VENOUS BLD VENIPUNCTURE: CPT | Performed by: NURSE PRACTITIONER

## 2023-09-23 PROCEDURE — 36415 COLL VENOUS BLD VENIPUNCTURE: CPT | Performed by: HOSPITALIST

## 2023-09-23 PROCEDURE — 11000001 HC ACUTE MED/SURG PRIVATE ROOM

## 2023-09-23 PROCEDURE — 83735 ASSAY OF MAGNESIUM: CPT | Mod: 91 | Performed by: HOSPITALIST

## 2023-09-23 RX ORDER — HYDROCODONE BITARTRATE AND ACETAMINOPHEN 500; 5 MG/1; MG/1
TABLET ORAL
Status: DISCONTINUED | OUTPATIENT
Start: 2023-09-23 | End: 2023-09-25 | Stop reason: HOSPADM

## 2023-09-23 RX ORDER — PREDNISONE 20 MG/1
40 TABLET ORAL DAILY
Status: DISCONTINUED | OUTPATIENT
Start: 2023-09-24 | End: 2023-09-25 | Stop reason: HOSPADM

## 2023-09-23 RX ORDER — ACETAMINOPHEN 325 MG/1
650 TABLET ORAL ONCE AS NEEDED
Status: DISCONTINUED | OUTPATIENT
Start: 2023-09-23 | End: 2023-09-25 | Stop reason: HOSPADM

## 2023-09-23 RX ORDER — DIPHENHYDRAMINE HYDROCHLORIDE 50 MG/ML
25 INJECTION INTRAMUSCULAR; INTRAVENOUS EVERY 6 HOURS PRN
Status: DISCONTINUED | OUTPATIENT
Start: 2023-09-23 | End: 2023-09-25 | Stop reason: HOSPADM

## 2023-09-23 RX ADMIN — FOLIC ACID 1 MG: 1 TABLET ORAL at 08:09

## 2023-09-23 RX ADMIN — DOXYCYCLINE HYCLATE 100 MG: 100 TABLET, COATED ORAL at 09:09

## 2023-09-23 RX ADMIN — Medication 800 MG: at 10:09

## 2023-09-23 RX ADMIN — PANTOPRAZOLE SODIUM 40 MG: 40 TABLET, DELAYED RELEASE ORAL at 08:09

## 2023-09-23 RX ADMIN — Medication 800 MG: at 06:09

## 2023-09-23 RX ADMIN — PREDNISONE 60 MG: 20 TABLET ORAL at 08:09

## 2023-09-23 RX ADMIN — IRON SUCROSE 100 MG: 20 INJECTION, SOLUTION INTRAVENOUS at 04:09

## 2023-09-23 RX ADMIN — FERROUS SULFATE TAB 325 MG (65 MG ELEMENTAL FE) 1 EACH: 325 (65 FE) TAB at 08:09

## 2023-09-23 RX ADMIN — DOXYCYCLINE HYCLATE 100 MG: 100 TABLET, COATED ORAL at 08:09

## 2023-09-23 RX ADMIN — SODIUM CHLORIDE, POTASSIUM CHLORIDE, SODIUM LACTATE AND CALCIUM CHLORIDE: 600; 310; 30; 20 INJECTION, SOLUTION INTRAVENOUS at 03:09

## 2023-09-23 NOTE — NURSING
Notified Nancy Kramer NP of patient and situation, ISBAR provided, explained to provider that patient has a critical hemoglobin+hematocrit this morning of 5.6+19.2. Provider voiced understanding and stated to hold off on administering blood to patient at this time. Awaiting lab results ordered from hematology consult, recs/plan, no blood to be administered at this time.

## 2023-09-23 NOTE — PROGRESS NOTES
[facility]/Surg  Hematology/Oncology  Progress Note    Patient Name: Giovanni Pena  Admission Date: 9/21/2023  Hospital Length of Stay: 1 days  Code Status: Full Code     Subjective:     Interval History: patient seen and examined. Does not appear anemic. Has a lot of energy. Denies any dizziness, sob, or fatigue.    Oncology Treatment Plan:   [No matching plan found]    Medications:  Continuous Infusions:  Scheduled Meds:   doxycycline  100 mg Oral BID    ferrous sulfate  1 tablet Oral Daily    folic acid  1 mg Oral Daily    IRON SUCROSE IV ORDERABLE  100 mg Intravenous Daily    pantoprazole  40 mg Oral Daily    [START ON 9/24/2023] predniSONE  40 mg Oral Daily    senna-docusate 8.6-50 mg  1 tablet Oral BID     PRN Meds:0.9%  NaCl infusion (for blood administration), 0.9%  NaCl infusion (for blood administration), acetaminophen, acetaminophen, aluminum-magnesium hydroxide-simethicone, dextrose 10%, dextrose 10%, diphenhydrAMINE, glucagon (human recombinant), glucose, glucose, HYDROcodone-acetaminophen, magnesium oxide, magnesium oxide, melatonin, naloxone, polyethylene glycol, potassium bicarbonate, potassium bicarbonate, potassium bicarbonate, promethazine (PHENERGAN) 12.5 mg in dextrose 5 % (D5W) 50 mL IVPB, sodium chloride 0.9%     Review of Systems   Constitutional: Negative.    HENT: Negative.     Eyes: Negative.    Respiratory: Negative.     Cardiovascular: Negative.    Gastrointestinal: Negative.    Endocrine: Negative.    Genitourinary: Negative.    Musculoskeletal: Negative.    Integumentary:  Negative.   Neurological: Negative.    Hematological: Negative.    Psychiatric/Behavioral: Negative.        Objective:     Vital Signs (Most Recent):  Temp: 98.5 °F (36.9 °C) (09/23/23 1541)  Pulse: 78 (09/23/23 1541)  Resp: 20 (09/23/23 1541)  BP: 107/62 (09/23/23 1541)  SpO2: 98 % (09/23/23 1541) Vital Signs (24h Range):  Temp:  [97.9 °F (36.6 °C)-98.6 °F (37 °C)] 98.5 °F (36.9 °C)  Pulse:   [67-78] 78  Resp:  [18-20] 20  SpO2:  [96 %-100 %] 98 %  BP: ()/(52-62) 107/62     Weight: 77.1 kg (170 lb)  Body mass index is 26.63 kg/m².  Body surface area is 1.91 meters squared.      Intake/Output Summary (Last 24 hours) at 9/23/2023 1604  Last data filed at 9/23/2023 0600  Gross per 24 hour   Intake 1600 ml   Output --   Net 1600 ml       Physical Exam  Constitutional:       Appearance: Normal appearance.   HENT:      Head: Normocephalic and atraumatic.      Nose: Nose normal.      Mouth/Throat:      Mouth: Mucous membranes are moist.      Pharynx: Oropharynx is clear.   Eyes:      Conjunctiva/sclera: Conjunctivae normal.   Cardiovascular:      Rate and Rhythm: Normal rate and regular rhythm.      Heart sounds: Normal heart sounds.   Pulmonary:      Effort: Pulmonary effort is normal.      Breath sounds: Normal breath sounds.   Abdominal:      General: Abdomen is flat. Bowel sounds are normal.      Palpations: Abdomen is soft.   Musculoskeletal:         General: Normal range of motion.      Cervical back: Normal range of motion and neck supple.   Skin:     General: Skin is warm and dry.   Neurological:      General: No focal deficit present.      Mental Status: She is alert and oriented to person, place, and time. Mental status is at baseline.   Psychiatric:         Mood and Affect: Mood normal.         Significant Labs:   CBC:   Recent Labs   Lab 09/21/23  1608 09/22/23  0529 09/23/23  0444   WBC 4.22 4.17 6.04   HGB 6.4* 6.4* 5.6*   HCT 21.5* 21.2* 19.2*   * 91* 85*    and CMP:   Recent Labs   Lab 09/22/23  0529 09/23/23  0444    141   K 4.3 4.0    107   CO2 26 26   * 101   BUN 11 10   CREATININE 0.8 0.7   CALCIUM 8.6* 8.3*   PROT 7.2 6.2   ALBUMIN 3.0* 2.7*   BILITOT 4.2* 1.4*   ALKPHOS 90 69   AST 37 20   ALT 19 14   ANIONGAP 7* 8       Diagnostic Results:  I have reviewed all pertinent imaging results/findings within the past 24 hours.    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  Her bilirubin had elevated, although that is now improved.  -she also has splenomegaly.  Will repeat ultrasound today to check for any change given her drop in hemoglobin.    -the drop in hemoglobin may be dilutional, so I will stop her fluids.  I will hold off on giving her any blood products as she is not symptomatic at the moment.    -I will give her IV iron with Venofer for 3 days starting at 100 mg today  -will continue to monitor hemoglobin daily.  If there is no improvement, patient may need bone marrow biopsy.    Thank you for your consult. I will follow-up with patient. Please contact us if you have any additional questions.     Aurash Khoobehi, MD  Hematology/Oncology  [facility]/Surg

## 2023-09-23 NOTE — PLAN OF CARE
Problem: Adult Inpatient Plan of Care  Goal: Patient-Specific Goal (Individualized)  Outcome: Ongoing, Progressing  Flowsheets (Taken 9/23/2023 5999)  Anxieties, Fears or Concerns: Do I need blood?  Individualized Care Needs: Safety, H/H level, Liver Function  Goal: Absence of Hospital-Acquired Illness or Injury  Outcome: Ongoing, Progressing  Goal: Optimal Comfort and Wellbeing  Outcome: Ongoing, Progressing  Goal: Readiness for Transition of Care  Outcome: Ongoing, Progressing     Problem: Anemia  Goal: Anemia Symptom Improvement  Outcome: Ongoing, Progressing     Problem: Electrolyte Imbalance  Goal: Electrolyte Balance  Outcome: Ongoing, Progressing

## 2023-09-24 LAB
ALBUMIN SERPL BCP-MCNC: 2.7 G/DL (ref 3.5–5.2)
ALP SERPL-CCNC: 77 U/L (ref 55–135)
ALT SERPL W/O P-5'-P-CCNC: 14 U/L (ref 10–44)
ANION GAP SERPL CALC-SCNC: 7 MMOL/L (ref 8–16)
AST SERPL-CCNC: 16 U/L (ref 10–40)
BASOPHILS # BLD AUTO: 0.01 K/UL (ref 0–0.2)
BASOPHILS NFR BLD: 0.2 % (ref 0–1.9)
BILIRUB SERPL-MCNC: 0.9 MG/DL (ref 0.1–1)
BUN SERPL-MCNC: 10 MG/DL (ref 6–20)
CALCIUM SERPL-MCNC: 7.8 MG/DL (ref 8.7–10.5)
CHLORIDE SERPL-SCNC: 106 MMOL/L (ref 95–110)
CO2 SERPL-SCNC: 26 MMOL/L (ref 23–29)
CREAT SERPL-MCNC: 0.7 MG/DL (ref 0.5–1.4)
DIFFERENTIAL METHOD: ABNORMAL
EOSINOPHIL # BLD AUTO: 0 K/UL (ref 0–0.5)
EOSINOPHIL NFR BLD: 0.2 % (ref 0–8)
ERYTHROCYTE [DISTWIDTH] IN BLOOD BY AUTOMATED COUNT: 24.1 % (ref 11.5–14.5)
EST. GFR  (NO RACE VARIABLE): >60 ML/MIN/1.73 M^2
GLUCOSE SERPL-MCNC: 89 MG/DL (ref 70–110)
HCT VFR BLD AUTO: 20.1 % (ref 37–48.5)
HGB BLD-MCNC: 5.8 G/DL (ref 12–16)
IMM GRANULOCYTES # BLD AUTO: 0.08 K/UL (ref 0–0.04)
IMM GRANULOCYTES NFR BLD AUTO: 1.5 % (ref 0–0.5)
LYMPHOCYTES # BLD AUTO: 1.2 K/UL (ref 1–4.8)
LYMPHOCYTES NFR BLD: 23.1 % (ref 18–48)
MAGNESIUM SERPL-MCNC: 2 MG/DL (ref 1.6–2.6)
MCH RBC QN AUTO: 21.1 PG (ref 27–31)
MCHC RBC AUTO-ENTMCNC: 28.9 G/DL (ref 32–36)
MCV RBC AUTO: 73 FL (ref 82–98)
MONOCYTES # BLD AUTO: 0.4 K/UL (ref 0.3–1)
MONOCYTES NFR BLD: 7.4 % (ref 4–15)
NEUTROPHILS # BLD AUTO: 3.6 K/UL (ref 1.8–7.7)
NEUTROPHILS NFR BLD: 67.6 % (ref 38–73)
NRBC BLD-RTO: 1 /100 WBC
PLATELET # BLD AUTO: 94 K/UL (ref 150–450)
PMV BLD AUTO: 10.5 FL (ref 9.2–12.9)
POTASSIUM SERPL-SCNC: 3.6 MMOL/L (ref 3.5–5.1)
PROT SERPL-MCNC: 6 G/DL (ref 6–8.4)
RBC # BLD AUTO: 2.75 M/UL (ref 4–5.4)
SODIUM SERPL-SCNC: 139 MMOL/L (ref 136–145)
WBC # BLD AUTO: 5.28 K/UL (ref 3.9–12.7)

## 2023-09-24 PROCEDURE — 25000003 PHARM REV CODE 250: Performed by: HOSPITALIST

## 2023-09-24 PROCEDURE — 63600175 PHARM REV CODE 636 W HCPCS: Performed by: INTERNAL MEDICINE

## 2023-09-24 PROCEDURE — 83735 ASSAY OF MAGNESIUM: CPT | Performed by: NURSE PRACTITIONER

## 2023-09-24 PROCEDURE — 25000003 PHARM REV CODE 250: Performed by: NURSE PRACTITIONER

## 2023-09-24 PROCEDURE — 11000001 HC ACUTE MED/SURG PRIVATE ROOM

## 2023-09-24 PROCEDURE — 85025 COMPLETE CBC W/AUTO DIFF WBC: CPT | Performed by: NURSE PRACTITIONER

## 2023-09-24 PROCEDURE — 36415 COLL VENOUS BLD VENIPUNCTURE: CPT | Performed by: NURSE PRACTITIONER

## 2023-09-24 PROCEDURE — 63600175 PHARM REV CODE 636 W HCPCS: Performed by: HOSPITALIST

## 2023-09-24 PROCEDURE — 99232 PR SUBSEQUENT HOSPITAL CARE,LEVL II: ICD-10-PCS | Mod: ,,, | Performed by: INTERNAL MEDICINE

## 2023-09-24 PROCEDURE — 99232 SBSQ HOSP IP/OBS MODERATE 35: CPT | Mod: ,,, | Performed by: INTERNAL MEDICINE

## 2023-09-24 PROCEDURE — 80053 COMPREHEN METABOLIC PANEL: CPT | Performed by: NURSE PRACTITIONER

## 2023-09-24 RX ORDER — LANOLIN ALCOHOL/MO/W.PET/CERES
1000 CREAM (GRAM) TOPICAL DAILY
Status: DISCONTINUED | OUTPATIENT
Start: 2023-09-24 | End: 2023-09-25 | Stop reason: HOSPADM

## 2023-09-24 RX ADMIN — DOXYCYCLINE HYCLATE 100 MG: 100 TABLET, COATED ORAL at 07:09

## 2023-09-24 RX ADMIN — ACETAMINOPHEN 650 MG: 325 TABLET, FILM COATED ORAL at 03:09

## 2023-09-24 RX ADMIN — IRON SUCROSE 100 MG: 20 INJECTION, SOLUTION INTRAVENOUS at 07:09

## 2023-09-24 RX ADMIN — POTASSIUM BICARBONATE 50 MEQ: 977.5 TABLET, EFFERVESCENT ORAL at 08:09

## 2023-09-24 RX ADMIN — CYANOCOBALAMIN TAB 1000 MCG 1000 MCG: 1000 TAB at 10:09

## 2023-09-24 RX ADMIN — PANTOPRAZOLE SODIUM 40 MG: 40 TABLET, DELAYED RELEASE ORAL at 07:09

## 2023-09-24 RX ADMIN — PREDNISONE 40 MG: 20 TABLET ORAL at 08:09

## 2023-09-24 RX ADMIN — THERA TABS 1 TABLET: TAB at 10:09

## 2023-09-24 RX ADMIN — FOLIC ACID 1 MG: 1 TABLET ORAL at 07:09

## 2023-09-24 RX ADMIN — DOXYCYCLINE HYCLATE 100 MG: 100 TABLET, COATED ORAL at 08:09

## 2023-09-24 NOTE — PLAN OF CARE
Problem: Adult Inpatient Plan of Care  Goal: Plan of Care Review  Outcome: Ongoing, Progressing     Problem: Adult Inpatient Plan of Care  Goal: Absence of Hospital-Acquired Illness or Injury  Outcome: Ongoing, Progressing     Problem: Adult Inpatient Plan of Care  Goal: Optimal Comfort and Wellbeing  Outcome: Ongoing, Progressing     Problem: Anemia  Goal: Anemia Symptom Improvement  Outcome: Ongoing, Progressing     Problem: Electrolyte Imbalance  Goal: Electrolyte Balance  Outcome: Ongoing, Progressing     Problem: Infection  Goal: Absence of Infection Signs and Symptoms  Outcome: Ongoing, Progressing

## 2023-09-24 NOTE — PROGRESS NOTES
[facility]/Surg  Hematology/Oncology  Progress Note    Patient Name: Giovanni Pena  Admission Date: 9/21/2023  Hospital Length of Stay: 2 days  Code Status: Full Code     Subjective:     Interval History: patient seen and examined. Overall stable. No new symptoms to suggest symptomatic anemia. No complaints to me.    Oncology Treatment Plan:   [No matching plan found]    Medications:  Continuous Infusions:  Scheduled Meds:   cyanocobalamin  1,000 mcg Oral Daily    doxycycline  100 mg Oral BID    ferrous sulfate  1 tablet Oral Daily    folic acid  1 mg Oral Daily    IRON SUCROSE IV ORDERABLE  100 mg Intravenous Daily    multivitamin  1 tablet Oral Daily    pantoprazole  40 mg Oral Daily    predniSONE  40 mg Oral Daily    senna-docusate 8.6-50 mg  1 tablet Oral BID     PRN Meds:0.9%  NaCl infusion (for blood administration), 0.9%  NaCl infusion (for blood administration), acetaminophen, acetaminophen, aluminum-magnesium hydroxide-simethicone, dextrose 10%, dextrose 10%, diphenhydrAMINE, glucagon (human recombinant), glucose, glucose, HYDROcodone-acetaminophen, magnesium oxide, magnesium oxide, melatonin, naloxone, polyethylene glycol, potassium bicarbonate, potassium bicarbonate, potassium bicarbonate, promethazine (PHENERGAN) 12.5 mg in dextrose 5 % (D5W) 50 mL IVPB, sodium chloride 0.9%     Review of Systems   Constitutional: Negative.    HENT: Negative.     Eyes: Negative.    Respiratory: Negative.     Cardiovascular: Negative.    Gastrointestinal: Negative.    Endocrine: Negative.    Genitourinary: Negative.    Musculoskeletal: Negative.    Integumentary:  Negative.   Neurological: Negative.    Hematological: Negative.    Psychiatric/Behavioral: Negative.        Objective:     Vital Signs (Most Recent):  Temp: 98 °F (36.7 °C) (09/24/23 1135)  Pulse: 69 (09/24/23 1135)  Resp: 20 (09/24/23 1135)  BP: (!) 116/57 (09/24/23 1135)  SpO2: 100 % (09/24/23 1135) Vital Signs (24h Range):  Temp:  [98  °F (36.7 °C)-98.6 °F (37 °C)] 98 °F (36.7 °C)  Pulse:  [67-78] 69  Resp:  [18-20] 20  SpO2:  [98 %-100 %] 100 %  BP: (102-116)/(56-68) 116/57     Weight: 77.1 kg (170 lb)  Body mass index is 26.63 kg/m².  Body surface area is 1.91 meters squared.      Intake/Output Summary (Last 24 hours) at 9/24/2023 1335  Last data filed at 9/24/2023 0800  Gross per 24 hour   Intake 620 ml   Output --   Net 620 ml         Physical Exam  Constitutional:       Appearance: Normal appearance.   HENT:      Head: Normocephalic and atraumatic.      Nose: Nose normal.      Mouth/Throat:      Mouth: Mucous membranes are moist.      Pharynx: Oropharynx is clear.   Eyes:      Conjunctiva/sclera: Conjunctivae normal.   Cardiovascular:      Rate and Rhythm: Normal rate and regular rhythm.      Heart sounds: Normal heart sounds.   Pulmonary:      Effort: Pulmonary effort is normal.      Breath sounds: Normal breath sounds.   Abdominal:      General: Abdomen is flat. Bowel sounds are normal.      Palpations: Abdomen is soft.   Musculoskeletal:         General: Normal range of motion.      Cervical back: Normal range of motion and neck supple.   Skin:     General: Skin is warm and dry.   Neurological:      General: No focal deficit present.      Mental Status: She is alert and oriented to person, place, and time. Mental status is at baseline.   Psychiatric:         Mood and Affect: Mood normal.         Significant Labs:   CBC:   Recent Labs   Lab 09/23/23  0444 09/24/23  0431   WBC 6.04 5.28   HGB 5.6* 5.8*   HCT 19.2* 20.1*   PLT 85* 94*      and CMP:   Recent Labs   Lab 09/23/23  0444 09/24/23  0431    139   K 4.0 3.6    106   CO2 26 26    89   BUN 10 10   CREATININE 0.7 0.7   CALCIUM 8.3* 7.8*   PROT 6.2 6.0   ALBUMIN 2.7* 2.7*   BILITOT 1.4* 0.9   ALKPHOS 69 77   AST 20 16   ALT 14 14   ANIONGAP 8 7*         Diagnostic Results:  I have reviewed all pertinent imaging results/findings within the past 24  hours.    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  Her bilirubin had elevated, although that is now improved.  -she also has splenomegaly.  Stable on US. Will monitor in clinic for resolution.    -hgb improved today after stopping fluids so was likely dilutional. Repeat again in AM. If continues to improve, ok for D/C tomorrow with clinical follow up in 1 week.  -cont IV iron through tomorrow    Thank you for your consult. I will follow-up with patient. Please contact us if you have any additional questions.     Aurash Khoobehi, MD  Hematology/Oncology  [facility]/Surg

## 2023-09-24 NOTE — NURSING
Spoke with Dr. Garner about patient and plan of care/treatment plan. MD stated we are still holding off on administering blood to patient. Patient remains asymptomatic. Will continue to monitor.

## 2023-09-24 NOTE — NURSING
Notified Nancy Kramer NP of patient and situation, ISBAR provided, explained to provider that patient has a critical hemoglobin and hematocrit of 5.8+20.1, which is improved from yesterday's HNH. Provider voiced understanding.

## 2023-09-24 NOTE — ASSESSMENT & PLAN NOTE
Concern for delayed hemolytic transfusion reaction   Hematology following  IV solumedrol 125mg q8h ordered weaned to oral prednisone   Direct and indirect ashley is negative   IV hydration   No CBD dilatation on CT   Trend CMP  Bilirubin decreasing- U/s pending

## 2023-09-24 NOTE — SUBJECTIVE & OBJECTIVE
Interval History:  see hospital course     Review of Systems   Constitutional:  Negative for activity change, appetite change, chills, diaphoresis, fatigue, fever and unexpected weight change.   HENT:  Negative for congestion, ear pain, facial swelling, hearing loss, sore throat and trouble swallowing.    Eyes:  Negative for pain and discharge.        Scleral icterus   Respiratory:  Negative for cough, chest tightness, shortness of breath and wheezing.    Cardiovascular:  Negative for chest pain, palpitations and leg swelling.   Gastrointestinal:  Positive for abdominal pain and nausea. Negative for blood in stool, diarrhea and vomiting.   Endocrine: Negative for polydipsia, polyphagia and polyuria.   Genitourinary:  Negative for difficulty urinating, dysuria, flank pain, frequency and urgency.   Musculoskeletal:  Negative for arthralgias, back pain, joint swelling, neck pain and neck stiffness.   Skin:  Positive for color change. Negative for rash and wound.   Allergic/Immunologic: Negative for environmental allergies and immunocompromised state.   Neurological:  Negative for dizziness, seizures, syncope, speech difficulty, weakness, light-headedness, numbness and headaches.   Hematological:  Negative for adenopathy.   Psychiatric/Behavioral:  Negative for sleep disturbance and suicidal ideas. The patient is not nervous/anxious.    All other systems reviewed and are negative.    Objective:     Vital Signs (Most Recent):  Temp: 98.3 °F (36.8 °C) (09/23/23 2015)  Pulse: 69 (09/23/23 2015)  Resp: 18 (09/23/23 2015)  BP: (!) 102/57 (09/23/23 2015)  SpO2: 100 % (09/23/23 2015) Vital Signs (24h Range):  Temp:  [97.9 °F (36.6 °C)-98.5 °F (36.9 °C)] 98.3 °F (36.8 °C)  Pulse:  [67-78] 69  Resp:  [18-20] 18  SpO2:  [98 %-100 %] 100 %  BP: ()/(52-62) 102/57     Weight: 77.1 kg (170 lb)  Body mass index is 26.63 kg/m².    Intake/Output Summary (Last 24 hours) at 9/23/2023 6568  Last data filed at 9/23/2023 0600  Gross  per 24 hour   Intake 1400 ml   Output --   Net 1400 ml           Physical Exam  Vitals and nursing note reviewed.   Constitutional:       Appearance: Normal appearance.   HENT:      Head: Normocephalic and atraumatic.      Nose: Nose normal.      Mouth/Throat:      Mouth: Mucous membranes are moist.      Pharynx: Oropharynx is clear.   Eyes:      General: Scleral icterus present.      Extraocular Movements: Extraocular movements intact.      Pupils: Pupils are equal, round, and reactive to light.   Cardiovascular:      Rate and Rhythm: Normal rate and regular rhythm.      Pulses: Normal pulses.      Heart sounds: Normal heart sounds.   Pulmonary:      Effort: Pulmonary effort is normal.      Breath sounds: Normal breath sounds.   Abdominal:      General: Bowel sounds are normal.      Palpations: Abdomen is soft.      Tenderness: There is abdominal tenderness.      Comments: Palpable spleen   Musculoskeletal:         General: Normal range of motion.      Cervical back: Normal range of motion and neck supple.   Skin:     General: Skin is warm and dry.      Capillary Refill: Capillary refill takes less than 2 seconds.   Neurological:      General: No focal deficit present.      Mental Status: She is alert and oriented to person, place, and time.   Psychiatric:         Mood and Affect: Mood normal.         Behavior: Behavior normal.             Significant Labs: All pertinent labs within the past 24 hours have been reviewed.    Significant Imaging: I have reviewed all pertinent imaging results/findings within the past 24 hours.

## 2023-09-24 NOTE — PROGRESS NOTES
"[facility]  Progress Note    Patient Name: Giovanni Pena  MRN: [id number]  Patient Class: IP- Inpatient   Admission Date: 9/21/2023  Length of Stay: 1 days  Attending Physician: Wilma Garner MD  Primary Care Provider: Miquel Mishra MD        Subjective:     Principal Problem:Jaundice        HPI:  Ms. 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 It is moderate. It is associated with vomiting bilious fluid, dark urine, and LLQ abd pain. She denies fever, chills, chest pain, diarrhea, dizziness, palpitations, weakness, or LOC. She noticed the dark urine yesterday and the jaundice in there eyes this morning. She had a blood transfusion on 9/8/23 at an outside facility for acute blood loss anemia for menorrhagia associated with uterine fibroids. She just completed another menstrual cycle that lasted for about 10 days going through roughly 10 pads per day. In the ED, hgb 6.4 and Hct 21.5, Tbili 7, AST 60, ALT 20, Lipase WNL and alk phos WNL. CT abd/pelvis: "Interval development of splenomegaly and multiple peripheral hypodensities in the spleen suspicious for splenic infarct.  New compared to the prior study 03/28/2022 but otherwise indeterminate in age.  No fat stranding or fluid collections around the spleen to suggest this is acute." Dr. Gibbons was consulted per ED MD and agreed to consult on patient. Hospital medicine is consulted for admission.       Overview/Hospital Course:  Patient admitted to the hospital medicine service for jaundice.  Concern for delayed hemolytic transfusion reaction.  Had splenomegaly on exam.  Hematology was consulted.  Direct/indirect  Nehemiah test was negative.  She was started on IV Solu-Medrol and switched to oral prednisone. H/H continues to " decrease. D/w hematology - hold transfusion for now, try IV iron x 3 days. A GI  workup not done as this looks to be a stronger GYN case of fibroids. No reported changes in bowels. She has follow up appt on 9/26/23 with GYN. Transfuse if hemoglobin drops further to 5 or less.         Interval History:  see hospital course     Review of Systems   Constitutional:  Negative for activity change, appetite change, chills, diaphoresis, fatigue, fever and unexpected weight change.   HENT:  Negative for congestion, ear pain, facial swelling, hearing loss, sore throat and trouble swallowing.    Eyes:  Negative for pain and discharge.        Scleral icterus   Respiratory:  Negative for cough, chest tightness, shortness of breath and wheezing.    Cardiovascular:  Negative for chest pain, palpitations and leg swelling.   Gastrointestinal:  Positive for abdominal pain and nausea. Negative for blood in stool, diarrhea and vomiting.   Endocrine: Negative for polydipsia, polyphagia and polyuria.   Genitourinary:  Negative for difficulty urinating, dysuria, flank pain, frequency and urgency.   Musculoskeletal:  Negative for arthralgias, back pain, joint swelling, neck pain and neck stiffness.   Skin:  Positive for color change. Negative for rash and wound.   Allergic/Immunologic: Negative for environmental allergies and immunocompromised state.   Neurological:  Negative for dizziness, seizures, syncope, speech difficulty, weakness, light-headedness, numbness and headaches.   Hematological:  Negative for adenopathy.   Psychiatric/Behavioral:  Negative for sleep disturbance and suicidal ideas. The patient is not nervous/anxious.    All other systems reviewed and are negative.    Objective:     Vital Signs (Most Recent):  Temp: 98.3 °F (36.8 °C) (09/23/23 2015)  Pulse: 69 (09/23/23 2015)  Resp: 18 (09/23/23 2015)  BP: (!) 102/57 (09/23/23 2015)  SpO2: 100 % (09/23/23 2015) Vital Signs (24h Range):  Temp:  [97.9 °F (36.6 °C)-98.5 °F  (36.9 °C)] 98.3 °F (36.8 °C)  Pulse:  [67-78] 69  Resp:  [18-20] 18  SpO2:  [98 %-100 %] 100 %  BP: ()/(52-62) 102/57     Weight: 77.1 kg (170 lb)  Body mass index is 26.63 kg/m².    Intake/Output Summary (Last 24 hours) at 9/23/2023 6086  Last data filed at 9/23/2023 0600  Gross per 24 hour   Intake 1400 ml   Output --   Net 1400 ml           Physical Exam  Vitals and nursing note reviewed.   Constitutional:       Appearance: Normal appearance.   HENT:      Head: Normocephalic and atraumatic.      Nose: Nose normal.      Mouth/Throat:      Mouth: Mucous membranes are moist.      Pharynx: Oropharynx is clear.   Eyes:      General: Scleral icterus present.      Extraocular Movements: Extraocular movements intact.      Pupils: Pupils are equal, round, and reactive to light.   Cardiovascular:      Rate and Rhythm: Normal rate and regular rhythm.      Pulses: Normal pulses.      Heart sounds: Normal heart sounds.   Pulmonary:      Effort: Pulmonary effort is normal.      Breath sounds: Normal breath sounds.   Abdominal:      General: Bowel sounds are normal.      Palpations: Abdomen is soft.      Tenderness: There is abdominal tenderness.      Comments: Palpable spleen   Musculoskeletal:         General: Normal range of motion.      Cervical back: Normal range of motion and neck supple.   Skin:     General: Skin is warm and dry.      Capillary Refill: Capillary refill takes less than 2 seconds.   Neurological:      General: No focal deficit present.      Mental Status: She is alert and oriented to person, place, and time.   Psychiatric:         Mood and Affect: Mood normal.         Behavior: Behavior normal.             Significant Labs: All pertinent labs within the past 24 hours have been reviewed.    Significant Imaging: I have reviewed all pertinent imaging results/findings within the past 24 hours.      Assessment/Plan:      * Jaundice  Concern for delayed hemolytic transfusion reaction   Hematology  following  IV solumedrol 125mg q8h ordered weaned to oral prednisone   Direct and indirect ashley is negative   IV hydration   No CBD dilatation on CT   Trend CMP  Bilirubin decreasing- U/s pending       Hypokalemia  Replete per electrolyte SS         Microcytic hypochromic anemia  Chronic d/t menorrhagia, but now complicated by jaundice  T&S   Will defer to hematology for transfusion recommendations  Daily CBC  Hold on transfusion- transfuse if hemoglobin <5  IV iron x 3 days    Splenic infarct  Does not appear acute per radiologist report   Due to previous PVT ?        History of Portal vein thrombosis  Had hypercoag work up in 2021/2022 with elevated factor VIII assay  Had repeat imaging of portal vein with resolution and was taken off of eliquis after 3 months per PCP's note (wrote off of eliquis on 3/29/22, but also wrote 6 months of therapy?).    Hypercoagulable workup outpatient       Rheumatoid arthritis  Aware; patient reports multiple family members with autoimmune issues        VTE Risk Mitigation (From admission, onward)         Ordered     IP VTE HIGH RISK PATIENT  Once         09/21/23 1914     Place sequential compression device  Until discontinued         09/21/23 1914                Discharge Planning   ZACHARY: 9/24/2023     Code Status: Full Code   Is the patient medically ready for discharge?:     Reason for patient still in hospital (select all that apply): Patient trending condition  Discharge Plan A: Home                  Wilma Garner MD  Department of Hospital Medicine   [facility]/Surg

## 2023-09-24 NOTE — ASSESSMENT & PLAN NOTE
Chronic d/t menorrhagia, but now complicated by jaundice  T&S   Will defer to hematology for transfusion recommendations  Daily CBC  Hold on transfusion- transfuse if hemoglobin <5  IV iron x 3 days

## 2023-09-24 NOTE — ASSESSMENT & PLAN NOTE
Had hypercoag work up in 2021/2022 with elevated factor VIII assay  Had repeat imaging of portal vein with resolution and was taken off of eliquis after 3 months per PCP's note (wrote off of eliquis on 3/29/22, but also wrote 6 months of therapy?).    Hypercoagulable workup outpatient

## 2023-09-25 ENCOUNTER — TELEPHONE (OUTPATIENT)
Dept: HEMATOLOGY/ONCOLOGY | Facility: CLINIC | Age: 43
End: 2023-09-25
Payer: MEDICAID

## 2023-09-25 ENCOUNTER — TELEPHONE (OUTPATIENT)
Dept: PRIMARY CARE CLINIC | Facility: CLINIC | Age: 43
End: 2023-09-25
Payer: MEDICAID

## 2023-09-25 VITALS
RESPIRATION RATE: 18 BRPM | HEIGHT: 67 IN | SYSTOLIC BLOOD PRESSURE: 130 MMHG | DIASTOLIC BLOOD PRESSURE: 70 MMHG | HEART RATE: 90 BPM | WEIGHT: 170 LBS | TEMPERATURE: 98 F | BODY MASS INDEX: 26.68 KG/M2 | OXYGEN SATURATION: 99 %

## 2023-09-25 PROBLEM — R17 JAUNDICE: Status: RESOLVED | Noted: 2023-09-21 | Resolved: 2023-09-25

## 2023-09-25 PROBLEM — E87.6 HYPOKALEMIA: Status: RESOLVED | Noted: 2023-09-21 | Resolved: 2023-09-25

## 2023-09-25 LAB
ALBUMIN SERPL BCP-MCNC: 2.7 G/DL (ref 3.5–5.2)
ALP SERPL-CCNC: 84 U/L (ref 55–135)
ALT SERPL W/O P-5'-P-CCNC: 15 U/L (ref 10–44)
ANION GAP SERPL CALC-SCNC: 8 MMOL/L (ref 8–16)
AST SERPL-CCNC: 17 U/L (ref 10–40)
BASOPHILS # BLD AUTO: 0.02 K/UL (ref 0–0.2)
BASOPHILS NFR BLD: 0.3 % (ref 0–1.9)
BILIRUB SERPL-MCNC: 0.8 MG/DL (ref 0.1–1)
BLD PROD TYP BPU: NORMAL
BLD PROD TYP BPU: NORMAL
BLOOD UNIT EXPIRATION DATE: NORMAL
BLOOD UNIT EXPIRATION DATE: NORMAL
BLOOD UNIT TYPE CODE: 600
BLOOD UNIT TYPE CODE: 600
BLOOD UNIT TYPE: NORMAL
BLOOD UNIT TYPE: NORMAL
BUN SERPL-MCNC: 8 MG/DL (ref 6–20)
CALCIUM SERPL-MCNC: 8.1 MG/DL (ref 8.7–10.5)
CHLORIDE SERPL-SCNC: 106 MMOL/L (ref 95–110)
CO2 SERPL-SCNC: 26 MMOL/L (ref 23–29)
CODING SYSTEM: NORMAL
CODING SYSTEM: NORMAL
CREAT SERPL-MCNC: 0.7 MG/DL (ref 0.5–1.4)
CROSSMATCH INTERPRETATION: NORMAL
CROSSMATCH INTERPRETATION: NORMAL
DIFFERENTIAL METHOD: ABNORMAL
DISPENSE STATUS: NORMAL
DISPENSE STATUS: NORMAL
EOSINOPHIL # BLD AUTO: 0 K/UL (ref 0–0.5)
EOSINOPHIL NFR BLD: 0.3 % (ref 0–8)
ERYTHROCYTE [DISTWIDTH] IN BLOOD BY AUTOMATED COUNT: 25.3 % (ref 11.5–14.5)
EST. GFR  (NO RACE VARIABLE): >60 ML/MIN/1.73 M^2
FLOW CYTOMETRY SPECIALIST REVIEW: NORMAL
G6PD RBC-CCNT: 13.3 U/G HB (ref 8–11.9)
GLUCOSE SERPL-MCNC: 82 MG/DL (ref 70–110)
HAPTOGLOB SERPL NEPH-MCNC: <14 MG/DL (ref 30–200)
HAV IGM SERPL QL IA: NEGATIVE
HBV CORE IGM SERPL QL IA: NEGATIVE
HBV SURFACE AG SERPL QL IA: NEGATIVE
HCT VFR BLD AUTO: 21.1 % (ref 37–48.5)
HCV IGG SERPL QL IA: NEGATIVE
HGB BLD-MCNC: 6.3 G/DL (ref 12–16)
IMM GRANULOCYTES # BLD AUTO: 0.11 K/UL (ref 0–0.04)
IMM GRANULOCYTES NFR BLD AUTO: 1.9 % (ref 0–0.5)
LYMPHOCYTES # BLD AUTO: 1.5 K/UL (ref 1–4.8)
LYMPHOCYTES NFR BLD: 26.4 % (ref 18–48)
MAGNESIUM SERPL-MCNC: 2 MG/DL (ref 1.6–2.6)
MCH RBC QN AUTO: 21.5 PG (ref 27–31)
MCHC RBC AUTO-ENTMCNC: 29.9 G/DL (ref 32–36)
MCV RBC AUTO: 72 FL (ref 82–98)
MONOCYTES # BLD AUTO: 0.4 K/UL (ref 0.3–1)
MONOCYTES NFR BLD: 6.8 % (ref 4–15)
NEUTROPHILS # BLD AUTO: 3.7 K/UL (ref 1.8–7.7)
NEUTROPHILS NFR BLD: 64.3 % (ref 38–73)
NRBC BLD-RTO: 1 /100 WBC
NUM UNITS TRANS PACKED RBC: NORMAL
NUM UNITS TRANS PACKED RBC: NORMAL
PLATELET # BLD AUTO: 91 K/UL (ref 150–450)
PMV BLD AUTO: ABNORMAL FL (ref 9.2–12.9)
PNH GRANULOCYTES: 0 % (ref 0–0.01)
PNH MONOCYTES: 0 % (ref 0–0.05)
PNH RBC-COMPLETE AG LOSS: 0 % (ref 0–0.01)
PNH RBC-PARTIAL AG LOSS: 0.01 % (ref 0–0.99)
POTASSIUM SERPL-SCNC: 3.7 MMOL/L (ref 3.5–5.1)
PROT SERPL-MCNC: 6.2 G/DL (ref 6–8.4)
RBC # BLD AUTO: 2.93 M/UL (ref 4–5.4)
SODIUM SERPL-SCNC: 140 MMOL/L (ref 136–145)
WBC # BLD AUTO: 5.76 K/UL (ref 3.9–12.7)

## 2023-09-25 PROCEDURE — 25000003 PHARM REV CODE 250: Performed by: NURSE PRACTITIONER

## 2023-09-25 PROCEDURE — 63600175 PHARM REV CODE 636 W HCPCS: Performed by: HOSPITALIST

## 2023-09-25 PROCEDURE — 83735 ASSAY OF MAGNESIUM: CPT | Performed by: NURSE PRACTITIONER

## 2023-09-25 PROCEDURE — 36415 COLL VENOUS BLD VENIPUNCTURE: CPT | Performed by: NURSE PRACTITIONER

## 2023-09-25 PROCEDURE — 25000003 PHARM REV CODE 250: Performed by: HOSPITALIST

## 2023-09-25 PROCEDURE — 85025 COMPLETE CBC W/AUTO DIFF WBC: CPT | Performed by: NURSE PRACTITIONER

## 2023-09-25 PROCEDURE — 80053 COMPREHEN METABOLIC PANEL: CPT | Performed by: NURSE PRACTITIONER

## 2023-09-25 RX ORDER — LANOLIN ALCOHOL/MO/W.PET/CERES
1000 CREAM (GRAM) TOPICAL DAILY
Qty: 30 TABLET | Refills: 0
Start: 2023-09-25 | End: 2023-10-25

## 2023-09-25 RX ORDER — FOLIC ACID 1 MG/1
1 TABLET ORAL DAILY
Qty: 30 TABLET | Refills: 0
Start: 2023-09-25 | End: 2026-01-11

## 2023-09-25 RX ADMIN — IRON SUCROSE 100 MG: 20 INJECTION, SOLUTION INTRAVENOUS at 08:09

## 2023-09-25 RX ADMIN — PANTOPRAZOLE SODIUM 40 MG: 40 TABLET, DELAYED RELEASE ORAL at 08:09

## 2023-09-25 RX ADMIN — DOCUSATE SODIUM AND SENNOSIDES 1 TABLET: 8.6; 5 TABLET, FILM COATED ORAL at 08:09

## 2023-09-25 RX ADMIN — FOLIC ACID 1 MG: 1 TABLET ORAL at 08:09

## 2023-09-25 RX ADMIN — CYANOCOBALAMIN TAB 1000 MCG 1000 MCG: 1000 TAB at 08:09

## 2023-09-25 RX ADMIN — DOXYCYCLINE HYCLATE 100 MG: 100 TABLET, COATED ORAL at 08:09

## 2023-09-25 RX ADMIN — THERA TABS 1 TABLET: TAB at 08:09

## 2023-09-25 RX ADMIN — FERROUS SULFATE TAB 325 MG (65 MG ELEMENTAL FE) 1 EACH: 325 (65 FE) TAB at 08:09

## 2023-09-25 RX ADMIN — PREDNISONE 40 MG: 20 TABLET ORAL at 08:09

## 2023-09-25 NOTE — SUBJECTIVE & OBJECTIVE
Interval History:  see hospital course; family member present reports patient very weak upon standing and cannot stand up very long without getting dizzy     Review of Systems   Constitutional:  Negative for activity change, appetite change, chills, diaphoresis, fatigue, fever and unexpected weight change.   HENT:  Negative for congestion, ear pain, facial swelling, hearing loss, sore throat and trouble swallowing.    Eyes:  Negative for pain and discharge.        Scleral icterus   Respiratory:  Negative for cough, chest tightness, shortness of breath and wheezing.    Cardiovascular:  Negative for chest pain, palpitations and leg swelling.   Gastrointestinal:  Positive for abdominal pain and nausea. Negative for blood in stool, diarrhea and vomiting.   Endocrine: Negative for polydipsia, polyphagia and polyuria.   Genitourinary:  Negative for difficulty urinating, dysuria, flank pain, frequency and urgency.   Musculoskeletal:  Negative for arthralgias, back pain, joint swelling, neck pain and neck stiffness.   Skin:  Positive for color change. Negative for rash and wound.   Allergic/Immunologic: Negative for environmental allergies and immunocompromised state.   Neurological:  Negative for dizziness, seizures, syncope, speech difficulty, weakness, light-headedness, numbness and headaches.   Hematological:  Negative for adenopathy.   Psychiatric/Behavioral:  Negative for sleep disturbance and suicidal ideas. The patient is not nervous/anxious.    All other systems reviewed and are negative.    Objective:     Vital Signs (Most Recent):  Temp: 97.8 °F (36.6 °C) (09/24/23 1552)  Pulse: 62 (09/24/23 1552)  Resp: 20 (09/24/23 1552)  BP: 107/61 (09/24/23 1552)  SpO2: 100 % (09/24/23 1552) Vital Signs (24h Range):  Temp:  [97.8 °F (36.6 °C)-98.6 °F (37 °C)] 97.8 °F (36.6 °C)  Pulse:  [62-74] 62  Resp:  [18-20] 20  SpO2:  [98 %-100 %] 100 %  BP: (102-116)/(56-68) 107/61     Weight: 77.1 kg (170 lb)  Body mass index is 26.63  kg/m².    Intake/Output Summary (Last 24 hours) at 9/24/2023 1953  Last data filed at 9/24/2023 0800  Gross per 24 hour   Intake 620 ml   Output --   Net 620 ml           Physical Exam  Vitals and nursing note reviewed.   Constitutional:       Appearance: Normal appearance.   HENT:      Head: Normocephalic and atraumatic.      Nose: Nose normal.      Mouth/Throat:      Mouth: Mucous membranes are moist.      Pharynx: Oropharynx is clear.   Eyes:      General: No scleral icterus.     Extraocular Movements: Extraocular movements intact.      Pupils: Pupils are equal, round, and reactive to light.   Cardiovascular:      Rate and Rhythm: Normal rate and regular rhythm.      Pulses: Normal pulses.      Heart sounds: Normal heart sounds.   Pulmonary:      Effort: Pulmonary effort is normal.      Breath sounds: Normal breath sounds.   Abdominal:      General: Bowel sounds are normal.      Palpations: Abdomen is soft.      Tenderness: There is abdominal tenderness.      Comments: Palpable spleen   Musculoskeletal:         General: Normal range of motion.      Cervical back: Normal range of motion and neck supple.   Skin:     General: Skin is warm and dry.      Capillary Refill: Capillary refill takes less than 2 seconds.   Neurological:      General: No focal deficit present.      Mental Status: She is alert and oriented to person, place, and time.   Psychiatric:         Mood and Affect: Mood normal.         Behavior: Behavior normal.             Significant Labs: All pertinent labs within the past 24 hours have been reviewed.    Significant Imaging: I have reviewed all pertinent imaging results/findings within the past 24 hours.

## 2023-09-25 NOTE — PROGRESS NOTES
"[facility]  Progress Note    Patient Name: Giovanni Pena  MRN: [id number]  Patient Class: IP- Inpatient   Admission Date: 9/21/2023  Length of Stay: 2 days  Attending Physician: Wilma Garner MD  Primary Care Provider: Miquel Mishra MD        Subjective:     Principal Problem:Jaundice        HPI:  Ms. 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 It is moderate. It is associated with vomiting bilious fluid, dark urine, and LLQ abd pain. She denies fever, chills, chest pain, diarrhea, dizziness, palpitations, weakness, or LOC. She noticed the dark urine yesterday and the jaundice in there eyes this morning. She had a blood transfusion on 9/8/23 at an outside facility for acute blood loss anemia for menorrhagia associated with uterine fibroids. She just completed another menstrual cycle that lasted for about 10 days going through roughly 10 pads per day. In the ED, hgb 6.4 and Hct 21.5, Tbili 7, AST 60, ALT 20, Lipase WNL and alk phos WNL. CT abd/pelvis: "Interval development of splenomegaly and multiple peripheral hypodensities in the spleen suspicious for splenic infarct.  New compared to the prior study 03/28/2022 but otherwise indeterminate in age.  No fat stranding or fluid collections around the spleen to suggest this is acute." Dr. Gibbons was consulted per ED MD and agreed to consult on patient. Hospital medicine is consulted for admission.       Overview/Hospital Course:  Patient admitted to the hospital medicine service for jaundice.  Concern for delayed hemolytic transfusion reaction.  Had splenomegaly on exam.  Hematology was consulted.  Direct/indirect  Nehemiah test was negative.  She was started on IV Solu-Medrol and switched to oral prednisone. H/H continues to " decrease. D/w hematology - hold transfusion for now, try IV iron x 3 days. A GI  workup not done as this looks to be a stronger GYN case of fibroids. No reported changes in bowels. She has follow up appt on 9/26/23 with GYN. Transfuse if hemoglobin drops further to 5 or less.     Complete IV iron therapy tomorrow and check CBC. Prepare for dc home with follow up GYN, hematology.       Interval History:  see hospital course; family member present reports patient very weak upon standing and cannot stand up very long without getting dizzy     Review of Systems   Constitutional:  Negative for activity change, appetite change, chills, diaphoresis, fatigue, fever and unexpected weight change.   HENT:  Negative for congestion, ear pain, facial swelling, hearing loss, sore throat and trouble swallowing.    Eyes:  Negative for pain and discharge.        Scleral icterus   Respiratory:  Negative for cough, chest tightness, shortness of breath and wheezing.    Cardiovascular:  Negative for chest pain, palpitations and leg swelling.   Gastrointestinal:  Positive for abdominal pain and nausea. Negative for blood in stool, diarrhea and vomiting.   Endocrine: Negative for polydipsia, polyphagia and polyuria.   Genitourinary:  Negative for difficulty urinating, dysuria, flank pain, frequency and urgency.   Musculoskeletal:  Negative for arthralgias, back pain, joint swelling, neck pain and neck stiffness.   Skin:  Positive for color change. Negative for rash and wound.   Allergic/Immunologic: Negative for environmental allergies and immunocompromised state.   Neurological:  Negative for dizziness, seizures, syncope, speech difficulty, weakness, light-headedness, numbness and headaches.   Hematological:  Negative for adenopathy.   Psychiatric/Behavioral:  Negative for sleep disturbance and suicidal ideas. The patient is not nervous/anxious.    All other systems reviewed and are negative.    Objective:     Vital Signs (Most  Recent):  Temp: 97.8 °F (36.6 °C) (09/24/23 1552)  Pulse: 62 (09/24/23 1552)  Resp: 20 (09/24/23 1552)  BP: 107/61 (09/24/23 1552)  SpO2: 100 % (09/24/23 1552) Vital Signs (24h Range):  Temp:  [97.8 °F (36.6 °C)-98.6 °F (37 °C)] 97.8 °F (36.6 °C)  Pulse:  [62-74] 62  Resp:  [18-20] 20  SpO2:  [98 %-100 %] 100 %  BP: (102-116)/(56-68) 107/61     Weight: 77.1 kg (170 lb)  Body mass index is 26.63 kg/m².    Intake/Output Summary (Last 24 hours) at 9/24/2023 1953  Last data filed at 9/24/2023 0800  Gross per 24 hour   Intake 620 ml   Output --   Net 620 ml           Physical Exam  Vitals and nursing note reviewed.   Constitutional:       Appearance: Normal appearance.   HENT:      Head: Normocephalic and atraumatic.      Nose: Nose normal.      Mouth/Throat:      Mouth: Mucous membranes are moist.      Pharynx: Oropharynx is clear.   Eyes:      General: No scleral icterus.     Extraocular Movements: Extraocular movements intact.      Pupils: Pupils are equal, round, and reactive to light.   Cardiovascular:      Rate and Rhythm: Normal rate and regular rhythm.      Pulses: Normal pulses.      Heart sounds: Normal heart sounds.   Pulmonary:      Effort: Pulmonary effort is normal.      Breath sounds: Normal breath sounds.   Abdominal:      General: Bowel sounds are normal.      Palpations: Abdomen is soft.      Tenderness: There is abdominal tenderness.      Comments: Palpable spleen   Musculoskeletal:         General: Normal range of motion.      Cervical back: Normal range of motion and neck supple.   Skin:     General: Skin is warm and dry.      Capillary Refill: Capillary refill takes less than 2 seconds.   Neurological:      General: No focal deficit present.      Mental Status: She is alert and oriented to person, place, and time.   Psychiatric:         Mood and Affect: Mood normal.         Behavior: Behavior normal.             Significant Labs: All pertinent labs within the past 24 hours have been  reviewed.    Significant Imaging: I have reviewed all pertinent imaging results/findings within the past 24 hours.      Assessment/Plan:      * Jaundice  Concern for delayed hemolytic transfusion reaction   Hematology following  IV solumedrol 125mg q8h ordered weaned to oral prednisone   Direct and indirect ashley is negative   IV hydration   No CBD dilatation on CT   Trend CMP  Bilirubin decreasing- U/s pending       Hypokalemia  Replete per electrolyte SS         Microcytic hypochromic anemia  Chronic d/t menorrhagia, but now complicated by jaundice  T&S   Will defer to hematology for transfusion recommendations  Daily CBC  Hold on transfusion- transfuse if hemoglobin <5  IV iron x 3 days    Splenic infarct  Does not appear acute per radiologist report   Due to previous PVT ?        History of Portal vein thrombosis  Had hypercoag work up in 2021/2022 with elevated factor VIII assay  Had repeat imaging of portal vein with resolution and was taken off of eliquis after 3 months per PCP's note (wrote off of eliquis on 3/29/22, but also wrote 6 months of therapy?).    Hypercoagulable workup outpatient       Rheumatoid arthritis  Aware; patient reports multiple family members with autoimmune issues        VTE Risk Mitigation (From admission, onward)         Ordered     IP VTE HIGH RISK PATIENT  Once         09/21/23 1914     Place sequential compression device  Until discontinued         09/21/23 1914                Discharge Planning   ZACHARY: 9/24/2023     Code Status: Full Code   Is the patient medically ready for discharge?:     Reason for patient still in hospital (select all that apply): Patient trending condition  Discharge Plan A: Home                  Wilma Garner MD  Department of Hospital Medicine   [facility]/Surg

## 2023-09-25 NOTE — NURSING
Oncology Navigation   Intake  Cancer Type: Benign hem (Anemia)  Internal / External Referral: Internal (Dr Wilma Garner)  Date of Referral: 09/25/23  Initial Nurse Navigator Contact: 09/25/23  Referral to Initial Contact Timeline (days): 0  First Appointment Available: 09/27/23  Appointment Date: 09/27/23 (Dr Khoobehi)  First Available Date vs. Scheduled Date (days): 0     Treatment                              Acuity      Follow Up  No follow-ups on file.

## 2023-09-25 NOTE — TELEPHONE ENCOUNTER
----- Message from Vanessa Ridley sent at 9/25/2023  1:10 PM CDT -----  Contact: [phone]  Pt is requesting to have a referral for a nutritionist.  Pt would also like a callback in regards to the results of her US on 09/23. Please call.            Thank you

## 2023-09-25 NOTE — NURSING
Notified Nancy Kramer NP of patient and situation, ISBAR provided, explained to provider that patient's HNH is 6.3+21.1 this morning, improved from yesterday's values. Provider voiced understanding.

## 2023-09-25 NOTE — NURSING
Discharge instructions given to pt. Pt verbalizes understanding. PIV removed, pt tolerated well. Left floor safely with all belongings.

## 2023-09-25 NOTE — PLAN OF CARE
Pt clear for DC from CM standpoint. Discharging home.    Hospital follow up scheduled with PCP for 9/29/23 @ 1:30. Added to AVS     09/25/23 1155   Final Note   Assessment Type Final Discharge Note   Anticipated Discharge Disposition Home   Hospital Resources/Appts/Education Provided Appointments scheduled and added to AVS

## 2023-09-25 NOTE — TELEPHONE ENCOUNTER
Patient is requesting a referral to see Jacqui Gil.  She states she has had bariatric surgery and needs to meet with a nutritionalist .

## 2023-09-26 NOTE — DISCHARGE SUMMARY
"[facility]  Discharge Summary      Patient Name: Giovanni Pena  MRN: [id number]  JACQUELINE: [id number]  Patient Class: IP- Inpatient  Admission Date: 9/21/2023  Hospital Length of Stay: 3 days  Discharge Date and Time: 09/25/2023  Attending Physician: No att. providers found   Discharging Provider: Wilma Garner MD  Primary Care Provider: Miquel Mishra MD    Primary Care Team: Networked reference to record PCT     HPI:   Ms. 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 It is moderate. It is associated with vomiting bilious fluid, dark urine, and LLQ abd pain. She denies fever, chills, chest pain, diarrhea, dizziness, palpitations, weakness, or LOC. She noticed the dark urine yesterday and the jaundice in there eyes this morning. She had a blood transfusion on 9/8/23 at an outside facility for acute blood loss anemia for menorrhagia associated with uterine fibroids. She just completed another menstrual cycle that lasted for about 10 days going through roughly 10 pads per day. In the ED, hgb 6.4 and Hct 21.5, Tbili 7, AST 60, ALT 20, Lipase WNL and alk phos WNL. CT abd/pelvis: "Interval development of splenomegaly and multiple peripheral hypodensities in the spleen suspicious for splenic infarct.  New compared to the prior study 03/28/2022 but otherwise indeterminate in age.  No fat stranding or fluid collections around the spleen to suggest this is acute." Dr. Gibbons was consulted per ED MD and agreed to consult on patient. Hospital medicine is consulted for admission.       * No surgery found *      Hospital Course:   Patient admitted to the hospital medicine service for jaundice.  Concern for delayed hemolytic transfusion reaction.  Had splenomegaly on exam.  Hematology was consulted. "  Direct/indirect  Nehemiah test was negative.  She was started on IV Solu-Medrol and switched to oral prednisone. H/H continues to decrease. D/w hematology - hold transfusion for now, try IV iron x 3 days. A GI  workup not done as this looks to be a stronger GYN case of fibroids. No reported changes in bowels. She has follow up appt on 9/26/23 with GYN. Transfuse if hemoglobin drops further to 5 or less.     Complete IV iron therapy tomorrow and check CBC. Prepare for dc home with follow up GYN, hematology.   Discharge H/H 6.3/21.1, platelets 91k. No blood transfusion given. She will complete her doxycyline prescription given prior to admission.        Goals of Care Treatment Preferences:  Code Status: Full Code      Consults:   Consults (From admission, onward)        Status Ordering Provider     Inpatient consult to Hematology  Once        Provider:  Shad Gibbons MD    Completed BALDOMERO OLIVA          No new Assessment & Plan notes have been filed under this hospital service since the last note was generated.  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 Go on 9/29/2023.    Specialty: Family Medicine  Why: hospital follow up at 1:30    this appt is with ENEDELIA Velazco  Contact information:  [address] [place] [address] [phone]           Khoobehi, Aurash, MD Follow up.    Specialties: Hematology and Oncology,  Hematology, Oncology  Why: referral placed, they should call you to schedule.  Contact information:  [place] [id number]  [phone]                       Patient Instructions:      Ambulatory referral/consult to Hematology / Oncology   Standing Status: Future   Referral Priority: Routine Referral Type: Consultation   Referral Reason: Specialty Services Required   Referred to Provider: KHOOBEHI, AURASH Requested Specialty: Hematology and Oncology   Number of Visits Requested: 1     Diet Adult Regular     Notify your health care provider if you experience any of the following:  temperature >100.4     Notify your health care provider if you experience any of the following:  persistent nausea and vomiting or diarrhea     Notify your health care provider if you experience any of the following:  persistent dizziness, light-headedness, or visual disturbances     Notify your health care provider if you experience any of the following:  severe persistent headache     Notify your health care provider if you experience any of the following:  difficulty breathing or increased cough     Activity as tolerated       Significant Diagnostic Studies: Labs:   BMP:   Recent Labs   Lab 09/25/23  0450   GLU 82      K 3.7      CO2 26   BUN 8   CREATININE 0.7   CALCIUM 8.1*   MG 2.0    and CBC   Recent Labs   Lab 09/25/23  0450   WBC 5.76   HGB 6.3*   HCT 21.1*   PLT 91*       Pending Diagnostic Studies:     Procedure Component Value Units Date/Time    Pathologist Interpretation Differential [[id number]] Collected: 09/21/23 1608    Order Status: Sent Lab Status: In process Updated: 09/21/23 1617    Specimen: Blood          Medications:  Reconciled Home Medications:      Medication List      START taking these medications    cyanocobalamin 1000 MCG tablet  Commonly known as: VITAMIN B-12  Take 1 tablet (1,000 mcg total) by mouth once daily.     folic acid 1 MG tablet  Commonly known as: FOLVITE  Take 1 tablet  (1 mg total) by mouth once daily.     multivitamin Tab  Take 1 tablet by mouth once daily.        CHANGE how you take these medications    omeprazole 40 MG capsule  Commonly known as: PRILOSEC  TAKE 1 CAPSULE BY MOUTH EVERY DAY  What changed: when to take this        CONTINUE taking these medications    doxycycline 100 MG Cap  Commonly known as: VIBRAMYCIN  Take 100 mg by mouth 2 (two) times daily.     ferrous sulfate 325 mg (65 mg iron) Tab tablet  Commonly known as: FEOSOL  Take 1 tablet by mouth every other day.     norethindrone 5 mg Tab  Commonly known as: AYGESTIN  Take 1 tablet by mouth 2 (two) times a day.        STOP taking these medications    ketoconazole 2 % shampoo  Commonly known as: NIZORAL     meloxicam 15 MG tablet  Commonly known as: MOBIC            Indwelling Lines/Drains at time of discharge:   Lines/Drains/Airways     None                 Time spent on the discharge of patient: 40 minutes         Wilma Garner MD  Department of Hospital Medicine  [facility]/Surg

## 2023-09-27 ENCOUNTER — OFFICE VISIT (OUTPATIENT)
Dept: HEMATOLOGY/ONCOLOGY | Facility: CLINIC | Age: 43
End: 2023-09-27
Payer: MEDICAID

## 2023-09-27 ENCOUNTER — TELEPHONE (OUTPATIENT)
Dept: PRIMARY CARE CLINIC | Facility: CLINIC | Age: 43
End: 2023-09-27
Payer: MEDICAID

## 2023-09-27 ENCOUNTER — PATIENT OUTREACH (OUTPATIENT)
Dept: ADMINISTRATIVE | Facility: CLINIC | Age: 43
End: 2023-09-27
Payer: MEDICAID

## 2023-09-27 VITALS
SYSTOLIC BLOOD PRESSURE: 108 MMHG | BODY MASS INDEX: 27.06 KG/M2 | WEIGHT: 172.38 LBS | DIASTOLIC BLOOD PRESSURE: 59 MMHG | TEMPERATURE: 99 F | HEART RATE: 98 BPM | HEIGHT: 67 IN | OXYGEN SATURATION: 100 % | RESPIRATION RATE: 18 BRPM

## 2023-09-27 DIAGNOSIS — D64.9 ANEMIA, UNSPECIFIED TYPE: Primary | ICD-10-CM

## 2023-09-27 DIAGNOSIS — D73.5 SPLENIC INFARCT: ICD-10-CM

## 2023-09-27 DIAGNOSIS — R16.1 SPLENOMEGALY: ICD-10-CM

## 2023-09-27 PROCEDURE — 99999 PR PBB SHADOW E&M-EST. PATIENT-LVL IV: CPT | Mod: PBBFAC,,, | Performed by: INTERNAL MEDICINE

## 2023-09-27 PROCEDURE — 99214 PR OFFICE/OUTPT VISIT, EST, LEVL IV, 30-39 MIN: ICD-10-PCS | Mod: S$PBB,,, | Performed by: INTERNAL MEDICINE

## 2023-09-27 PROCEDURE — 3074F PR MOST RECENT SYSTOLIC BLOOD PRESSURE < 130 MM HG: ICD-10-PCS | Mod: CPTII,,, | Performed by: INTERNAL MEDICINE

## 2023-09-27 PROCEDURE — 1111F PR DISCHARGE MEDS RECONCILED W/ CURRENT OUTPATIENT MED LIST: ICD-10-PCS | Mod: CPTII,,, | Performed by: INTERNAL MEDICINE

## 2023-09-27 PROCEDURE — 99214 OFFICE O/P EST MOD 30 MIN: CPT | Mod: S$PBB,,, | Performed by: INTERNAL MEDICINE

## 2023-09-27 PROCEDURE — 3008F PR BODY MASS INDEX (BMI) DOCUMENTED: ICD-10-PCS | Mod: CPTII,,, | Performed by: INTERNAL MEDICINE

## 2023-09-27 PROCEDURE — 1159F PR MEDICATION LIST DOCUMENTED IN MEDICAL RECORD: ICD-10-PCS | Mod: CPTII,,, | Performed by: INTERNAL MEDICINE

## 2023-09-27 PROCEDURE — 3078F DIAST BP <80 MM HG: CPT | Mod: CPTII,,, | Performed by: INTERNAL MEDICINE

## 2023-09-27 PROCEDURE — 3078F PR MOST RECENT DIASTOLIC BLOOD PRESSURE < 80 MM HG: ICD-10-PCS | Mod: CPTII,,, | Performed by: INTERNAL MEDICINE

## 2023-09-27 PROCEDURE — 99999 PR PBB SHADOW E&M-EST. PATIENT-LVL IV: ICD-10-PCS | Mod: PBBFAC,,, | Performed by: INTERNAL MEDICINE

## 2023-09-27 PROCEDURE — 1111F DSCHRG MED/CURRENT MED MERGE: CPT | Mod: CPTII,,, | Performed by: INTERNAL MEDICINE

## 2023-09-27 PROCEDURE — 3074F SYST BP LT 130 MM HG: CPT | Mod: CPTII,,, | Performed by: INTERNAL MEDICINE

## 2023-09-27 PROCEDURE — 1159F MED LIST DOCD IN RCRD: CPT | Mod: CPTII,,, | Performed by: INTERNAL MEDICINE

## 2023-09-27 PROCEDURE — 3008F BODY MASS INDEX DOCD: CPT | Mod: CPTII,,, | Performed by: INTERNAL MEDICINE

## 2023-09-27 PROCEDURE — 99214 OFFICE O/P EST MOD 30 MIN: CPT | Mod: PBBFAC,PN | Performed by: INTERNAL MEDICINE

## 2023-09-27 NOTE — TELEPHONE ENCOUNTER
Spoke to patient.  She will bring the paperwork to clear her for work when she comes to her appt on 10/4.

## 2023-09-27 NOTE — TELEPHONE ENCOUNTER
----- Message from Lyric Godinez sent at 9/27/2023 11:37 AM CDT -----  Contact: [phone] @ patient  Good morning patient would like a call back to discuss some paper work filled out from a hospital stay. Please give patient a call back [phone]

## 2023-09-27 NOTE — PROGRESS NOTES
Service Date:  23    Chief Complaint: Anemia (NP    anemia)    Giovanni Pena is a 43 y.o. female here with anemia.  Recently hospitalized after was felt to be possibly a transfusion reaction.  Patient had elevated bilirubin and worsening anemia following the blood transfusion.  The hyperbilirubinemia did resolve.  Anemia again fell, but this was felt to be dilutional as it improved after stopping IV fluids.  She is now here for follow-up.  She received 3 doses of IV iron in the hospital at 100 mg each.  She has no complaints me.  She still has some right upper quadrant pain.    Review of Systems   Constitutional: Negative.    HENT: Negative.     Eyes: Negative.    Respiratory: Negative.     Cardiovascular: Negative.    Gastrointestinal:  Positive for abdominal pain (Right upper quadrant).   Endocrine: Negative.    Genitourinary: Negative.    Musculoskeletal: Negative.    Integumentary:  Negative.   Neurological: Negative.    Hematological: Negative.    Psychiatric/Behavioral: Negative.          Current Outpatient Medications   Medication Instructions    cyanocobalamin (VITAMIN B-12) 1,000 mcg, Oral, Daily    doxycycline (VIBRAMYCIN) 100 mg, Oral, 2 times daily    ferrous sulfate (FEOSOL) 325 mg (65 mg iron) Tab tablet 1 tablet, Oral, Every other day    folic acid (FOLVITE) 1 mg, Oral, Daily    multivitamin Tab 1 tablet, Oral, Daily    norethindrone (AYGESTIN) 5 mg Tab 1 tablet, Oral, 2 times daily    omeprazole (PRILOSEC) 40 MG capsule TAKE 1 CAPSULE BY MOUTH EVERY DAY        Past Medical History:   Diagnosis Date    Embolism and thrombosis of renal vein     RA (rheumatoid arthritis)         Past Surgical History:   Procedure Laterality Date     SECTION  , , ,     CHOLECYSTECTOMY  2014    SLEEVE GASTROPLASTY  2021    in [place]    SURGICAL REMOVAL OF BUNION WITH OSTEOTOMY OF METATARSAL BONE Left 2022    Procedure: BUNIONECTOMY, WITH METATARSAL OSTEOTOMY  ;  Surgeon:  "Roshan Pyle DPM;  Location: [facility];  Service: Podiatry;  Laterality: Left;  distal osteotomy left 1st metatarsal    TUBAL LIGATION  2014        Family History   Problem Relation Age of Onset    Breast cancer Maternal Aunt     Hypertension Maternal Grandmother     Coronary artery disease Maternal Grandmother     Colon cancer Neg Hx     Ovarian cancer Neg Hx     Endometrial cancer Neg Hx        Social History     Tobacco Use    Smoking status: Never    Smokeless tobacco: Never   Substance Use Topics    Alcohol use: Yes     Comment: rare    Drug use: Never         Vitals:    09/27/23 1059   BP: (!) 108/59   Pulse: 98   Resp: 18   Temp: 98.5 °F (36.9 °C)        Physical Exam:  BP (!) 108/59 (BP Location: Right arm, Patient Position: Sitting, BP Method: Small (Automatic))   Pulse 98   Temp 98.5 °F (36.9 °C) (Temporal)   Resp 18   Ht 5' 7" (1.702 m)   Wt 78.2 kg (172 lb 6.4 oz)   LMP 09/05/2023   SpO2 100%   BMI 27.00 kg/m²     Physical Exam  Constitutional:       Appearance: Normal appearance.   HENT:      Head: Normocephalic and atraumatic.      Nose: Nose normal.      Mouth/Throat:      Mouth: Mucous membranes are moist.      Pharynx: Oropharynx is clear.   Eyes:      Conjunctiva/sclera: Conjunctivae normal.   Cardiovascular:      Rate and Rhythm: Normal rate and regular rhythm.      Heart sounds: Normal heart sounds.   Pulmonary:      Effort: Pulmonary effort is normal.      Breath sounds: Normal breath sounds.   Abdominal:      General: Abdomen is flat. Bowel sounds are normal.      Palpations: Abdomen is soft.   Musculoskeletal:         General: Normal range of motion.      Cervical back: Normal range of motion and neck supple.   Skin:     General: Skin is warm and dry.   Neurological:      General: No focal deficit present.      Mental Status: She is alert and oriented to person, place, and time. Mental status is at baseline.   Psychiatric:         Mood and Affect: Mood normal.          Labs:  Lab " Results   Component Value Date    WBC 7.0 09/26/2023    RBC 2.93 (L) 09/25/2023    HGB 7.4 (L) 09/26/2023    HCT 23.2 (L) 09/26/2023    MCV 69.0 (L) 09/26/2023    MCH 22.1 (L) 09/26/2023    MCHC 32.0 09/26/2023    RDW 21.1 (H) 09/26/2023    PLT 91 (L) 09/25/2023    MPV 8.4 09/26/2023    GRAN 3.7 09/25/2023    GRAN 64.3 09/25/2023    LYMPH 1.5 09/25/2023    LYMPH 26.4 09/25/2023    MONO 0.4 09/25/2023    MONO 6.8 09/25/2023    EOS 0.0 09/25/2023    BASO 0.02 09/25/2023    EOSINOPHIL 0.3 09/25/2023    BASOPHIL 0.3 09/25/2023     Sodium   Date Value Ref Range Status   09/25/2023 140 136 - 145 mmol/L Final     Potassium   Date Value Ref Range Status   09/25/2023 3.7 3.5 - 5.1 mmol/L Final     Chloride   Date Value Ref Range Status   09/25/2023 106 95 - 110 mmol/L Final     CO2   Date Value Ref Range Status   09/25/2023 26 23 - 29 mmol/L Final     Glucose   Date Value Ref Range Status   09/25/2023 82 70 - 110 mg/dL Final     BUN   Date Value Ref Range Status   09/25/2023 8 6 - 20 mg/dL Final     Creatinine   Date Value Ref Range Status   09/25/2023 0.7 0.5 - 1.4 mg/dL Final     Calcium   Date Value Ref Range Status   09/25/2023 8.1 (L) 8.7 - 10.5 mg/dL Final     Total Protein   Date Value Ref Range Status   09/25/2023 6.2 6.0 - 8.4 g/dL Final     Albumin   Date Value Ref Range Status   09/25/2023 2.7 (L) 3.5 - 5.2 g/dL Final     Total Bilirubin   Date Value Ref Range Status   09/25/2023 0.8 0.1 - 1.0 mg/dL Final     Comment:     For infants and newborns, interpretation of results should be based  on gestational age, weight and in agreement with clinical  observations.    Premature Infant recommended reference ranges:  Up to 24 hours.............<8.0 mg/dL  Up to 48 hours............<12.0 mg/dL  3-5 days..................<15.0 mg/dL  6-29 days.................<15.0 mg/dL       Alkaline Phosphatase   Date Value Ref Range Status   09/25/2023 84 55 - 135 U/L Final     AST   Date Value Ref Range Status   09/25/2023 17 10 - 40  U/L Final     ALT   Date Value Ref Range Status   09/25/2023 15 10 - 44 U/L Final     Anion Gap   Date Value Ref Range Status   09/25/2023 8 8 - 16 mmol/L Final     eGFR if    Date Value Ref Range Status   07/19/2022 >60.0 >60 mL/min/1.73 m^2 Final     eGFR if non    Date Value Ref Range Status   07/19/2022 >60.0 >60 mL/min/1.73 m^2 Final     Comment:     Calculation used to obtain the estimated glomerular filtration  rate (eGFR) is the CKD-EPI equation.          A/P:    Anemia  -does have iron deficiency  -check a CBC today and again in next week.  Also check ferritin next week.  -return to clinic in 1 week  -I am unsure if she really had a transfusion reaction or if it is all just from iron deficiency.    Splenomegaly   -unclear if this is due to transfusion reaction as well.  Will monitor for now and repeat ultrasound in the future.      Aurash Khoobehi, MD  Hematology and Oncology

## 2023-09-27 NOTE — PROGRESS NOTES
C3 nurse spoke with Giovanni Pena for a TCC post hospital discharge follow up call. The patient has a scheduled HOSFU appointment with Mora Hopper NP on 09/29/23 @ 1330.

## 2023-10-04 ENCOUNTER — OFFICE VISIT (OUTPATIENT)
Dept: PRIMARY CARE CLINIC | Facility: CLINIC | Age: 43
End: 2023-10-04
Payer: MEDICAID

## 2023-10-04 VITALS
HEART RATE: 72 BPM | HEIGHT: 67 IN | BODY MASS INDEX: 26.23 KG/M2 | TEMPERATURE: 98 F | DIASTOLIC BLOOD PRESSURE: 56 MMHG | RESPIRATION RATE: 16 BRPM | OXYGEN SATURATION: 99 % | SYSTOLIC BLOOD PRESSURE: 100 MMHG | WEIGHT: 167.13 LBS

## 2023-10-04 DIAGNOSIS — Z09 HOSPITAL DISCHARGE FOLLOW-UP: Primary | ICD-10-CM

## 2023-10-04 DIAGNOSIS — R17 JAUNDICE: ICD-10-CM

## 2023-10-04 DIAGNOSIS — M79.671 PAIN IN BOTH FEET: ICD-10-CM

## 2023-10-04 DIAGNOSIS — M79.672 PAIN IN BOTH FEET: ICD-10-CM

## 2023-10-04 DIAGNOSIS — T80.92XD BLOOD TRANSFUSION REACTION, SUBSEQUENT ENCOUNTER: ICD-10-CM

## 2023-10-04 PROBLEM — T80.92XA BLOOD TRANSFUSION REACTION: Status: ACTIVE | Noted: 2023-10-04

## 2023-10-04 PROCEDURE — 3008F BODY MASS INDEX DOCD: CPT | Mod: CPTII,,, | Performed by: STUDENT IN AN ORGANIZED HEALTH CARE EDUCATION/TRAINING PROGRAM

## 2023-10-04 PROCEDURE — 99999 PR PBB SHADOW E&M-EST. PATIENT-LVL III: CPT | Mod: PBBFAC,,, | Performed by: STUDENT IN AN ORGANIZED HEALTH CARE EDUCATION/TRAINING PROGRAM

## 2023-10-04 PROCEDURE — 99214 PR OFFICE/OUTPT VISIT, EST, LEVL IV, 30-39 MIN: ICD-10-PCS | Mod: S$PBB,,, | Performed by: STUDENT IN AN ORGANIZED HEALTH CARE EDUCATION/TRAINING PROGRAM

## 2023-10-04 PROCEDURE — 3074F SYST BP LT 130 MM HG: CPT | Mod: CPTII,,, | Performed by: STUDENT IN AN ORGANIZED HEALTH CARE EDUCATION/TRAINING PROGRAM

## 2023-10-04 PROCEDURE — 3008F PR BODY MASS INDEX (BMI) DOCUMENTED: ICD-10-PCS | Mod: CPTII,,, | Performed by: STUDENT IN AN ORGANIZED HEALTH CARE EDUCATION/TRAINING PROGRAM

## 2023-10-04 PROCEDURE — 1111F DSCHRG MED/CURRENT MED MERGE: CPT | Mod: CPTII,,, | Performed by: STUDENT IN AN ORGANIZED HEALTH CARE EDUCATION/TRAINING PROGRAM

## 2023-10-04 PROCEDURE — 1111F PR DISCHARGE MEDS RECONCILED W/ CURRENT OUTPATIENT MED LIST: ICD-10-PCS | Mod: CPTII,,, | Performed by: STUDENT IN AN ORGANIZED HEALTH CARE EDUCATION/TRAINING PROGRAM

## 2023-10-04 PROCEDURE — 3078F PR MOST RECENT DIASTOLIC BLOOD PRESSURE < 80 MM HG: ICD-10-PCS | Mod: CPTII,,, | Performed by: STUDENT IN AN ORGANIZED HEALTH CARE EDUCATION/TRAINING PROGRAM

## 2023-10-04 PROCEDURE — 99999 PR PBB SHADOW E&M-EST. PATIENT-LVL III: ICD-10-PCS | Mod: PBBFAC,,, | Performed by: STUDENT IN AN ORGANIZED HEALTH CARE EDUCATION/TRAINING PROGRAM

## 2023-10-04 PROCEDURE — 99214 OFFICE O/P EST MOD 30 MIN: CPT | Mod: S$PBB,,, | Performed by: STUDENT IN AN ORGANIZED HEALTH CARE EDUCATION/TRAINING PROGRAM

## 2023-10-04 PROCEDURE — 99213 OFFICE O/P EST LOW 20 MIN: CPT | Mod: PBBFAC,PN | Performed by: STUDENT IN AN ORGANIZED HEALTH CARE EDUCATION/TRAINING PROGRAM

## 2023-10-04 PROCEDURE — 3078F DIAST BP <80 MM HG: CPT | Mod: CPTII,,, | Performed by: STUDENT IN AN ORGANIZED HEALTH CARE EDUCATION/TRAINING PROGRAM

## 2023-10-04 PROCEDURE — 1159F PR MEDICATION LIST DOCUMENTED IN MEDICAL RECORD: ICD-10-PCS | Mod: CPTII,,, | Performed by: STUDENT IN AN ORGANIZED HEALTH CARE EDUCATION/TRAINING PROGRAM

## 2023-10-04 PROCEDURE — 1159F MED LIST DOCD IN RCRD: CPT | Mod: CPTII,,, | Performed by: STUDENT IN AN ORGANIZED HEALTH CARE EDUCATION/TRAINING PROGRAM

## 2023-10-04 PROCEDURE — 3074F PR MOST RECENT SYSTOLIC BLOOD PRESSURE < 130 MM HG: ICD-10-PCS | Mod: CPTII,,, | Performed by: STUDENT IN AN ORGANIZED HEALTH CARE EDUCATION/TRAINING PROGRAM

## 2023-10-04 NOTE — PROGRESS NOTES
"Subjective:           Patient ID: Giovanni Pena is a 43 y.o. female who presents today with a chief complaint of Hospital Follow Up  .    Chief Complaint:   Hospital Follow Up      History of Present Illness:    Giovanni Pena is a 43 y.o. female who presents today with a chief complaint of Hospital Follow Up  .      "  Admission Date: 9/21/2023  Hospital Length of Stay: 3 days  Discharge Date and Time: 09/25/2023  Attending Physician: No att. providers found   Discharging Provider: Wilma Garner MD  Primary Care Provider: Miquel Mishra MD     Primary Care Team: Networked reference to record PCT      HPI:   Ms. 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 It is moderate. It is associated with vomiting bilious fluid, dark urine, and LLQ abd pain. She denies fever, chills, chest pain, diarrhea, dizziness, palpitations, weakness, or LOC. She noticed the dark urine yesterday and the jaundice in there eyes this morning. She had a blood transfusion on 9/8/23 at an outside facility for acute blood loss anemia for menorrhagia associated with uterine fibroids. She just completed another menstrual cycle that lasted for about 10 days going through roughly 10 pads per day. In the ED, hgb 6.4 and Hct 21.5, Tbili 7, AST 60, ALT 20, Lipase WNL and alk phos WNL. CT abd/pelvis: "Interval development of splenomegaly and multiple peripheral hypodensities in the spleen suspicious for splenic infarct.  New compared to the prior study 03/28/2022 but otherwise indeterminate in age.  No fat stranding or fluid collections around the spleen to suggest this is acute." Dr. Gibbons was consulted per ED MD and agreed to consult on patient. Hospital medicine is consulted for admission.         * No surgery found *       Hospital Course: " "  Patient admitted to the hospital medicine service for jaundice.  Concern for delayed hemolytic transfusion reaction.  Had splenomegaly on exam.  Hematology was consulted.  Direct/indirect  Nehemiah test was negative.  She was started on IV Solu-Medrol and switched to oral prednisone. H/H continues to decrease. D/w hematology - hold transfusion for now, try IV iron x 3 days. A GI  workup not done as this looks to be a stronger GYN case of fibroids. No reported changes in bowels. She has follow up appt on 9/26/23 with GYN. Transfuse if hemoglobin drops further to 5 or less.      Complete IV iron therapy tomorrow and check CBC. Prepare for dc home with follow up GYN, hematology.   Discharge H/H 6.3/21.1, platelets 91k. No blood transfusion given. She will complete her doxycyline prescription given prior to admission.         Goals of Care Treatment Preferences:  Code Status: Full Code        Consults:          Consults (From admission, onward)         Status Ordering Provider       Inpatient consult to Hematology  Once        Provider:  Shad Gibbons MD    Completed BALDOMERO OLIVA             No new Assessment & Plan notes have been filed under this hospital service since the last note was generated.  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  Then presented to the ED on 9/21/23; vomiting bilious fluid, dark urine, and LLQ abd pain.      Was discharged with a hb of " "6.3, was advised by hospital providers that patient was used to these levels and would be okay to discharge.    Hb 6.7 on ED presentation.  Dropped as low at 5.6 and was back to 6.3% on discharge.  Did not get blood in hospital due to concern about reaction.  Had 3 iron infusions in hospital and is taking Iron supplement at home every other day.     Tbili has peaked at about 7.0, now down to 2.7.    States she is feeling well.  Is not eating very well. No appetites since leaving the hospital.    Was heme/onc last week, will see again tomorrow.     Paperwork for work, needing for days she missed while in the hospital.      States she needs to get a foot surgery and needs PCP authorization complete.      Review of Systems   Constitutional:  Positive for fatigue. Negative for chills, diaphoresis and fever.   Respiratory:  Negative for chest tightness and shortness of breath.    Cardiovascular:  Negative for chest pain and palpitations.   Gastrointestinal:  Negative for abdominal pain, diarrhea, nausea and vomiting.   Genitourinary:  Negative for difficulty urinating, dyspareunia and frequency.   Musculoskeletal:  Positive for arthralgias (foot pain).   Skin:  Negative for rash and wound.   Psychiatric/Behavioral:  Negative for sleep disturbance. The patient is nervous/anxious.            Objective:        Vitals:    10/04/23 0919   BP: (!) 100/56   BP Location: Right arm   Patient Position: Sitting   BP Method: Medium (Manual)   Pulse: 72   Resp: 16   Temp: 97.5 °F (36.4 °C)   TempSrc: Temporal   SpO2: 99%   Weight: 75.8 kg (167 lb 1.7 oz)   Height: 5' 7" (1.702 m)       Body mass index is 26.17 kg/m².      Physical Exam  Constitutional:       General: She is not in acute distress.     Appearance: Normal appearance.   HENT:      Head: Normocephalic and atraumatic.      Right Ear: External ear normal.      Left Ear: External ear normal.      Nose: No rhinorrhea.      Mouth/Throat:      Mouth: Mucous membranes are moist. " "  Eyes:      Extraocular Movements: Extraocular movements intact.      Conjunctiva/sclera: Conjunctivae normal.   Cardiovascular:      Rate and Rhythm: Normal rate.   Pulmonary:      Effort: Pulmonary effort is normal. No respiratory distress.   Musculoskeletal:      Right lower leg: No edema.      Left lower leg: No edema.   Skin:     Coloration: Skin is not jaundiced.      Findings: No bruising.   Neurological:      General: No focal deficit present.      Mental Status: She is alert and oriented to person, place, and time.      Motor: No weakness.      Gait: Gait normal.   Psychiatric:         Mood and Affect: Mood normal.             Lab Results   Component Value Date     09/25/2023    K 3.7 09/25/2023     09/25/2023    CO2 26 09/25/2023    BUN 8 09/25/2023    CREATININE 0.7 09/25/2023    GLUCOSE 87 01/01/2022    ANIONGAP 8 09/25/2023     Lab Results   Component Value Date    HGBA1C 5.0 11/18/2021     No results found for: "BNP", "BNPTRIAGEBLO"    Lab Results   Component Value Date    WBC 3.74 (L) 10/03/2023    HGB 8.0 (L) 10/03/2023    HCT 28.2 (L) 10/03/2023     (L) 10/03/2023    GRAN 2.7 10/03/2023    GRAN 71.2 10/03/2023     Lab Results   Component Value Date    CHOL 184 12/30/2021    CHOL 200 10/21/2020    HDL 38 (L) 12/30/2021    HDL 50 10/21/2020    LDLCALC 130 (H) 12/30/2021    LDLCALC 135 (H) 10/21/2020    TRIG 78 12/30/2021    TRIG 76 10/21/2020          Current Outpatient Medications:     cyanocobalamin (VITAMIN B-12) 1000 MCG tablet, Take 1 tablet (1,000 mcg total) by mouth once daily., Disp: 30 tablet, Rfl: 0    ferrous sulfate (FEOSOL) 325 mg (65 mg iron) Tab tablet, Take 1 tablet by mouth every other day., Disp: , Rfl:     folic acid (FOLVITE) 1 MG tablet, Take 1 tablet (1 mg total) by mouth once daily., Disp: 30 tablet, Rfl: 0    levonorgestreL (MIRENA) 21 mcg/24 hours (8 yrs) 52 mg IUD, 52,000 mcg by Intrauterine route., Disp: , Rfl:     multivitamin Tab, Take 1 tablet by mouth " once daily., Disp: 30 tablet, Rfl: 0    omeprazole (PRILOSEC) 40 MG capsule, TAKE 1 CAPSULE BY MOUTH EVERY DAY (Patient taking differently: Take 1 capsule by mouth once daily.), Disp: 90 capsule, Rfl: 0     Outpatient Encounter Medications as of 10/4/2023   Medication Sig Dispense Refill    cyanocobalamin (VITAMIN B-12) 1000 MCG tablet Take 1 tablet (1,000 mcg total) by mouth once daily. 30 tablet 0    ferrous sulfate (FEOSOL) 325 mg (65 mg iron) Tab tablet Take 1 tablet by mouth every other day.      folic acid (FOLVITE) 1 MG tablet Take 1 tablet (1 mg total) by mouth once daily. 30 tablet 0    levonorgestreL (MIRENA) 21 mcg/24 hours (8 yrs) 52 mg IUD 52,000 mcg by Intrauterine route.      multivitamin Tab Take 1 tablet by mouth once daily. 30 tablet 0    omeprazole (PRILOSEC) 40 MG capsule TAKE 1 CAPSULE BY MOUTH EVERY DAY (Patient taking differently: Take 1 capsule by mouth once daily.) 90 capsule 0    [DISCONTINUED] doxycycline (VIBRAMYCIN) 100 MG Cap Take 100 mg by mouth 2 (two) times daily.      [DISCONTINUED] norethindrone (AYGESTIN) 5 mg Tab Take 1 tablet by mouth 2 (two) times a day.       No facility-administered encounter medications on file as of 10/4/2023.          Assessment:       1. Hospital discharge follow-up    2. Jaundice    3. Blood transfusion reaction, subsequent encounter    4. Pain in both feet           Plan:       Hospital discharge follow-up    Jaundice    Blood transfusion reaction, subsequent encounter    Pain in both feet               Hospital F/u:   - patient having hospital follow-up appointment, was hospitalized from 09/21/2023 to 09/25/2023 for possible blood transfusion reaction, had significant anemia with elevated bilirubin.   - hospital staff attempted to avoid further transfusions, so had given patient IV iron 3 times common sent home with oral iron.   - discharge with hemoglobin of 6.3.     - patient feels fine this time.  Having foot pain but no significant dizziness or  decreased exercise tolerance.  No shortness of breath.  Has returned to work full-time.    Foot pain:   - patient with chronic foot pain, states she would talked to her podiatrist about surgery.  Would like to go forward with surgery if possible, but with where current blood counts are do not feel this would be appropriate.

## 2023-10-04 NOTE — PATIENT INSTRUCTIONS
Hospital F/u:   - patient having hospital follow-up appointment, was hospitalized from 09/21/2023 to 09/25/2023 for possible blood transfusion reaction, had significant anemia with elevated bilirubin.   - hospital staff attempted to avoid further transfusions, so had given patient IV iron 3 times common sent home with oral iron.   - discharge with hemoglobin of 6.3.     - patient feels fine this time.  Having foot pain but no significant dizziness or decreased exercise tolerance.  No shortness of breath.  Has returned to work full-time.    Foot pain:   - patient with chronic foot pain, states she would talked to her podiatrist about surgery.  Would like to go forward with surgery if possible, but with where current blood counts are do not feel this would be appropriate.

## 2023-10-05 ENCOUNTER — OFFICE VISIT (OUTPATIENT)
Dept: HEMATOLOGY/ONCOLOGY | Facility: CLINIC | Age: 43
End: 2023-10-05
Payer: MEDICAID

## 2023-10-05 VITALS
HEART RATE: 82 BPM | WEIGHT: 173.31 LBS | BODY MASS INDEX: 27.2 KG/M2 | TEMPERATURE: 99 F | SYSTOLIC BLOOD PRESSURE: 100 MMHG | RESPIRATION RATE: 16 BRPM | OXYGEN SATURATION: 100 % | DIASTOLIC BLOOD PRESSURE: 59 MMHG | HEIGHT: 67 IN

## 2023-10-05 DIAGNOSIS — D50.0 IRON DEFICIENCY ANEMIA DUE TO CHRONIC BLOOD LOSS: ICD-10-CM

## 2023-10-05 PROCEDURE — 99214 OFFICE O/P EST MOD 30 MIN: CPT | Mod: S$PBB,,, | Performed by: INTERNAL MEDICINE

## 2023-10-05 PROCEDURE — 1111F DSCHRG MED/CURRENT MED MERGE: CPT | Mod: CPTII,,, | Performed by: INTERNAL MEDICINE

## 2023-10-05 PROCEDURE — 99999 PR PBB SHADOW E&M-EST. PATIENT-LVL III: ICD-10-PCS | Mod: PBBFAC,,, | Performed by: INTERNAL MEDICINE

## 2023-10-05 PROCEDURE — 3008F BODY MASS INDEX DOCD: CPT | Mod: CPTII,,, | Performed by: INTERNAL MEDICINE

## 2023-10-05 PROCEDURE — 99213 OFFICE O/P EST LOW 20 MIN: CPT | Mod: PBBFAC,PN | Performed by: INTERNAL MEDICINE

## 2023-10-05 PROCEDURE — 1160F RVW MEDS BY RX/DR IN RCRD: CPT | Mod: CPTII,,, | Performed by: INTERNAL MEDICINE

## 2023-10-05 PROCEDURE — 3074F PR MOST RECENT SYSTOLIC BLOOD PRESSURE < 130 MM HG: ICD-10-PCS | Mod: CPTII,,, | Performed by: INTERNAL MEDICINE

## 2023-10-05 PROCEDURE — 99999 PR PBB SHADOW E&M-EST. PATIENT-LVL III: CPT | Mod: PBBFAC,,, | Performed by: INTERNAL MEDICINE

## 2023-10-05 PROCEDURE — 1159F MED LIST DOCD IN RCRD: CPT | Mod: CPTII,,, | Performed by: INTERNAL MEDICINE

## 2023-10-05 PROCEDURE — 3008F PR BODY MASS INDEX (BMI) DOCUMENTED: ICD-10-PCS | Mod: CPTII,,, | Performed by: INTERNAL MEDICINE

## 2023-10-05 PROCEDURE — 1159F PR MEDICATION LIST DOCUMENTED IN MEDICAL RECORD: ICD-10-PCS | Mod: CPTII,,, | Performed by: INTERNAL MEDICINE

## 2023-10-05 PROCEDURE — 3078F PR MOST RECENT DIASTOLIC BLOOD PRESSURE < 80 MM HG: ICD-10-PCS | Mod: CPTII,,, | Performed by: INTERNAL MEDICINE

## 2023-10-05 PROCEDURE — 3078F DIAST BP <80 MM HG: CPT | Mod: CPTII,,, | Performed by: INTERNAL MEDICINE

## 2023-10-05 PROCEDURE — 1111F PR DISCHARGE MEDS RECONCILED W/ CURRENT OUTPATIENT MED LIST: ICD-10-PCS | Mod: CPTII,,, | Performed by: INTERNAL MEDICINE

## 2023-10-05 PROCEDURE — 1160F PR REVIEW ALL MEDS BY PRESCRIBER/CLIN PHARMACIST DOCUMENTED: ICD-10-PCS | Mod: CPTII,,, | Performed by: INTERNAL MEDICINE

## 2023-10-05 PROCEDURE — 3074F SYST BP LT 130 MM HG: CPT | Mod: CPTII,,, | Performed by: INTERNAL MEDICINE

## 2023-10-05 PROCEDURE — 99214 PR OFFICE/OUTPT VISIT, EST, LEVL IV, 30-39 MIN: ICD-10-PCS | Mod: S$PBB,,, | Performed by: INTERNAL MEDICINE

## 2023-10-05 RX ORDER — SODIUM CHLORIDE 0.9 % (FLUSH) 0.9 %
10 SYRINGE (ML) INJECTION
Status: CANCELLED | OUTPATIENT
Start: 2023-10-05

## 2023-10-05 RX ORDER — DIPHENHYDRAMINE HYDROCHLORIDE 50 MG/ML
50 INJECTION INTRAMUSCULAR; INTRAVENOUS ONCE AS NEEDED
Status: CANCELLED | OUTPATIENT
Start: 2023-10-05

## 2023-10-05 RX ORDER — EPINEPHRINE 0.3 MG/.3ML
0.3 INJECTION SUBCUTANEOUS ONCE AS NEEDED
Status: CANCELLED | OUTPATIENT
Start: 2023-10-05

## 2023-10-05 RX ORDER — HEPARIN 100 UNIT/ML
500 SYRINGE INTRAVENOUS
Status: CANCELLED | OUTPATIENT
Start: 2023-10-05

## 2023-10-05 NOTE — PROGRESS NOTES
Service Date:  10/5/23    Chief Complaint: Anemia (Anemia unspecified/1wkfu/Labs)    Giovanni Pena is a 43 y.o. female here with anemia.  Recently hospitalized after was felt to be possibly a transfusion reaction.  Patient had elevated bilirubin and worsening anemia following the blood transfusion.  The hyperbilirubinemia did resolve.  Anemia again fell, but this was felt to be dilutional as it improved after stopping IV fluids.  She is now here for follow-up.  She received 3 doses of IV iron in the hospital at 100 mg each.      Doing well.  Abdominal pain improving.    Review of Systems   Constitutional: Negative.    HENT: Negative.     Eyes: Negative.    Respiratory: Negative.     Cardiovascular: Negative.    Gastrointestinal:  Positive for abdominal pain (Right upper quadrant).   Endocrine: Negative.    Genitourinary: Negative.    Musculoskeletal: Negative.    Integumentary:  Negative.   Neurological: Negative.    Hematological: Negative.    Psychiatric/Behavioral: Negative.          Current Outpatient Medications   Medication Instructions    cyanocobalamin (VITAMIN B-12) 1,000 mcg, Oral, Daily    ferrous sulfate (FEOSOL) 325 mg (65 mg iron) Tab tablet 1 tablet, Oral, Every other day    folic acid (FOLVITE) 1 mg, Oral, Daily    levonorgestreL (MIRENA) 52,000 mcg, Intrauterine    multivitamin Tab 1 tablet, Oral, Daily    omeprazole (PRILOSEC) 40 MG capsule TAKE 1 CAPSULE BY MOUTH EVERY DAY        Past Medical History:   Diagnosis Date    Embolism and thrombosis of renal vein     RA (rheumatoid arthritis)         Past Surgical History:   Procedure Laterality Date     SECTION  , , ,     CHOLECYSTECTOMY  2014    SLEEVE GASTROPLASTY  2021    in [place]    SURGICAL REMOVAL OF BUNION WITH OSTEOTOMY OF METATARSAL BONE Left 2022    Procedure: BUNIONECTOMY, WITH METATARSAL OSTEOTOMY  ;  Surgeon: Roshan Pyle DPM;  Location: [facility];  Service: Podiatry;  Laterality: Left;   "distal osteotomy left 1st metatarsal    TUBAL LIGATION  2014        Family History   Problem Relation Age of Onset    Breast cancer Maternal Aunt     Hypertension Maternal Grandmother     Coronary artery disease Maternal Grandmother     Colon cancer Neg Hx     Ovarian cancer Neg Hx     Endometrial cancer Neg Hx        Social History     Tobacco Use    Smoking status: Never    Smokeless tobacco: Never   Substance Use Topics    Alcohol use: Yes     Comment: rare    Drug use: Never         Vitals:    10/05/23 1307   BP: (!) 100/59   Pulse: 82   Resp: 16   Temp: 98.8 °F (37.1 °C)        Physical Exam:  BP (!) 100/59 (BP Location: Right arm, Patient Position: Sitting, BP Method: Medium (Automatic))   Pulse 82   Temp 98.8 °F (37.1 °C) (Temporal)   Resp 16   Ht 5' 7" (1.702 m)   Wt 78.6 kg (173 lb 4.5 oz)   LMP 09/05/2023 (Exact Date)   SpO2 100%   BMI 27.14 kg/m²     Physical Exam  Constitutional:       Appearance: Normal appearance.   HENT:      Head: Normocephalic and atraumatic.      Nose: Nose normal.      Mouth/Throat:      Mouth: Mucous membranes are moist.      Pharynx: Oropharynx is clear.   Eyes:      Conjunctiva/sclera: Conjunctivae normal.   Cardiovascular:      Rate and Rhythm: Normal rate and regular rhythm.      Heart sounds: Normal heart sounds.   Pulmonary:      Effort: Pulmonary effort is normal.      Breath sounds: Normal breath sounds.   Abdominal:      General: Abdomen is flat. Bowel sounds are normal.      Palpations: Abdomen is soft.   Musculoskeletal:         General: Normal range of motion.      Cervical back: Normal range of motion and neck supple.   Skin:     General: Skin is warm and dry.   Neurological:      General: No focal deficit present.      Mental Status: She is alert and oriented to person, place, and time. Mental status is at baseline.   Psychiatric:         Mood and Affect: Mood normal.          Labs:  Lab Results   Component Value Date    WBC 3.74 (L) 10/03/2023    RBC 3.48 " (L) 10/03/2023    HGB 8.0 (L) 10/03/2023    HCT 28.2 (L) 10/03/2023    MCV 81 (L) 10/03/2023    MCH 23.0 (L) 10/03/2023    MCHC 28.4 (L) 10/03/2023    RDW 31.8 (H) 10/03/2023     (L) 10/03/2023    MPV 10.0 10/03/2023    GRAN 2.7 10/03/2023    GRAN 71.2 10/03/2023    LYMPH 0.8 (L) 10/03/2023    LYMPH 20.3 10/03/2023    MONO 0.3 10/03/2023    MONO 6.7 10/03/2023    EOS 0.0 10/03/2023    BASO 0.02 10/03/2023    EOSINOPHIL 0.8 10/03/2023    BASOPHIL 0.5 10/03/2023     Sodium   Date Value Ref Range Status   09/25/2023 140 136 - 145 mmol/L Final     Potassium   Date Value Ref Range Status   09/25/2023 3.7 3.5 - 5.1 mmol/L Final     Chloride   Date Value Ref Range Status   09/25/2023 106 95 - 110 mmol/L Final     CO2   Date Value Ref Range Status   09/25/2023 26 23 - 29 mmol/L Final     Glucose   Date Value Ref Range Status   09/25/2023 82 70 - 110 mg/dL Final     BUN   Date Value Ref Range Status   09/25/2023 8 6 - 20 mg/dL Final     Creatinine   Date Value Ref Range Status   09/25/2023 0.7 0.5 - 1.4 mg/dL Final     Calcium   Date Value Ref Range Status   09/25/2023 8.1 (L) 8.7 - 10.5 mg/dL Final     Total Protein   Date Value Ref Range Status   09/25/2023 6.2 6.0 - 8.4 g/dL Final     Albumin   Date Value Ref Range Status   09/25/2023 2.7 (L) 3.5 - 5.2 g/dL Final     Total Bilirubin   Date Value Ref Range Status   09/25/2023 0.8 0.1 - 1.0 mg/dL Final     Comment:     For infants and newborns, interpretation of results should be based  on gestational age, weight and in agreement with clinical  observations.    Premature Infant recommended reference ranges:  Up to 24 hours.............<8.0 mg/dL  Up to 48 hours............<12.0 mg/dL  3-5 days..................<15.0 mg/dL  6-29 days.................<15.0 mg/dL       Alkaline Phosphatase   Date Value Ref Range Status   09/25/2023 84 55 - 135 U/L Final     AST   Date Value Ref Range Status   09/25/2023 17 10 - 40 U/L Final     ALT   Date Value Ref Range Status    09/25/2023 15 10 - 44 U/L Final     Anion Gap   Date Value Ref Range Status   09/25/2023 8 8 - 16 mmol/L Final     eGFR if    Date Value Ref Range Status   07/19/2022 >60.0 >60 mL/min/1.73 m^2 Final     eGFR if non    Date Value Ref Range Status   07/19/2022 >60.0 >60 mL/min/1.73 m^2 Final     Comment:     Calculation used to obtain the estimated glomerular filtration  rate (eGFR) is the CKD-EPI equation.          A/P:    Anemia  -does have iron deficiency due to menorrhagia  -will give 5 doses of 200 mg IV Venofer  -hemoglobin stable   -recheck after IV iron has been given    Splenomegaly   -unclear if this is due to transfusion reaction as well.  Will monitor for now and repeat ultrasound in the future.      Aurash Khoobehi, MD  Hematology and Oncology

## 2023-10-09 ENCOUNTER — PATIENT MESSAGE (OUTPATIENT)
Dept: HEMATOLOGY/ONCOLOGY | Facility: CLINIC | Age: 43
End: 2023-10-09
Payer: MEDICAID

## 2023-10-11 DIAGNOSIS — R07.89 MID STERNAL CHEST PAIN: ICD-10-CM

## 2023-10-11 DIAGNOSIS — K44.9 HIATAL HERNIA: ICD-10-CM

## 2023-10-11 RX ORDER — OMEPRAZOLE 40 MG/1
40 CAPSULE, DELAYED RELEASE ORAL DAILY
Qty: 90 CAPSULE | Refills: 3 | Status: SHIPPED | OUTPATIENT
Start: 2023-10-11 | End: 2023-11-15

## 2023-10-11 NOTE — TELEPHONE ENCOUNTER
No care due was identified.  [facility] Embedded Care Due Messages. Reference number: [id number].   10/11/2023 3:37:04 PM CDT

## 2023-10-12 ENCOUNTER — OFFICE VISIT (OUTPATIENT)
Dept: OBSTETRICS AND GYNECOLOGY | Facility: CLINIC | Age: 43
End: 2023-10-12
Payer: MEDICAID

## 2023-10-12 VITALS
BODY MASS INDEX: 27.93 KG/M2 | HEIGHT: 67 IN | SYSTOLIC BLOOD PRESSURE: 89 MMHG | DIASTOLIC BLOOD PRESSURE: 56 MMHG | WEIGHT: 177.94 LBS

## 2023-10-12 DIAGNOSIS — N92.2 EXCESSIVE MENSTRUATION AT PUBERTY: ICD-10-CM

## 2023-10-12 PROCEDURE — 99213 OFFICE O/P EST LOW 20 MIN: CPT | Mod: PBBFAC,PN | Performed by: OBSTETRICS & GYNECOLOGY

## 2023-10-12 PROCEDURE — 99999 PR PBB SHADOW E&M-EST. PATIENT-LVL III: ICD-10-PCS | Mod: PBBFAC,,, | Performed by: OBSTETRICS & GYNECOLOGY

## 2023-10-12 PROCEDURE — 99999 PR PBB SHADOW E&M-EST. PATIENT-LVL III: CPT | Mod: PBBFAC,,, | Performed by: OBSTETRICS & GYNECOLOGY

## 2023-10-13 ENCOUNTER — TELEPHONE (OUTPATIENT)
Dept: PRIMARY CARE CLINIC | Facility: CLINIC | Age: 43
End: 2023-10-13
Payer: MEDICAID

## 2023-10-13 NOTE — TELEPHONE ENCOUNTER
----- Message from Vanessa Ridley sent at 10/13/2023 12:16 PM CDT -----  Contact: [phone]  Per pt, she is requesting a prior authorization for a refill on her omeprazole (PRILOSEC) 40 MG capsule 90 capsule.    Pt is using   [facility]/pharmacy #[id number] - [place], [place] - [address]  [address]  [place] [address]  Phone: [phone] Fax: [phone]                  Thank you

## 2023-10-18 ENCOUNTER — PATIENT MESSAGE (OUTPATIENT)
Dept: CARDIOLOGY | Facility: CLINIC | Age: 43
End: 2023-10-18
Payer: MEDICAID

## 2023-11-02 ENCOUNTER — INFUSION (OUTPATIENT)
Dept: INFUSION THERAPY | Facility: HOSPITAL | Age: 43
End: 2023-11-02
Attending: INTERNAL MEDICINE
Payer: MEDICAID

## 2023-11-02 VITALS
RESPIRATION RATE: 18 BRPM | BODY MASS INDEX: 28.39 KG/M2 | SYSTOLIC BLOOD PRESSURE: 114 MMHG | DIASTOLIC BLOOD PRESSURE: 56 MMHG | WEIGHT: 180.88 LBS | OXYGEN SATURATION: 100 % | TEMPERATURE: 97 F | HEART RATE: 73 BPM | HEIGHT: 67 IN

## 2023-11-02 DIAGNOSIS — D50.0 IRON DEFICIENCY ANEMIA DUE TO CHRONIC BLOOD LOSS: Primary | ICD-10-CM

## 2023-11-02 PROCEDURE — 96365 THER/PROPH/DIAG IV INF INIT: CPT

## 2023-11-02 PROCEDURE — 63600175 PHARM REV CODE 636 W HCPCS: Performed by: INTERNAL MEDICINE

## 2023-11-02 PROCEDURE — 25000003 PHARM REV CODE 250: Performed by: INTERNAL MEDICINE

## 2023-11-02 RX ORDER — EPINEPHRINE 0.3 MG/.3ML
0.3 INJECTION SUBCUTANEOUS ONCE AS NEEDED
Status: CANCELLED | OUTPATIENT
Start: 2023-11-09

## 2023-11-02 RX ORDER — SODIUM CHLORIDE 0.9 % (FLUSH) 0.9 %
10 SYRINGE (ML) INJECTION
Status: CANCELLED | OUTPATIENT
Start: 2023-11-09

## 2023-11-02 RX ORDER — SODIUM CHLORIDE 0.9 % (FLUSH) 0.9 %
10 SYRINGE (ML) INJECTION
Status: DISCONTINUED | OUTPATIENT
Start: 2023-11-02 | End: 2023-11-02 | Stop reason: HOSPADM

## 2023-11-02 RX ORDER — DIPHENHYDRAMINE HYDROCHLORIDE 50 MG/ML
50 INJECTION INTRAMUSCULAR; INTRAVENOUS ONCE AS NEEDED
Status: CANCELLED | OUTPATIENT
Start: 2023-11-09

## 2023-11-02 RX ORDER — HEPARIN 100 UNIT/ML
500 SYRINGE INTRAVENOUS
Status: CANCELLED | OUTPATIENT
Start: 2023-11-09

## 2023-11-02 RX ADMIN — IRON SUCROSE 200 MG: 20 INJECTION, SOLUTION INTRAVENOUS at 11:11

## 2023-11-02 RX ADMIN — SODIUM CHLORIDE: 0.9 INJECTION, SOLUTION INTRAVENOUS at 11:11

## 2023-11-02 NOTE — PLAN OF CARE
Problem: Fatigue  Goal: Improved Activity Tolerance  Intervention: Promote Improved Energy  Flowsheets (Taken 11/2/2023 1324)  Fatigue Management: fatigue-related activity identified  Activity Management: Ambulated -L4

## 2023-11-09 ENCOUNTER — INFUSION (OUTPATIENT)
Dept: INFUSION THERAPY | Facility: HOSPITAL | Age: 43
End: 2023-11-09
Attending: INTERNAL MEDICINE
Payer: MEDICAID

## 2023-11-09 VITALS
RESPIRATION RATE: 16 BRPM | HEART RATE: 92 BPM | HEIGHT: 67 IN | SYSTOLIC BLOOD PRESSURE: 92 MMHG | TEMPERATURE: 99 F | BODY MASS INDEX: 28.93 KG/M2 | DIASTOLIC BLOOD PRESSURE: 60 MMHG | WEIGHT: 184.31 LBS | OXYGEN SATURATION: 100 %

## 2023-11-09 DIAGNOSIS — D50.0 IRON DEFICIENCY ANEMIA DUE TO CHRONIC BLOOD LOSS: Primary | ICD-10-CM

## 2023-11-09 PROCEDURE — 96365 THER/PROPH/DIAG IV INF INIT: CPT

## 2023-11-09 PROCEDURE — 63600175 PHARM REV CODE 636 W HCPCS: Performed by: INTERNAL MEDICINE

## 2023-11-09 PROCEDURE — 25000003 PHARM REV CODE 250: Performed by: INTERNAL MEDICINE

## 2023-11-09 RX ORDER — SODIUM CHLORIDE 0.9 % (FLUSH) 0.9 %
10 SYRINGE (ML) INJECTION
Status: CANCELLED | OUTPATIENT
Start: 2023-11-16

## 2023-11-09 RX ORDER — HEPARIN 100 UNIT/ML
500 SYRINGE INTRAVENOUS
Status: CANCELLED | OUTPATIENT
Start: 2023-11-16

## 2023-11-09 RX ORDER — DIPHENHYDRAMINE HYDROCHLORIDE 50 MG/ML
50 INJECTION INTRAMUSCULAR; INTRAVENOUS ONCE AS NEEDED
Status: CANCELLED | OUTPATIENT
Start: 2023-11-16

## 2023-11-09 RX ORDER — SODIUM CHLORIDE 0.9 % (FLUSH) 0.9 %
10 SYRINGE (ML) INJECTION
Status: DISCONTINUED | OUTPATIENT
Start: 2023-11-09 | End: 2023-11-09 | Stop reason: HOSPADM

## 2023-11-09 RX ORDER — EPINEPHRINE 0.3 MG/.3ML
0.3 INJECTION SUBCUTANEOUS ONCE AS NEEDED
Status: CANCELLED | OUTPATIENT
Start: 2023-11-16

## 2023-11-09 RX ADMIN — IRON SUCROSE 200 MG: 20 INJECTION, SOLUTION INTRAVENOUS at 11:11

## 2023-11-09 RX ADMIN — SODIUM CHLORIDE: 0.9 INJECTION, SOLUTION INTRAVENOUS at 11:11

## 2023-11-09 NOTE — PLAN OF CARE
Problem: Anemia  Goal: Anemia Symptom Improvement  Outcome: Ongoing, Progressing  Intervention: Monitor and Manage Anemia  Flowsheets (Taken 11/9/2023 1347)  Oral Nutrition Promotion: rest periods promoted  Fatigue Management:   activity schedule adjusted   fatigue-related activity identified   frequent rest breaks encouraged

## 2023-11-15 ENCOUNTER — E-VISIT (OUTPATIENT)
Dept: FAMILY MEDICINE | Facility: CLINIC | Age: 43
End: 2023-11-15
Payer: MEDICAID

## 2023-11-15 DIAGNOSIS — K21.9 GASTROESOPHAGEAL REFLUX DISEASE, UNSPECIFIED WHETHER ESOPHAGITIS PRESENT: Primary | ICD-10-CM

## 2023-11-15 DIAGNOSIS — R12 HEARTBURN: ICD-10-CM

## 2023-11-15 PROCEDURE — 99422 OL DIG E/M SVC 11-20 MIN: CPT | Mod: ,,, | Performed by: STUDENT IN AN ORGANIZED HEALTH CARE EDUCATION/TRAINING PROGRAM

## 2023-11-15 PROCEDURE — 99422 PR E&M, ONLINE DIGIT, EST, < 7 DAYS,  11-20 MINS: ICD-10-PCS | Mod: ,,, | Performed by: STUDENT IN AN ORGANIZED HEALTH CARE EDUCATION/TRAINING PROGRAM

## 2023-11-15 RX ORDER — PANTOPRAZOLE SODIUM 40 MG/1
40 TABLET, DELAYED RELEASE ORAL DAILY
Qty: 30 TABLET | Refills: 2 | Status: SHIPPED | OUTPATIENT
Start: 2023-11-15 | End: 2024-02-26

## 2023-11-15 NOTE — PROGRESS NOTES
Patient ID: Giovanni Pena is a 43 y.o. female.    Chief Complaint: Heartburn (Entered automatically based on patient selection in Patient Portal.)          274}  The patient initiated a request through [employer] on 11/15/2023 for evaluation and management with a chief complaint of Heartburn (Entered automatically based on patient selection in Patient Portal.)     I evaluated the questionnaire submission on 11/15/2023 .    Ohs Peq Evisit Heartburn    11/15/2023 12:03 PM CST - Filed by Patient   Do you agree to participate in an E-Visit? Yes   If you have any of the following symptoms, please present to your local ER or call 911:  I acknowledge   What is the main issue that you would like for your doctor to address today? Acid Reflux/Heartburn   Are you able to take your vital signs? No   Are you currently pregnant, could you be pregnant, or are you breast feeding? None of the above   How long have you had heartburn? More than a week and less than 4 weeks   How often do you experience heartburn? Several times a day   Where do you feel the heartburn? In the middle of my chest;  In my stomach   How long does your heartburn last? I have it all the time   Does your heartburn wake you from sleep? Yes   Does your heartburn limit your ability to do thing you need to do? Yes   Does your heartburn change depending on whether you are sitting, standing, or lying down? Yes   Which of the following are you experiencing: Frequent belching;  Stomach fluid entering the back of your throat   Do you have any of the following? None of the above   Do you have trouble or pain with  swallowing? No   While eating, do you feel full quicker than usual? No   Do you have any of the following? None of the above   Which of the following makes the heartburn worse? Eating certain foods;  Lying down   Do you have any of the following? None of the above   Have you lost weight lately without trying? No   Have you ever been told you have the  following? Hiatal hernia   Have you seen a healthcare provider for your heartburn? I have never seen a provider for heartburn   Have you taken any medicines to relieve your heartburn in the past? Antacids (Tums, Rolaids, Maalox, other)   Have the medicines provided relief? Yes   Have you had any of the following for heartburn? 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   Latest known visit with results is:   Lab Visit on 10/03/2023   Component Date Value Ref Range Status    WBC 10/03/2023 3.74 (L)  3.90 - 12.70 K/uL Final    RBC 10/03/2023 3.48 (L)  4.00 - 5.40 M/uL Final    Hemoglobin 10/03/2023 8.0 (L)  12.0 - 16.0 g/dL Final    Hematocrit 10/03/2023 28.2 (L)  37.0 - 48.5 % Final    MCV 10/03/2023 81 (L)  82 - 98 fL Final    Delta check review    MCH 10/03/2023 23.0 (L)  27.0 - 31.0 pg Final    MCHC 10/03/2023 28.4 (L)  32.0 - 36.0 g/dL Final    RDW 10/03/2023 31.8 (H)  11.5 - 14.5 % Final    Platelets 10/03/2023 147 (L)  150 - 450 K/uL Final    MPV 10/03/2023 10.0  9.2 - 12.9 fL Final    Immature Granulocytes 10/03/2023 0.5  0.0 - 0.5 % Final    Gran # (ANC) 10/03/2023 2.7  1.8 - 7.7 K/uL Final    Immature Grans (Abs) 10/03/2023 0.02  0.00 - 0.04 K/uL Final    Comment: Mild elevation in immature  granulocytes is non specific and   can be seen in a variety of conditions including stress response,   acute inflammation, trauma and pregnancy. Correlation with other   laboratory and clinical findings is essential.      Lymph # 10/03/2023 0.8 (L)  1.0 - 4.8 K/uL Final    Mono # 10/03/2023 0.3  0.3 - 1.0 K/uL Final    Eos # 10/03/2023 0.0  0.0 - 0.5 K/uL Final    Baso # 10/03/2023 0.02  0.00 - 0.20 K/uL Final    nRBC 10/03/2023 0  0 /100 WBC Final    Gran % 10/03/2023 71.2  38.0 - 73.0 % Final    Lymph % 10/03/2023 20.3  18.0 - 48.0 % Final    Mono % 10/03/2023 6.7  4.0 - 15.0 % Final    Eosinophil % 10/03/2023 0.8  0.0 - 8.0 % Final    Basophil % 10/03/2023 0.5  0.0 - 1.9 % Final    Differential Method 10/03/2023 Automated   Final    Ferritin 10/03/2023 77  20.0 - 300.0 ng/mL Final     The 10-year ASCVD risk score (Risa MARTÍNEZ, et al., 2019) is: 0.3%    Values used to calculate the score:      Age: 43 years      Sex: Female      Is Non- : Yes      Diabetic: No      Tobacco smoker: No      Systolic Blood Pressure: 92 mmHg      Is BP treated: No      HDL Cholesterol: 38 mg/dL      Total Cholesterol: 184 mg/dL      Encounter Diagnoses   Name Primary?    Heartburn     Gastroesophageal reflux disease, unspecified whether esophagitis present Yes        Orders Placed This Encounter   Procedures    H. pylori Antibody, IgG     Standing Status:   Future     Standing Expiration Date:   11/14/2024      Medications Ordered This Encounter   Medications    pantoprazole (PROTONIX) 40 MG tablet     Sig: Take 1 tablet (40 mg total) by mouth once daily.     Dispense:  30 tablet     Refill:  2        E-Visit Time Tracking:    Day 1 Time (in minutes): 12     Total Time (in minutes): 12       274}

## 2023-11-28 ENCOUNTER — TELEPHONE (OUTPATIENT)
Dept: PRIMARY CARE CLINIC | Facility: CLINIC | Age: 43
End: 2023-11-28
Payer: MEDICAID

## 2023-11-28 NOTE — TELEPHONE ENCOUNTER
----- Message from Juliette Jeong sent at 11/28/2023 11:09 AM CST -----  Contact: Patient, [phone]  Calling because she needs lab orders for a procedure she is having out of town. Please advise. Thanks.

## 2023-11-30 ENCOUNTER — INFUSION (OUTPATIENT)
Dept: INFUSION THERAPY | Facility: HOSPITAL | Age: 43
End: 2023-11-30
Attending: INTERNAL MEDICINE
Payer: MEDICAID

## 2023-11-30 VITALS
RESPIRATION RATE: 18 BRPM | HEART RATE: 67 BPM | OXYGEN SATURATION: 99 % | SYSTOLIC BLOOD PRESSURE: 100 MMHG | BODY MASS INDEX: 28.83 KG/M2 | DIASTOLIC BLOOD PRESSURE: 62 MMHG | TEMPERATURE: 98 F | HEIGHT: 67 IN | WEIGHT: 183.69 LBS

## 2023-11-30 DIAGNOSIS — D50.0 IRON DEFICIENCY ANEMIA DUE TO CHRONIC BLOOD LOSS: Primary | ICD-10-CM

## 2023-11-30 PROCEDURE — 96365 THER/PROPH/DIAG IV INF INIT: CPT

## 2023-11-30 PROCEDURE — 63600175 PHARM REV CODE 636 W HCPCS: Performed by: INTERNAL MEDICINE

## 2023-11-30 PROCEDURE — 25000003 PHARM REV CODE 250: Performed by: INTERNAL MEDICINE

## 2023-11-30 RX ORDER — HEPARIN 100 UNIT/ML
500 SYRINGE INTRAVENOUS
OUTPATIENT
Start: 2023-12-07

## 2023-11-30 RX ORDER — EPINEPHRINE 0.3 MG/.3ML
0.3 INJECTION SUBCUTANEOUS ONCE AS NEEDED
Status: CANCELLED | OUTPATIENT
Start: 2023-12-07

## 2023-11-30 RX ORDER — SODIUM CHLORIDE 0.9 % (FLUSH) 0.9 %
10 SYRINGE (ML) INJECTION
Status: CANCELLED | OUTPATIENT
Start: 2023-12-07

## 2023-11-30 RX ORDER — DIPHENHYDRAMINE HYDROCHLORIDE 50 MG/ML
50 INJECTION INTRAMUSCULAR; INTRAVENOUS ONCE AS NEEDED
Status: CANCELLED | OUTPATIENT
Start: 2023-12-07

## 2023-11-30 RX ORDER — SODIUM CHLORIDE 0.9 % (FLUSH) 0.9 %
10 SYRINGE (ML) INJECTION
Status: DISCONTINUED | OUTPATIENT
Start: 2023-11-30 | End: 2023-11-30 | Stop reason: HOSPADM

## 2023-11-30 RX ORDER — DIPHENHYDRAMINE HYDROCHLORIDE 50 MG/ML
50 INJECTION INTRAMUSCULAR; INTRAVENOUS ONCE AS NEEDED
Status: DISCONTINUED | OUTPATIENT
Start: 2023-11-30 | End: 2023-11-30 | Stop reason: HOSPADM

## 2023-11-30 RX ORDER — EPINEPHRINE 0.3 MG/.3ML
0.3 INJECTION SUBCUTANEOUS ONCE AS NEEDED
Status: DISCONTINUED | OUTPATIENT
Start: 2023-11-30 | End: 2023-11-30 | Stop reason: HOSPADM

## 2023-11-30 RX ADMIN — SODIUM CHLORIDE: 0.9 INJECTION, SOLUTION INTRAVENOUS at 11:11

## 2023-11-30 RX ADMIN — IRON SUCROSE 200 MG: 20 INJECTION, SOLUTION INTRAVENOUS at 11:11

## 2023-11-30 NOTE — PLAN OF CARE
Problem: Anemia  Goal: Anemia Symptom Improvement  Outcome: Ongoing, Progressing  Intervention: Monitor and Manage Anemia  Flowsheets (Taken 11/30/2023 1155)  Fatigue Management: frequent rest breaks encouraged

## 2023-12-11 ENCOUNTER — OFFICE VISIT (OUTPATIENT)
Dept: PRIMARY CARE CLINIC | Facility: CLINIC | Age: 43
End: 2023-12-11
Payer: MEDICAID

## 2023-12-11 VITALS
RESPIRATION RATE: 16 BRPM | OXYGEN SATURATION: 97 % | HEIGHT: 67 IN | WEIGHT: 183.88 LBS | SYSTOLIC BLOOD PRESSURE: 100 MMHG | HEART RATE: 74 BPM | DIASTOLIC BLOOD PRESSURE: 60 MMHG | BODY MASS INDEX: 28.86 KG/M2 | TEMPERATURE: 98 F

## 2023-12-11 DIAGNOSIS — I81 PORTAL VEIN THROMBOSIS: ICD-10-CM

## 2023-12-11 DIAGNOSIS — E74.819 DISORDERS OF GLUCOSE TRANSPORT, UNSPECIFIED: ICD-10-CM

## 2023-12-11 DIAGNOSIS — Z01.818 PREOP EXAMINATION: Primary | ICD-10-CM

## 2023-12-11 DIAGNOSIS — D50.9 MICROCYTIC HYPOCHROMIC ANEMIA: ICD-10-CM

## 2023-12-11 DIAGNOSIS — D73.5 SPLENIC INFARCT: ICD-10-CM

## 2023-12-11 DIAGNOSIS — K21.9 GASTROESOPHAGEAL REFLUX DISEASE, UNSPECIFIED WHETHER ESOPHAGITIS PRESENT: ICD-10-CM

## 2023-12-11 DIAGNOSIS — Z00.00 ANNUAL PHYSICAL EXAM: ICD-10-CM

## 2023-12-11 PROCEDURE — 99214 OFFICE O/P EST MOD 30 MIN: CPT | Mod: S$PBB,,, | Performed by: STUDENT IN AN ORGANIZED HEALTH CARE EDUCATION/TRAINING PROGRAM

## 2023-12-11 PROCEDURE — 3078F DIAST BP <80 MM HG: CPT | Mod: CPTII,,, | Performed by: STUDENT IN AN ORGANIZED HEALTH CARE EDUCATION/TRAINING PROGRAM

## 2023-12-11 PROCEDURE — 99999 PR PBB SHADOW E&M-EST. PATIENT-LVL IV: CPT | Mod: PBBFAC,,, | Performed by: STUDENT IN AN ORGANIZED HEALTH CARE EDUCATION/TRAINING PROGRAM

## 2023-12-11 PROCEDURE — 3074F PR MOST RECENT SYSTOLIC BLOOD PRESSURE < 130 MM HG: ICD-10-PCS | Mod: CPTII,,, | Performed by: STUDENT IN AN ORGANIZED HEALTH CARE EDUCATION/TRAINING PROGRAM

## 2023-12-11 PROCEDURE — 3008F BODY MASS INDEX DOCD: CPT | Mod: CPTII,,, | Performed by: STUDENT IN AN ORGANIZED HEALTH CARE EDUCATION/TRAINING PROGRAM

## 2023-12-11 PROCEDURE — 99999 PR PBB SHADOW E&M-EST. PATIENT-LVL IV: ICD-10-PCS | Mod: PBBFAC,,, | Performed by: STUDENT IN AN ORGANIZED HEALTH CARE EDUCATION/TRAINING PROGRAM

## 2023-12-11 PROCEDURE — 1160F PR REVIEW ALL MEDS BY PRESCRIBER/CLIN PHARMACIST DOCUMENTED: ICD-10-PCS | Mod: CPTII,,, | Performed by: STUDENT IN AN ORGANIZED HEALTH CARE EDUCATION/TRAINING PROGRAM

## 2023-12-11 PROCEDURE — 1160F RVW MEDS BY RX/DR IN RCRD: CPT | Mod: CPTII,,, | Performed by: STUDENT IN AN ORGANIZED HEALTH CARE EDUCATION/TRAINING PROGRAM

## 2023-12-11 PROCEDURE — 3074F SYST BP LT 130 MM HG: CPT | Mod: CPTII,,, | Performed by: STUDENT IN AN ORGANIZED HEALTH CARE EDUCATION/TRAINING PROGRAM

## 2023-12-11 PROCEDURE — 3078F PR MOST RECENT DIASTOLIC BLOOD PRESSURE < 80 MM HG: ICD-10-PCS | Mod: CPTII,,, | Performed by: STUDENT IN AN ORGANIZED HEALTH CARE EDUCATION/TRAINING PROGRAM

## 2023-12-11 PROCEDURE — 1159F PR MEDICATION LIST DOCUMENTED IN MEDICAL RECORD: ICD-10-PCS | Mod: CPTII,,, | Performed by: STUDENT IN AN ORGANIZED HEALTH CARE EDUCATION/TRAINING PROGRAM

## 2023-12-11 PROCEDURE — 99214 PR OFFICE/OUTPT VISIT, EST, LEVL IV, 30-39 MIN: ICD-10-PCS | Mod: S$PBB,,, | Performed by: STUDENT IN AN ORGANIZED HEALTH CARE EDUCATION/TRAINING PROGRAM

## 2023-12-11 PROCEDURE — 99214 OFFICE O/P EST MOD 30 MIN: CPT | Mod: PBBFAC,PN | Performed by: STUDENT IN AN ORGANIZED HEALTH CARE EDUCATION/TRAINING PROGRAM

## 2023-12-11 PROCEDURE — 1159F MED LIST DOCD IN RCRD: CPT | Mod: CPTII,,, | Performed by: STUDENT IN AN ORGANIZED HEALTH CARE EDUCATION/TRAINING PROGRAM

## 2023-12-11 PROCEDURE — 3008F PR BODY MASS INDEX (BMI) DOCUMENTED: ICD-10-PCS | Mod: CPTII,,, | Performed by: STUDENT IN AN ORGANIZED HEALTH CARE EDUCATION/TRAINING PROGRAM

## 2023-12-11 RX ORDER — PNV NO.95/FERROUS FUM/FOLIC AC 28MG-0.8MG
1 TABLET ORAL DAILY
COMMUNITY

## 2023-12-11 NOTE — PROGRESS NOTES
Subjective:           Patient ID: Giovanni Pena is a 43 y.o. female who presents today with a chief complaint of Pre-op Exam (Liposuction/)  .    Chief Complaint:   Pre-op Exam (Liposuction/)      History of Present Illness:    Giovanni Pena is a 43 y.o. female who presents today with a chief complaint of Pre-op Exam (Liposuction/)  .      Had gastric sleeve in Dec 2021, on subsequent CT in Dec 2021 noted to have renal thrombus as well as portral vein involvement.  Required Elequis BID for 6 months in early 2022.     Has hx of persistant anemia w/ menorrhagia.  Working w/ OB and Hematology.    [place] [facility] [place]    Last CBC showed Hb 8, MCV 81, Plt 147.    Has been taking Ferrous Sulfate as well as doing iron infusions weekly at this time.     Here for pre-op clearance for liposuction on 1/10/23.      Any prior reaction to anaesthesia:  No  History of prolonged bleeding after surgery or injury?: Yes  High risk surgery?: yes, in anemia w/ clotting issues.  Has also reacted to blood transfusion before.  History of MI, abnormal stress, anginal chest pain, stent, NTG use?:   No  History of CHF?:  No  History of TIA or stroke?: No   On insulin?: No   Pre-op Cr >2 mg/dL?:  0.66.    Has had issues with acid reflux.  States that famotidine was not working, is now on Pantoprazole.             Review of Systems   Constitutional:  Positive for fatigue. Negative for activity change, chills, diaphoresis, fever and unexpected weight change.   HENT:  Negative for congestion, nosebleeds, sinus pressure and sneezing.    Respiratory:  Negative for cough, chest tightness, shortness of breath and wheezing.    Cardiovascular:  Negative for chest pain, palpitations and leg swelling.   Gastrointestinal:  Negative for abdominal distention, abdominal pain, constipation, diarrhea, nausea and vomiting.   Genitourinary:  Negative for difficulty urinating, dyspareunia, dysuria and frequency.   Musculoskeletal:   "Positive for arthralgias (foot pain). Negative for back pain and gait problem.   Skin:  Negative for pallor, rash and wound.   Neurological:  Negative for weakness, numbness and headaches.   Psychiatric/Behavioral:  Negative for agitation and sleep disturbance. The patient is nervous/anxious.            Objective:        Vitals:    12/11/23 0949   BP: 100/60   BP Location: Right arm   Patient Position: Sitting   BP Method: Medium (Manual)   Pulse: 74   Resp: 16   Temp: 98.3 °F (36.8 °C)   TempSrc: Oral   SpO2: 97%   Weight: 83.4 kg (183 lb 13.8 oz)   Height: 5' 7" (1.702 m)       Body mass index is 28.8 kg/m².      Physical Exam  Constitutional:       General: She is not in acute distress.     Appearance: Normal appearance.   HENT:      Head: Normocephalic and atraumatic.      Right Ear: External ear normal.      Left Ear: External ear normal.      Nose: No rhinorrhea.      Mouth/Throat:      Mouth: Mucous membranes are moist.   Eyes:      Extraocular Movements: Extraocular movements intact.      Conjunctiva/sclera: Conjunctivae normal.   Cardiovascular:      Rate and Rhythm: Normal rate and regular rhythm.      Pulses: Normal pulses.      Heart sounds: No murmur heard.  Pulmonary:      Effort: Pulmonary effort is normal. No respiratory distress.   Musculoskeletal:      Right lower leg: No edema.      Left lower leg: No edema.   Skin:     Coloration: Skin is not jaundiced.      Findings: No bruising.   Neurological:      General: No focal deficit present.      Mental Status: She is alert and oriented to person, place, and time.      Motor: No weakness.      Gait: Gait normal.   Psychiatric:         Mood and Affect: Mood normal.             Lab Results   Component Value Date     09/25/2023    K 3.7 09/25/2023     09/25/2023    CO2 26 09/25/2023    BUN 8 09/25/2023    CREATININE 0.7 09/25/2023    GLUCOSE 87 01/01/2022    ANIONGAP 8 09/25/2023     Lab Results   Component Value Date    HGBA1C 5.0 11/18/2021 " "    No results found for: "BNP", "BNPTRIAGEBLO"    Lab Results   Component Value Date    WBC 3.1 (L) 10/12/2023    WBC 3.74 (L) 10/03/2023    HGB 8.6 (L) 10/12/2023    HGB 8.0 (L) 10/03/2023    HCT 27.2 (L) 10/12/2023    HCT 28.2 (L) 10/03/2023     (L) 10/03/2023    GRAN 2.7 10/03/2023    GRAN 71.2 10/03/2023     Lab Results   Component Value Date    CHOL 184 12/30/2021    CHOL 200 10/21/2020    HDL 38 (L) 12/30/2021    HDL 50 10/21/2020    LDLCALC 130 (H) 12/30/2021    LDLCALC 135 (H) 10/21/2020    TRIG 78 12/30/2021    TRIG 76 10/21/2020          Current Outpatient Medications:     cyanocobalamin (VITAMIN B-12) 100 MCG tablet, Take 1 tablet by mouth once daily., Disp: , Rfl:     ferrous sulfate (FEOSOL) 325 mg (65 mg iron) Tab tablet, Take 1 tablet by mouth every other day., Disp: , Rfl:     folic acid (FOLVITE) 1 MG tablet, Take 1 tablet (1 mg total) by mouth once daily., Disp: 30 tablet, Rfl: 0    levonorgestreL (MIRENA) 21 mcg/24 hours (8 yrs) 52 mg IUD, 52,000 mcg by Intrauterine route., Disp: , Rfl:     pantoprazole (PROTONIX) 40 MG tablet, Take 1 tablet (40 mg total) by mouth once daily., Disp: 30 tablet, Rfl: 2     Outpatient Encounter Medications as of 12/11/2023   Medication Sig Dispense Refill    cyanocobalamin (VITAMIN B-12) 100 MCG tablet Take 1 tablet by mouth once daily.      ferrous sulfate (FEOSOL) 325 mg (65 mg iron) Tab tablet Take 1 tablet by mouth every other day.      folic acid (FOLVITE) 1 MG tablet Take 1 tablet (1 mg total) by mouth once daily. 30 tablet 0    levonorgestreL (MIRENA) 21 mcg/24 hours (8 yrs) 52 mg IUD 52,000 mcg by Intrauterine route.      pantoprazole (PROTONIX) 40 MG tablet Take 1 tablet (40 mg total) by mouth once daily. 30 tablet 2     No facility-administered encounter medications on file as of 12/11/2023.          Assessment:       1. Preop examination    2. Microcytic hypochromic anemia    3. History of Portal vein thrombosis    4. Splenic infarct    5. " Gastroesophageal reflux disease, unspecified whether esophagitis present    6. Annual physical exam    7. Disorders of glucose transport, unspecified           Plan:       Preop examination  -     CBC Auto Differential; Future; Expected date: 12/11/2023  -     Protime-INR; Future; Expected date: 12/11/2023    Microcytic hypochromic anemia  -     CBC Auto Differential; Future; Expected date: 12/11/2023  -     Protime-INR; Future; Expected date: 12/11/2023    History of Portal vein thrombosis  -     CBC Auto Differential; Future; Expected date: 12/11/2023  -     Protime-INR; Future; Expected date: 12/11/2023    Splenic infarct    Gastroesophageal reflux disease, unspecified whether esophagitis present  -     Lipid Panel; Future; Expected date: 12/11/2023  -     Hemoglobin A1C; Future; Expected date: 12/11/2023    Annual physical exam  -     Lipid Panel; Future; Expected date: 12/11/2023  -     Hemoglobin A1C; Future; Expected date: 12/11/2023    Disorders of glucose transport, unspecified  -     Hemoglobin A1C; Future; Expected date: 12/11/2023               Preop Clearance:   - patient presenting today for preoperative clearance for liposuction in January of 2024, however review of patient's history shows history of clot following gastric sleeve in December of 2021, had to be on Eliquis for 6 months, additionally patient has been dealing with symptomatic anemia due to heavy menstrual bleeding.  Last hemoglobin was 8, is currently getting iron infusions weekly.   - with patient's significant anemia, iron deficiency and her history of clots, would strongly recommend against getting elective surgery.    - Will not be able to clear patient for surgery at this time.  Would be very cautious about getting in any non life critical surgeries in the future due to previous clot history.    Microcytic anemia:  - rechecking blood counts on fasting blood work.    History of portal vein clot, splenic infarct:   - reviewing labs  from [facility] had appears patient has had labs showing elevated von Willebrand factor, as well as elevated PT while not on anticoagulation.   - has appointment in January with Hematology and with Hematology Oncology, would speak with them about possible clotting disorders and adjustments that she should consider or monitoring.      GERD:   - patient currently taking PPI.  Tried H2 blocker but was inadequate for controlling symptoms.

## 2023-12-11 NOTE — PATIENT INSTRUCTIONS
Preop Clearance:   - patient presenting today for preoperative clearance for liposuction in January of 2024, however review of patient's history shows history of clot following gastric sleeve in December of 2021, had to be on Eliquis for 6 months, additionally patient has been dealing with symptomatic anemia due to heavy menstrual bleeding.  Last hemoglobin was 8, is currently getting iron infusions weekly.   - with patient's significant anemia, iron deficiency and her history of clots, would strongly recommend against getting elective surgery.    - Will not be able to clear patient for surgery at this time.  Would be very cautious about getting in any non life critical surgeries in the future due to previous clot history.    Microcytic anemia:  - rechecking blood counts on fasting blood work.    History of portal vein clot, splenic infarct:   - reviewing labs from [facility] had appears patient has had labs showing elevated von Willebrand factor, as well as elevated PT while not on anticoagulation.   - has appointment in January with Hematology and with Hematology Oncology, would speak with them about possible clotting disorders and adjustments that she should consider or monitoring.    GERD:   - patient currently taking PPI.  Tried H2 blocker but was inadequate for controlling symptoms.

## 2023-12-13 ENCOUNTER — TELEPHONE (OUTPATIENT)
Dept: PRIMARY CARE CLINIC | Facility: CLINIC | Age: 43
End: 2023-12-13
Payer: MEDICAID

## 2023-12-13 NOTE — TELEPHONE ENCOUNTER
----- Message from Vanessa Ridley sent at 12/13/2023 11:50 AM CST -----  Contact: [phone]  Pt is requesting a callback to be advised on if Dr Mishra would ok her for surgery, since the results have come back. Please call.                  Thank you

## 2023-12-14 ENCOUNTER — INFUSION (OUTPATIENT)
Dept: INFUSION THERAPY | Facility: HOSPITAL | Age: 43
End: 2023-12-14
Attending: INTERNAL MEDICINE
Payer: MEDICAID

## 2023-12-14 VITALS
RESPIRATION RATE: 18 BRPM | OXYGEN SATURATION: 99 % | BODY MASS INDEX: 28.91 KG/M2 | HEART RATE: 65 BPM | DIASTOLIC BLOOD PRESSURE: 62 MMHG | WEIGHT: 184.19 LBS | HEIGHT: 67 IN | TEMPERATURE: 98 F | SYSTOLIC BLOOD PRESSURE: 113 MMHG

## 2023-12-14 DIAGNOSIS — D50.0 IRON DEFICIENCY ANEMIA DUE TO CHRONIC BLOOD LOSS: Primary | ICD-10-CM

## 2023-12-14 PROCEDURE — 63600175 PHARM REV CODE 636 W HCPCS: Performed by: INTERNAL MEDICINE

## 2023-12-14 PROCEDURE — 96374 THER/PROPH/DIAG INJ IV PUSH: CPT

## 2023-12-14 RX ORDER — DIPHENHYDRAMINE HYDROCHLORIDE 50 MG/ML
50 INJECTION INTRAMUSCULAR; INTRAVENOUS ONCE AS NEEDED
Status: DISCONTINUED | OUTPATIENT
Start: 2023-12-14 | End: 2023-12-14 | Stop reason: HOSPADM

## 2023-12-14 RX ORDER — EPINEPHRINE 0.3 MG/.3ML
0.3 INJECTION SUBCUTANEOUS ONCE AS NEEDED
OUTPATIENT
Start: 2023-12-21

## 2023-12-14 RX ORDER — EPINEPHRINE 0.3 MG/.3ML
0.3 INJECTION SUBCUTANEOUS ONCE AS NEEDED
Status: DISCONTINUED | OUTPATIENT
Start: 2023-12-14 | End: 2023-12-14 | Stop reason: HOSPADM

## 2023-12-14 RX ORDER — DIPHENHYDRAMINE HYDROCHLORIDE 50 MG/ML
50 INJECTION INTRAMUSCULAR; INTRAVENOUS ONCE AS NEEDED
OUTPATIENT
Start: 2023-12-21

## 2023-12-14 RX ORDER — SODIUM CHLORIDE 0.9 % (FLUSH) 0.9 %
10 SYRINGE (ML) INJECTION
Status: DISCONTINUED | OUTPATIENT
Start: 2023-12-14 | End: 2023-12-14 | Stop reason: HOSPADM

## 2023-12-14 RX ORDER — SODIUM CHLORIDE 0.9 % (FLUSH) 0.9 %
10 SYRINGE (ML) INJECTION
OUTPATIENT
Start: 2023-12-21

## 2023-12-14 RX ORDER — HEPARIN 100 UNIT/ML
500 SYRINGE INTRAVENOUS
OUTPATIENT
Start: 2023-12-21

## 2023-12-14 RX ADMIN — IRON SUCROSE 200 MG: 20 INJECTION, SOLUTION INTRAVENOUS at 01:12

## 2023-12-14 NOTE — PROGRESS NOTES
Pt arrived to infusion room, placed to chair. IV started, therapy plan administered. Pt tolerated infusion well. Discharge instructions given including signs and symptoms of a reaction and to notify any adverse reactions to ordering MD. Verified next appt, AVS given to Pt. Patient discharged.

## 2024-01-04 ENCOUNTER — TELEPHONE (OUTPATIENT)
Dept: HEMATOLOGY/ONCOLOGY | Facility: CLINIC | Age: 44
End: 2024-01-04
Payer: MEDICAID

## 2024-01-04 NOTE — TELEPHONE ENCOUNTER
Spoke to pt.  Pt has not had labs done.  Pt asked for appt to be rescheduled for next week on a wens or Thursday.  The soonest I could schedule was 1/25/24 at 1020 am   Pt agreed to that time

## 2024-01-24 ENCOUNTER — TELEPHONE (OUTPATIENT)
Dept: HEMATOLOGY/ONCOLOGY | Facility: CLINIC | Age: 44
End: 2024-01-24
Payer: MEDICAID

## 2024-01-24 NOTE — TELEPHONE ENCOUNTER
Called pt, pt stated that she has a new insurance taking effect on 2/1/24, thus not able to complete her last treatment.  I moved pt appt out to 2/8/24 and labs at 2/6/24.  Pt ok with dates.  I did notify pt that she will need to keep appt to receive last treatment.

## 2024-02-26 DIAGNOSIS — K21.9 GASTROESOPHAGEAL REFLUX DISEASE, UNSPECIFIED WHETHER ESOPHAGITIS PRESENT: ICD-10-CM

## 2024-02-26 RX ORDER — PANTOPRAZOLE SODIUM 40 MG/1
40 TABLET, DELAYED RELEASE ORAL
Qty: 90 TABLET | Refills: 3 | Status: SHIPPED | OUTPATIENT
Start: 2024-02-26

## 2024-02-26 NOTE — TELEPHONE ENCOUNTER
Refill Decision Note   Diegoal Pena  is requesting a refill authorization.  Brief Assessment and Rationale for Refill:  Approve     Medication Therapy Plan:         Comments:     Note composed:4:07 AM 02/26/2024

## 2024-02-26 NOTE — TELEPHONE ENCOUNTER
No care due was identified.  [facility] Embedded Care Due Messages. Reference number: [id number].   2/26/2024 12:18:49 AM CST

## 2024-03-04 ENCOUNTER — TELEPHONE (OUTPATIENT)
Dept: HEMATOLOGY/ONCOLOGY | Facility: CLINIC | Age: 44
End: 2024-03-04
Payer: MEDICAID

## 2024-04-10 ENCOUNTER — PATIENT MESSAGE (OUTPATIENT)
Dept: HEMATOLOGY/ONCOLOGY | Facility: CLINIC | Age: 44
End: 2024-04-10
Payer: COMMERCIAL

## 2024-04-12 DIAGNOSIS — E87.6 HYPOKALEMIA: ICD-10-CM

## 2024-04-12 DIAGNOSIS — D50.0 IRON DEFICIENCY ANEMIA DUE TO CHRONIC BLOOD LOSS: Primary | ICD-10-CM

## 2024-04-12 RX ORDER — POTASSIUM CHLORIDE 20 MEQ/1
20 TABLET, EXTENDED RELEASE ORAL DAILY
Qty: 7 TABLET | Refills: 0 | Status: SHIPPED | OUTPATIENT
Start: 2024-04-12 | End: 2024-04-19

## 2024-04-15 ENCOUNTER — PATIENT MESSAGE (OUTPATIENT)
Dept: HEMATOLOGY/ONCOLOGY | Facility: CLINIC | Age: 44
End: 2024-04-15
Payer: COMMERCIAL

## 2024-07-08 ENCOUNTER — TELEPHONE (OUTPATIENT)
Dept: HEMATOLOGY/ONCOLOGY | Facility: CLINIC | Age: 44
End: 2024-07-08
Payer: COMMERCIAL

## 2024-07-15 ENCOUNTER — TELEPHONE (OUTPATIENT)
Dept: HEMATOLOGY/ONCOLOGY | Facility: CLINIC | Age: 44
End: 2024-07-15
Payer: COMMERCIAL

## 2024-07-15 NOTE — TELEPHONE ENCOUNTER
Lvm for pt to call office to reschedule appt.  Pt did not have labs drawn and appt will need to be rescheduled.  Asked pt to call office.  Pt has canceled and/or missed lab and virtual visits.  Lab appts missed 7/10, 4/10, 3/1, 2/6, 1/3, 1/2/2024.  Virtual visits missed: 4/17, /4/10, 3/4, 2/8, 1/25, 1/4/2024   I will send out a missed appt letter to pt address on file and ask pt to call us to reschedule

## 2024-08-28 DIAGNOSIS — Z12.31 OTHER SCREENING MAMMOGRAM: ICD-10-CM

## 2024-09-19 ENCOUNTER — PATIENT MESSAGE (OUTPATIENT)
Dept: PRIMARY CARE CLINIC | Facility: CLINIC | Age: 44
End: 2024-09-19
Payer: COMMERCIAL

## 2025-03-18 ENCOUNTER — TELEPHONE (OUTPATIENT)
Dept: PRIMARY CARE CLINIC | Facility: CLINIC | Age: 45
End: 2025-03-18

## 2025-03-18 ENCOUNTER — HOSPITAL ENCOUNTER (OUTPATIENT)
Facility: OTHER | Age: 45
Discharge: HOME | End: 2025-03-19
Attending: EMERGENCY MEDICINE | Admitting: STUDENT IN AN ORGANIZED HEALTH CARE EDUCATION/TRAINING PROGRAM
Payer: COMMERCIAL

## 2025-03-18 ENCOUNTER — OFFICE VISIT (OUTPATIENT)
Dept: PRIMARY CARE CLINIC | Facility: CLINIC | Age: 45
End: 2025-03-18
Payer: COMMERCIAL

## 2025-03-18 VITALS
OXYGEN SATURATION: 99 % | SYSTOLIC BLOOD PRESSURE: 116 MMHG | WEIGHT: 175.69 LBS | BODY MASS INDEX: 27.57 KG/M2 | HEART RATE: 77 BPM | HEIGHT: 67 IN | DIASTOLIC BLOOD PRESSURE: 64 MMHG

## 2025-03-18 DIAGNOSIS — K21.9 GASTROESOPHAGEAL REFLUX DISEASE, UNSPECIFIED WHETHER ESOPHAGITIS PRESENT: ICD-10-CM

## 2025-03-18 DIAGNOSIS — N93.9 ABNORMAL UTERINE BLEEDING (AUB): ICD-10-CM

## 2025-03-18 DIAGNOSIS — Z51.81 MEDICATION MONITORING ENCOUNTER: ICD-10-CM

## 2025-03-18 DIAGNOSIS — D64.9 SEVERE ANEMIA: Primary | ICD-10-CM

## 2025-03-18 DIAGNOSIS — Z13.220 SCREENING FOR LIPID DISORDERS: ICD-10-CM

## 2025-03-18 DIAGNOSIS — N92.0 MENORRHAGIA WITH REGULAR CYCLE: ICD-10-CM

## 2025-03-18 DIAGNOSIS — Z00.00 ANNUAL PHYSICAL EXAM: Primary | ICD-10-CM

## 2025-03-18 PROCEDURE — 99999 PR PBB SHADOW E&M-EST. PATIENT-LVL IV: CPT | Mod: PBBFAC,,,

## 2025-03-18 PROCEDURE — 99285 EMERGENCY DEPT VISIT HI MDM: CPT

## 2025-03-18 PROCEDURE — 99396 PREV VISIT EST AGE 40-64: CPT | Mod: S$GLB,,,

## 2025-03-18 RX ORDER — ESOMEPRAZOLE MAGNESIUM 40 MG/1
40 CAPSULE, DELAYED RELEASE ORAL
Qty: 30 CAPSULE | Refills: 3 | Status: SHIPPED | OUTPATIENT
Start: 2025-03-18

## 2025-03-18 NOTE — TELEPHONE ENCOUNTER
----- Message from Lyric sent at 3/18/2025  4:28 PM CDT -----  Contact: [phone]@[email or web address]  Patient is returning a phone call. Yes Who left a message for the patient: Marivel Bravo LPNDoes patient know what this is regarding:  Would you like a call back, or a response through your [facility] portal?:   Call back Comments:

## 2025-03-18 NOTE — PROGRESS NOTES
Clinic Note  3/18/2025      Subjective:       Patient ID:  Giovanni is a 44 y.o. female being seen for an established visit.    Chief Complaint: Annual Exam    Patient presents to clinic for annual visit     Patient denies any acute concerns today     GERD - Patient reports Protonix has not been effective, has tried Pepcid and Prilosec in the past with little symptom management. Pt has hx of gastric sleeve as well. She states she tried to adhere to GERD diet.     Vaccines - offered, pt declined, pt accepts risks and benefits     Labs due- pt is non fasting today       Review of Systems   Constitutional:  Negative for activity change and unexpected weight change.   HENT:  Negative for hearing loss, rhinorrhea and trouble swallowing.    Eyes:  Negative for discharge and visual disturbance.   Respiratory:  Negative for chest tightness and wheezing.    Cardiovascular:  Negative for chest pain and palpitations.   Gastrointestinal:  Negative for blood in stool, constipation, diarrhea and vomiting.   Endocrine: Negative for polydipsia and polyuria.   Genitourinary:  Negative for difficulty urinating, dysuria, hematuria and menstrual problem.   Musculoskeletal:  Negative for arthralgias, joint swelling and neck pain.   Neurological:  Negative for weakness and headaches.   Psychiatric/Behavioral:  Negative for confusion and dysphoric mood.         Medication List with Changes/Refills   New Medications    ESOMEPRAZOLE (NEXIUM) 40 MG CAPSULE    Take 1 capsule (40 mg total) by mouth before breakfast.   Current Medications    LEVONORGESTREL (MIRENA) 21 MCG/24 HOURS (8 YRS) 52 MG IUD    52,000 mcg by Intrauterine route.   Discontinued Medications    CYANOCOBALAMIN (VITAMIN B-12) 100 MCG TABLET    Take 1 tablet by mouth once daily.    FOLIC ACID (FOLVITE) 1 MG TABLET    Take 1 tablet (1 mg total) by mouth once daily.    PANTOPRAZOLE (PROTONIX) 40 MG TABLET    TAKE 1 TABLET BY MOUTH EVERY DAY       Problem List[1]        Objective:     "  /64 (BP Location: Right arm, Patient Position: Sitting)   Pulse 77   Ht 5' 7" (1.702 m)   Wt 79.7 kg (175 lb 11.3 oz)   LMP 03/18/2025   SpO2 99%   BMI 27.52 kg/m²   Estimated body mass index is 27.52 kg/m² as calculated from the following:    Height as of this encounter: 5' 7" (1.702 m).    Weight as of this encounter: 79.7 kg (175 lb 11.3 oz).  Physical Exam  Vitals and nursing note reviewed.   Constitutional:       General: She is not in acute distress.  HENT:      Head: Atraumatic.   Cardiovascular:      Rate and Rhythm: Normal rate and regular rhythm.      Heart sounds: Murmur (pt aware, asymptomatic chronic murmur) heard.   Pulmonary:      Effort: Pulmonary effort is normal. No respiratory distress.      Breath sounds: Normal breath sounds. No wheezing, rhonchi or rales.   Musculoskeletal:         General: Normal range of motion.      Right lower leg: No edema.      Left lower leg: No edema.   Neurological:      Mental Status: She is alert and oriented to person, place, and time.      Gait: Gait normal.   Psychiatric:         Mood and Affect: Mood normal.         Thought Content: Thought content normal.             Assessment and Plan:   -GERD  Switch PPI Protonix to Nexium   GI referral   GERD diet advised    Labs due         Problem List Items Addressed This Visit       Gastroesophageal reflux disease    Relevant Medications    esomeprazole (NEXIUM) 40 MG capsule    Other Relevant Orders    CBC Auto Differential    Comprehensive Metabolic Panel    Hemoglobin A1C    TSH    Ambulatory referral/consult to Gastroenterology     Other Visit Diagnoses         Annual physical exam    -  Primary    Relevant Orders    CBC Auto Differential    Comprehensive Metabolic Panel    Hemoglobin A1C    Lipid Panel    TSH      Medication monitoring encounter        Relevant Orders    CBC Auto Differential    Comprehensive Metabolic Panel    Hemoglobin A1C    Lipid Panel    TSH      Screening for lipid disorders     "    Relevant Orders    Lipid Panel          Reviewed risks, benefits, and appropriate medication use prescribed  Discussed LS changes as appropriate   Reviewed cancer screening guidelines   Advised to keep speciality and follow up appointments as scheduled   Reviewed ER precautions   Verbalized understanding and is agreeable to plan      Follow Up:   Follow up in about 1 year (around 3/18/2026), or if symptoms worsen or fail to improve, for annual .    Other Orders Placed This Visit:  Orders Placed This Encounter   Procedures    CBC Auto Differential    Comprehensive Metabolic Panel    Hemoglobin A1C    Lipid Panel    TSH    Ambulatory referral/consult to Gastroenterology           XIMENA Barrett              [1]   Patient Active Problem List  Diagnosis    Rheumatoid arthritis    Eczema    Seborrheic dermatitis of scalp    Embolism and thrombosis of renal vein    History of Portal vein thrombosis    H/O umbilical hernia repair    Gastroesophageal reflux disease    Splenic infarct    Microcytic hypochromic anemia    Blood transfusion reaction    Iron deficiency anemia due to chronic blood loss

## 2025-03-18 NOTE — TELEPHONE ENCOUNTER
Spoke to Ruchi (patient's mother) about pts low hemoglobin and advised ER for possible transfusion per ENEDELIA Dupree. Ruchi voiced understanding and will contact pt.

## 2025-03-18 NOTE — LETTER
March 19, 2025         [address]  [facility] [address]  Phone: [phone]  Fax: [phone]       Patient: Giovanni Pena   YOB: 1980  Date of Visit: 03/19/2025    To Whom It May Concern:    Barbie Pena  was at [facility] on 3/18/2025-3/19/2025. The patient may return to work/school on 3/21/2025 with no restrictions. If you have any questions or concerns, or if I can be of further assistance, please do not hesitate to contact me.    Sincerely,    Soha Quinn MD    [phone]

## 2025-03-19 ENCOUNTER — RESULTS FOLLOW-UP (OUTPATIENT)
Dept: PRIMARY CARE CLINIC | Facility: CLINIC | Age: 45
End: 2025-03-19
Payer: COMMERCIAL

## 2025-03-19 VITALS
RESPIRATION RATE: 18 BRPM | TEMPERATURE: 98 F | WEIGHT: 183.19 LBS | OXYGEN SATURATION: 99 % | HEIGHT: 67 IN | HEART RATE: 84 BPM | SYSTOLIC BLOOD PRESSURE: 105 MMHG | BODY MASS INDEX: 28.75 KG/M2 | DIASTOLIC BLOOD PRESSURE: 59 MMHG

## 2025-03-19 PROBLEM — N93.9 ABNORMAL UTERINE BLEEDING (AUB): Status: ACTIVE | Noted: 2025-03-19

## 2025-03-19 LAB
ABO + RH BLD: NORMAL
BASOPHILS # BLD AUTO: 0.03 K/UL (ref 0–0.2)
BASOPHILS NFR BLD: 0.8 % (ref 0–1.9)
BLD GP AB SCN CELLS X3 SERPL QL: NORMAL
BLD PROD TYP BPU: NORMAL
BLD PROD TYP BPU: NORMAL
BLOOD UNIT EXPIRATION DATE: NORMAL
BLOOD UNIT EXPIRATION DATE: NORMAL
BLOOD UNIT TYPE CODE: 600
BLOOD UNIT TYPE CODE: 600
BLOOD UNIT TYPE: NORMAL
BLOOD UNIT TYPE: NORMAL
CODING SYSTEM: NORMAL
CODING SYSTEM: NORMAL
CROSSMATCH INTERPRETATION: NORMAL
CROSSMATCH INTERPRETATION: NORMAL
DIFFERENTIAL METHOD BLD: ABNORMAL
DISPENSE STATUS: NORMAL
DISPENSE STATUS: NORMAL
EOSINOPHIL # BLD AUTO: 0.1 K/UL (ref 0–0.5)
EOSINOPHIL NFR BLD: 2.2 % (ref 0–8)
ERYTHROCYTE [DISTWIDTH] IN BLOOD BY AUTOMATED COUNT: 21.6 % (ref 11.5–14.5)
HCT VFR BLD AUTO: 26.6 % (ref 37–48.5)
HGB BLD-MCNC: 8 G/DL (ref 12–16)
IMM GRANULOCYTES # BLD AUTO: 0.01 K/UL (ref 0–0.04)
IMM GRANULOCYTES NFR BLD AUTO: 0.3 % (ref 0–0.5)
LYMPHOCYTES # BLD AUTO: 0.6 K/UL (ref 1–4.8)
LYMPHOCYTES NFR BLD: 17.5 % (ref 18–48)
MCH RBC QN AUTO: 22 PG (ref 27–31)
MCHC RBC AUTO-ENTMCNC: 30.1 G/DL (ref 32–36)
MCV RBC AUTO: 73 FL (ref 82–98)
MONOCYTES # BLD AUTO: 0.3 K/UL (ref 0.3–1)
MONOCYTES NFR BLD: 8 % (ref 4–15)
NEUTROPHILS # BLD AUTO: 2.6 K/UL (ref 1.8–7.7)
NEUTROPHILS NFR BLD: 71.2 % (ref 38–73)
NRBC BLD-RTO: 0 /100 WBC
NUM UNITS TRANS PACKED RBC: NORMAL
NUM UNITS TRANS PACKED RBC: NORMAL
PLATELET # BLD AUTO: 142 K/UL (ref 150–450)
PMV BLD AUTO: 10.3 FL (ref 9.2–12.9)
RBC # BLD AUTO: 3.64 M/UL (ref 4–5.4)
SPECIMEN OUTDATE: NORMAL
WBC # BLD AUTO: 3.61 K/UL (ref 3.9–12.7)

## 2025-03-19 PROCEDURE — G0378 HOSPITAL OBSERVATION PER HR: HCPCS

## 2025-03-19 PROCEDURE — 58301 REMOVE INTRAUTERINE DEVICE: CPT | Mod: ,,, | Performed by: STUDENT IN AN ORGANIZED HEALTH CARE EDUCATION/TRAINING PROGRAM

## 2025-03-19 PROCEDURE — 96374 THER/PROPH/DIAG INJ IV PUSH: CPT

## 2025-03-19 PROCEDURE — P9016 RBC LEUKOCYTES REDUCED: HCPCS

## 2025-03-19 PROCEDURE — 36415 COLL VENOUS BLD VENIPUNCTURE: CPT | Performed by: STUDENT IN AN ORGANIZED HEALTH CARE EDUCATION/TRAINING PROGRAM

## 2025-03-19 PROCEDURE — 96376 TX/PRO/DX INJ SAME DRUG ADON: CPT

## 2025-03-19 PROCEDURE — 36430 TRANSFUSION BLD/BLD COMPNT: CPT

## 2025-03-19 PROCEDURE — 85025 COMPLETE CBC W/AUTO DIFF WBC: CPT | Performed by: STUDENT IN AN ORGANIZED HEALTH CARE EDUCATION/TRAINING PROGRAM

## 2025-03-19 PROCEDURE — 86920 COMPATIBILITY TEST SPIN: CPT

## 2025-03-19 PROCEDURE — 99222 1ST HOSP IP/OBS MODERATE 55: CPT | Mod: AI,,, | Performed by: STUDENT IN AN ORGANIZED HEALTH CARE EDUCATION/TRAINING PROGRAM

## 2025-03-19 PROCEDURE — 25000003 PHARM REV CODE 250

## 2025-03-19 PROCEDURE — 86901 BLOOD TYPING SEROLOGIC RH(D): CPT

## 2025-03-19 RX ORDER — TRANEXAMIC ACID 100 MG/ML
60 INJECTION, SOLUTION INTRAVENOUS EVERY 8 HOURS
Status: COMPLETED | OUTPATIENT
Start: 2025-03-19 | End: 2025-03-19

## 2025-03-19 RX ORDER — SODIUM CHLORIDE 0.9 % (FLUSH) 0.9 %
10 SYRINGE (ML) INJECTION
Status: DISCONTINUED | OUTPATIENT
Start: 2025-03-19 | End: 2025-03-19 | Stop reason: HOSPADM

## 2025-03-19 RX ORDER — OXYCODONE HYDROCHLORIDE 10 MG/1
10 TABLET ORAL EVERY 4 HOURS PRN
Refills: 0 | Status: DISCONTINUED | OUTPATIENT
Start: 2025-03-19 | End: 2025-03-19 | Stop reason: HOSPADM

## 2025-03-19 RX ORDER — TRANEXAMIC ACID 100 MG/ML
5 INJECTION, SOLUTION INTRAVENOUS EVERY 8 HOURS
Status: DISCONTINUED | OUTPATIENT
Start: 2025-03-19 | End: 2025-03-19

## 2025-03-19 RX ORDER — ACETAMINOPHEN 325 MG/1
650 TABLET ORAL EVERY 4 HOURS PRN
Status: DISCONTINUED | OUTPATIENT
Start: 2025-03-19 | End: 2025-03-19 | Stop reason: HOSPADM

## 2025-03-19 RX ORDER — ONDANSETRON 8 MG/1
8 TABLET, ORALLY DISINTEGRATING ORAL EVERY 8 HOURS PRN
Status: DISCONTINUED | OUTPATIENT
Start: 2025-03-19 | End: 2025-03-19 | Stop reason: HOSPADM

## 2025-03-19 RX ORDER — DOCUSATE SODIUM 100 MG/1
100 CAPSULE, LIQUID FILLED ORAL 2 TIMES DAILY
Qty: 60 CAPSULE | Refills: 0 | Status: SHIPPED | OUTPATIENT
Start: 2025-03-19

## 2025-03-19 RX ORDER — OXYCODONE HYDROCHLORIDE 5 MG/1
5 TABLET ORAL EVERY 4 HOURS PRN
Refills: 0 | Status: DISCONTINUED | OUTPATIENT
Start: 2025-03-19 | End: 2025-03-19 | Stop reason: HOSPADM

## 2025-03-19 RX ORDER — HYDROCODONE BITARTRATE AND ACETAMINOPHEN 500; 5 MG/1; MG/1
TABLET ORAL
Status: DISCONTINUED | OUTPATIENT
Start: 2025-03-19 | End: 2025-03-19 | Stop reason: HOSPADM

## 2025-03-19 RX ORDER — FERROUS SULFATE 325(65) MG
325 TABLET ORAL
Qty: 30 TABLET | Refills: 11 | Status: SHIPPED | OUTPATIENT
Start: 2025-03-19

## 2025-03-19 RX ADMIN — TRANEXAMIC ACID 60 MG: 100 INJECTION, SOLUTION INTRAVENOUS at 02:03

## 2025-03-19 RX ADMIN — TRANEXAMIC ACID 397 MG: 100 INJECTION, SOLUTION INTRAVENOUS at 03:03

## 2025-03-19 NOTE — DISCHARGE SUMMARY
[facility] ([address])  Obstetrics & Gynecology  Discharge Summary    Patient Name: Giovanni Pena  MRN: [id number]  Admission Date: 3/18/2025  Hospital Length of Stay: 0 days  Discharge Date and Time:  2025 5:23 PM  Attending Physician: Abbey Wong MD   Discharging Provider: Soha Quinn MD  Primary Care Provider: Miquel Mishra MD    HPI: Giovanni Pena is a 44 y.o.  that presented to ED as a transfer for evaluation 2/2 AUB and symptomatic anemia. She was admitted for observation, started on TXA, and received 3u pRBC with appropriate rise in H/H. On day of discharge, TVUS revealed malpositioned IUD which was removed. Patient was stable overall with notable decrease in vaginal bleeding. She was discharged home in stable condition and has follow-up scheduled in one week with [facility] for close monitoring and to discuss possible IUD replacement/Nexplanon.    * No surgery found *     Consults:   Consults (From admission, onward)          Status Ordering Provider     Inpatient consult to Gynecology  Once        Provider:  Radha Thomson MD    Completed [facility]            Significant Diagnostic Studies:   Recent Labs   Lab 25  1522 25  1936 25  1303   WBC 4.30 4.61 3.61*   HGB 5.7* 5.6* 8.0*   HCT 20.8* 20.7* 26.6*    172 142*     Radiology:   EXAMINATION:  US PELVIS COMP WITH TRANSVAG NON-OB (XPD)     CLINICAL HISTORY:  aub;     TECHNIQUE:  Transabdominal sonography of the pelvis was performed, followed by transvaginal sonography to better evaluate the uterus and ovaries.     COMPARISON:  2023     FINDINGS:  Uterus:     Size: 10.1 x 5 x 6.8 cm     Multiple intramural and subserosal leiomyomas.     Subserosal leiomyoma on the right, 2.9 cm (previously 2.3 cm).     Posterior intramural leiomyoma, 3.1 cm.     Subserosal leiomyoma on the left, 2.6 cm.     Intramural leiomyoma lower uterine segment, 2.9 cm.      Intrauterine device resides along the endometrial canal of the lower uterine segment and cervical canal.     Right ovary:     Size: 3.4 x 2.7 x 3.2 cm     Left ovary:     Size: 2.8 x 1.6 x 2.9 cm     Free Fluid:     None.     Impression:     Multiple intramural and subserosal leiomyomas measure up to 3.1 cm.  Intrauterine device is low lying, residing along the endometrial canal of the lower uterine segment and cervical canal.     Normal sonographic appearance of the ovaries.     This report was flagged in Epic as abnormal.        Electronically signed by:Sylvie Polk  Date:                                            03/19/2025  Time:                                           16:43    Pending Diagnostic Studies:       None          Final Active Diagnoses:    Diagnosis Date Noted POA    PRINCIPAL PROBLEM:  Abnormal uterine bleeding (AUB) [N93.9] 03/19/2025 Yes      Problems Resolved During this Admission:        Discharged Condition: good    Disposition: Home or Self Care    Follow Up:   Follow-up Information       [facility] Follow up in 1 week(s).                           Patient Instructions:      Diet Adult Regular     No driving until:   Order Comments: Do not drive until you are no longer taking narcotic pain medications and you feel comfortable stepping on the brake.     Pelvic Rest   Order Comments: Nothing in the vagina including tampons and intercourse for 4 weeks.     Notify your health care provider if you experience any of the following:  temperature >100.4     Notify your health care provider if you experience any of the following:  persistent nausea and vomiting or diarrhea     Notify your health care provider if you experience any of the following:  severe uncontrolled pain     Notify your health care provider if you experience any of the following:  redness, tenderness, or signs of infection (pain, swelling, redness, odor or green/yellow discharge around incision site)     Notify your  health care provider if you experience any of the following:  severe persistent headache     Notify your health care provider if you experience any of the following:  persistent dizziness, light-headedness, or visual disturbances     Notify your health care provider if you experience any of the following:  increased confusion or weakness     Notify your health care provider if you experience any of the following:   Order Comments: BLEEDING PRECAUTIONS: Please report to the Emergency Room or contact your physician if you are saturating 1 thick overnight pad per hour for 2 consecutive hours.    CONSTIPATION REMEDIES: Patients are often constipated after surgery or with use of narcotic pain medicine. Remain adequately hydrated by consuming 8 standard water bottles daily. Take a stool softener (Colace or Sennakot) daily, or Mirilax if you prefer. If you have not had a bowel movement for 3 days after surgery, or are uncomfortable and unable to pass stool, please try one or all of the following measures:  1.  Milk of Magnesia - 30 cc by mouth every 12 hours   2.  Dulcolax suppository - one suppository per rectum every 4-6 hours   3.  Metamucil, Fibercon or other bulk former - use as directed on packaging  4.  Fleet enema     Activity as tolerated     Medications:  Reconciled Home Medications:      Medication List        START taking these medications      docusate sodium 100 MG capsule  Commonly known as: COLACE  Take 1 capsule (100 mg total) by mouth 2 (two) times daily.     ferrous sulfate 325 mg (65 mg iron) Tab tablet  Commonly known as: IRON  Take 1 tablet (325 mg total) by mouth daily with breakfast.            CONTINUE taking these medications      esomeprazole 40 MG capsule  Commonly known as: NEXIUM  Take 1 capsule (40 mg total) by mouth before breakfast.     levonorgestreL 52 mg IUD  Commonly known as: Mirena  52,000 mcg by Intrauterine route.              Soha Quinn MD  Obstetrics & Gynecology  [facility]  Med Surg ([address])

## 2025-03-19 NOTE — CONSULTS
In Short patient transferred from outside hospital for evaluation and management of AUB 2/2 symptomatic anemia requiring blood transfusion. Decision to admit patient to GYN. Please see H&P for full details.     Radha Thomson MD PGY-1  Obstetrics and Gynecology  [facility]

## 2025-03-19 NOTE — PLAN OF CARE
Case Management Assessment     PCP: Dr Miquel Mishra    Patient Arrived From: Home  Existing Help at Home: Self, Independent    Barriers to Discharge: None    Discharge Plan:    A. Home w/family   B. Home    Patient AAOx3, independent at baseline. PCP correct on facesheet. Denies owning any DME. Will need transportation home.     03/19/25 1051   Discharge Assessment   Assessment Type Discharge Planning Assessment   Confirmed/corrected address, phone number and insurance Yes   Confirmed Demographics Correct on Facesheet   Source of Information patient   Communicated ZACHARY with patient/caregiver Date not available/Unable to determine   People in Home alone   Do you expect to return to your current living situation? Yes   Prior to hospitilization cognitive status: Alert/Oriented   Current cognitive status: Alert/Oriented   Walking or Climbing Stairs Difficulty no   Dressing/Bathing Difficulty no   Equipment Currently Used at Home none   Readmission within 30 days? No   Do you currently have service(s) that help you manage your care at home? No   Do you take prescription medications? Yes   Do you have prescription coverage? Yes   Do you have any problems affording any of your prescribed medications? No   Is the patient taking medications as prescribed? yes   Who is going to help you get home at discharge? Will need ride home   How do you get to doctors appointments? car, drives self   Are you on dialysis? No   Do you take coumadin? No   Discharge Plan A Home with family   Discharge Plan B Home   DME Needed Upon Discharge  none   Discharge Plan discussed with: Patient   Transition of Care Barriers None     [facility] ([address])  Initial Discharge Assessment       Primary Care Provider: Miquel Mishra MD    Admission Diagnosis: Menorrhagia with regular cycle [N92.0]  Severe anemia [D64.9]    Admission Date: 3/18/2025  Expected Discharge Date:     Transition of Care Barriers: (P) None    Payor: [employer] /  Plan: [employer] OPEN ACCESS MANAGED CHOICE / Product Type: Commercial /     Extended Emergency Contact Information  Primary Emergency Contact: Ruchi Pleitez  Mobile Phone: [phone]  Relation: Mother  Preferred language: English   needed? No    Discharge Plan A: (P) Home with family  Discharge Plan B: (P) Home      [id number] - [place], [place] - [address]  [address]  [place] [address]  Phone: [phone] Fax: [phone]      Initial Assessment (most recent)       Adult Discharge Assessment - 03/19/25 1051          Discharge Assessment    Assessment Type Discharge Planning Assessment (P)      Confirmed/corrected address, phone number and insurance Yes (P)      Confirmed Demographics Correct on Facesheet (P)      Source of Information patient (P)      Communicated ZACHARY with patient/caregiver Date not available/Unable to determine (P)      People in Home alone (P)      Do you expect to return to your current living situation? Yes (P)      Prior to hospitilization cognitive status: Alert/Oriented (P)      Current cognitive status: Alert/Oriented (P)      Walking or Climbing Stairs Difficulty no (P)      Dressing/Bathing Difficulty no (P)      Equipment Currently Used at Home none (P)      Readmission within 30 days? No (P)      Do you currently have service(s) that help you manage your care at home? No (P)      Do you take prescription medications? Yes (P)      Do you have prescription coverage? Yes (P)      Do you have any problems affording any of your prescribed medications? No (P)      Is the patient taking medications as prescribed? yes (P)      Who is going to help you get home at discharge? Will need ride home (P)      How do you get to doctors appointments? car, drives self (P)      Are you on dialysis? No (P)      Do you take coumadin? No (P)      Discharge Plan A Home with family (P)      Discharge Plan B Home (P)      DME Needed Upon Discharge  none (P)      Discharge Plan  discussed with: Patient (P)      Transition of Care Barriers None (P)

## 2025-03-19 NOTE — PLAN OF CARE
Problem: Adult Inpatient Plan of Care  Goal: Plan of Care Review  Outcome: Progressing  Goal: Absence of Hospital-Acquired Illness or Injury  Outcome: Progressing  Goal: Optimal Comfort and Wellbeing  Outcome: Progressing  Goal: Readiness for Transition of Care  Outcome: Progressing     Problem: Fatigue  Goal: Improved Activity Tolerance  Outcome: Progressing     Problem: Fall Injury Risk  Goal: Absence of Fall and Fall-Related Injury  Outcome: Progressing     Problem: Anemia  Goal: Anemia Symptom Improvement  Outcome: Progressing

## 2025-03-19 NOTE — ED NOTES
Blood bank called requesting type and screen but order just discontinued by Dr. Thomson- Message sent for Dr. Thomson to re-order so pt can be transfused.

## 2025-03-19 NOTE — PROGRESS NOTES
Progress Note  Gynecology    Admit Date: 3/18/2025  LOS: 0    Reason for Admission:  Abnormal uterine bleeding (AUB)    SUBJECTIVE:     Giovanni Pena is a 44 y.o.  who is admitted for symptomatic anemia in the setting of AUB.   Pt doing well this morning. She reports weakness/fatigue is improving with blood transfusions. She reports minimal bleeding overnight. She did nto change her pad and denies any blood clots. She denies any pain. She is tolerating a regular diet without N/V. She has not been ambulating. Voiding without difficulty. Denies any SOB, HA, visual changes, lightheadedness/dizziness.     OBJECTIVE:     Vital Signs   Temp:  [97.9 °F (36.6 °C)-99 °F (37.2 °C)] 97.9 °F (36.6 °C)  Pulse:  [69-88] 70  Resp:  [12-18] 16  SpO2:  [94 %-100 %] 100 %  BP: ()/(50-64) 96/55      Intake/Output Summary (Last 24 hours) at 3/19/2025 0652  Last data filed at 3/19/2025 0438  Gross per 24 hour   Intake 0 ml   Output --   Net 0 ml       Physical Exam:  Gen: A&Ox3, NAD  CV: RRR  Pulm: normal respiratory effort  Abd: soft, non-distended, non-tender to palpation without rebound or guarding  Ext: PPP, no peripheral edema, TEDs/SCDs in place  : Deferred    Laboratory:  Recent Results (from the past 24 hours)   Ferritin    Collection Time: 25  3:22 PM   Result Value Ref Range    Ferritin 7 (L) 20.0 - 300.0 ng/mL   Iron and TIBC    Collection Time: 25  3:22 PM   Result Value Ref Range    Iron 13 (L) 30 - 160 ug/dL    Transferrin 396 (H) 200 - 375 mg/dL    TIBC 586 (H) 250 - 450 ug/dL    Saturated Iron 2 (L) 20 - 50 %   CBC Auto Differential    Collection Time: 25  3:22 PM   Result Value Ref Range    WBC 4.30 3.90 - 12.70 K/uL    RBC 3.06 (L) 4.00 - 5.40 M/uL    Hemoglobin 5.7 (LL) 12.0 - 16.0 g/dL    Hematocrit 20.8 (L) 37.0 - 48.5 %    MCV 68 (L) 82 - 98 fL    MCH 18.6 (L) 27.0 - 31.0 pg    MCHC 27.4 (L) 32.0 - 36.0 g/dL    RDW 19.1 (H) 11.5 - 14.5 %    Platelets 165 150 - 450 K/uL    MPV  10.6 9.2 - 12.9 fL    Immature Granulocytes 0.5 0.0 - 0.5 %    Gran # (ANC) 3.1 1.8 - 7.7 K/uL    Immature Grans (Abs) 0.02 0.00 - 0.04 K/uL    Lymph # 0.8 (L) 1.0 - 4.8 K/uL    Mono # 0.3 0.3 - 1.0 K/uL    Eos # 0.1 0.0 - 0.5 K/uL    Baso # 0.05 0.00 - 0.20 K/uL    nRBC 0 0 /100 WBC    Gran % 72.3 38.0 - 73.0 %    Lymph % 17.4 (L) 18.0 - 48.0 %    Mono % 6.7 4.0 - 15.0 %    Eosinophil % 1.9 0.0 - 8.0 %    Basophil % 1.2 0.0 - 1.9 %    Platelet Estimate Appears normal     Aniso Moderate     Poik Slight     Hypo Moderate     Ovalocytes Occasional     Tear Drop Cells Occasional     Large/Giant Platelets Present     Fragmented Cells Occasional     Differential Method Automated    Comprehensive Metabolic Panel    Collection Time: 03/18/25  3:22 PM   Result Value Ref Range    Sodium 139 136 - 145 mmol/L    Potassium 3.8 3.5 - 5.1 mmol/L    Chloride 109 95 - 110 mmol/L    CO2 22 (L) 23 - 29 mmol/L    Glucose 82 70 - 110 mg/dL    BUN 10 6 - 20 mg/dL    Creatinine 0.8 0.5 - 1.4 mg/dL    Calcium 8.1 (L) 8.7 - 10.5 mg/dL    Total Protein 7.6 6.0 - 8.4 g/dL    Albumin 3.4 (L) 3.5 - 5.2 g/dL    Total Bilirubin 1.2 (H) 0.1 - 1.0 mg/dL    Alkaline Phosphatase 77 40 - 150 U/L    AST 15 10 - 40 U/L    ALT 11 10 - 44 U/L    eGFR >60.0 >60 mL/min/1.73 m^2    Anion Gap 8 8 - 16 mmol/L   Hemoglobin A1C    Collection Time: 03/18/25  3:22 PM   Result Value Ref Range    Hemoglobin A1C 5.2 4.0 - 5.6 %    Estimated Avg Glucose 103 68 - 131 mg/dL   Lipid Panel    Collection Time: 03/18/25  3:22 PM   Result Value Ref Range    Cholesterol 146 120 - 199 mg/dL    Triglycerides 47 30 - 150 mg/dL    HDL 59 40 - 75 mg/dL    LDL Cholesterol 77.6 63.0 - 159.0 mg/dL    HDL/Cholesterol Ratio 40.4 20.0 - 50.0 %    Total Cholesterol/HDL Ratio 2.5 2.0 - 5.0    Non-HDL Cholesterol 87 mg/dL   TSH    Collection Time: 03/18/25  3:22 PM   Result Value Ref Range    TSH 0.815 0.400 - 4.000 uIU/mL   CBC auto differential    Collection Time: 03/18/25  7:36 PM    Result Value Ref Range    WBC 4.61 3.90 - 12.70 K/uL    RBC 3.04 (L) 4.00 - 5.40 M/uL    Hemoglobin 5.6 (LL) 12.0 - 16.0 g/dL    Hematocrit 20.7 (L) 37.0 - 48.5 %    MCV 68 (L) 82 - 98 fL    MCH 18.4 (L) 27.0 - 31.0 pg    MCHC 27.1 (L) 32.0 - 36.0 g/dL    RDW 19.0 (H) 11.5 - 14.5 %    Platelets 172 150 - 450 K/uL    MPV 10.7 9.2 - 12.9 fL    Immature Granulocytes 0.2 0.0 - 0.5 %    Gran # (ANC) 3.2 1.8 - 7.7 K/uL    Immature Grans (Abs) 0.01 0.00 - 0.04 K/uL    Lymph # 0.9 (L) 1.0 - 4.8 K/uL    Mono # 0.3 0.3 - 1.0 K/uL    Eos # 0.1 0.0 - 0.5 K/uL    Baso # 0.03 0.00 - 0.20 K/uL    nRBC 0 0 /100 WBC    Gran % 69.7 38.0 - 73.0 %    Lymph % 20.2 18.0 - 48.0 %    Mono % 7.2 4.0 - 15.0 %    Eosinophil % 2.0 0.0 - 8.0 %    Basophil % 0.7 0.0 - 1.9 %    Differential Method Automated    Comprehensive metabolic panel    Collection Time: 03/18/25  7:36 PM   Result Value Ref Range    Sodium 138 136 - 145 mmol/L    Potassium 3.5 3.5 - 5.1 mmol/L    Chloride 108 95 - 110 mmol/L    CO2 22 (L) 23 - 29 mmol/L    Glucose 85 70 - 110 mg/dL    BUN 10 6 - 20 mg/dL    Creatinine 0.8 0.5 - 1.4 mg/dL    Calcium 8.2 (L) 8.7 - 10.5 mg/dL    Total Protein 7.8 6.0 - 8.4 g/dL    Albumin 3.6 3.5 - 5.2 g/dL    Total Bilirubin 1.1 (H) 0.1 - 1.0 mg/dL    Alkaline Phosphatase 80 40 - 150 U/L    AST 14 10 - 40 U/L    ALT 11 10 - 44 U/L    eGFR >60.0 >60 mL/min/1.73 m^2    Anion Gap 8 8 - 16 mmol/L   Type & Screen    Collection Time: 03/18/25  7:36 PM   Result Value Ref Range    Group & Rh A NEG     Indirect Nehemiah NEG     Specimen Outdate 03/21/2025 23:59    Protime-INR    Collection Time: 03/18/25  7:36 PM   Result Value Ref Range    Prothrombin Time 11.8 9.0 - 12.5 sec    INR 1.1 0.8 - 1.2   Prepare RBC 1 Unit    Collection Time: 03/18/25  7:36 PM   Result Value Ref Range    UNIT NUMBER [id number]     Product Code G0342H20     DISPENSE STATUS CROSSMATCHED     CODING SYSTEM BFDW585     Unit Blood Type Code 0600     Unit Blood Type A NEG      Unit Expiration [id number]     CROSSMATCH INTERPRETATION Compatible     UNIT NUMBER [id number]     Product Code H2426R94     DISPENSE STATUS TRANSFUSED     CODING SYSTEM RJQE112     Unit Blood Type Code 0600     Unit Blood Type A NEG     Unit Expiration [id number]     CROSSMATCH INTERPRETATION Compatible    Urinalysis, Reflex to Urine Culture Urine, Clean Catch    Collection Time: 03/18/25  8:00 PM    Specimen: Urine   Result Value Ref Range    Specimen UA Urine, Clean Catch     Color, UA Yellow Yellow, Straw, Iza    Appearance, UA Clear Clear    pH, UA 7.0 5.0 - 8.0    Specific Gravity, UA 1.025 1.005 - 1.030    Protein, UA 1+ (A) Negative    Glucose, UA Negative Negative    Ketones, UA Negative Negative    Bilirubin (UA) Negative Negative    Occult Blood UA 3+ (A) Negative    Nitrite, UA Negative Negative    Urobilinogen, UA 4.0-6.0 (A) Negative EU/dL    Leukocytes, UA 1+ (A) Negative   Urinalysis Microscopic    Collection Time: 03/18/25  8:00 PM   Result Value Ref Range    RBC, UA >100 (H) 0 - 4 /hpf    WBC, UA 6 (H) 0 - 5 /hpf    Bacteria Rare None-Occ /hpf    Squam Epithel, UA 1 /hpf    Hyaline Casts, UA 0 0-1/lpf /lpf    Microscopic Comment SEE COMMENT    POCT urine pregnancy    Collection Time: 03/18/25  8:14 PM   Result Value Ref Range    POC Preg Test, Ur Negative Negative     Acceptable Yes    Prepare RBC 2 Units; Anemia    Collection Time: 03/19/25  1:28 AM   Result Value Ref Range    UNIT NUMBER [id number]     Product Code S1959C38     DISPENSE STATUS ISSUED     CODING SYSTEM ERWF096     Unit Blood Type Code 0600     Unit Blood Type A NEG     Unit Expiration [id number]     CROSSMATCH INTERPRETATION Compatible     UNIT NUMBER [id number]     Product Code K8314N98     DISPENSE STATUS CROSSMATCHED     CODING SYSTEM [id number]     Unit Blood Type Code 0600     Unit Blood Type A NEG     Unit Expiration [id number]     CROSSMATCH INTERPRETATION Compatible    Type & Screen     Collection Time: 25  1:28 AM   Result Value Ref Range    Group & Rh A NEG     Indirect Nehemiah NEG     Specimen Outdate 2025 23:59        Diagnostic Results:  N/A    ASSESSMENT/PLAN:     Active Hospital Problems    Diagnosis  POA    *Abnormal uterine bleeding (AUB) [N93.9]  Yes      Resolved Hospital Problems   No resolved problems to display.       Assessment: 44 y.o.  admitted for symptomatic anemia in the setting of AUB.     Plan:   AUB  - Reports improving fatigue/weakness, otherwise denies signs/symptoms of anemia   - HDS, with intermittent episodes of hypotension  - PE: as stated above   - Labs: UA: + Leukocytes, +3 blood; urinalysis w/ reflex to culture unremarkable              UPT negative              CBC w/o evidence of leukocytosis              H/H 5.7/20/7 > s/p 1 u pRBC outside hospital >> 2 u pRBC ordered              PT/INR: 11.8/1.1              T&S: A negative   - Imaging: non-obtained by ED, last documented TVUS 2023  - Patient with symptomatic anemia and critical H/H on presentation 2/2 menorrhagia likely d/t uterine fibroids. Given patient symptoms and physical exam recommend admission overnight to monitor bleeding. Management of AUB with TXA 60mg IV q 8h for 24hours. Strict pad counts ordered. 2 u pRBC ordered and currently being transfused, repeat CBC 6 hours after completion of transfusion. TVUS ordered for this AM.     Dispo: As patient meets appropriate post-op milestones, plan for discharge to home.    Yohana Hoffman MD  Obstetrics & Gynecology, PGY-1

## 2025-03-19 NOTE — ED PROVIDER NOTES
Encounter Date: 3/18/2025       History     Chief Complaint   Patient presents with    Vaginal Bleeding     Transfer from [facility] for dysfunctional vaginal bleerding; PTA Hgb 5.5     44-year-old female with history of menorrhagia presents as a transfer from an outside hospital for evaluation by gyn.  The patient states that she had routine lab work done by her PCP and was found to be severely anemic with a hemoglobin of 5, and she was instructed to present to the emergency department for a blood transfusion.  She does report some fatigue but describes it as mild and denies any associated shortness of breath or palpitations.  She has required blood transfusions in the past and her most recent 1 was close to a year ago.  She also takes iron supplements occasionally.  She is currently on day 3 of her menstrual cycle which typically lasts for about 7 days and is consistent with her typical heavy flow.  She is not on OCPs but does have an IUD.  She currently does not have a gyn.    She is currently being transfused 1 unit of PRBCs.      Review of patient's allergies indicates:   Allergen Reactions    Penicillin Hives     Past Medical History:   Diagnosis Date    Anemia, unspecified     Embolism and thrombosis of renal vein     RA (rheumatoid arthritis)      Past Surgical History:   Procedure Laterality Date     SECTION  , , 2004, 2014    CHOLECYSTECTOMY  2014    SLEEVE GASTROPLASTY  2021    in [place]    SURGICAL REMOVAL OF BUNION WITH OSTEOTOMY OF METATARSAL BONE Left 2022    Procedure: BUNIONECTOMY, WITH METATARSAL OSTEOTOMY  ;  Surgeon: Roshan Pyle DPM;  Location: [facility];  Service: Podiatry;  Laterality: Left;  distal osteotomy left 1st metatarsal    TUBAL LIGATION       Family History   Problem Relation Name Age of Onset    Breast cancer Maternal Aunt      Hypertension Maternal Grandmother      Coronary artery disease Maternal Grandmother      Colon cancer Neg Hx      Ovarian  cancer Neg Hx      Endometrial cancer Neg Hx       Social History[1]  Review of Systems    Physical Exam     Initial Vitals   BP Pulse Resp Temp SpO2   03/18/25 2340 03/18/25 2340 03/18/25 2340 03/18/25 2340 03/18/25 2327   (!) 98/52 70 13 98.4 °F (36.9 °C) (!) 94 %      MAP       --                Physical Exam    Vitals reviewed.  Constitutional: She appears well-developed and well-nourished. She is not diaphoretic. No distress.   HENT:   Head: Normocephalic and atraumatic.   Right Ear: External ear normal.   Left Ear: External ear normal.   Nose: Nose normal.   Eyes: Conjunctivae are normal.   Cardiovascular:  Normal rate, regular rhythm and normal heart sounds.     Exam reveals no gallop and no friction rub.       No murmur heard.  Pulmonary/Chest: Breath sounds normal. No respiratory distress. She has no wheezes. She has no rhonchi. She has no rales.   Abdominal: Abdomen is soft. She exhibits no distension. There is no abdominal tenderness. There is no rebound and no guarding.     Neurological: She is alert and oriented to person, place, and time.         ED Course   Procedures  Labs Reviewed   TYPE & SCREEN          Imaging Results    None          Medications - No data to display  Medical Decision Making  44-year-old female with history of menorrhagia presents severely anemic min outside hospital for gyn evaluation.  She is minimally symptomatic and is currently completing 1 unit of PRBCs.  Will defer further management to Gyn.      12:14 AM  The patient has been evaluated by gyn in the emergency department and they will admit to their service for further management.                                      Clinical Impression:  Final diagnoses:  [D64.9] Severe anemia (Primary)  [N92.0] Menorrhagia with regular cycle          ED Disposition Condition    Observation Stable                    [1]   Social History  Tobacco Use    Smoking status: Never    Smokeless tobacco: Never   Substance Use Topics    Alcohol  use: Not Currently     Comment: socially    Drug use: Never        Katherine Lopez MD  03/19/25 0015

## 2025-03-19 NOTE — PROCEDURES
44 y.o. female with IUD who desires removal 2/2 misplaced IUD. TVUS consistent with IUD in cervix.  Cervix was visualized and strings were grasped and IUD removed intact.  Pt tolerated well. No active bleeding noted after removal.    Yohana Hoffman MD  Obstetrics & Gynecology, PGY-1

## 2025-03-19 NOTE — H&P
[facility] - Emergency Dept  Obstetrics & Gynecology  Consult Note    Patient Name: Giovanni Pena  MRN: [id number]  Admission Date: 3/18/2025  Hospital Length of Stay: 0 days  Code Status: Full Code  Primary Care Provider: Miquel Mishra MD  Principal Problem: Abnormal uterine bleeding (AUB)    Consults  Subjective:     Chief Complaint: AUB     History of Present Illness: Giovanni ePna is a 44 y.o.    That presented to ED as a transfer for evaluation 2/2 AUB and symptomatic anemia. Patient reports that today she saw her PCP for annual exam and upon review of lab work was told she had severe anemia and was instructed to go to nearest ED for evaluation. Patient reports that her period began three days ago and for the past two days she has been experiencing severe fatigue, weakness, restless leg syndrome and intermittent chest palpitations. She denies SOB, lightheadedness or syncopal events. She reports this period has been heavy requiring her to change a maxi pad 8-10  times today. Reports passage of intermittent clots, largest the size of a golf ball. She reports she has a long standing history of menorrhagia 2/2 uterine fibroids and has required blood transfusions in the past d/t severe anemia. Has been on Mirena IUD since  however does not report improvement in symptoms since placement. Prior to evaluation by GYN patient had been transfused 1u pRBC at outside hospital    OB History:   - , s/p C/S x4     Gyn History:  - menarche age 17, reports menses were previously regular however since  her menses are irregular and heavy, not occurring every month and lasting 8-10 days on average saturates through 10 max pads daily while on menses and often saturates through her clothes   - Pap: NILM, HPV negative (2020), denies h/o abn pap smears or excisional procedures  - not currently sexually active & denies previous h/o STI's   - Contraception: Mirena IUD    PMHx:   - Portal Vein  Thrombosis s/p 6m treatment with Eliquis ()  - Uterine Fibroids     PSHx:   - Gastric sleeve, Cholecystectomy, Hernia, C/s x4, Hernia repair     Current Facility-Administered Medications on File Prior to Encounter   Medication    [COMPLETED] diphenhydrAMINE injection 50 mg    [COMPLETED] famotidine tablet 20 mg    [DISCONTINUED] 0.9%  NaCl infusion (for blood administration)     Current Outpatient Medications on File Prior to Encounter   Medication Sig    esomeprazole (NEXIUM) 40 MG capsule Take 1 capsule (40 mg total) by mouth before breakfast.    levonorgestreL (MIRENA) 21 mcg/24 hours (8 yrs) 52 mg IUD 52,000 mcg by Intrauterine route.       Review of patient's allergies indicates:   Allergen Reactions    Penicillin Hives       Past Medical History:   Diagnosis Date    Anemia, unspecified     Embolism and thrombosis of renal vein     RA (rheumatoid arthritis)      OB History    Para Term  AB Living   5 4 4 0 1 0   SAB IAB Ectopic Multiple Live Births   0 1 0 0 0      # Outcome Date GA Lbr Lonnie/2nd Weight Sex Type Anes PTL Lv   5 IAB            4 Term            3 Term            2 Term            1 Term              Past Surgical History:   Procedure Laterality Date     SECTION  , , ,     CHOLECYSTECTOMY  2014    SLEEVE GASTROPLASTY  2021    in [place]    SURGICAL REMOVAL OF BUNION WITH OSTEOTOMY OF METATARSAL BONE Left 2022    Procedure: BUNIONECTOMY, WITH METATARSAL OSTEOTOMY  ;  Surgeon: Roshan Pyle DPM;  Location: [facility];  Service: Podiatry;  Laterality: Left;  distal osteotomy left 1st metatarsal    TUBAL LIGATION       Family History       Problem Relation (Age of Onset)    Breast cancer Maternal Aunt    Coronary artery disease Maternal Grandmother    Hypertension Maternal Grandmother          Tobacco Use    Smoking status: Never    Smokeless tobacco: Never   Substance and Sexual Activity    Alcohol use: Not Currently      Comment: socially    Drug use: Never    Sexual activity: Not Currently     Partners: Male     Review of Systems   Constitutional:  Positive for activity change and fatigue. Negative for chills and fever.   HENT:  Negative for nasal congestion and mouth sores.    Eyes:  Negative for visual disturbance.   Respiratory:  Negative for cough and shortness of breath.    Cardiovascular:  Negative for chest pain and palpitations.   Gastrointestinal:  Negative for abdominal pain, constipation, diarrhea, nausea and vomiting.   Genitourinary:  Positive for menorrhagia, menstrual problem and vaginal bleeding. Negative for dysuria, pelvic pain and vaginal discharge.   Musculoskeletal:  Positive for leg pain (reports restless leg). Negative for joint swelling.   Integumentary:  Negative for rash.   Neurological:  Negative for syncope, numbness and headaches.   Psychiatric/Behavioral:  The patient is not nervous/anxious.      Objective:     Vital Signs (Most Recent):  Temp: 98.4 °F (36.9 °C) (03/18/25 2340)  Pulse: 70 (03/18/25 2340)  Resp: 13 (03/18/25 2340)  BP: (!) 98/52 (03/18/25 2340)  SpO2: 100 % (03/18/25 2340) Vital Signs (24h Range):  Temp:  [98.4 °F (36.9 °C)-99 °F (37.2 °C)] 98.4 °F (36.9 °C)  Pulse:  [70-88] 70  Resp:  [13-16] 13  SpO2:  [94 %-100 %] 100 %  BP: ()/(52-64) 98/52        There is no height or weight on file to calculate BMI.  Patient's last menstrual period was 03/15/2025 (exact date).    Physical Exam:   Constitutional: She is oriented to person, place, and time. She appears well-developed and well-nourished. No distress.    HENT:   Head: Normocephalic and atraumatic.    Eyes: EOM are normal.     Cardiovascular:  Normal rate, regular rhythm and normal heart sounds.             Pulmonary/Chest: Effort normal. No respiratory distress.        Abdominal: Soft. There is abdominal tenderness. There is no rebound and no guarding.     Genitourinary:    Genitourinary Comments: External genitalia: grossly  normal, no erythema edema or lesions noted.   SSE: No active bleeding noted from cervical os, no evidence of pooling, golf sized clot evacuated from vaginal vault with 5 proctoswabs, cervix with evidence of multiple nabothian cysts and vagina grossly normal, no signs of atrophy or friability. No discharge. IUD strings visualized   SVE: uterus and adnexa nontender, uterus normal in size, adnexa not palpable limited by habitus.              Musculoskeletal: Normal range of motion.       Neurological: She is alert and oriented to person, place, and time.    Skin: Skin is warm and dry.    Psychiatric: She has a normal mood and affect. Her behavior is normal.       Laboratory:  Recent Lab Results  (Last 5 results in the past 24 hours)        03/19/25 0128 03/18/25 2014 03/18/25 2000 03/18/25 1936 03/18/25  1522        Unit Blood Type Code 0600  [P]       0600  [P]          0600  [P]       0600         Unit Expiration [id number]  [P]       [id number]  [P]          [id number]  [P]       [id number]         Unit Blood Type A NEG  [P]       A NEG  [P]          A NEG  [P]       A NEG         Albumin       3.6   3.4       ALP       80   77       ALT       11   11       Anion Gap       8   8       Aniso         Moderate       Appearance, UA     Clear           AST       14   15       Bacteria, UA     Rare           Baso #       0.03   0.05       Basophil %       0.7   1.2       Bilirubin (UA)     Negative           BILIRUBIN TOTAL       1.1  Comment: For infants and newborns, interpretation of results should be based  on gestational age, weight and in agreement with clinical  observations.    Premature Infant recommended reference ranges:  Up to 24 hours.............<8.0 mg/dL  Up to 48 hours............<12.0 mg/dL  3-5 days..................<15.0 mg/dL  6-29 days.................<15.0 mg/dL     1.2  Comment: For infants and newborns, interpretation of results should be based  on gestational age, weight and  in agreement with clinical  observations.    Premature Infant recommended reference ranges:  Up to 24 hours.............<8.0 mg/dL  Up to 48 hours............<12.0 mg/dL  3-5 days..................<15.0 mg/dL  6-29 days.................<15.0 mg/dL         BUN       10   10       Calcium       8.2   8.1       Chloride       108   109       Cholesterol Total         146  Comment: The National Cholesterol Education Program (NCEP) has set the  following guidelines (reference ranges) for Cholesterol:  Optimal.....................<200 mg/dL  Borderline High.............200-239 mg/dL  High........................> or = 240 mg/dL         CO2       22   22       CODING SYSTEM [id number]  [P]       [id number]  [P]          [id number]  [P]       [id number]         Color, UA     Yellow           Creatinine       0.8   0.8       Crossmatch Interpretation Compatible  [P]       Compatible  [P]          Compatible  [P]       Compatible         Differential Method       Automated   Automated       DISPENSE STATUS CROSSMATCHED  [P]       CROSSMATCHED  [P]          CROSSMATCHED  [P]       TRANSFUSED         eGFR       >60.0   >60.0       Eos #       0.1   0.1       Eos %       2.0   1.9       Estimated Avg Glucose         103       Ferritin         7       Fragmented Cells         Occasional       Giant Platelets         Present       Glucose       85   82       Glucose, UA     Negative           Gran # (ANC)       3.2   3.1       Gran %       69.7   72.3       Group & Rh A NEG       A NEG         HDL         59  Comment: The National Cholesterol Education Program (NCEP) has set the  following guidelines (reference values) for HDL Cholesterol:  Low...............<40 mg/dL  Optimal...........>60 mg/dL         HDL/Cholesterol Ratio         40.4       Hematocrit       20.7   20.8       Hemoglobin       5.6  Comment: hgb  critical result(s) called and verbal readback obtained from   thanh HAMLIN 03/18/2025 19:49     5.7  Comment: Hgb critical  result(s) called and verbal readback obtained from   jarret willingham  by SUPRIYA 03/18/2025 16:08         Hemoglobin A1C External         5.2  Comment: ADA Screening Guidelines:  5.7-6.4%  Consistent with prediabetes  >or=6.5%  Consistent with diabetes    High levels of fetal hemoglobin interfere with the HbA1C  assay. Heterozygous hemoglobin variants (HbS, HgC, etc)do  not significantly interfere with this assay.   However, presence of multiple variants may affect accuracy.         Hyaline Casts, UA     0           Hypo         Moderate       Immature Grans (Abs)       0.01  Comment: Mild elevation in immature granulocytes is non specific and   can be seen in a variety of conditions including stress response,   acute inflammation, trauma and pregnancy. Correlation with other   laboratory and clinical findings is essential.     0.02  Comment: Mild elevation in immature granulocytes is non specific and   can be seen in a variety of conditions including stress response,   acute inflammation, trauma and pregnancy. Correlation with other   laboratory and clinical findings is essential.         Immature Granulocytes       0.2   0.5       INDIRECT ROMÁN NEG       NEG         INR       1.1  Comment: Coumadin Therapy:  2.0 - 3.0 for INR for all indicators except mechanical heart valves  and antiphospholipid syndromes which should use 2.5 - 3.5.  LOT^040^PT Inn^325800           Iron         13       Ketones, UA     Negative           LDL Cholesterol         77.6  Comment: The National Cholesterol Education Program (NCEP) has set the  following guidelines (reference values) for LDL Cholesterol:  Optimal.......................<130 mg/dL  Borderline High...............130-159 mg/dL  High..........................160-189 mg/dL  Very High.....................>190 mg/dL         Leukocyte Esterase, UA     1+           Lymph #       0.9   0.8       Lymph %       20.2   17.4       MCH       18.4   18.6       MCHC       27.1   27.4        MCV       68   68       Microscopic Comment     SEE COMMENT  Comment: Other formed elements not mentioned in the report are not   present in the microscopic examination.              Mono #       0.3   0.3       Mono %       7.2   6.7       MPV       10.7   10.6       NITRITE UA     Negative           Non-HDL Cholesterol         87  Comment: Risk category and Non-HDL cholesterol goals:  Coronary heart disease (CHD)or equivalent (10-year risk of CHD >20%):  Non-HDL cholesterol goal     <130 mg/dL  Two or more CHD risk factors and 10-year risk of CHD <= 20%:  Non-HDL cholesterol goal     <160 mg/dL  0 to 1 CHD risk factor:  Non-HDL cholesterol goal     <190 mg/dL         nRBC       0   0       Blood, UA     3+           Ovalocytes         Occasional       pH, UA     7.0           Platelet Estimate         Appears normal       Platelet Count       172   165       Poikilocytosis         Slight       Potassium       3.5   3.8       hCG Qualitative, Urine   Negative             Product Code [id number]  [P]       [id number]  [P]          [id number]  [P]       [id number]         PROTEIN TOTAL       7.8   7.6       Protein, UA     1+  Comment: Recommend a 24 hour urine protein or a urine   protein/creatinine ratio if globulin induced proteinuria is  clinically suspected.             PT       11.8          Acceptable   Yes             RBC       3.04   3.06       RBC, UA     >100           RDW       19.0   19.1       Saturated Iron         2       Sodium       138   139       Spec Grav UA     1.025           Specimen Outdate 03/22/2025 23:59       03/21/2025 23:59         Specimen UA     Urine, Clean Catch           Squam Epithel, UA     1           Teardrop Cells         Occasional       TIBC         586       Total Cholesterol/HDL Ratio         2.5       Transferrin         396       Triglycerides         47  Comment: The National Cholesterol Education Program (NCEP) has set the  following guidelines (reference  values) for triglycerides:  Normal......................<150 mg/dL  Borderline High.............150-199 mg/dL  High........................200-499 mg/dL         TSH         0.815       UNIT NUMBER [id number]  [P]       [id number]  [P]          [id number]  [P]       [id number]         UROBILINOGEN UA     4.0-6.0           WBC, UA     6           WBC       4.61   4.30                               [P] - Preliminary Result             Diagnostic Results: N/A    Assessment/Plan:     Active Diagnoses:    Diagnosis Date Noted POA    PRINCIPAL PROBLEM:  Abnormal uterine bleeding (AUB) [N93.9] 2025 Yes      Problems Resolved During this Admission:     Giovanni Pena is a 44 y.o.    That presented to ED as a transfer for evaluation 2/2 AUB and symptomatic anemia    AUB  - HDS, patient with intermittent episodes of hypotension lowest BP:97/55, reports fatigue and weakness otherwise denies other symptoms of anemia at this time   - PE: as stated above   - Labs: UA: + Leukocytes, +3 blood; urinalysis w/ reflex to culture unremarkable              UPT negative              CBC w/o evidence of leukocytosis              H/H 5.7/20/7 > s/p 1 u pRBC outside hospital >> 2 u pRBC ordered              PT/INR: 11.8/1.1              T&S: A negative   - Imaging: non-obtained by ED, last documented TVUS 2023  - Patient with symptomatic anemia and critical H/H on presentation 2/2 menorrhagia likely d/t uterine fibroids. Given patient symptoms and physical exam recommend admission overnight to monitor bleeding. Management of AUB with TXA 60mg IV q 8h for 24hours. Strict pad counts ordered. 2 u pRBC ordered and pending transfusion, repeat CBC 6 hours after completion of transfusion. TVUS ordered in the morning. If bleeding worsens or acute clinical decompensation presents consider management with IV estrogen therapy.      Thank you for your consult. I will follow-up with patient. Please contact us if you have  any additional questions.      Radha Thomson MD  Obstetrics & Gynecology  [facility] - Emergency Dept

## 2025-03-20 ENCOUNTER — TELEPHONE (OUTPATIENT)
Dept: OBSTETRICS AND GYNECOLOGY | Facility: CLINIC | Age: 45
End: 2025-03-20
Payer: COMMERCIAL

## 2025-03-20 NOTE — TELEPHONE ENCOUNTER
----- Message from Soha Quinn sent at 3/19/2025  5:19 PM CDT -----  Regarding: Schedule hosp f/u and discuss IUD vs nexplanon in gyn resident clinic  Hi Noris,Can you please schedule this patient next week for gyn resident clinic follow-up/discuss IUD or nexplanon? Thank you!Soha Quinn MD [id number]

## 2025-03-20 NOTE — NURSING
Pt discharged to home. Pt is Aox4 and states verbal understanding of all discharge instructions.  IV access x2 removed with tip intact. Medications called into patients pharmacy. Pt left via wheelchair and LYFT provided back to [facility].  Pt safe

## 2025-03-21 NOTE — PROGRESS NOTES
Subjective:       Patient ID: Giovanni Pena is a 44 y.o. female Body mass index is 28.11 kg/m².    Chief Complaint: Bloated (X2 months )    This patient is new to me.  Referring Provider: Leia Day for gerd/bloating/iron def anemia.     Had gastric sleeve in  - in [place]    Constant growing and bloating    Was switched to nexium but cannot start until May due       Review of Systems   Constitutional:  Positive for activity change, appetite change and fatigue. Negative for fever and unexpected weight change.   HENT:  Negative for sore throat and trouble swallowing.    Respiratory:  Negative for cough and shortness of breath.    Cardiovascular:  Negative for chest pain.   Gastrointestinal:  Positive for abdominal pain and nausea. Negative for abdominal distention, anal bleeding, blood in stool, constipation, diarrhea, rectal pain and vomiting.   Genitourinary:  Positive for menstrual problem.       Patient's last menstrual period was 03/15/2025 (exact date).  Past Medical History:   Diagnosis Date    Anemia, unspecified     Embolism and thrombosis of renal vein     RA (rheumatoid arthritis)      Past Surgical History:   Procedure Laterality Date     SECTION  , , , 2014    CHOLECYSTECTOMY  2014    SLEEVE GASTROPLASTY  2021    in [place]    SURGICAL REMOVAL OF BUNION WITH OSTEOTOMY OF METATARSAL BONE Left 2022    Procedure: BUNIONECTOMY, WITH METATARSAL OSTEOTOMY  ;  Surgeon: Roshan Pyle DPM;  Location: [facility];  Service: Podiatry;  Laterality: Left;  distal osteotomy left 1st metatarsal    TUBAL LIGATION       Family History   Problem Relation Name Age of Onset    Breast cancer Maternal Aunt      Hypertension Maternal Grandmother      Coronary artery disease Maternal Grandmother      Colon cancer Neg Hx      Ovarian cancer Neg Hx      Endometrial cancer Neg Hx       Social History[1]  Wt Readings from Last 10 Encounters:   25 81.4 kg (179 lb 7.3 oz)    03/19/25 83.1 kg (183 lb 3.2 oz)   03/18/25 80.5 kg (177 lb 7.5 oz)   03/18/25 79.7 kg (175 lb 11.3 oz)   12/14/23 83.6 kg (184 lb 3.1 oz)   12/11/23 83.4 kg (183 lb 13.8 oz)   11/30/23 83.3 kg (183 lb 11.2 oz)   11/09/23 83.6 kg (184 lb 4.8 oz)   11/02/23 82.1 kg (180 lb 14.4 oz)   10/12/23 80.7 kg (177 lb 14.6 oz)     Lab Results   Component Value Date    WBC 3.61 (L) 03/19/2025    HGB 8.0 (L) 03/19/2025    HCT 26.6 (L) 03/19/2025    MCV 73 (L) 03/19/2025     (L) 03/19/2025     CMP  Sodium   Date Value Ref Range Status   03/18/2025 138 136 - 145 mmol/L Final     Potassium   Date Value Ref Range Status   03/18/2025 3.5 3.5 - 5.1 mmol/L Final     Chloride   Date Value Ref Range Status   03/18/2025 108 95 - 110 mmol/L Final     CO2   Date Value Ref Range Status   03/18/2025 22 (L) 23 - 29 mmol/L Final     Glucose   Date Value Ref Range Status   03/18/2025 85 70 - 110 mg/dL Final     BUN   Date Value Ref Range Status   03/18/2025 10 6 - 20 mg/dL Final     Creatinine   Date Value Ref Range Status   03/18/2025 0.8 0.5 - 1.4 mg/dL Final     Calcium   Date Value Ref Range Status   03/18/2025 8.2 (L) 8.7 - 10.5 mg/dL Final     Total Protein   Date Value Ref Range Status   03/18/2025 7.8 6.0 - 8.4 g/dL Final     Albumin   Date Value Ref Range Status   03/18/2025 3.6 3.5 - 5.2 g/dL Final     Total Bilirubin   Date Value Ref Range Status   03/18/2025 1.1 (H) 0.1 - 1.0 mg/dL Final     Comment:     For infants and newborns, interpretation of results should be based  on gestational age, weight and in agreement with clinical  observations.    Premature Infant recommended reference ranges:  Up to 24 hours.............<8.0 mg/dL  Up to 48 hours............<12.0 mg/dL  3-5 days..................<15.0 mg/dL  6-29 days.................<15.0 mg/dL       Alkaline Phosphatase   Date Value Ref Range Status   03/18/2025 80 40 - 150 U/L Final     AST   Date Value Ref Range Status   03/18/2025 14 10 - 40 U/L Final     ALT   Date  "Value Ref Range Status   03/18/2025 11 10 - 44 U/L Final     Anion Gap   Date Value Ref Range Status   03/18/2025 8 8 - 16 mmol/L Final     eGFR if    Date Value Ref Range Status   07/19/2022 >60.0 >60 mL/min/1.73 m^2 Final     eGFR if non    Date Value Ref Range Status   07/19/2022 >60.0 >60 mL/min/1.73 m^2 Final     Comment:     Calculation used to obtain the estimated glomerular filtration  rate (eGFR) is the CKD-EPI equation.        No results found for: "AMYLASE"  Lab Results   Component Value Date    LIPASE 18 09/21/2023     No results found for: "LIPASERES"  Lab Results   Component Value Date    TSH 0.815 03/18/2025       Reviewed prior medical records including radiology report of hospital stay 3/18/25, labs 3/2025 & endoscopy history (see surgical history).    Objective:      Physical Exam  Vitals and nursing note reviewed.   Constitutional:       General: She is not in acute distress.     Appearance: She is not ill-appearing.   HENT:      Head: Normocephalic and atraumatic.      Mouth/Throat:      Mouth: Mucous membranes are moist.      Pharynx: Oropharynx is clear.   Eyes:      Conjunctiva/sclera: Conjunctivae normal.   Cardiovascular:      Rate and Rhythm: Normal rate and regular rhythm.      Pulses: Normal pulses.   Pulmonary:      Effort: Pulmonary effort is normal. No respiratory distress.   Abdominal:      General: Abdomen is flat. Bowel sounds are normal. There is no distension.      Palpations: Abdomen is soft.      Tenderness: There is no abdominal tenderness.   Skin:     General: Skin is warm and dry.      Capillary Refill: Capillary refill takes 2 to 3 seconds.   Neurological:      Mental Status: She is alert and oriented to person, place, and time.         Assessment:       1. H/O gastric sleeve    2. Gastroesophageal reflux disease, unspecified whether esophagitis present    3. Iron deficiency anemia due to chronic blood loss        Plan:       H/O gastric " sleeve & Iron deficiency anemia due to chronic blood loss  - discussed with patient the different ways that anemia occurs: blood loss (such as from the gi tract), the body is not making enough, or the body is breaking down the rbcs too quickly; recommend EGD and colonoscopy to further evaluate gi tract for possible blood loss and pending results of endoscopies, possible UGI with Small Bowel Follow Through/video capsule study  -follow-up with PCP and/or hematology for continued evaluation and management     Recent transfusions for significant anemia  - schedule EGD, discussed procedure with patient, including risks and benefits, patient verbalized understanding  - schedule Colonoscopy, discussed procedure with the patient, including risks and benefits, patient verbalized understanding      Gastroesophageal reflux disease, unspecified whether esophagitis present  - schedule EGD, discussed procedure with patient, including risks and benefits, patient verbalized understanding  -discussed about the different types of medications used to treat reflux and how to use them, antacids can be used PRN for breakthrough heartburn symptoms by reducing stomach acid that is already produced, H2 blockers work by limiting the amount acid production, & PPI's work to block acid production and are taken daily, patient verbalized understanding.  -Educated patient on lifestyle modifications to help control/reduce reflux/abdominal pain including: avoid large meals, avoid eating within 2-3 hours of bedtime (avoid late night eating & lying down soon after eating), elevate head of bed if nocturnal symptoms are present, smoking cessation (if current smoker), & weight loss (if overweight).   -Educated to avoid known foods which trigger reflux symptoms & to minimize/avoid high-fat foods, chocolate, caffeine, citrus, alcohol, & tomato products.  -Advised to avoid/limit use of NSAID's, since they can cause GI upset, bleeding, and/or ulcers. If  needed, take with food.     Rule out H. Pylori with EGD  -     Ambulatory referral/consult to Gastroenterology  -     esomeprazole (NEXIUM) 40 MG capsule; Take 1 capsule (40 mg total) by mouth 2 (two) times daily.  Dispense: 60 capsule; Refill: 11  -     sucralfate (CARAFATE) 1 gram tablet; Take 1 tablet (1 g total) by mouth 4 (four) times daily before meals and nightly. for 10 days  Dispense: 40 tablet; Refill: 0  -     X-Ray Abdomen AP 1 View; Future; Expected date: 03/25/2025    Follow up if symptoms worsen or fail to improve.      If no improvement in symptoms or symptoms worsen, call/follow-up at clinic or go to ER.       JEFF Harrell, DANIELC    Encounter includes face to face time and non-face to face time preparing to see the patient (eg, review of tests), obtaining and/or reviewing separately obtained history, documenting clinical information in the electronic or other health record, independently interpreting results (not separately reported) and communicating results to the patient/family/caregiver, or care coordination (not separately reported).     A dictation software program was used for this note. Please expect some simple typographical errors in this note.         [1]   Social History  Tobacco Use    Smoking status: Never    Smokeless tobacco: Never   Substance Use Topics    Alcohol use: Not Currently     Comment: socially    Drug use: Never

## 2025-03-21 NOTE — H&P (VIEW-ONLY)
Subjective:       Patient ID: Givoanni Pena is a 44 y.o. female Body mass index is 28.11 kg/m².    Chief Complaint: Bloated (X2 months )    This patient is new to me.  Referring Provider: Leia Day for gerd/bloating/iron def anemia.     Had gastric sleeve in  - in [place]    Constant growing and bloating    Was switched to nexium but cannot start until May due       Review of Systems   Constitutional:  Positive for activity change, appetite change and fatigue. Negative for fever and unexpected weight change.   HENT:  Negative for sore throat and trouble swallowing.    Respiratory:  Negative for cough and shortness of breath.    Cardiovascular:  Negative for chest pain.   Gastrointestinal:  Positive for abdominal pain and nausea. Negative for abdominal distention, anal bleeding, blood in stool, constipation, diarrhea, rectal pain and vomiting.   Genitourinary:  Positive for menstrual problem.       Patient's last menstrual period was 03/15/2025 (exact date).  Past Medical History:   Diagnosis Date    Anemia, unspecified     Embolism and thrombosis of renal vein     RA (rheumatoid arthritis)      Past Surgical History:   Procedure Laterality Date     SECTION  , , , 2014    CHOLECYSTECTOMY  2014    SLEEVE GASTROPLASTY  2021    in [place]    SURGICAL REMOVAL OF BUNION WITH OSTEOTOMY OF METATARSAL BONE Left 2022    Procedure: BUNIONECTOMY, WITH METATARSAL OSTEOTOMY  ;  Surgeon: Roshan Pyle DPM;  Location: [facility];  Service: Podiatry;  Laterality: Left;  distal osteotomy left 1st metatarsal    TUBAL LIGATION       Family History   Problem Relation Name Age of Onset    Breast cancer Maternal Aunt      Hypertension Maternal Grandmother      Coronary artery disease Maternal Grandmother      Colon cancer Neg Hx      Ovarian cancer Neg Hx      Endometrial cancer Neg Hx       Social History[1]  Wt Readings from Last 10 Encounters:   25 81.4 kg (179 lb 7.3 oz)    03/19/25 83.1 kg (183 lb 3.2 oz)   03/18/25 80.5 kg (177 lb 7.5 oz)   03/18/25 79.7 kg (175 lb 11.3 oz)   12/14/23 83.6 kg (184 lb 3.1 oz)   12/11/23 83.4 kg (183 lb 13.8 oz)   11/30/23 83.3 kg (183 lb 11.2 oz)   11/09/23 83.6 kg (184 lb 4.8 oz)   11/02/23 82.1 kg (180 lb 14.4 oz)   10/12/23 80.7 kg (177 lb 14.6 oz)     Lab Results   Component Value Date    WBC 3.61 (L) 03/19/2025    HGB 8.0 (L) 03/19/2025    HCT 26.6 (L) 03/19/2025    MCV 73 (L) 03/19/2025     (L) 03/19/2025     CMP  Sodium   Date Value Ref Range Status   03/18/2025 138 136 - 145 mmol/L Final     Potassium   Date Value Ref Range Status   03/18/2025 3.5 3.5 - 5.1 mmol/L Final     Chloride   Date Value Ref Range Status   03/18/2025 108 95 - 110 mmol/L Final     CO2   Date Value Ref Range Status   03/18/2025 22 (L) 23 - 29 mmol/L Final     Glucose   Date Value Ref Range Status   03/18/2025 85 70 - 110 mg/dL Final     BUN   Date Value Ref Range Status   03/18/2025 10 6 - 20 mg/dL Final     Creatinine   Date Value Ref Range Status   03/18/2025 0.8 0.5 - 1.4 mg/dL Final     Calcium   Date Value Ref Range Status   03/18/2025 8.2 (L) 8.7 - 10.5 mg/dL Final     Total Protein   Date Value Ref Range Status   03/18/2025 7.8 6.0 - 8.4 g/dL Final     Albumin   Date Value Ref Range Status   03/18/2025 3.6 3.5 - 5.2 g/dL Final     Total Bilirubin   Date Value Ref Range Status   03/18/2025 1.1 (H) 0.1 - 1.0 mg/dL Final     Comment:     For infants and newborns, interpretation of results should be based  on gestational age, weight and in agreement with clinical  observations.    Premature Infant recommended reference ranges:  Up to 24 hours.............<8.0 mg/dL  Up to 48 hours............<12.0 mg/dL  3-5 days..................<15.0 mg/dL  6-29 days.................<15.0 mg/dL       Alkaline Phosphatase   Date Value Ref Range Status   03/18/2025 80 40 - 150 U/L Final     AST   Date Value Ref Range Status   03/18/2025 14 10 - 40 U/L Final     ALT   Date  "Value Ref Range Status   03/18/2025 11 10 - 44 U/L Final     Anion Gap   Date Value Ref Range Status   03/18/2025 8 8 - 16 mmol/L Final     eGFR if    Date Value Ref Range Status   07/19/2022 >60.0 >60 mL/min/1.73 m^2 Final     eGFR if non    Date Value Ref Range Status   07/19/2022 >60.0 >60 mL/min/1.73 m^2 Final     Comment:     Calculation used to obtain the estimated glomerular filtration  rate (eGFR) is the CKD-EPI equation.        No results found for: "AMYLASE"  Lab Results   Component Value Date    LIPASE 18 09/21/2023     No results found for: "LIPASERES"  Lab Results   Component Value Date    TSH 0.815 03/18/2025       Reviewed prior medical records including radiology report of hospital stay 3/18/25, labs 3/2025 & endoscopy history (see surgical history).    Objective:      Physical Exam  Vitals and nursing note reviewed.   Constitutional:       General: She is not in acute distress.     Appearance: She is not ill-appearing.   HENT:      Head: Normocephalic and atraumatic.      Mouth/Throat:      Mouth: Mucous membranes are moist.      Pharynx: Oropharynx is clear.   Eyes:      Conjunctiva/sclera: Conjunctivae normal.   Cardiovascular:      Rate and Rhythm: Normal rate and regular rhythm.      Pulses: Normal pulses.   Pulmonary:      Effort: Pulmonary effort is normal. No respiratory distress.   Abdominal:      General: Abdomen is flat. Bowel sounds are normal. There is no distension.      Palpations: Abdomen is soft.      Tenderness: There is no abdominal tenderness.   Skin:     General: Skin is warm and dry.      Capillary Refill: Capillary refill takes 2 to 3 seconds.   Neurological:      Mental Status: She is alert and oriented to person, place, and time.         Assessment:       1. H/O gastric sleeve    2. Gastroesophageal reflux disease, unspecified whether esophagitis present    3. Iron deficiency anemia due to chronic blood loss        Plan:       H/O gastric " sleeve & Iron deficiency anemia due to chronic blood loss  - discussed with patient the different ways that anemia occurs: blood loss (such as from the gi tract), the body is not making enough, or the body is breaking down the rbcs too quickly; recommend EGD and colonoscopy to further evaluate gi tract for possible blood loss and pending results of endoscopies, possible UGI with Small Bowel Follow Through/video capsule study  -follow-up with PCP and/or hematology for continued evaluation and management     Recent transfusions for significant anemia  - schedule EGD, discussed procedure with patient, including risks and benefits, patient verbalized understanding  - schedule Colonoscopy, discussed procedure with the patient, including risks and benefits, patient verbalized understanding      Gastroesophageal reflux disease, unspecified whether esophagitis present  - schedule EGD, discussed procedure with patient, including risks and benefits, patient verbalized understanding  -discussed about the different types of medications used to treat reflux and how to use them, antacids can be used PRN for breakthrough heartburn symptoms by reducing stomach acid that is already produced, H2 blockers work by limiting the amount acid production, & PPI's work to block acid production and are taken daily, patient verbalized understanding.  -Educated patient on lifestyle modifications to help control/reduce reflux/abdominal pain including: avoid large meals, avoid eating within 2-3 hours of bedtime (avoid late night eating & lying down soon after eating), elevate head of bed if nocturnal symptoms are present, smoking cessation (if current smoker), & weight loss (if overweight).   -Educated to avoid known foods which trigger reflux symptoms & to minimize/avoid high-fat foods, chocolate, caffeine, citrus, alcohol, & tomato products.  -Advised to avoid/limit use of NSAID's, since they can cause GI upset, bleeding, and/or ulcers. If  needed, take with food.     Rule out H. Pylori with EGD  -     Ambulatory referral/consult to Gastroenterology  -     esomeprazole (NEXIUM) 40 MG capsule; Take 1 capsule (40 mg total) by mouth 2 (two) times daily.  Dispense: 60 capsule; Refill: 11  -     sucralfate (CARAFATE) 1 gram tablet; Take 1 tablet (1 g total) by mouth 4 (four) times daily before meals and nightly. for 10 days  Dispense: 40 tablet; Refill: 0  -     X-Ray Abdomen AP 1 View; Future; Expected date: 03/25/2025    Follow up if symptoms worsen or fail to improve.      If no improvement in symptoms or symptoms worsen, call/follow-up at clinic or go to ER.       JEFF Harrell, DANIELC    Encounter includes face to face time and non-face to face time preparing to see the patient (eg, review of tests), obtaining and/or reviewing separately obtained history, documenting clinical information in the electronic or other health record, independently interpreting results (not separately reported) and communicating results to the patient/family/caregiver, or care coordination (not separately reported).     A dictation software program was used for this note. Please expect some simple typographical errors in this note.         [1]   Social History  Tobacco Use    Smoking status: Never    Smokeless tobacco: Never   Substance Use Topics    Alcohol use: Not Currently     Comment: socially    Drug use: Never

## 2025-03-25 ENCOUNTER — OFFICE VISIT (OUTPATIENT)
Dept: GASTROENTEROLOGY | Facility: CLINIC | Age: 45
End: 2025-03-25
Payer: COMMERCIAL

## 2025-03-25 ENCOUNTER — PATIENT MESSAGE (OUTPATIENT)
Dept: GASTROENTEROLOGY | Facility: CLINIC | Age: 45
End: 2025-03-25

## 2025-03-25 VITALS
HEART RATE: 72 BPM | WEIGHT: 179.44 LBS | HEIGHT: 67 IN | BODY MASS INDEX: 28.16 KG/M2 | DIASTOLIC BLOOD PRESSURE: 58 MMHG | RESPIRATION RATE: 17 BRPM | SYSTOLIC BLOOD PRESSURE: 101 MMHG

## 2025-03-25 DIAGNOSIS — Z90.3 H/O GASTRIC SLEEVE: Primary | ICD-10-CM

## 2025-03-25 DIAGNOSIS — K21.9 GASTROESOPHAGEAL REFLUX DISEASE, UNSPECIFIED WHETHER ESOPHAGITIS PRESENT: ICD-10-CM

## 2025-03-25 DIAGNOSIS — D50.0 IRON DEFICIENCY ANEMIA DUE TO CHRONIC BLOOD LOSS: ICD-10-CM

## 2025-03-25 PROCEDURE — 99204 OFFICE O/P NEW MOD 45 MIN: CPT | Mod: S$GLB,,,

## 2025-03-25 PROCEDURE — 99999 PR PBB SHADOW E&M-EST. PATIENT-LVL IV: CPT | Mod: PBBFAC,,,

## 2025-03-25 RX ORDER — SUCRALFATE 1 G/1
1 TABLET ORAL
Qty: 40 TABLET | Refills: 0 | Status: SHIPPED | OUTPATIENT
Start: 2025-03-25 | End: 2025-04-04

## 2025-03-25 RX ORDER — ESOMEPRAZOLE MAGNESIUM 40 MG/1
40 CAPSULE, DELAYED RELEASE ORAL 2 TIMES DAILY
Qty: 60 CAPSULE | Refills: 11 | Status: SHIPPED | OUTPATIENT
Start: 2025-03-25 | End: 2026-03-25

## 2025-03-25 NOTE — PATIENT INSTRUCTIONS
- recommend OTC simethicone as directed, such as Phazyme or Gas-x    - recommend low gas diet: Reduce or eliminate these foods from your diet: Broccoli, Cauliflower, [place] sprouts, Cabbage, Cooked dried beans, Carbonated beverages (sparkling water, soda, beer, champagne)    Other Causes Of Excess Gas Include:     1) EATING TOO FAST or TALKING WHILE YOU CHEW may cause you to swallow air. This increases the amount of gas in the stomach and may worsen your symptoms.  --> Chew each mouthful completely before swallowing. Take your time.    2) OVEREATING may increase the feeling of being bloated and cause more gas.  --> When you are full, stop eating.    3) CONSTIPATION can increase the amount of normal intestinal gas.  --> Avoid constipation by increasing the amount of fiber in your diet by including whole cereal grains, fresh vegetables (except those in the above list) and fresh fruits. High-fiber foods absorb water and carry it out of the body. When increasing the amount of fiber in your diet, you also need to increase the amount of water that you drink. You should drink at least eight 8-ounce glasses of water (two quarts) per day.

## 2025-03-26 ENCOUNTER — RESULTS FOLLOW-UP (OUTPATIENT)
Dept: GASTROENTEROLOGY | Facility: CLINIC | Age: 45
End: 2025-03-26

## 2025-03-27 ENCOUNTER — PATIENT MESSAGE (OUTPATIENT)
Dept: PRIMARY CARE CLINIC | Facility: CLINIC | Age: 45
End: 2025-03-27
Payer: COMMERCIAL

## 2025-03-28 ENCOUNTER — HOSPITAL ENCOUNTER (OUTPATIENT)
Dept: RADIOLOGY | Facility: HOSPITAL | Age: 45
Discharge: HOME | End: 2025-03-28
Attending: STUDENT IN AN ORGANIZED HEALTH CARE EDUCATION/TRAINING PROGRAM
Payer: COMMERCIAL

## 2025-03-28 DIAGNOSIS — Z12.31 OTHER SCREENING MAMMOGRAM: ICD-10-CM

## 2025-03-28 PROCEDURE — 77067 SCR MAMMO BI INCL CAD: CPT | Mod: 26,,, | Performed by: RADIOLOGY

## 2025-03-28 PROCEDURE — 77067 SCR MAMMO BI INCL CAD: CPT | Mod: TC

## 2025-03-28 PROCEDURE — 77063 BREAST TOMOSYNTHESIS BI: CPT | Mod: 26,,, | Performed by: RADIOLOGY

## 2025-03-29 ENCOUNTER — HOSPITAL ENCOUNTER (INPATIENT)
Facility: OTHER | Age: 45
LOS: 3 days | Discharge: HOME | DRG: 809 | End: 2025-04-03
Attending: EMERGENCY MEDICINE | Admitting: STUDENT IN AN ORGANIZED HEALTH CARE EDUCATION/TRAINING PROGRAM
Payer: COMMERCIAL

## 2025-03-29 DIAGNOSIS — R74.02 ELEVATED LDH: ICD-10-CM

## 2025-03-29 DIAGNOSIS — R17 JAUNDICE: ICD-10-CM

## 2025-03-29 DIAGNOSIS — D58.9 HEMOLYTIC ANEMIA: ICD-10-CM

## 2025-03-29 DIAGNOSIS — E80.6 HYPERBILIRUBINEMIA: Primary | ICD-10-CM

## 2025-03-29 DIAGNOSIS — D50.0 IRON DEFICIENCY ANEMIA DUE TO CHRONIC BLOOD LOSS: ICD-10-CM

## 2025-03-29 DIAGNOSIS — E87.6 HYPOKALEMIA: ICD-10-CM

## 2025-03-29 DIAGNOSIS — D64.9 ACUTE ON CHRONIC ANEMIA: ICD-10-CM

## 2025-03-29 DIAGNOSIS — D59.8 OTHER ACQUIRED HEMOLYTIC ANEMIAS: ICD-10-CM

## 2025-03-29 DIAGNOSIS — D59.4 OTHER NON-AUTOIMMUNE HEMOLYTIC ANEMIAS: ICD-10-CM

## 2025-03-29 DIAGNOSIS — N30.00 ACUTE CYSTITIS WITHOUT HEMATURIA: ICD-10-CM

## 2025-03-29 DIAGNOSIS — D69.6 THROMBOCYTOPENIA: ICD-10-CM

## 2025-03-29 LAB
ABO GROUP BLD: NORMAL
ABSOLUTE EOSINOPHIL (OHS): 0.07 K/UL
ABSOLUTE MONOCYTE (OHS): 0.59 K/UL (ref 0.3–1)
ABSOLUTE NEUTROPHIL COUNT (OHS): 2.29 K/UL (ref 1.8–7.7)
ALBUMIN SERPL BCP-MCNC: 3.2 G/DL (ref 3.5–5.2)
ALP SERPL-CCNC: 68 UNIT/L (ref 40–150)
ALT SERPL W/O P-5'-P-CCNC: 16 UNIT/L (ref 10–44)
ANION GAP (OHS): 6 MMOL/L (ref 8–16)
ANISOCYTOSIS BLD QL SMEAR: SLIGHT
AST SERPL-CCNC: 73 UNIT/L (ref 11–45)
B-HCG UR QL: NEGATIVE
BACTERIA #/AREA URNS AUTO: ABNORMAL /HPF
BASOPHILS # BLD AUTO: 0.02 K/UL
BASOPHILS NFR BLD AUTO: 0.5 %
BILIRUB DIRECT SERPL-MCNC: 0.3 MG/DL (ref 0.1–0.3)
BILIRUB SERPL-MCNC: 8.4 MG/DL (ref 0.1–1)
BILIRUB UR QL STRIP.AUTO: NEGATIVE
BUN SERPL-MCNC: 9 MG/DL (ref 6–20)
CALCIUM SERPL-MCNC: 8.7 MG/DL (ref 8.7–10.5)
CHLORIDE SERPL-SCNC: 106 MMOL/L (ref 95–110)
CK SERPL-CCNC: 307 U/L (ref 20–180)
CLARITY UR: ABNORMAL
CO2 SERPL-SCNC: 24 MMOL/L (ref 23–29)
COLOR UR AUTO: ABNORMAL
CREAT SERPL-MCNC: 0.7 MG/DL (ref 0.5–1.4)
CTP QC/QA: YES
DIRECT COOMBS (DAT): NORMAL
ERYTHROCYTE [DISTWIDTH] IN BLOOD BY AUTOMATED COUNT: 23.8 % (ref 11.5–14.5)
FIBRINOGEN PPP-MCNC: 313 MG/DL (ref 182–400)
GFR SERPLBLD CREATININE-BSD FMLA CKD-EPI: >60 ML/MIN/1.73/M2
GLUCOSE SERPL-MCNC: 90 MG/DL (ref 70–110)
GLUCOSE UR QL STRIP: ABNORMAL
HCT VFR BLD AUTO: 24.4 % (ref 37–48.5)
HCV AB SERPL QL IA: NEGATIVE
HGB BLD-MCNC: 7.2 GM/DL (ref 12–16)
HGB UR QL STRIP: ABNORMAL
HIV 1+2 AB+HIV1 P24 AG SERPL QL IA: NEGATIVE
HYALINE CASTS UR QL AUTO: 0 /LPF (ref 0–1)
HYPOCHROMIA BLD QL SMEAR: ABNORMAL
IMM GRANULOCYTES # BLD AUTO: 0.02 K/UL (ref 0–0.04)
IMM GRANULOCYTES NFR BLD AUTO: 0.5 % (ref 0–0.5)
INR PPP: 1 (ref 0.8–1.2)
KETONES UR QL STRIP: ABNORMAL
LDH SERPL-CCNC: 1391 U/L (ref 110–260)
LEUKOCYTE ESTERASE UR QL STRIP: ABNORMAL
LYMPHOCYTES # BLD AUTO: 0.66 K/UL (ref 1–4.8)
MCH RBC QN AUTO: 20.8 PG (ref 27–50)
MCHC RBC AUTO-ENTMCNC: 29.5 G/DL (ref 32–36)
MCV RBC AUTO: 71 FL (ref 82–98)
MICROSCOPIC COMMENT: ABNORMAL
NITRITE UR QL STRIP: POSITIVE
NON-SQ EPI CELLS #/AREA URNS AUTO: 3 /HPF
NUCLEATED RBC (/100WBC) (OHS): 0 /100 WBC
OVALOCYTES BLD QL SMEAR: ABNORMAL
PH UR STRIP: 7 [PH]
PLATELET # BLD AUTO: 93 K/UL (ref 150–450)
PLATELET BLD QL SMEAR: ABNORMAL
PMV BLD AUTO: ABNORMAL FL
POIKILOCYTOSIS BLD QL SMEAR: SLIGHT
POTASSIUM SERPL-SCNC: 3.4 MMOL/L (ref 3.5–5.1)
PROT SERPL-MCNC: 7.4 GM/DL (ref 6–8.4)
PROT UR QL STRIP: ABNORMAL
PROTHROMBIN TIME: 11.4 SECONDS (ref 9–12.5)
RBC # BLD AUTO: 3.46 M/UL (ref 4–5.4)
RBC #/AREA URNS AUTO: 15 /HPF (ref 0–4)
RELATIVE EOSINOPHIL (OHS): 1.9 %
RELATIVE LYMPHOCYTE (OHS): 18.1 % (ref 18–48)
RELATIVE MONOCYTE (OHS): 16.2 % (ref 4–15)
RELATIVE NEUTROPHIL (OHS): 62.8 % (ref 38–73)
RETICS/RBC NFR AUTO: 2.6 % (ref 0.5–2.5)
RH BLD: NORMAL
SCHISTOCYTES BLD QL SMEAR: ABNORMAL
SODIUM SERPL-SCNC: 136 MMOL/L (ref 136–145)
SP GR UR STRIP: 1.01
SPHEROCYTES BLD QL SMEAR: ABNORMAL
SQUAMOUS #/AREA URNS AUTO: 2 /HPF
UROBILINOGEN UR STRIP-ACNC: ABNORMAL EU/DL
WBC # BLD AUTO: 3.65 K/UL (ref 3.9–12.7)
WBC #/AREA URNS AUTO: 30 /HPF (ref 0–5)

## 2025-03-29 PROCEDURE — 63600175 PHARM REV CODE 636 W HCPCS: Performed by: PHYSICIAN ASSISTANT

## 2025-03-29 PROCEDURE — A4216 STERILE WATER/SALINE, 10 ML: HCPCS | Performed by: PHYSICIAN ASSISTANT

## 2025-03-29 PROCEDURE — 86900 BLOOD TYPING SEROLOGIC ABO: CPT | Performed by: EMERGENCY MEDICINE

## 2025-03-29 PROCEDURE — 82248 BILIRUBIN DIRECT: CPT | Performed by: EMERGENCY MEDICINE

## 2025-03-29 PROCEDURE — 86901 BLOOD TYPING SEROLOGIC RH(D): CPT | Performed by: EMERGENCY MEDICINE

## 2025-03-29 PROCEDURE — 82550 ASSAY OF CK (CPK): CPT | Performed by: EMERGENCY MEDICINE

## 2025-03-29 PROCEDURE — 87086 URINE CULTURE/COLONY COUNT: CPT | Performed by: NURSE PRACTITIONER

## 2025-03-29 PROCEDURE — 96374 THER/PROPH/DIAG INJ IV PUSH: CPT

## 2025-03-29 PROCEDURE — 85610 PROTHROMBIN TIME: CPT | Performed by: EMERGENCY MEDICINE

## 2025-03-29 PROCEDURE — 63600175 PHARM REV CODE 636 W HCPCS: Performed by: EMERGENCY MEDICINE

## 2025-03-29 PROCEDURE — 87389 HIV-1 AG W/HIV-1&-2 AB AG IA: CPT

## 2025-03-29 PROCEDURE — 85025 COMPLETE CBC W/AUTO DIFF WBC: CPT | Performed by: NURSE PRACTITIONER

## 2025-03-29 PROCEDURE — G0378 HOSPITAL OBSERVATION PER HR: HCPCS

## 2025-03-29 PROCEDURE — 96375 TX/PRO/DX INJ NEW DRUG ADDON: CPT

## 2025-03-29 PROCEDURE — 81003 URINALYSIS AUTO W/O SCOPE: CPT | Performed by: NURSE PRACTITIONER

## 2025-03-29 PROCEDURE — 85060 BLOOD SMEAR INTERPRETATION: CPT | Mod: ,,, | Performed by: PATHOLOGY

## 2025-03-29 PROCEDURE — 83010 ASSAY OF HAPTOGLOBIN QUANT: CPT | Performed by: EMERGENCY MEDICINE

## 2025-03-29 PROCEDURE — 85045 AUTOMATED RETICULOCYTE COUNT: CPT | Performed by: EMERGENCY MEDICINE

## 2025-03-29 PROCEDURE — 99285 EMERGENCY DEPT VISIT HI MDM: CPT | Mod: 25

## 2025-03-29 PROCEDURE — 86880 COOMBS TEST DIRECT: CPT | Performed by: EMERGENCY MEDICINE

## 2025-03-29 PROCEDURE — 83615 LACTATE (LD) (LDH) ENZYME: CPT | Performed by: EMERGENCY MEDICINE

## 2025-03-29 PROCEDURE — 85384 FIBRINOGEN ACTIVITY: CPT | Performed by: EMERGENCY MEDICINE

## 2025-03-29 PROCEDURE — 25000003 PHARM REV CODE 250: Performed by: PHYSICIAN ASSISTANT

## 2025-03-29 PROCEDURE — 80053 COMPREHEN METABOLIC PANEL: CPT | Performed by: NURSE PRACTITIONER

## 2025-03-29 PROCEDURE — 80074 ACUTE HEPATITIS PANEL: CPT | Performed by: EMERGENCY MEDICINE

## 2025-03-29 PROCEDURE — 25500020 PHARM REV CODE 255: Performed by: HOSPITALIST

## 2025-03-29 PROCEDURE — 81025 URINE PREGNANCY TEST: CPT | Performed by: NURSE PRACTITIONER

## 2025-03-29 PROCEDURE — 86803 HEPATITIS C AB TEST: CPT

## 2025-03-29 RX ORDER — IBUPROFEN 200 MG
24 TABLET ORAL
Status: DISCONTINUED | OUTPATIENT
Start: 2025-03-29 | End: 2025-04-03 | Stop reason: HOSPADM

## 2025-03-29 RX ORDER — ACETAMINOPHEN 325 MG/1
650 TABLET ORAL EVERY 6 HOURS PRN
Status: DISCONTINUED | OUTPATIENT
Start: 2025-03-29 | End: 2025-04-03 | Stop reason: HOSPADM

## 2025-03-29 RX ORDER — IBUPROFEN 200 MG
16 TABLET ORAL
Status: DISCONTINUED | OUTPATIENT
Start: 2025-03-29 | End: 2025-04-03 | Stop reason: HOSPADM

## 2025-03-29 RX ORDER — AMOXICILLIN 250 MG
1 CAPSULE ORAL 2 TIMES DAILY PRN
Status: DISCONTINUED | OUTPATIENT
Start: 2025-03-29 | End: 2025-04-03 | Stop reason: HOSPADM

## 2025-03-29 RX ORDER — GLUCAGON 1 MG
1 KIT INJECTION
Status: DISCONTINUED | OUTPATIENT
Start: 2025-03-29 | End: 2025-04-03 | Stop reason: HOSPADM

## 2025-03-29 RX ORDER — NALOXONE HCL 0.4 MG/ML
0.02 VIAL (ML) INJECTION
Status: DISCONTINUED | OUTPATIENT
Start: 2025-03-29 | End: 2025-04-03 | Stop reason: HOSPADM

## 2025-03-29 RX ORDER — CEFTRIAXONE 1 G/1
1 INJECTION, POWDER, FOR SOLUTION INTRAMUSCULAR; INTRAVENOUS
Status: COMPLETED | OUTPATIENT
Start: 2025-03-29 | End: 2025-03-29

## 2025-03-29 RX ORDER — SODIUM CHLORIDE 0.9 % (FLUSH) 0.9 %
10 SYRINGE (ML) INJECTION EVERY 8 HOURS
Status: DISCONTINUED | OUTPATIENT
Start: 2025-03-29 | End: 2025-04-01

## 2025-03-29 RX ORDER — ONDANSETRON HYDROCHLORIDE 2 MG/ML
4 INJECTION, SOLUTION INTRAVENOUS EVERY 6 HOURS PRN
Status: DISCONTINUED | OUTPATIENT
Start: 2025-03-29 | End: 2025-04-03 | Stop reason: HOSPADM

## 2025-03-29 RX ORDER — TALC
6 POWDER (GRAM) TOPICAL NIGHTLY PRN
Status: DISCONTINUED | OUTPATIENT
Start: 2025-03-29 | End: 2025-04-03 | Stop reason: HOSPADM

## 2025-03-29 RX ADMIN — IOHEXOL 100 ML: 350 INJECTION, SOLUTION INTRAVENOUS at 09:03

## 2025-03-29 RX ADMIN — SODIUM CHLORIDE, PRESERVATIVE FREE 10 ML: 5 INJECTION INTRAVENOUS at 10:03

## 2025-03-29 RX ADMIN — CEFTRIAXONE 1 G: 1 INJECTION, POWDER, FOR SOLUTION INTRAMUSCULAR; INTRAVENOUS at 10:03

## 2025-03-29 RX ADMIN — ONDANSETRON 4 MG: 2 INJECTION INTRAMUSCULAR; INTRAVENOUS at 10:03

## 2025-03-29 NOTE — LETTER
April 3, 2025         [address]  [facility] [address]  Phone: [phone]  Fax: [phone]       Patient: Giovanni Pena   YOB: 1980  Date of Visit: 04/03/2025    To Whom It May Concern:    Barbie Pena  was at [facility] on 3/29- 04/03/2025. She may return to work/school on 4/14/2025 with no restrictions. If you have any questions or concerns, or if I can be of further assistance, please do not hesitate to contact me.    Sincerely,    Kelly Paez, DNP

## 2025-03-30 PROBLEM — D58.9 HEMOLYTIC ANEMIA: Status: ACTIVE | Noted: 2025-03-30

## 2025-03-30 PROBLEM — N92.1 MENORRHAGIA WITH IRREGULAR CYCLE: Status: ACTIVE | Noted: 2024-04-25

## 2025-03-30 PROBLEM — D64.9 ANEMIA: Status: ACTIVE | Noted: 2025-03-30

## 2025-03-30 PROBLEM — R16.1 SPLENOMEGALY: Status: ACTIVE | Noted: 2025-03-30

## 2025-03-30 PROBLEM — Z98.84 GASTRIC BYPASS STATUS FOR OBESITY: Status: ACTIVE | Noted: 2023-09-26

## 2025-03-30 LAB
ABSOLUTE EOSINOPHIL (OHS): 0.06 K/UL
ABSOLUTE MONOCYTE (OHS): 0.48 K/UL (ref 0.3–1)
ABSOLUTE NEUTROPHIL COUNT (OHS): 1.98 K/UL (ref 1.8–7.7)
ALBUMIN SERPL BCP-MCNC: 3 G/DL (ref 3.5–5.2)
ALP SERPL-CCNC: 72 UNIT/L (ref 40–150)
ALT SERPL W/O P-5'-P-CCNC: 17 UNIT/L (ref 10–44)
ANION GAP (OHS): 10 MMOL/L (ref 8–16)
AST SERPL-CCNC: 76 UNIT/L (ref 11–45)
BASOPHILS # BLD AUTO: 0.02 K/UL
BASOPHILS NFR BLD AUTO: 0.6 %
BILIRUB SERPL-MCNC: 8.1 MG/DL (ref 0.1–1)
BUN SERPL-MCNC: 9 MG/DL (ref 6–20)
CALCIUM SERPL-MCNC: 8.7 MG/DL (ref 8.7–10.5)
CHLORIDE SERPL-SCNC: 107 MMOL/L (ref 95–110)
CO2 SERPL-SCNC: 23 MMOL/L (ref 23–29)
CREAT SERPL-MCNC: 0.8 MG/DL (ref 0.5–1.4)
ERYTHROCYTE [DISTWIDTH] IN BLOOD BY AUTOMATED COUNT: 23.9 % (ref 11.5–14.5)
FERRITIN SERPL-MCNC: 140 NG/ML (ref 20–300)
FIBRINOGEN PPP-MCNC: 293 MG/DL (ref 182–400)
FOLATE SERPL-MCNC: 6.5 NG/ML (ref 4–24)
GFR SERPLBLD CREATININE-BSD FMLA CKD-EPI: >60 ML/MIN/1.73/M2
GLUCOSE SERPL-MCNC: 83 MG/DL (ref 70–110)
HAPTOGLOB SERPL-MCNC: <10 MG/DL (ref 30–250)
HAPTOGLOB SERPL-MCNC: <10 MG/DL (ref 30–250)
HAV IGM SERPL QL IA: NORMAL
HBV CORE IGM SERPL QL IA: NORMAL
HBV SURFACE AG SERPL QL IA: NORMAL
HCT VFR BLD AUTO: 23.1 % (ref 37–48.5)
HCV AB SERPL QL IA: NORMAL
HGB BLD-MCNC: 6.8 GM/DL (ref 12–16)
IMM GRANULOCYTES # BLD AUTO: 0.01 K/UL (ref 0–0.04)
IMM GRANULOCYTES NFR BLD AUTO: 0.3 % (ref 0–0.5)
INDIRECT COOMBS: NORMAL
LYMPHOCYTES # BLD AUTO: 0.88 K/UL (ref 1–4.8)
MAGNESIUM SERPL-MCNC: 1.8 MG/DL (ref 1.6–2.6)
MCH RBC QN AUTO: 20.6 PG (ref 27–50)
MCHC RBC AUTO-ENTMCNC: 29.4 G/DL (ref 32–36)
MCV RBC AUTO: 70 FL (ref 82–98)
NUCLEATED RBC (/100WBC) (OHS): 0 /100 WBC
PLATELET # BLD AUTO: 92 K/UL (ref 150–450)
PMV BLD AUTO: ABNORMAL FL
POTASSIUM SERPL-SCNC: 3.7 MMOL/L (ref 3.5–5.1)
PROT SERPL-MCNC: 7 GM/DL (ref 6–8.4)
RBC # BLD AUTO: 3.3 M/UL (ref 4–5.4)
RELATIVE EOSINOPHIL (OHS): 1.7 %
RELATIVE LYMPHOCYTE (OHS): 25.7 % (ref 18–48)
RELATIVE MONOCYTE (OHS): 14 % (ref 4–15)
RELATIVE NEUTROPHIL (OHS): 57.7 % (ref 38–73)
RH BLD: NORMAL
SODIUM SERPL-SCNC: 140 MMOL/L (ref 136–145)
SPECIMEN OUTDATE: NORMAL
VIT B12 SERPL-MCNC: 556 PG/ML (ref 210–950)
WBC # BLD AUTO: 3.43 K/UL (ref 3.9–12.7)

## 2025-03-30 PROCEDURE — 85384 FIBRINOGEN ACTIVITY: CPT | Performed by: PHYSICIAN ASSISTANT

## 2025-03-30 PROCEDURE — 96376 TX/PRO/DX INJ SAME DRUG ADON: CPT

## 2025-03-30 PROCEDURE — 82607 VITAMIN B-12: CPT | Performed by: PHYSICIAN ASSISTANT

## 2025-03-30 PROCEDURE — 82728 ASSAY OF FERRITIN: CPT | Performed by: PHYSICIAN ASSISTANT

## 2025-03-30 PROCEDURE — 83010 ASSAY OF HAPTOGLOBIN QUANT: CPT | Performed by: PHYSICIAN ASSISTANT

## 2025-03-30 PROCEDURE — 25000003 PHARM REV CODE 250: Performed by: PHYSICIAN ASSISTANT

## 2025-03-30 PROCEDURE — 85025 COMPLETE CBC W/AUTO DIFF WBC: CPT | Performed by: PHYSICIAN ASSISTANT

## 2025-03-30 PROCEDURE — 86901 BLOOD TYPING SEROLOGIC RH(D): CPT | Performed by: PHYSICIAN ASSISTANT

## 2025-03-30 PROCEDURE — 83735 ASSAY OF MAGNESIUM: CPT | Performed by: PHYSICIAN ASSISTANT

## 2025-03-30 PROCEDURE — 96375 TX/PRO/DX INJ NEW DRUG ADDON: CPT

## 2025-03-30 PROCEDURE — G0378 HOSPITAL OBSERVATION PER HR: HCPCS

## 2025-03-30 PROCEDURE — A4216 STERILE WATER/SALINE, 10 ML: HCPCS | Performed by: PHYSICIAN ASSISTANT

## 2025-03-30 PROCEDURE — 82746 ASSAY OF FOLIC ACID SERUM: CPT | Performed by: PHYSICIAN ASSISTANT

## 2025-03-30 PROCEDURE — 63600175 PHARM REV CODE 636 W HCPCS: Mod: JZ,TB | Performed by: PHYSICIAN ASSISTANT

## 2025-03-30 PROCEDURE — 94761 N-INVAS EAR/PLS OXIMETRY MLT: CPT

## 2025-03-30 PROCEDURE — 36415 COLL VENOUS BLD VENIPUNCTURE: CPT | Performed by: PHYSICIAN ASSISTANT

## 2025-03-30 PROCEDURE — 80053 COMPREHEN METABOLIC PANEL: CPT | Performed by: PHYSICIAN ASSISTANT

## 2025-03-30 RX ORDER — METHYLPREDNISOLONE SOD SUCC 125 MG
120 VIAL (EA) INJECTION EVERY 8 HOURS
Status: DISCONTINUED | OUTPATIENT
Start: 2025-03-30 | End: 2025-03-31

## 2025-03-30 RX ORDER — PANTOPRAZOLE SODIUM 40 MG/1
40 TABLET, DELAYED RELEASE ORAL DAILY
Status: DISCONTINUED | OUTPATIENT
Start: 2025-03-30 | End: 2025-04-03 | Stop reason: HOSPADM

## 2025-03-30 RX ORDER — FOLIC ACID 1 MG/1
1 TABLET ORAL DAILY
Status: DISCONTINUED | OUTPATIENT
Start: 2025-03-30 | End: 2025-04-03 | Stop reason: HOSPADM

## 2025-03-30 RX ORDER — CALCIUM CARBONATE 200(500)MG
1000 TABLET,CHEWABLE ORAL DAILY
Status: DISCONTINUED | OUTPATIENT
Start: 2025-03-30 | End: 2025-03-30

## 2025-03-30 RX ORDER — SODIUM FERRIC GLUCONATE COMPLEX IN SUCROSE 12.5 MG/ML
125 INJECTION INTRAVENOUS
Status: DISCONTINUED | OUTPATIENT
Start: 2025-03-31 | End: 2025-04-03 | Stop reason: HOSPADM

## 2025-03-30 RX ADMIN — SODIUM CHLORIDE, PRESERVATIVE FREE 10 ML: 5 INJECTION INTRAVENOUS at 05:03

## 2025-03-30 RX ADMIN — SODIUM CHLORIDE, PRESERVATIVE FREE 10 ML: 5 INJECTION INTRAVENOUS at 10:03

## 2025-03-30 RX ADMIN — SODIUM CHLORIDE, PRESERVATIVE FREE 10 ML: 5 INJECTION INTRAVENOUS at 02:03

## 2025-03-30 RX ADMIN — METHYLPREDNISOLONE SODIUM SUCCINATE 120 MG: 125 INJECTION, POWDER, FOR SOLUTION INTRAMUSCULAR; INTRAVENOUS at 05:03

## 2025-03-30 RX ADMIN — PANTOPRAZOLE SODIUM 40 MG: 40 TABLET, DELAYED RELEASE ORAL at 09:03

## 2025-03-30 RX ADMIN — FOLIC ACID 1 MG: 1 TABLET ORAL at 08:03

## 2025-03-30 RX ADMIN — METHYLPREDNISOLONE SODIUM SUCCINATE 120 MG: 125 INJECTION, POWDER, FOR SOLUTION INTRAMUSCULAR; INTRAVENOUS at 10:03

## 2025-03-30 RX ADMIN — METHYLPREDNISOLONE SODIUM SUCCINATE 120 MG: 125 INJECTION, POWDER, FOR SOLUTION INTRAMUSCULAR; INTRAVENOUS at 02:03

## 2025-03-30 NOTE — PROGRESS NOTES
"[facility] Medicine  Progress Note    Patient Name: Giovanni Pena  MRN: [id number]  Patient Class: OP- Observation   Admission Date: 3/29/2025  Length of Stay: 0 days  Attending Physician: Anne Marie Koenig MD  Primary Care Provider: Miquel Mishra MD        Subjective     Principal Problem:Hemolytic anemia        HPI:  Ms. Giovanni Pena is a 44 y.o. female, with PMH of microcytic hypochromic anemia,/SOCORRO, renal vein embolism/thrombosis, portal vein thrombosis, splenic infarct, s/p gastric sleeve, GERD, RA, Eczema, who [place] [facility] [place]/29/25 due to generalized abdominal pain with red/brown colored urine x 3 days. She states the pain is a "tightness" in the middle of the abdomen. She had a blood transfusion one week ago. She states she needed the transfusion as she has uterine fibroids, and heavy menses (LMP 3/14/25). She states her GYN team is planning for a partial hysterectomy. She states she feels dehydrated, weak, and notes yellow eyes. She states this occurred after a blood transfusion years ago. She denies chest pain, shortness breath, fever, vomiting, diarrhea, or any other associated symptoms. She was evaluated in the ED with labs showing anemia with H&H of 7.2/24.4, thrombocytopenia with PLT of 93. Retic count was 2.6. A metabolic panel showed albumin of 3.2, Tbili of 8.4 (without elevation of direct bilirubin), and AST of 73. CPK was elevated at 307, and LDH was 1391. A UA additionally showed a nirite positive UTI, with 30 WBC/hpf, many bacteria and 1+ leuk esterase. She was treated in the ED with rocephin. She was placed on observation.       Overview/Hospital Course:  Giovanni Pena was admitted for anemia, most likely hemolytic anemia secondary to transfusion. Hgb 7.2 > 6.8 (Close to her baseline). LDH 1391, Haptoglobin < 10 and T. Bili 8.1, consistent with hemolytic anemia. Started on IV Solumedrol. Hematology consulted    Interval History: Seen at " bedside, parents present. No vaginal bleeding or blood in stool. Labs most consistent with hemolytic anemia, suspected secondary to transfusion. Contineu IV solumedrol. Check Hgb this PM. Heme consulted    Review of Systems   Constitutional:  Negative for fatigue and fever.   Respiratory:  Negative for cough and shortness of breath.    Cardiovascular:  Negative for chest pain and palpitations.   Gastrointestinal:  Negative for anal bleeding, blood in stool and constipation.   Genitourinary:  Positive for hematuria and menstrual problem. Negative for decreased urine volume, difficulty urinating, dysuria, flank pain, frequency, pelvic pain and urgency.   Musculoskeletal:  Negative for arthralgias and back pain.   Skin:  Positive for color change (conjunctiva). Negative for pallor.   Neurological:  Positive for weakness. Negative for syncope and numbness.   Psychiatric/Behavioral:  Negative for agitation and confusion.      Objective:     Vital Signs (Most Recent):  Temp: 98.6 °F (37 °C) (03/30/25 1057)  Pulse: 65 (03/30/25 1057)  Resp: 18 (03/30/25 1057)  BP: (!) 107/59 (03/30/25 1057)  SpO2: 100 % (03/30/25 1057) Vital Signs (24h Range):  Temp:  [98.1 °F (36.7 °C)-98.6 °F (37 °C)] 98.6 °F (37 °C)  Pulse:  [58-72] 65  Resp:  [11-18] 18  SpO2:  [97 %-100 %] 100 %  BP: (101-124)/(58-81) 107/59     Weight: 77.8 kg (171 lb 8 oz)  Body mass index is 26.86 kg/m².    Intake/Output Summary (Last 24 hours) at 3/30/2025 1116  Last data filed at 3/29/2025 2240  Gross per 24 hour   Intake --   Output 25 ml   Net -25 ml         Physical Exam  Vitals and nursing note reviewed.   Constitutional:       General: She is not in acute distress.     Appearance: She is well-developed and normal weight. She is not ill-appearing.   HENT:      Head: Normocephalic and atraumatic.   Eyes:      General: Scleral icterus present.   Neck:      Trachea: No tracheal deviation.   Cardiovascular:      Rate and Rhythm: Normal rate and regular rhythm.     "  Heart sounds: Normal heart sounds.   Pulmonary:      Effort: Pulmonary effort is normal. No respiratory distress.   Abdominal:      General: Bowel sounds are normal.      Palpations: Abdomen is soft.      Tenderness: There is no abdominal tenderness.   Musculoskeletal:      Right lower leg: No edema.      Left lower leg: No edema.   Skin:     General: Skin is warm and dry.   Neurological:      General: No focal deficit present.      Mental Status: She is alert and oriented to person, place, and time.      Motor: No abnormal muscle tone.   Psychiatric:         Mood and Affect: Mood normal.         Behavior: Behavior normal.               Significant Labs: All pertinent labs within the past 24 hours have been reviewed.    Significant Imaging: I have reviewed all pertinent imaging results/findings within the past 24 hours.      Assessment & Plan  Hemolytic anemia  - Ms. Giovanni Pena presents with jaundice and abdominal pain   - she has h/o transfusion reaction and subsequent hemolytic anemia, SOCORRO, s/p gastric sleeve   - recently admitted with GYN team, last transfusion 3/19  - labs are very similar to prior hospitalization where she was diagnosed w/ hemolytic anemia and she is post-blood transfusion again   - labs ordered including: peripheral smear, direct antiglobulin test, LDH, PT-INR, aPTT, haptoglobin, fibrinogen, Iron studies, type and screen, hepatitis panel   - suspect hemolysis as she has elevated Tbili, elevated LDH 1391, haptoglobin <10  - no active uterine bleeding at present  - she has plans to have partial hysterectomy with GYN  - will start IV solu-medrol 120 mg [id number]   - trend hgb BID: stable: 7.2 > 6.8  - Hem-Onc consult placed, appreciate assistance   Menorrhagia with irregular cycle  Iron deficiency anemia due to chronic blood loss  Recently admitted to GYN team, discharged 3/19; admitted for "AUB and symptomatic anemia. She was admitted for observation, started on TXA, and received 3u " "pRBC with appropriate rise in H/H. On day of discharge, TVUS revealed malpositioned IUD which was removed. She was discharged home in stable condition and has follow-up scheduled in one week with [facility] for close monitoring and to discuss possible IUD replacement/Nexplanon."    - LMP was during that admission  - follows w/ GYN   - plans for partial hysterectomy 2/2 menorrhagia / uterine fibroids     History of Portal vein thrombosis  Splenomegaly  Splenic Infarct  Per chart review-  Prior history of thromboembolism portal vein thrombosis SMV and splenic vein thrombosis gastric sleeve  Was on eliquis 6m  Had hypercoag work up in 2021/2022 with elevated factor VIII assay  Had repeat imaging of portal vein with resolution      VTE Risk Mitigation (From admission, onward)           Ordered     Reason for No Pharmacological VTE Prophylaxis  Once        Question:  Reasons:  Answer:  Thrombocytopenia    03/29/25 2249     IP VTE HIGH RISK PATIENT  Once         03/29/25 2249     Place sequential compression device  Until discontinued         03/29/25 2249                    Discharge Planning   ZACHARY:      Code Status: Full Code   Medical Readiness for Discharge Date:   Discharge Plan A: Home                        Alicia Arango PA-C  Department of Hospital Medicine   [facility] ([place])    "

## 2025-03-30 NOTE — FIRST PROVIDER EVALUATION
" Emergency Department TeleTriage Encounter Note      CHIEF COMPLAINT    Chief Complaint   Patient presents with    Hematuria    Abdominal Pain     2 days brown/red-tinged urine, lmp ended 3/24, middle "tight" continuous abd pain. Pmh blood transfusion x1 week ago, pt feels dehydrated, weak, scleras yellow.       VITAL SIGNS   Initial Vitals [03/29/25 1932]   BP Pulse Resp Temp SpO2   124/81 71 16 98.6 °F (37 °C) 100 %      MAP       --            ALLERGIES    Review of patient's allergies indicates:   Allergen Reactions    Penicillin Hives       PROVIDER TRIAGE NOTE  Verbal consent for the teletriage evaluation was given by the patient at the start of the evaluation.  All efforts will be made to maintain patient's privacy during the evaluation.      This is a teletriage evaluation of a 44 y.o. female presenting to the ED with c/o dark colored urine, abdominal pain, and yellow eyes that started 2 days ago; s/p blood transfusion 3/18/2025. Limited physical exam via telehealth: The patient is awake, alert, answering questions appropriately and is not in respiratory distress.  As the Teletriage provider, I performed an initial assessment and ordered appropriate labs and imaging studies, if any, to facilitate the patient's care once placed in the ED. Once a room is available, care and a full evaluation will be completed by an alternate ED provider.  Any additional orders and the final disposition will be determined by that provider.  All imaging and labs will not be followed-up by the Teletriage Team, including myself.      ORDERS  Labs Reviewed   HEPATITIS C ANTIBODY   HIV 1 / 2 ANTIBODY       ED Orders (720h ago, onward)      Start Ordered     Status Ordering Provider    03/29/25 1938 03/29/25 1938  Saline lock IV  Once         Ordered LETI OMER    03/29/25 1938 03/29/25 1938  CBC auto differential  STAT         Ordered LETI OMER    03/29/25 1938 03/29/25 1938  Comprehensive metabolic panel  STAT         " Ordered LETI OMER    03/29/25 1938 03/29/25 1938  Urinalysis, Reflex to Urine Culture Urine, Clean Catch  STAT         Ordered LETI OMER    03/29/25 1938 03/29/25 1938  POCT urine pregnancy  Once         Ordered LETI OMER    03/29/25 1936 03/29/25 1935  Hepatitis C Antibody  STAT         Ordered NICK DEL CID    03/29/25 1936 03/29/25 1935  HIV 1/2 Ag/Ab (4th Gen)  STAT         Ordered NICK DEL CID              Virtual Visit Note: The provider triage portion of this emergency department evaluation and documentation was performed via [employer], a HIPAA-compliant telemedicine application, in concert with a tele-presenter in the room. A face to face patient evaluation with one of my colleagues will occur once the patient is placed in an emergency department room.      DISCLAIMER: This note was prepared with [employer] voice recognition transcription software. Garbled syntax, mangled pronouns, and other bizarre constructions may be attributed to that software system.

## 2025-03-30 NOTE — HOSPITAL COURSE
Giovnani Pena was admitted for anemia, most likely hemolytic anemia secondary to transfusion. Hgb 7.2 > 6.8 > 7.4 (Close to her baseline). LDH 1391, Haptoglobin < 10 and T. Bili 8.1, consistent with hemolytic anemia. Started on IV Solumedrol. Hematology consulted, recommending IV iron and transition to PO prednisone. Labs improving and patient stopped having vaginal bleeding (hgb stable, T. Bili downtrending).  Gyn recommended patient is starting Provera for decreased monthly bleeding.  On discharge day she was seen and examined.  Ms. Davila denies any weakness, pain or further bleeding.  Hematology will continue outpatient workup and recommend oral iron supplementation.  Patient will be discharged home with outpatient labs and outpatient follow-up within 10 days.  She is in agreement with the plan.

## 2025-03-30 NOTE — SUBJECTIVE & OBJECTIVE
Interval History: Seen at bedside, parents present. No vaginal bleeding or blood in stool. Labs most consistent with hemolytic anemia, suspected secondary to transfusion. Contineu IV solumedrol. Check Hgb this PM. Heme consulted    Review of Systems   Constitutional:  Negative for fatigue and fever.   Respiratory:  Negative for cough and shortness of breath.    Cardiovascular:  Negative for chest pain and palpitations.   Gastrointestinal:  Negative for anal bleeding, blood in stool and constipation.   Genitourinary:  Positive for hematuria and menstrual problem. Negative for decreased urine volume, difficulty urinating, dysuria, flank pain, frequency, pelvic pain and urgency.   Musculoskeletal:  Negative for arthralgias and back pain.   Skin:  Positive for color change (conjunctiva). Negative for pallor.   Neurological:  Positive for weakness. Negative for syncope and numbness.   Psychiatric/Behavioral:  Negative for agitation and confusion.      Objective:     Vital Signs (Most Recent):  Temp: 98.6 °F (37 °C) (03/30/25 1057)  Pulse: 65 (03/30/25 1057)  Resp: 18 (03/30/25 1057)  BP: (!) 107/59 (03/30/25 1057)  SpO2: 100 % (03/30/25 1057) Vital Signs (24h Range):  Temp:  [98.1 °F (36.7 °C)-98.6 °F (37 °C)] 98.6 °F (37 °C)  Pulse:  [58-72] 65  Resp:  [11-18] 18  SpO2:  [97 %-100 %] 100 %  BP: (101-124)/(58-81) 107/59     Weight: 77.8 kg (171 lb 8 oz)  Body mass index is 26.86 kg/m².    Intake/Output Summary (Last 24 hours) at 3/30/2025 1116  Last data filed at 3/29/2025 2240  Gross per 24 hour   Intake --   Output 25 ml   Net -25 ml         Physical Exam  Vitals and nursing note reviewed.   Constitutional:       General: She is not in acute distress.     Appearance: She is well-developed and normal weight. She is not ill-appearing.   HENT:      Head: Normocephalic and atraumatic.   Eyes:      General: Scleral icterus present.   Neck:      Trachea: No tracheal deviation.   Cardiovascular:      Rate and Rhythm: Normal  rate and regular rhythm.      Heart sounds: Normal heart sounds.   Pulmonary:      Effort: Pulmonary effort is normal. No respiratory distress.   Abdominal:      General: Bowel sounds are normal.      Palpations: Abdomen is soft.      Tenderness: There is no abdominal tenderness.   Musculoskeletal:      Right lower leg: No edema.      Left lower leg: No edema.   Skin:     General: Skin is warm and dry.   Neurological:      General: No focal deficit present.      Mental Status: She is alert and oriented to person, place, and time.      Motor: No abnormal muscle tone.   Psychiatric:         Mood and Affect: Mood normal.         Behavior: Behavior normal.               Significant Labs: All pertinent labs within the past 24 hours have been reviewed.    Significant Imaging: I have reviewed all pertinent imaging results/findings within the past 24 hours.

## 2025-03-30 NOTE — CONSULTS
Hematology/Oncology Chart review Consult    43 year old female with a history of RA, portal vein thrombosis and SMV and possibly in the splenic vein thrombosis, gastric sleeve, uterine fibroids , SOCORRO, presents to ED on 3/29/25 with abd pain and brownish colored urine. She had a blood transfusion with 3 uprbc on 3/19/25 2/2 symptomatic anemia 2/2 AUB.  She states she needed the transfusion  as she has uterine fibroids, and heavy menses (LMP 3/14/25). She states her GYN team is planning for a partial hysterectomy. She undergoes intermittent iron infusions for SOCORRO. Labs in ED shows  H&H of 7.2/24.4 PLT of 93. Retic count was 2.6 Tbili of 8.4 and AST of 73.  Haptoglobin <10 LDH was 1391. Pt admitted for similar diagnoses in past. Consulted for post transfusion hemolysis.    Past Medical History:   Diagnosis Date    Anemia, unspecified     Embolism and thrombosis of renal vein     RA (rheumatoid arthritis) 2017         Lab Results   Component Value Date    WBC 3.43 (L) 03/30/2025    HGB 6.8 (L) 03/30/2025    HCT 23.1 (L) 03/30/2025    MCV 70 (L) 03/30/2025    PLT 92 (L) 03/30/2025     Lab Results   Component Value Date    IRON 13 (L) 03/18/2025    TRANSFERRIN 396 (H) 03/18/2025    TIBC 586 (H) 03/18/2025    FESATURATED 2 (L) 03/18/2025        Hemolytic anemia non auto immune   Pt underwent prbc transfusion on  3/19/25  2/2 symptomatic anemia and presents with hemolytic anemia   No schistocytes noted  Hep panel neg  Coags nl  She was started on IV solu medrol in ED   Transition to prednisone 60mg po qd  Monitor daily , cbc,cmp, LDH and retic   Monitor for  on potential SE of steroid therapy ( wt gain, hyperglycemia, mood changes, effects on bone health)      SOCORRO   Begin iron infusions   Daily cbc             Diamond Mckeon MD  Hematology/Oncology  [facility]

## 2025-03-30 NOTE — ASSESSMENT & PLAN NOTE
- LMP 2/14/25   - follows w/ GYN   - plans for partial hysterectomy 2/2 menorrhagia / uterine fibroids

## 2025-03-30 NOTE — ASSESSMENT & PLAN NOTE
- Ms. Giovanni Pena presents with jaundice and abdominal pain   - she has h/o transfusion reaction and subsequent hemolytic anemia, SOCORRO, s/p gastric sleeve   - recently admitted with GYN team, last transfusion 3/19  - labs are very similar to prior hospitalization where she was diagnosed w/ hemolytic anemia and she is post-blood transfusion again   - labs ordered including: peripheral smear, direct antiglobulin test, LDH, PT-INR, aPTT, haptoglobin, fibrinogen, Iron studies, type and screen, hepatitis panel   - suspect hemolysis as she has elevated Tbili, elevated LDH 1391, haptoglobin <10  - no active uterine bleeding at present  - she has plans to have partial hysterectomy with GYN  - will start IV solu-medrol 120 mg [id number]   - trend hgb BID: stable: 7.2 > 6.8  - Hem-Onc consult placed, appreciate assistance

## 2025-03-30 NOTE — PLAN OF CARE
Case Management Assessment     PCP: Miquel Mishra MD  Pharmacy: bedside    Patient Arrived From: Home  Existing Help at Home: Adult Son     Barriers to Discharge: None    Discharge Plan:    A. Home   B. Home with Family     [facility] ([place])  Initial Discharge Assessment       Primary Care Provider: Miquel Mishra MD    Admission Diagnosis: Hypokalemia [E87.6]  Hyperbilirubinemia [E80.6]  Jaundice [R17]  Thrombocytopenia [D69.6]  Elevated LDH [R74.02]  Acute cystitis without hematuria [N30.00]  Other acquired hemolytic anemias [D59.8]  Acute on chronic anemia [D64.9]    Admission Date: 3/29/2025  Expected Discharge Date:     Transition of Care Barriers: (P) None    Payor: AETNA / Plan: [employer]ET[employer] OPEN ACCESS MANAGED CHOICE / Product Type: Commercial /     Extended Emergency Contact Information  Primary Emergency Contact: Ruchi Pleitez  Mobile Phone: [phone]  Relation: Mother  Preferred language: English   needed? No    Discharge Plan A: (P) Home  Discharge Plan B: (P) Home with family      [id number] - [place], [place] - [address]  [address]  [place] [address]  Phone: [phone] Fax: [phone]      Initial Assessment (most recent)       Adult Discharge Assessment - 03/30/25 0950          Discharge Assessment    Assessment Type Discharge Planning Assessment (P)      Confirmed/corrected address, phone number and insurance Yes (P)      Confirmed Demographics Correct on Facesheet (P)      Source of Information patient;health record (P)      People in Home child(ponce), adult (P)      Do you expect to return to your current living situation? Yes (P)      Do you have help at home or someone to help you manage your care at home? Yes (P)      Prior to hospitilization cognitive status: Alert/Oriented;No Deficits (P)      Current cognitive status: Alert/Oriented;No Deficits (P)      Equipment Currently Used at Home none (P)      Readmission within 30 days? Yes (P)       Patient currently being followed by outpatient case management? No (P)      Do you currently have service(s) that help you manage your care at home? No (P)      Do you take prescription medications? Yes (P)      Do you have prescription coverage? Yes (P)      Do you have any problems affording any of your prescribed medications? No (P)      Is the patient taking medications as prescribed? yes (P)      How do you get to doctors appointments? family or friend will provide;car, drives self (P)      Are you on dialysis? No (P)      Do you take coumadin? No (P)      Discharge Plan A Home (P)      Discharge Plan B Home with family (P)      Discharge Plan discussed with: Patient (P)      Transition of Care Barriers None (P)

## 2025-03-30 NOTE — PLAN OF CARE
03/30/25 0951   Readmission   Was this a planned readmission? No   Why were you readmitted? Related to previous admission   When you left the hospital where did you go? Home with Family   Did patient/caregiver refused recommended DC plan? No   Did you try to manage your symptoms your self? No   Did you call anyone? No   Did you try to see or did see a doctor or nurse before you came? No   Did you have  a follow-up appointment on discharge? Yes   Did you go? Yes

## 2025-03-30 NOTE — NURSING
Arrived on the unit via w/c. Pt care assumed. Pt AAO x 4 with RR easy, even,on room air comfortably seated on the bed. Sclera observed to be yellow, 20g PIV to the RAC flushed saline locked with no redness or swelling noted to sites. Plan of care explained will  continue to educate pt encouraged to ask any questions, call bell in reach,safety measures in place. Instructed to call for any assistance., verbalized understanding.  MD will assess/prescribe medications as needed and monitor patient progress during daily rounds;NSG will administer medications per MD order and provide patient education on wellness topics during medication administration times. POC ongoing.

## 2025-03-30 NOTE — ED PROVIDER NOTES
"Emergency Department Encounter  Provider Note    Giovanni Pena  [id number]  3/29/2025    Evaluation:    History Acquisition:     Chief Complaint   Patient presents with    Hematuria    Abdominal Pain     2 days brown/red-tinged urine, lmp ended 3/24, middle "tight" continuous abd pain. Pmh blood transfusion x1 week ago, pt feels dehydrated, weak, scleras yellow.       History of Present Illness:  Giovanni Pena is a 44 y.o. female who has a past medical history of Anemia, unspecified, Embolism and thrombosis of renal vein, and RA (rheumatoid arthritis) (2017).    The patient presents to the ED due to jaundice and abdominal pain.   Patient reports symptoms started 3 days ago.  She was recently admitted for anemia and received 3 units of RBCs.  She noticed the jaundice started afterward.  She reports mild, generalized abdominal discomfort.  She denies any associated chest pain, shortness breath, fever, vomiting, diarrhea, or any other associated symptoms.    She states she had a similar episode with blood transfusions several years ago, but does not remember if they determined a definitive cause.    Additional historians utilized:  none    Prior medical records were reviewed:   GI visit  for follow-up abdominal bloating, GERD  Admitted 3/18 to 3/19 for dysfunctional uterine bleeding and symptomatic anemia.  Received 3 units RBCs.  PCP visit  for annual exam, f/u GERD    The patient's list of active medical history, family/social history, medications, and allergies as documented has been reviewed.     Past Medical History:   Diagnosis Date    Anemia, unspecified     Embolism and thrombosis of renal vein     RA (rheumatoid arthritis)      Past Surgical History:   Procedure Laterality Date     SECTION  , , , 2014    CHOLECYSTECTOMY  2014    SLEEVE GASTROPLASTY  2021    in [place]    SURGICAL REMOVAL OF BUNION WITH OSTEOTOMY OF METATARSAL BONE Left 2022    Procedure: " BUNIONECTOMY, WITH METATARSAL OSTEOTOMY  ;  Surgeon: Roshan Pyle DPM;  Location: [facility];  Service: Podiatry;  Laterality: Left;  distal osteotomy left 1st metatarsal    TUBAL LIGATION  2014     Family History   Problem Relation Name Age of Onset    Breast cancer Maternal Aunt      Hypertension Maternal Grandmother      Coronary artery disease Maternal Grandmother      Colon cancer Neg Hx      Ovarian cancer Neg Hx      Endometrial cancer Neg Hx       Social History     Socioeconomic History    Marital status:    Tobacco Use    Smoking status: Never    Smokeless tobacco: Never   Substance and Sexual Activity    Alcohol use: Not Currently     Comment: socially    Drug use: Never    Sexual activity: Not Currently     Partners: Male     Social Drivers of Health     Financial Resource Strain: Low Risk  (4/10/2024)    Overall Financial Resource Strain (CARDIA)     Difficulty of Paying Living Expenses: Not very hard   Food Insecurity: No Food Insecurity (4/10/2024)    Hunger Vital Sign     Worried About Running Out of Food in the Last Year: Never true     Ran Out of Food in the Last Year: Never true   Transportation Needs: No Transportation Needs (4/10/2024)    PRAPARE - Transportation     Lack of Transportation (Medical): No     Lack of Transportation (Non-Medical): No   Physical Activity: Insufficiently Active (4/10/2024)    Exercise Vital Sign     Days of Exercise per Week: 1 day     Minutes of Exercise per Session: 30 min   Stress: No Stress Concern Present (4/10/2024)    [employer] - Occupational Stress Questionnaire     Feeling of Stress : Not at all   Housing Stability: Unknown (4/10/2024)    Housing Stability Vital Sign     Unable to Pay for Housing in the Last Year: No     Unstable Housing in the Last Year: No       Medications:  Current Discharge Medication List        CONTINUE these medications which have NOT CHANGED    Details   docusate sodium (COLACE) 100 MG capsule  Take 1 capsule (100 mg total) by mouth 2 (two) times daily.  Qty: 60 capsule, Refills: 0      esomeprazole (NEXIUM) 40 MG capsule Take 1 capsule (40 mg total) by mouth 2 (two) times daily.  Qty: 60 capsule, Refills: 11    Associated Diagnoses: Gastroesophageal reflux disease, unspecified whether esophagitis present      ferrous sulfate (IRON) 325 mg (65 mg iron) Tab tablet Take 1 tablet (325 mg total) by mouth daily with breakfast.  Qty: 30 tablet, Refills: 11      levonorgestreL (MIRENA) 21 mcg/24 hours (8 yrs) 52 mg IUD 52,000 mcg by Intrauterine route.      sucralfate (CARAFATE) 1 gram tablet Take 1 tablet (1 g total) by mouth 4 (four) times daily before meals and nightly. for 10 days  Qty: 40 tablet, Refills: 0    Associated Diagnoses: Gastroesophageal reflux disease, unspecified whether esophagitis present             Allergies:  Review of patient's allergies indicates:   Allergen Reactions    Penicillin Hives         Physical Exam:     Initial Vitals [03/29/25 1932]   BP Pulse Resp Temp SpO2   124/81 71 16 98.6 °F (37 °C) 100 %      MAP       --         Physical Exam    Nursing note and vitals reviewed.  Constitutional: She appears well-developed and well-nourished. She is not diaphoretic. No distress.   HENT:   Head: Normocephalic and atraumatic. Mouth/Throat: Oropharynx is clear and moist.   Eyes: EOM are normal. Pupils are equal, round, and reactive to light. Right conjunctiva is not injected. Right conjunctiva has no hemorrhage. Left conjunctiva is not injected. Left conjunctiva has no hemorrhage. Scleral icterus is present.   Neck: No tracheal deviation present.   Cardiovascular:  Normal rate, regular rhythm, normal heart sounds and intact distal pulses.           Pulmonary/Chest: Breath sounds normal. No stridor. No respiratory distress. She has no wheezes.   Abdominal: Abdomen is soft and flat. Bowel sounds are normal. She exhibits no distension and no mass. There is abdominal tenderness (mild) in the  suprapubic area. There is no rebound and no guarding.   Musculoskeletal:         General: No edema. Normal range of motion.     Neurological: She is alert and oriented to person, place, and time. She has normal strength. No cranial nerve deficit or sensory deficit.   Skin: Skin is warm and dry. Capillary refill takes less than 2 seconds. No pallor.   Psychiatric: She has a normal mood and affect. Her behavior is normal. Thought content normal.         Differential Diagnoses:   Based on available information and initial assessment, Differential Diagnosis includes, but is not limited to:  Blood loss anemia, GI bleed, infection/sepsis, medication/chemotherapy side effect, vitamin deficiency, lead poisoning, hemolysis, DIC, bone marrow suppression, proliferative neoplastic disorder, liver disease, hypothyroidism, chronic disease, lab error.      ED Management:   Procedures    Orders Placed This Encounter    Urine culture    CT Abdomen Pelvis With IV Contrast NO Oral Contrast    Hepatitis C Antibody    HIV 1/2 Ag/Ab (4th Gen)    CBC auto differential    Comprehensive metabolic panel    Urinalysis, Reflex to Urine Culture Urine, Clean Catch    CBC with Differential    Morphology    Bilirubin, Direct    Lactate Dehydrogenase    Reticulocytes    CK    Protime-INR    Haptoglobin    Fibrinogen    Hepatitis Panel, Acute    Urinalysis Microscopic    Magnesium    CBC with Automated Differential    Comprehensive Metabolic Panel (CMP)    Peripheral Smear Review for Hemolysis or Low Platelets    CBC with Differential    Haptoglobin    Fibrinogen    Ferritin    Vitamin B12    Folate    Vital signs    Cardiac Monitoring - Adult    Weigh patient    Strict intake and output (1) Document % meals taken (2) # of urinations (3) # and consistency of BMs    Bladder scan    Notify Provider    Notify Physician - Potential Need of Opioid Reversal    Place sequential compression device    Full code    Inpatient consult to Hematology/Oncology     Oxygen PRN    POCT urine pregnancy    POCT glucose    Direct antiglobulin test    Group & Rh    Type & Screen    Saline lock IV    Saline lock IV    Place in Observation    Fall precautions    Reason for No Pharmacological VTE Prophylaxis    iohexoL (OMNIPAQUE 350) injection 100 mL    iohexoL (OMNIPAQUE 350) injection 100 mL    cefTRIAXone injection 1 g    ondansetron injection 4 mg    sodium chloride 0.9% flush 10 mL    melatonin tablet 6 mg    senna-docusate 8.6-50 mg per tablet 1 tablet    acetaminophen tablet 650 mg    naloxone 0.4 mg/mL injection 0.02 mg    glucose chewable tablet 16 g    glucose chewable tablet 24 g    dextrose 50% injection 12.5 g    dextrose 50% injection 25 g    glucagon (human recombinant) injection 1 mg    methylPREDNISolone sodium succinate injection 120 mg    folic acid tablet 1 mg    IP VTE HIGH RISK PATIENT    Progressive Mobility Protocol (mobilize patient to their highest level of functioning at least twice daily)          EKG:       Labs:     Labs Reviewed   COMPREHENSIVE METABOLIC PANEL - Abnormal       Result Value    Sodium 136      Potassium 3.4 (*)     Chloride 106      CO2 24      Glucose 90      BUN 9      Creatinine 0.7      Calcium 8.7      Protein Total 7.4      Albumin 3.2 (*)     Bilirubin Total 8.4 (*)     ALP 68      AST 73 (*)     ALT 16      Anion Gap 6 (*)     eGFR >60     URINALYSIS, REFLEX TO URINE CULTURE - Abnormal    Color, UA Red (*)     Appearance, UA Cloudy (*)     pH, UA 7.0      Spec Grav UA 1.015      Protein, UA 3+ (*)     Glucose, UA Trace (*)     Ketones, UA Trace (*)     Bilirubin, UA Negative      Blood, UA 3+ (*)     Nitrites, UA Positive (*)     Urobilinogen, UA 4.0-6.0 (*)     Leukocyte Esterase, UA 1+ (*)    CBC WITH DIFFERENTIAL - Abnormal    WBC 3.65 (*)     RBC 3.46 (*)     HGB 7.2 (*)     HCT 24.4 (*)     MCV 71 (*)     MCH 20.8 (*)     MCHC 29.5 (*)     RDW 23.8 (*)     Platelet Count 93 (*)     MPV        Nucleated RBC 0      Neut % 62.8       Lymph % 18.1      Mono % 16.2 (*)     Eos % 1.9      Basophil % 0.5      Imm Grans % 0.5      Neut # 2.29      Lymph # 0.66 (*)     Mono # 0.59      Eos # 0.07      Baso # 0.02      Imm Grans # 0.02     MORPHOLOGY - Abnormal    Platelet Estimate Decreased (*)     Fragmented Cells Occasional      Anisocytosis Slight      Poik Slight      Hypochromia Occasional      Ovalocytes Occasional      Spherocyte Occasional     LACTATE DEHYDROGENASE - Abnormal    Lactate Dehydrogenase 1,391 (*)     Narrative:     Results are increased in hemolyzed samples.    RETICULOCYTES - Abnormal    Retic Count, Automated 2.6 (*)    CK - Abnormal     (*)    URINALYSIS MICROSCOPIC - Abnormal    RBC, UA 15 (*)     WBC, UA 30 (*)     Bacteria, UA Many (*)     Squamous Epithelial Cells, UA 2      Non-Squamous Epithelial Cells 3 (*)     Hyaline Casts, UA 0      Microscopic Comment       HEPATITIS C ANTIBODY - Normal    Hep C Ab Interp Negative     HIV 1 / 2 ANTIBODY - Normal    HIV 1/2 Ag/Ab Negative     BILIRUBIN, DIRECT - Normal    Bilirubin Direct 0.3     PROTIME-INR - Normal    PT 11.4      INR 1.0     FIBRINOGEN - Normal    Fibrinogen 313     CULTURE, URINE   CBC W/ AUTO DIFFERENTIAL    Narrative:     The following orders were created for panel order CBC auto differential.  Procedure                               Abnormality         Status                     ---------                               -----------         ------                     CBC with Differential[[id number]]       Abnormal            Final result                 Please view results for these tests on the individual orders.   HAPTOGLOBIN   HEPATITIS PANEL, ACUTE   PERIPHERAL SMEAR REVIEW FOR HEMOLYSIS OR LOW PLATELETS   POCT URINE PREGNANCY    POC Preg Test, Ur Negative       Acceptable Yes     DIRECT ANTIGLOBULIN TEST    Direct Nehemiah (DINORAH) NEG     GROUP & RH    ABO A      Rh Type NEG       Independent review of the labs ordered include:   See ED  course    Imaging:     Imaging Results               CT Abdomen Pelvis With IV Contrast NO Oral Contrast (Final result)  Result time 03/29/25 22:40:18      Final result by Heidi Restrepo MD (03/29/25 22:40:18)                   Impression:      Hepatic steatosis. Mild intrahepatic biliary ductal dilatation, which was seen previous examination.  Cavernous malformation of the portal vein, as previously described.    Thickening of the ascending colon and hepatic flexure colonic wall.  Adjacent pericolonic infiltration.  Finding may suggest acute colitis.  Recommend follow-up to resolution to exclude the possibility of underlying neoplasia.    Fibroid uterus.    Moderate hiatal hernia.    This report was flagged in Epic as abnormal.      Electronically signed by: Heidi Restrepo  Date:    03/29/2025  Time:    22:40               Narrative:    EXAMINATION:  CT OF ABDOMEN PELVIS WITH    CLINICAL HISTORY:  Jaundice and abdominal pain.    TECHNIQUE:  5 mm enhanced axial images were obtained from the lung bases through the greater trochanters.  One hundred mL of Omnipaque 350 was injected.    COMPARISON:  09/21/2023    FINDINGS:  There is fatty infiltration of the liver.  Mild intrahepatic biliary ductal dilatation is present.  A surgical clip is seen at the anterior periphery of the left lobe of the liver.  On the previous examination, cavernous malformation of the portal vein was described.    Pancreas, kidneys, and adrenal glands are unremarkable. The gallbladder is surgically absent.    The spleen is moderately enlarged.  Surgical clips are seen at splenic hilum.  A splenule is present.    There has been gastric surgery.    There is no definite evidence for abdominal adenopathy or ascites.  Belly jewelry is noted.    There is thickening of the ascending colon and hepatic flexure colonic wall with adjacent mild pericolonic infiltration.  The appendix is not inflamed.  The uterus is heterogeneous and contains  multiple small masses.  There are 2 surgical clips seen within the pelvis, which are likely due to tubal ligation history.    There is no free fluid in the pelvis.    There is mild bibasilar atelectasis.  A moderate hiatal hernia is present with small air-fluid level.    Been gastric surgery.                                    X-Rays:   Independently Interpreted Readings:   Other Readings:  CT A/P independently interpreted: no high-grade bowel obstruction or free air.  Agree with radiologist interpretation.       Medications Given:     Medications   iohexoL (OMNIPAQUE 350) injection 100 mL (has no administration in time range)   ondansetron injection 4 mg (4 mg Intravenous Given 3/29/25 2253)   sodium chloride 0.9% flush 10 mL (10 mLs Intravenous Given 3/29/25 2255)   melatonin tablet 6 mg (has no administration in time range)   senna-docusate 8.6-50 mg per tablet 1 tablet (has no administration in time range)   acetaminophen tablet 650 mg (has no administration in time range)   naloxone 0.4 mg/mL injection 0.02 mg (has no administration in time range)   glucose chewable tablet 16 g (has no administration in time range)   glucose chewable tablet 24 g (has no administration in time range)   dextrose 50% injection 12.5 g (has no administration in time range)   dextrose 50% injection 25 g (has no administration in time range)   glucagon (human recombinant) injection 1 mg (has no administration in time range)   methylPREDNISolone sodium succinate injection 120 mg (has no administration in time range)   folic acid tablet 1 mg (has no administration in time range)   iohexoL (OMNIPAQUE 350) injection 100 mL (100 mLs Intravenous Given 3/29/25 2148)   cefTRIAXone injection 1 g (1 g Intravenous Given 3/29/25 2233)        Medical Decision Making:    Additional Consideration:   Additional testing considered during clinical course: none    Social determinants of health considered during development of treatment plan include:  poor access to care    Current co-morbidities considered which impacted clinical decision making: chronic blood loss anemia, GERD    Case discussed with additional provider: Laurita RODRIGUEZ NP, will admit for further management of suspected hemolytic anemia    ED Course as of 03/30/25 0406   Sat Mar 29, 2025   2131 SpO2: 100 % [SS]   2131 Resp: 16 [SS]   2131 Pulse: 71 [SS]   2131 Temp: 98.6 °F (37 °C) [SS]   2131 BP: 124/81  Vitals reassuring, afebrile [SS]   2132 CBC auto differential(!)  Acute on chronic anemia, low MCV, new thrombocytopenia [SS]   2132 Comprehensive metabolic panel(!)  Significant hyperbilirubinemia, mild AST elevation, mild hypokalemia [SS]   2132 Reticulocytes(!)  Reticulocyte count elevated [SS]   2133 HIV 1/2 Ag/Ab (4th Gen)  Negative [SS]   2133 POCT urine pregnancy  Negative [SS]   2133 Hepatitis C Antibody  Negative [SS]   2210 Urinalysis Microscopic(!)  UA with 30 WBC, many bacteria [SS]   2210 Urinalysis, Reflex to Urine Culture Urine, Clean Catch(!)  Nitrite positive, will cover with antibiotics for possible UTI [SS]   2210 Lactate Dehydrogenase(!)  LDH significantly elevated [SS]   2210 CK(!)  Mildly elevated [SS]   2222 Blood consent obtained in anticipation of possible use of blood products. [SS]      ED Course User Index  [SS] Bari Finney MD            Medical Decision Making  45 yo F presents to ED with new onset jaundice, dark urine, and abdominal pain. Reports recent admission for anemia requiring transfusion of 3 U RBCs. Vitals stable in ED. Today's labs show acute on chronic anemia with new thrombocytopenia, hyperbilirubinemia 8.4.  Presentation concerning for hemolysis, suspect related to recent transfusion. Additional hemolysis labs added on. Requesting observation for bilirubin and CBC trending, further workup of hemolytic anemia.    Problems Addressed:  Acute cystitis without hematuria: acute illness or injury  Acute on chronic anemia: acute illness or injury  Elevated  LDH: acute illness or injury  Hyperbilirubinemia: acute illness or injury  Hypokalemia: acute illness or injury  Jaundice: acute illness or injury  Other acquired hemolytic anemias: acute illness or injury  Thrombocytopenia: acute illness or injury    Amount and/or Complexity of Data Reviewed  External Data Reviewed: notes.  Labs: ordered. Decision-making details documented in ED Course.  Radiology: ordered and independent interpretation performed. Decision-making details documented in ED Course.    Risk  Prescription drug management.        Clinical Impression:       ICD-10-CM ICD-9-CM   1. Hyperbilirubinemia  E80.6 782.4   2. Jaundice  R17 782.4   3. Other acquired hemolytic anemias  D59.8 283.2   4. Acute on chronic anemia  D64.9 285.9   5. Thrombocytopenia  D69.6 287.5   6. Hypokalemia  E87.6 276.8   7. Acute cystitis without hematuria  N30.00 595.0   8. Elevated LDH  R74.02 790.4         Follow-up Information    None          ED Disposition Condition    Observation Stable              On re-evaluation, the patient's status has remained stable.  At this time, I believe the patient should be admitted to the hospital for further evaluation and management.  The consulting physician/team agrees with plan and will admit under their service.   The patient and family were updated with test results, overall impression, and further plan of care. All questions were answered.       Bari Finney MD  03/30/25 6810

## 2025-03-30 NOTE — SUBJECTIVE & OBJECTIVE
Past Medical History:   Diagnosis Date    Anemia, unspecified     Embolism and thrombosis of renal vein     RA (rheumatoid arthritis) 2017       Past Surgical History:   Procedure Laterality Date     SECTION  , , 2004, 2014    CHOLECYSTECTOMY  2014    SLEEVE GASTROPLASTY  2021    in [place]    SURGICAL REMOVAL OF BUNION WITH OSTEOTOMY OF METATARSAL BONE Left 2022    Procedure: BUNIONECTOMY, WITH METATARSAL OSTEOTOMY  ;  Surgeon: Roshan Pyle DPM;  Location: [facility];  Service: Podiatry;  Laterality: Left;  distal osteotomy left 1st metatarsal    TUBAL LIGATION         Review of patient's allergies indicates:   Allergen Reactions    Penicillin Hives       No current facility-administered medications on file prior to encounter.     Current Outpatient Medications on File Prior to Encounter   Medication Sig    docusate sodium (COLACE) 100 MG capsule Take 1 capsule (100 mg total) by mouth 2 (two) times daily.    esomeprazole (NEXIUM) 40 MG capsule Take 1 capsule (40 mg total) by mouth 2 (two) times daily.    ferrous sulfate (IRON) 325 mg (65 mg iron) Tab tablet Take 1 tablet (325 mg total) by mouth daily with breakfast.    levonorgestreL (MIRENA) 21 mcg/24 hours (8 yrs) 52 mg IUD 52,000 mcg by Intrauterine route.    sucralfate (CARAFATE) 1 gram tablet Take 1 tablet (1 g total) by mouth 4 (four) times daily before meals and nightly. for 10 days     Family History       Problem Relation (Age of Onset)    Breast cancer Maternal Aunt    Coronary artery disease Maternal Grandmother    Hypertension Maternal Grandmother          Tobacco Use    Smoking status: Never    Smokeless tobacco: Never   Substance and Sexual Activity    Alcohol use: Not Currently     Comment: socially    Drug use: Never    Sexual activity: Not Currently     Partners: Male     Review of Systems   Constitutional:  Negative for chills, diaphoresis, fatigue and fever.   Respiratory:  Negative for cough, shortness of breath and  wheezing.    Cardiovascular:  Negative for chest pain and palpitations.   Gastrointestinal:  Negative for abdominal pain, constipation, diarrhea, nausea and vomiting.   Genitourinary:  Positive for menstrual problem. Negative for decreased urine volume, difficulty urinating, dysuria, flank pain, frequency, hematuria, pelvic pain and urgency.   Musculoskeletal:  Negative for arthralgias, back pain, myalgias, neck pain and neck stiffness.   Skin:  Negative for color change and pallor.   Neurological:  Positive for weakness. Negative for dizziness, syncope, light-headedness, numbness and headaches.   Psychiatric/Behavioral:  Negative for agitation and confusion.      Objective:     Vital Signs (Most Recent):  Temp: 98.1 °F (36.7 °C) (03/30/25 0351)  Pulse: 62 (03/30/25 0351)  Resp: 18 (03/30/25 0351)  BP: 104/62 (03/30/25 0351)  SpO2: 100 % (03/30/25 0351) Vital Signs (24h Range):  Temp:  [98.1 °F (36.7 °C)-98.6 °F (37 °C)] 98.1 °F (36.7 °C)  Pulse:  [58-72] 62  Resp:  [11-18] 18  SpO2:  [97 %-100 %] 100 %  BP: (101-124)/(61-81) 104/62     Weight: 77.8 kg (171 lb 8 oz)  Body mass index is 26.86 kg/m².     Physical Exam  Vitals and nursing note reviewed.   Constitutional:       General: She is not in acute distress.     Appearance: She is well-developed and normal weight. She is not ill-appearing, toxic-appearing or diaphoretic.   HENT:      Head: Normocephalic and atraumatic.   Eyes:      General: No scleral icterus.        Right eye: No discharge.         Left eye: No discharge.      Conjunctiva/sclera: Conjunctivae normal.   Neck:      Trachea: No tracheal deviation.   Cardiovascular:      Rate and Rhythm: Normal rate and regular rhythm.      Heart sounds: Normal heart sounds. No murmur heard.     No gallop.   Pulmonary:      Effort: Pulmonary effort is normal. No respiratory distress.      Breath sounds: Normal breath sounds. No stridor. No wheezing or rales.   Abdominal:      General: Bowel sounds are normal. There  "is no distension.      Palpations: Abdomen is soft. There is no mass.      Tenderness: There is no abdominal tenderness. There is no guarding.   Musculoskeletal:         General: No deformity. Normal range of motion.      Cervical back: Normal range of motion and neck supple.   Skin:     General: Skin is warm and dry.      Coloration: Skin is not pale.      Findings: No erythema or rash.   Neurological:      General: No focal deficit present.      Mental Status: She is alert and oriented to person, place, and time.      Cranial Nerves: No cranial nerve deficit.      Motor: No abnormal muscle tone.   Psychiatric:         Mood and Affect: Mood normal.         Behavior: Behavior normal.         Thought Content: Thought content normal.         Judgment: Judgment normal.                Significant Labs: All pertinent labs within the past 24 hours have been reviewed.  BMP:   Recent Labs   Lab 03/29/25 1947      K 3.4*      CO2 24   BUN 9   CREATININE 0.7   CALCIUM 8.7     CBC:   Recent Labs   Lab 03/29/25 1947   WBC 3.65*   HGB 7.2*   HCT 24.4*   PLT 93*     CMP:   Recent Labs   Lab 03/29/25 1947      K 3.4*      CO2 24   BUN 9   CREATININE 0.7   CALCIUM 8.7   ALBUMIN 3.2*   BILITOT 8.4*   ALKPHOS 68   AST 73*   ALT 16   ANIONGAP 6*     Urine Culture: No results for input(s): "LABURIN" in the last 48 hours.  Urine Studies:   Recent Labs   Lab 03/29/25 2123   COLORU Red*   APPEARANCEUA Cloudy*   PHUR 7.0   SPECGRAV 1.015   PROTEINUA 3+*   GLUCUA Trace*   BILIRUBINUA Negative   OCCULTUA 3+*   NITRITE Positive*   UROBILINOGEN 4.0-6.0*   LEUKOCYTESUR 1+*   RBCUA 15*   WBCUA 30*   BACTERIA Many*   HYALINECASTS 0       Significant Imaging: I have reviewed all pertinent imaging results/findings within the past 24 hours.  Imaging Results               CT Abdomen Pelvis With IV Contrast NO Oral Contrast (Final result)  Result time 03/29/25 22:40:18      Final result by Heidi Restrepo MD (03/29/25 " 22:40:18)                   Impression:      Hepatic steatosis. Mild intrahepatic biliary ductal dilatation, which was seen previous examination.  Cavernous malformation of the portal vein, as previously described.    Thickening of the ascending colon and hepatic flexure colonic wall.  Adjacent pericolonic infiltration.  Finding may suggest acute colitis.  Recommend follow-up to resolution to exclude the possibility of underlying neoplasia.    Fibroid uterus.    Moderate hiatal hernia.    This report was flagged in Epic as abnormal.      Electronically signed by: Heidi Restrepo  Date:    03/29/2025  Time:    22:40               Narrative:    EXAMINATION:  CT OF ABDOMEN PELVIS WITH    CLINICAL HISTORY:  Jaundice and abdominal pain.    TECHNIQUE:  5 mm enhanced axial images were obtained from the lung bases through the greater trochanters.  One hundred mL of Omnipaque 350 was injected.    COMPARISON:  09/21/2023    FINDINGS:  There is fatty infiltration of the liver.  Mild intrahepatic biliary ductal dilatation is present.  A surgical clip is seen at the anterior periphery of the left lobe of the liver.  On the previous examination, cavernous malformation of the portal vein was described.    Pancreas, kidneys, and adrenal glands are unremarkable. The gallbladder is surgically absent.    The spleen is moderately enlarged.  Surgical clips are seen at splenic hilum.  A splenule is present.    There has been gastric surgery.    There is no definite evidence for abdominal adenopathy or ascites.  Belly jewelry is noted.    There is thickening of the ascending colon and hepatic flexure colonic wall with adjacent mild pericolonic infiltration.  The appendix is not inflamed.  The uterus is heterogeneous and contains multiple small masses.  There are 2 surgical clips seen within the pelvis, which are likely due to tubal ligation history.    There is no free fluid in the pelvis.    There is mild bibasilar atelectasis.  A  moderate hiatal hernia is present with small air-fluid level.    Been gastric surgery.

## 2025-03-30 NOTE — PLAN OF CARE
POC reviewed with the patient. AOX4 VSS on RA. All due medications given as per MAR. Complained of heartburn, PRN medication given. Purposeful rounding done. Safety precautions maintained.     Problem: Adult Inpatient Plan of Care  Goal: Plan of Care Review  Outcome: Progressing  Goal: Patient-Specific Goal (Individualized)  Outcome: Progressing  Goal: Absence of Hospital-Acquired Illness or Injury  Outcome: Progressing  Goal: Optimal Comfort and Wellbeing  Outcome: Progressing  Goal: Readiness for Transition of Care  Outcome: Progressing

## 2025-03-30 NOTE — H&P
"  [facility] Medicine  History & Physical    Patient Name: Giovanni Pena  MRN: [id number]  Patient Class: OP- Observation  Admission Date: 3/29/2025  Attending Physician: Anne Marie Koenig MD   Primary Care Provider: Miquel Mishra MD         Patient information was obtained from patient, past medical records, and ER records.     Subjective:     Principal Problem:Anemia    Chief Complaint:   Chief Complaint   Patient presents with    Hematuria    Abdominal Pain     2 days brown/red-tinged urine, lmp ended 3/24, middle "tight" continuous abd pain. Pmh blood transfusion x1 week ago, pt feels dehydrated, weak, scleras yellow.        HPI: Ms. Giovanni Pena is a 44 y.o. female, with PMH of microcytic hypochromic anemia,/SOCORRO, renal vein embolism/thrombosis, portal vein thrombosis, splenic infarct, s/p gastric sleeve, GERD, RA, Eczema, who presented to [facility] ED on 3/29/25 due to generalized abdominal pain with red/brown colored urine x 3 days. She states the pain is a "tightness" in the middle of the abdomen. She had a blood transfusion one week ago. She states she needed the transfusion as she has uterine fibroids, and heavy menses (LMP 3/14/25). She states her GYN team is planning for a partial hysterectomy. She states she feels dehydrated, weak, and notes yellow eyes. She states this occurred after a blood transfusion years ago. She denies chest pain, shortness breath, fever, vomiting, diarrhea, or any other associated symptoms. She was evaluated in the ED with labs showing anemia with H&H of 7.2/24.4, thrombocytopenia with PLT of 93. Retic count was 2.6. A metabolic panel showed albumin of 3.2, Tbili of 8.4 (without elevation of direct bilirubin), and AST of 73. CPK was elevated at 307, and LDH was 1391. A UA additionally showed a nirite positive UTI, with 30 WBC/hpf, many bacteria and 1+ leuk esterase. She was treated in the ED with rocephin. She was placed on observation. "       Past Medical History:   Diagnosis Date    Anemia, unspecified     Embolism and thrombosis of renal vein     RA (rheumatoid arthritis) 2017       Past Surgical History:   Procedure Laterality Date     SECTION  , , 2004, 2014    CHOLECYSTECTOMY  2014    SLEEVE GASTROPLASTY  2021    in [place]    SURGICAL REMOVAL OF BUNION WITH OSTEOTOMY OF METATARSAL BONE Left 2022    Procedure: BUNIONECTOMY, WITH METATARSAL OSTEOTOMY  ;  Surgeon: Roshan Pyle DPM;  Location: [facility];  Service: Podiatry;  Laterality: Left;  distal osteotomy left 1st metatarsal    TUBAL LIGATION         Review of patient's allergies indicates:   Allergen Reactions    Penicillin Hives       No current facility-administered medications on file prior to encounter.     Current Outpatient Medications on File Prior to Encounter   Medication Sig    docusate sodium (COLACE) 100 MG capsule Take 1 capsule (100 mg total) by mouth 2 (two) times daily.    esomeprazole (NEXIUM) 40 MG capsule Take 1 capsule (40 mg total) by mouth 2 (two) times daily.    ferrous sulfate (IRON) 325 mg (65 mg iron) Tab tablet Take 1 tablet (325 mg total) by mouth daily with breakfast.    levonorgestreL (MIRENA) 21 mcg/24 hours (8 yrs) 52 mg IUD 52,000 mcg by Intrauterine route.    sucralfate (CARAFATE) 1 gram tablet Take 1 tablet (1 g total) by mouth 4 (four) times daily before meals and nightly. for 10 days     Family History       Problem Relation (Age of Onset)    Breast cancer Maternal Aunt    Coronary artery disease Maternal Grandmother    Hypertension Maternal Grandmother          Tobacco Use    Smoking status: Never    Smokeless tobacco: Never   Substance and Sexual Activity    Alcohol use: Not Currently     Comment: socially    Drug use: Never    Sexual activity: Not Currently     Partners: Male     Review of Systems   Constitutional:  Negative for chills, diaphoresis, fatigue and fever.   Respiratory:  Negative for cough, shortness of  breath and wheezing.    Cardiovascular:  Negative for chest pain and palpitations.   Gastrointestinal:  Negative for abdominal pain, constipation, diarrhea, nausea and vomiting.   Genitourinary:  Positive for menstrual problem. Negative for decreased urine volume, difficulty urinating, dysuria, flank pain, frequency, hematuria, pelvic pain and urgency.   Musculoskeletal:  Negative for arthralgias, back pain, myalgias, neck pain and neck stiffness.   Skin:  Negative for color change and pallor.   Neurological:  Positive for weakness. Negative for dizziness, syncope, light-headedness, numbness and headaches.   Psychiatric/Behavioral:  Negative for agitation and confusion.      Objective:     Vital Signs (Most Recent):  Temp: 98.1 °F (36.7 °C) (03/30/25 0351)  Pulse: 62 (03/30/25 0351)  Resp: 18 (03/30/25 0351)  BP: 104/62 (03/30/25 0351)  SpO2: 100 % (03/30/25 0351) Vital Signs (24h Range):  Temp:  [98.1 °F (36.7 °C)-98.6 °F (37 °C)] 98.1 °F (36.7 °C)  Pulse:  [58-72] 62  Resp:  [11-18] 18  SpO2:  [97 %-100 %] 100 %  BP: (101-124)/(61-81) 104/62     Weight: 77.8 kg (171 lb 8 oz)  Body mass index is 26.86 kg/m².     Physical Exam  Vitals and nursing note reviewed.   Constitutional:       General: She is not in acute distress.     Appearance: She is well-developed and normal weight. She is not ill-appearing, toxic-appearing or diaphoretic.   HENT:      Head: Normocephalic and atraumatic.   Eyes:      General: No scleral icterus.        Right eye: No discharge.         Left eye: No discharge.      Conjunctiva/sclera: Conjunctivae normal.   Neck:      Trachea: No tracheal deviation.   Cardiovascular:      Rate and Rhythm: Normal rate and regular rhythm.      Heart sounds: Normal heart sounds. No murmur heard.     No gallop.   Pulmonary:      Effort: Pulmonary effort is normal. No respiratory distress.      Breath sounds: Normal breath sounds. No stridor. No wheezing or rales.   Abdominal:      General: Bowel sounds are  "normal. There is no distension.      Palpations: Abdomen is soft. There is no mass.      Tenderness: There is no abdominal tenderness. There is no guarding.   Musculoskeletal:         General: No deformity. Normal range of motion.      Cervical back: Normal range of motion and neck supple.   Skin:     General: Skin is warm and dry.      Coloration: Skin is not pale.      Findings: No erythema or rash.   Neurological:      General: No focal deficit present.      Mental Status: She is alert and oriented to person, place, and time.      Cranial Nerves: No cranial nerve deficit.      Motor: No abnormal muscle tone.   Psychiatric:         Mood and Affect: Mood normal.         Behavior: Behavior normal.         Thought Content: Thought content normal.         Judgment: Judgment normal.                Significant Labs: All pertinent labs within the past 24 hours have been reviewed.  BMP:   Recent Labs   Lab 03/29/25 1947      K 3.4*      CO2 24   BUN 9   CREATININE 0.7   CALCIUM 8.7     CBC:   Recent Labs   Lab 03/29/25 1947   WBC 3.65*   HGB 7.2*   HCT 24.4*   PLT 93*     CMP:   Recent Labs   Lab 03/29/25 1947      K 3.4*      CO2 24   BUN 9   CREATININE 0.7   CALCIUM 8.7   ALBUMIN 3.2*   BILITOT 8.4*   ALKPHOS 68   AST 73*   ALT 16   ANIONGAP 6*     Urine Culture: No results for input(s): "LABURIN" in the last 48 hours.  Urine Studies:   Recent Labs   Lab 03/29/25 2123   COLORU Red*   APPEARANCEUA Cloudy*   PHUR 7.0   SPECGRAV 1.015   PROTEINUA 3+*   GLUCUA Trace*   BILIRUBINUA Negative   OCCULTUA 3+*   NITRITE Positive*   UROBILINOGEN 4.0-6.0*   LEUKOCYTESUR 1+*   RBCUA 15*   WBCUA 30*   BACTERIA Many*   HYALINECASTS 0       Significant Imaging: I have reviewed all pertinent imaging results/findings within the past 24 hours.  Imaging Results               CT Abdomen Pelvis With IV Contrast NO Oral Contrast (Final result)  Result time 03/29/25 22:40:18      Final result by Heidi Restrepo, " MD (03/29/25 22:40:18)                   Impression:      Hepatic steatosis. Mild intrahepatic biliary ductal dilatation, which was seen previous examination.  Cavernous malformation of the portal vein, as previously described.    Thickening of the ascending colon and hepatic flexure colonic wall.  Adjacent pericolonic infiltration.  Finding may suggest acute colitis.  Recommend follow-up to resolution to exclude the possibility of underlying neoplasia.    Fibroid uterus.    Moderate hiatal hernia.    This report was flagged in Epic as abnormal.      Electronically signed by: Heidi Restrepo  Date:    03/29/2025  Time:    22:40               Narrative:    EXAMINATION:  CT OF ABDOMEN PELVIS WITH    CLINICAL HISTORY:  Jaundice and abdominal pain.    TECHNIQUE:  5 mm enhanced axial images were obtained from the lung bases through the greater trochanters.  One hundred mL of Omnipaque 350 was injected.    COMPARISON:  09/21/2023    FINDINGS:  There is fatty infiltration of the liver.  Mild intrahepatic biliary ductal dilatation is present.  A surgical clip is seen at the anterior periphery of the left lobe of the liver.  On the previous examination, cavernous malformation of the portal vein was described.    Pancreas, kidneys, and adrenal glands are unremarkable. The gallbladder is surgically absent.    The spleen is moderately enlarged.  Surgical clips are seen at splenic hilum.  A splenule is present.    There has been gastric surgery.    There is no definite evidence for abdominal adenopathy or ascites.  Belly jewelry is noted.    There is thickening of the ascending colon and hepatic flexure colonic wall with adjacent mild pericolonic infiltration.  The appendix is not inflamed.  The uterus is heterogeneous and contains multiple small masses.  There are 2 surgical clips seen within the pelvis, which are likely due to tubal ligation history.    There is no free fluid in the pelvis.    There is mild bibasilar  atelectasis.  A moderate hiatal hernia is present with small air-fluid level.    Been gastric surgery.                                       Assessment/Plan:     Assessment & Plan  Anemia  - Ms. Giovanni Pena presents with jaundice and abdominal pain   - she has h/o transfusion reaction and subsequent hemolytic anemia, SOCORRO, s/p gastric sleeve   - labs are very similar to prior hospitalization where she was diagnosed w/ hemolytic anemia and she is post-blood transfusion again   - labs ordered including: peripheral smear, direct antiglobulin test, LDH, PT-INR, aPTT, haptoglobin, fibrinogen, Iron studies, type and screen, hepatitis panel   - suspect hemolysis as she has elevated Tbili, elevated LDH (haptoglobin is pending)  - will start IV solu-medrol 120 mg [id number]   - Hem-Onc consult placed  Menorrhagia with irregular cycle  - LMP 2/14/25   - follows w/ GYN   - plans for partial hysterectomy 2/2 menorrhagia / uterine fibroids     Splenomegaly  - chronic       VTE Risk Mitigation (From admission, onward)           Ordered     Reason for No Pharmacological VTE Prophylaxis  Once        Question:  Reasons:  Answer:  Thrombocytopenia    03/29/25 2249     IP VTE HIGH RISK PATIENT  Once         03/29/25 2249     Place sequential compression device  Until discontinued         03/29/25 2249                         On 03/30/2025, patient should be placed in hospital observation services under the care of Dr. Jerardo Saldivar MD.           Kelly Plascencia PA-C  Department of Hospital Medicine  [facility] - Emergency Dept

## 2025-03-30 NOTE — PLAN OF CARE
Problem: Adult Inpatient Plan of Care  Goal: Plan of Care Review  Outcome: Progressing  Goal: Patient-Specific Goal (Individualized)  Outcome: Progressing  Goal: Absence of Hospital-Acquired Illness or Injury  Outcome: Progressing  Goal: Optimal Comfort and Wellbeing  Outcome: Progressing    Plan of care reviewed and followed. Patient progressing well. Tolerated medications well. Absence of falls or injury noted throughout shift. Instructed to call for mobility assistance. Call bell in reach, side rails up x2, bed in lowest position. VSS and NADN. Compliant with scheduled no prn medications. POC ongoing.

## 2025-03-30 NOTE — HPI
"Ms. Giovanni Pena is a 44 y.o. female, with PMH of microcytic hypochromic anemia,/SOCORRO, renal vein embolism/thrombosis, portal vein thrombosis, splenic infarct, s/p gastric sleeve, GERD, RA, Eczema, who presented to [facility] ED on 3/29/25 due to generalized abdominal pain with red/brown colored urine x 3 days. She states the pain is a "tightness" in the middle of the abdomen. She had a blood transfusion one week ago. She states she needed the transfusion as she has uterine fibroids, and heavy menses (LMP 3/14/25). She states her GYN team is planning for a partial hysterectomy. She states she feels dehydrated, weak, and notes yellow eyes. She states this occurred after a blood transfusion years ago. She denies chest pain, shortness breath, fever, vomiting, diarrhea, or any other associated symptoms. She was evaluated in the ED with labs showing anemia with H&H of 7.2/24.4, thrombocytopenia with PLT of 93. Retic count was 2.6. A metabolic panel showed albumin of 3.2, Tbili of 8.4 (without elevation of direct bilirubin), and AST of 73. CPK was elevated at 307, and LDH was 1391. A UA additionally showed a nirite positive UTI, with 30 WBC/hpf, many bacteria and 1+ leuk esterase. She was treated in the ED with rocephin. She was placed on observation.     "

## 2025-03-30 NOTE — ASSESSMENT & PLAN NOTE
"Recently admitted to GYN team, discharged 3/19; admitted for "AUB and symptomatic anemia. She was admitted for observation, started on TXA, and received 3u pRBC with appropriate rise in H/H. On day of discharge, TVUS revealed malpositioned IUD which was removed. She was discharged home in stable condition and has follow-up scheduled in one week with [facility] for close monitoring and to discuss possible IUD replacement/Nexplanon."    - LMP was during that admission  - follows w/ GYN   - plans for partial hysterectomy 2/2 menorrhagia / uterine fibroids     "

## 2025-03-30 NOTE — ED TRIAGE NOTES
"Pt presents to ED with complaint of brown/red-tinged urine x 2 days. Endorses continuous "tightness" in middle abdominal area. Hx of blood transfusion 1 weeks ago. Endorses weakness and dehydration. Yellow scleras noted.    "

## 2025-03-30 NOTE — ASSESSMENT & PLAN NOTE
- Ms. Giovanni Pena presents with jaundice and abdominal pain   - she has h/o transfusion reaction and subsequent hemolytic anemia, SOCORRO, s/p gastric sleeve   - labs are very similar to prior hospitalization where she was diagnosed w/ hemolytic anemia and she is post-blood transfusion again   - labs ordered including: peripheral smear, direct antiglobulin test, LDH, PT-INR, aPTT, haptoglobin, fibrinogen, Iron studies, type and screen, hepatitis panel   - suspect hemolysis as she has elevated Tbili, elevated LDH (haptoglobin is pending)  - will start IV solu-medrol 120 mg [id number]   - Hem-Onc consult placed

## 2025-03-30 NOTE — ASSESSMENT & PLAN NOTE
Splenic Infarct  Per chart review-  Prior history of thromboembolism portal vein thrombosis [facility] and splenic vein thrombosis gastric sleeve  Was on eliquis 6m  Had hypercoag work up in 2021/2022 with elevated factor VIII assay  Had repeat imaging of portal vein with resolution

## 2025-03-31 ENCOUNTER — RESULTS FOLLOW-UP (OUTPATIENT)
Dept: PRIMARY CARE CLINIC | Facility: CLINIC | Age: 45
End: 2025-03-31

## 2025-03-31 ENCOUNTER — TELEPHONE (OUTPATIENT)
Dept: OBSTETRICS AND GYNECOLOGY | Facility: CLINIC | Age: 45
End: 2025-03-31
Payer: COMMERCIAL

## 2025-03-31 LAB
ABSOLUTE EOSINOPHIL (OHS): 0 K/UL
ABSOLUTE MONOCYTE (OHS): 0.23 K/UL (ref 0.3–1)
ABSOLUTE NEUTROPHIL COUNT (OHS): 8.07 K/UL (ref 1.8–7.7)
ALBUMIN SERPL BCP-MCNC: 3.3 G/DL (ref 3.5–5.2)
ALP SERPL-CCNC: 77 UNIT/L (ref 40–150)
ALT SERPL W/O P-5'-P-CCNC: 18 UNIT/L (ref 10–44)
ANION GAP (OHS): 8 MMOL/L (ref 8–16)
AST SERPL-CCNC: 50 UNIT/L (ref 11–45)
BACTERIA UR CULT: ABNORMAL
BASOPHILS # BLD AUTO: 0.01 K/UL
BASOPHILS NFR BLD AUTO: 0.1 %
BILIRUB SERPL-MCNC: 4.3 MG/DL (ref 0.1–1)
BUN SERPL-MCNC: 13 MG/DL (ref 6–20)
CALCIUM SERPL-MCNC: 9 MG/DL (ref 8.7–10.5)
CHLORIDE SERPL-SCNC: 107 MMOL/L (ref 95–110)
CO2 SERPL-SCNC: 23 MMOL/L (ref 23–29)
CREAT SERPL-MCNC: 0.9 MG/DL (ref 0.5–1.4)
ERYTHROCYTE [DISTWIDTH] IN BLOOD BY AUTOMATED COUNT: 24.4 % (ref 11.5–14.5)
GFR SERPLBLD CREATININE-BSD FMLA CKD-EPI: >60 ML/MIN/1.73/M2
GLUCOSE SERPL-MCNC: 126 MG/DL (ref 70–110)
HCT VFR BLD AUTO: 25.8 % (ref 37–48.5)
HGB BLD-MCNC: 7.4 GM/DL (ref 12–16)
IMM GRANULOCYTES # BLD AUTO: 0.04 K/UL (ref 0–0.04)
IMM GRANULOCYTES NFR BLD AUTO: 0.4 % (ref 0–0.5)
LDH SERPL-CCNC: 1457 U/L (ref 110–260)
LYMPHOCYTES # BLD AUTO: 0.65 K/UL (ref 1–4.8)
MAGNESIUM SERPL-MCNC: 2 MG/DL (ref 1.6–2.6)
MCH RBC QN AUTO: 20.5 PG (ref 27–50)
MCHC RBC AUTO-ENTMCNC: 28.7 G/DL (ref 32–36)
MCV RBC AUTO: 72 FL (ref 82–98)
NUCLEATED RBC (/100WBC) (OHS): 0 /100 WBC
PATHOLOGIST REVIEW - CBC/DIFF (OHS): NORMAL
PLATELET # BLD AUTO: 113 K/UL (ref 150–450)
PMV BLD AUTO: ABNORMAL FL
POTASSIUM SERPL-SCNC: 4.1 MMOL/L (ref 3.5–5.1)
PROT SERPL-MCNC: 8 GM/DL (ref 6–8.4)
RBC # BLD AUTO: 3.61 M/UL (ref 4–5.4)
RELATIVE EOSINOPHIL (OHS): 0 %
RELATIVE LYMPHOCYTE (OHS): 7.2 % (ref 18–48)
RELATIVE MONOCYTE (OHS): 2.6 % (ref 4–15)
RELATIVE NEUTROPHIL (OHS): 89.7 % (ref 38–73)
RETICS/RBC NFR AUTO: 3.8 % (ref 0.5–2.5)
SODIUM SERPL-SCNC: 138 MMOL/L (ref 136–145)
WBC # BLD AUTO: 9 K/UL (ref 3.9–12.7)

## 2025-03-31 PROCEDURE — 96376 TX/PRO/DX INJ SAME DRUG ADON: CPT

## 2025-03-31 PROCEDURE — 85025 COMPLETE CBC W/AUTO DIFF WBC: CPT | Performed by: PHYSICIAN ASSISTANT

## 2025-03-31 PROCEDURE — 63600175 PHARM REV CODE 636 W HCPCS: Performed by: PHYSICIAN ASSISTANT

## 2025-03-31 PROCEDURE — 63600175 PHARM REV CODE 636 W HCPCS: Mod: JZ,TB | Performed by: PHYSICIAN ASSISTANT

## 2025-03-31 PROCEDURE — 21400001 HC TELEMETRY ROOM

## 2025-03-31 PROCEDURE — 94761 N-INVAS EAR/PLS OXIMETRY MLT: CPT

## 2025-03-31 PROCEDURE — A4216 STERILE WATER/SALINE, 10 ML: HCPCS | Performed by: PHYSICIAN ASSISTANT

## 2025-03-31 PROCEDURE — 85045 AUTOMATED RETICULOCYTE COUNT: CPT | Performed by: INTERNAL MEDICINE

## 2025-03-31 PROCEDURE — 25000003 PHARM REV CODE 250: Performed by: PHYSICIAN ASSISTANT

## 2025-03-31 PROCEDURE — 83615 LACTATE (LD) (LDH) ENZYME: CPT | Performed by: INTERNAL MEDICINE

## 2025-03-31 PROCEDURE — 80053 COMPREHEN METABOLIC PANEL: CPT | Performed by: PHYSICIAN ASSISTANT

## 2025-03-31 PROCEDURE — 36415 COLL VENOUS BLD VENIPUNCTURE: CPT | Performed by: INTERNAL MEDICINE

## 2025-03-31 PROCEDURE — 86356 MONONUCLEAR CELL ANTIGEN: CPT | Performed by: INTERNAL MEDICINE

## 2025-03-31 PROCEDURE — 83735 ASSAY OF MAGNESIUM: CPT | Performed by: PHYSICIAN ASSISTANT

## 2025-03-31 PROCEDURE — 63600175 PHARM REV CODE 636 W HCPCS: Performed by: INTERNAL MEDICINE

## 2025-03-31 RX ADMIN — PREDNISONE 60 MG: 50 TABLET ORAL at 09:03

## 2025-03-31 RX ADMIN — SODIUM CHLORIDE, PRESERVATIVE FREE 10 ML: 5 INJECTION INTRAVENOUS at 09:03

## 2025-03-31 RX ADMIN — SODIUM CHLORIDE, PRESERVATIVE FREE 10 ML: 5 INJECTION INTRAVENOUS at 05:03

## 2025-03-31 RX ADMIN — FOLIC ACID 1 MG: 1 TABLET ORAL at 09:03

## 2025-03-31 RX ADMIN — PANTOPRAZOLE SODIUM 40 MG: 40 TABLET, DELAYED RELEASE ORAL at 09:03

## 2025-03-31 RX ADMIN — METHYLPREDNISOLONE SODIUM SUCCINATE 120 MG: 125 INJECTION, POWDER, FOR SOLUTION INTRAMUSCULAR; INTRAVENOUS at 06:03

## 2025-03-31 RX ADMIN — SODIUM FERRIC GLUCONATE COMPLEX IN SUCROSE 125 MG: 12.5 INJECTION INTRAVENOUS at 09:03

## 2025-03-31 RX ADMIN — SODIUM CHLORIDE, PRESERVATIVE FREE 10 ML: 5 INJECTION INTRAVENOUS at 01:03

## 2025-03-31 NOTE — TELEPHONE ENCOUNTER
----- Message from Soha Quinn sent at 3/31/2025 11:37 AM CDT -----  Regarding: Schedule f/u with gyn resident clinic  Bladimir Hamm,Can you please schedule this patient to discuss surgery with the gyn resident clinic this Thursday? Thank you!Soha Quinn MD [id number]

## 2025-03-31 NOTE — SUBJECTIVE & OBJECTIVE
Interval History: T. Bili downtrending this morning and hgb stable. Seen at bedside, feeling well. Getting IV iron and transitioning to PO prednisone per GYN recs. Will need GYN appointment that was follow up from prior admission rescheduled (SW notified). She reports less red in her urine    Review of Systems   Constitutional:  Negative for fatigue and fever.   Cardiovascular:  Negative for chest pain and palpitations.   Gastrointestinal:  Negative for blood in stool.   Genitourinary:  Positive for hematuria (improving) and menstrual problem. Negative for difficulty urinating and dysuria.   Musculoskeletal:  Negative for arthralgias and back pain.   Skin:  Positive for color change (conjunctiva). Negative for pallor.   Neurological:  Positive for weakness. Negative for syncope and numbness.     Objective:     Vital Signs (Most Recent):  Temp: 98.3 °F (36.8 °C) (03/31/25 0735)  Pulse: 73 (03/31/25 0735)  Resp: 18 (03/31/25 0735)  BP: 103/64 (03/31/25 0735)  SpO2: 100 % (03/31/25 0735) Vital Signs (24h Range):  Temp:  [98.1 °F (36.7 °C)-98.6 °F (37 °C)] 98.3 °F (36.8 °C)  Pulse:  [62-78] 73  Resp:  [16-18] 18  SpO2:  [97 %-100 %] 100 %  BP: ()/(50-64) 103/64     Weight: 77.8 kg (171 lb 8 oz)  Body mass index is 26.86 kg/m².    Intake/Output Summary (Last 24 hours) at 3/31/2025 1052  Last data filed at 3/31/2025 1045  Gross per 24 hour   Intake --   Output 1500 ml   Net -1500 ml         Physical Exam  Vitals and nursing note reviewed.   Constitutional:       General: She is not in acute distress.     Appearance: She is well-developed and normal weight.   HENT:      Head: Normocephalic and atraumatic.   Eyes:      General: Scleral icterus (improved from admission) present.   Neck:      Trachea: No tracheal deviation.   Cardiovascular:      Rate and Rhythm: Normal rate and regular rhythm.   Pulmonary:      Effort: Pulmonary effort is normal. No respiratory distress.   Abdominal:      General: Abdomen is flat.    Skin:     General: Skin is warm and dry.   Neurological:      General: No focal deficit present.      Mental Status: She is alert.      Motor: No abnormal muscle tone.   Psychiatric:         Mood and Affect: Mood normal.         Behavior: Behavior normal.               Significant Labs: All pertinent labs within the past 24 hours have been reviewed.    Significant Imaging: I have reviewed all pertinent imaging results/findings within the past 24 hours.

## 2025-03-31 NOTE — PROGRESS NOTES
"[facility] Medicine  Progress Note    Patient Name: Giovanni Pena  MRN: [id number]  Patient Class: OP- Observation   Admission Date: 3/29/2025  Length of Stay: 0 days  Attending Physician: Anne Marie Koenig MD  Primary Care Provider: Miquel Mishra MD        Subjective     Principal Problem:Hemolytic anemia        HPI:  Ms. Giovanni Pena is a 44 y.o. female, with PMH of microcytic hypochromic anemia,/SOCORRO, renal vein embolism/thrombosis, portal vein thrombosis, splenic infarct, s/p gastric sleeve, GERD, RA, Eczema, who [facility]/25 due to generalized abdominal pain with red/brown colored urine x 3 days. She states the pain is a "tightness" in the middle of the abdomen. She had a blood transfusion one week ago. She states she needed the transfusion as she has uterine fibroids, and heavy menses (LMP 3/14/25). She states her GYN team is planning for a partial hysterectomy. She states she feels dehydrated, weak, and notes yellow eyes. She states this occurred after a blood transfusion years ago. She denies chest pain, shortness breath, fever, vomiting, diarrhea, or any other associated symptoms. She was evaluated in the ED with labs showing anemia with H&H of 7.2/24.4, thrombocytopenia with PLT of 93. Retic count was 2.6. A metabolic panel showed albumin of 3.2, Tbili of 8.4 (without elevation of direct bilirubin), and AST of 73. CPK was elevated at 307, and LDH was 1391. A UA additionally showed a nirite positive UTI, with 30 WBC/hpf, many bacteria and 1+ leuk esterase. She was treated in the ED with rocephin. She was placed on observation.       Overview/Hospital Course:  Giovanni Pena was admitted for anemia, most likely hemolytic anemia secondary to transfusion. Hgb 7.2 > 6.8 > 7.4 (Close to her baseline). LDH 1391, Haptoglobin < 10 and T. Bili 8.1, consistent with hemolytic anemia. Started on IV Solumedrol. Hematology consulted, recommending IV iron " and transition to PO prednisone. Continue to trend labs (hgb stable, T. Bili downtrending)    Interval History: T. Bili downtrending this morning and hgb stable. Seen at bedside, feeling well. Getting IV iron and transitioning to PO prednisone per GYN recs. Will need GYN appointment that was follow up from prior admission rescheduled (SW notified). She reports less red in her urine    Review of Systems   Constitutional:  Negative for fatigue and fever.   Cardiovascular:  Negative for chest pain and palpitations.   Gastrointestinal:  Negative for blood in stool.   Genitourinary:  Positive for hematuria (improving) and menstrual problem. Negative for difficulty urinating and dysuria.   Musculoskeletal:  Negative for arthralgias and back pain.   Skin:  Positive for color change (conjunctiva). Negative for pallor.   Neurological:  Positive for weakness. Negative for syncope and numbness.     Objective:     Vital Signs (Most Recent):  Temp: 98.3 °F (36.8 °C) (03/31/25 0735)  Pulse: 73 (03/31/25 0735)  Resp: 18 (03/31/25 0735)  BP: 103/64 (03/31/25 0735)  SpO2: 100 % (03/31/25 0735) Vital Signs (24h Range):  Temp:  [98.1 °F (36.7 °C)-98.6 °F (37 °C)] 98.3 °F (36.8 °C)  Pulse:  [62-78] 73  Resp:  [16-18] 18  SpO2:  [97 %-100 %] 100 %  BP: ()/(50-64) 103/64     Weight: 77.8 kg (171 lb 8 oz)  Body mass index is 26.86 kg/m².    Intake/Output Summary (Last 24 hours) at 3/31/2025 1052  Last data filed at 3/31/2025 1045  Gross per 24 hour   Intake --   Output 1500 ml   Net -1500 ml         Physical Exam  Vitals and nursing note reviewed.   Constitutional:       General: She is not in acute distress.     Appearance: She is well-developed and normal weight.   HENT:      Head: Normocephalic and atraumatic.   Eyes:      General: Scleral icterus (improved from admission) present.   Neck:      Trachea: No tracheal deviation.   Cardiovascular:      Rate and Rhythm: Normal rate and regular rhythm.   Pulmonary:      Effort:  "Pulmonary effort is normal. No respiratory distress.   Abdominal:      General: Abdomen is flat.   Skin:     General: Skin is warm and dry.   Neurological:      General: No focal deficit present.      Mental Status: She is alert.      Motor: No abnormal muscle tone.   Psychiatric:         Mood and Affect: Mood normal.         Behavior: Behavior normal.               Significant Labs: All pertinent labs within the past 24 hours have been reviewed.    Significant Imaging: I have reviewed all pertinent imaging results/findings within the past 24 hours.      Assessment & Plan  Hemolytic anemia  - Ms. Giovanni Pena presents with jaundice and abdominal pain   - she has h/o transfusion reaction and subsequent hemolytic anemia, SOCORRO, s/p gastric sleeve   - recently admitted with GYN team, last transfusion 3/19  - labs are very similar to prior hospitalization where she was diagnosed w/ hemolytic anemia and she is post-blood transfusion again   - labs ordered including: peripheral smear, direct antiglobulin test, LDH, PT-INR, aPTT, haptoglobin, fibrinogen, Iron studies, type and screen, hepatitis panel   - suspect hemolysis as she has elevated Tbili, elevated LDH 1391, haptoglobin <10  - no active uterine bleeding at present  - she has plans to have partial hysterectomy with GYN  - will start IV solu-medrol 120 mg [id number]   - trend hgb BID: stable: 7.2 > 6.8> 7.4  - Hem-Onc consult placed, appreciate assistance (referral placed for outpatient as well)   - IV iron started 3/31   - transition to PO prednisone 3/31   - Monitor daily , cbc,cmp, LDH and retic     Menorrhagia with irregular cycle  Iron deficiency anemia due to chronic blood loss  Recently admitted to GYN team, discharged 3/19; admitted for "AUB and symptomatic anemia. She was admitted for observation, started on TXA, and received 3u pRBC with appropriate rise in H/H. On day of discharge, TVUS revealed malpositioned IUD which was removed. She was discharged " "home in stable condition and has follow-up scheduled in one week with [facility] for close monitoring and to discuss possible IUD replacement/Nexplanon."    - LMP was during that admission  - follows w/ GYN   - plans for partial hysterectomy 2/2 menorrhagia / uterine fibroids     History of Portal vein thrombosis  Splenomegaly  Splenic Infarct  Per chart review-  Prior history of thromboembolism portal vein thrombosis SMV and splenic vein thrombosis gastric sleeve  Was on eliquis 6m  Had hypercoag work up in 2021/2022 with elevated factor VIII assay  Had repeat imaging of portal vein with resolution      VTE Risk Mitigation (From admission, onward)           Ordered     Reason for No Pharmacological VTE Prophylaxis  Once        Question:  Reasons:  Answer:  Thrombocytopenia    03/29/25 2249     IP VTE HIGH RISK PATIENT  Once         03/29/25 2249     Place sequential compression device  Until discontinued         03/29/25 2249                    Discharge Planning   ZACHARY: 4/1/2025     Code Status: Full Code   Medical Readiness for Discharge Date:   Discharge Plan A: Home                        Alicia Arango PA-C  Department of Hospital Medicine   [facility] ([place])    "

## 2025-03-31 NOTE — ASSESSMENT & PLAN NOTE
- Ms. Giovanni Pena presents with jaundice and abdominal pain   - she has h/o transfusion reaction and subsequent hemolytic anemia, SOCORRO, s/p gastric sleeve   - recently admitted with GYN team, last transfusion 3/19  - labs are very similar to prior hospitalization where she was diagnosed w/ hemolytic anemia and she is post-blood transfusion again   - labs ordered including: peripheral smear, direct antiglobulin test, LDH, PT-INR, aPTT, haptoglobin, fibrinogen, Iron studies, type and screen, hepatitis panel   - suspect hemolysis as she has elevated Tbili, elevated LDH 1391, haptoglobin <10  - no active uterine bleeding at present  - she has plans to have partial hysterectomy with GYN  - will start IV solu-medrol 120 mg [id number]   - trend hgb BID: stable: 7.2 > 6.8> 7.4  - Hem-Onc consult placed, appreciate assistance (referral placed for outpatient as well)   - IV iron started 3/31   - transition to PO prednisone 3/31   - Monitor daily , cbc,cmp, LDH and retic

## 2025-04-01 PROBLEM — E80.6 HYPERBILIRUBINEMIA: Status: ACTIVE | Noted: 2025-04-01

## 2025-04-01 LAB
ABSOLUTE EOSINOPHIL (OHS): 0 K/UL
ABSOLUTE MONOCYTE (OHS): 0.48 K/UL (ref 0.3–1)
ABSOLUTE NEUTROPHIL COUNT (OHS): 5.76 K/UL (ref 1.8–7.7)
ALBUMIN SERPL BCP-MCNC: 2.9 G/DL (ref 3.5–5.2)
ALP SERPL-CCNC: 71 UNIT/L (ref 40–150)
ALT SERPL W/O P-5'-P-CCNC: 18 UNIT/L (ref 10–44)
ANION GAP (OHS): 7 MMOL/L (ref 8–16)
AST SERPL-CCNC: 30 UNIT/L (ref 11–45)
BASOPHILS # BLD AUTO: 0.01 K/UL
BASOPHILS NFR BLD AUTO: 0.1 %
BILIRUB SERPL-MCNC: 2.1 MG/DL (ref 0.1–1)
BUN SERPL-MCNC: 12 MG/DL (ref 6–20)
CALCIUM SERPL-MCNC: 8.5 MG/DL (ref 8.7–10.5)
CHLORIDE SERPL-SCNC: 109 MMOL/L (ref 95–110)
CO2 SERPL-SCNC: 24 MMOL/L (ref 23–29)
CREAT SERPL-MCNC: 0.8 MG/DL (ref 0.5–1.4)
ERYTHROCYTE [DISTWIDTH] IN BLOOD BY AUTOMATED COUNT: 25.3 % (ref 11.5–14.5)
ERYTHROCYTE [DISTWIDTH] IN BLOOD BY AUTOMATED COUNT: 26.7 % (ref 11.5–14.5)
GFR SERPLBLD CREATININE-BSD FMLA CKD-EPI: >60 ML/MIN/1.73/M2
GLUCOSE SERPL-MCNC: 97 MG/DL (ref 70–110)
HCT VFR BLD AUTO: 22.2 % (ref 37–48.5)
HCT VFR BLD AUTO: 26.6 % (ref 37–48.5)
HGB BLD-MCNC: 6.6 GM/DL (ref 12–16)
HGB BLD-MCNC: 7.6 GM/DL (ref 12–16)
IMM GRANULOCYTES # BLD AUTO: 0.09 K/UL (ref 0–0.04)
IMM GRANULOCYTES NFR BLD AUTO: 1.3 % (ref 0–0.5)
LDH SERPL-CCNC: 1111 U/L (ref 110–260)
LYMPHOCYTES # BLD AUTO: 0.79 K/UL (ref 1–4.8)
MAGNESIUM SERPL-MCNC: 2 MG/DL (ref 1.6–2.6)
MCH RBC QN AUTO: 20.9 PG (ref 27–31)
MCH RBC QN AUTO: 21.3 PG (ref 27–31)
MCHC RBC AUTO-ENTMCNC: 28.6 G/DL (ref 32–36)
MCHC RBC AUTO-ENTMCNC: 29.7 G/DL (ref 32–36)
MCV RBC AUTO: 72 FL (ref 82–98)
MCV RBC AUTO: 73 FL (ref 82–98)
NUCLEATED RBC (/100WBC) (OHS): 0 /100 WBC
PLATELET # BLD AUTO: 105 K/UL (ref 150–450)
PLATELET # BLD AUTO: 92 K/UL (ref 150–450)
PMV BLD AUTO: ABNORMAL FL
PMV BLD AUTO: ABNORMAL FL
POTASSIUM SERPL-SCNC: 3.8 MMOL/L (ref 3.5–5.1)
PROT SERPL-MCNC: 6.6 GM/DL (ref 6–8.4)
RBC # BLD AUTO: 3.1 M/UL (ref 4–5.4)
RBC # BLD AUTO: 3.64 M/UL (ref 4–5.4)
RELATIVE EOSINOPHIL (OHS): 0 %
RELATIVE LYMPHOCYTE (OHS): 11.1 % (ref 18–48)
RELATIVE MONOCYTE (OHS): 6.7 % (ref 4–15)
RELATIVE NEUTROPHIL (OHS): 80.8 % (ref 38–73)
RETICS/RBC NFR AUTO: 3.7 % (ref 0.5–2.5)
SODIUM SERPL-SCNC: 140 MMOL/L (ref 136–145)
WBC # BLD AUTO: 6.47 K/UL (ref 3.9–12.7)
WBC # BLD AUTO: 7.13 K/UL (ref 3.9–12.7)

## 2025-04-01 PROCEDURE — 85027 COMPLETE CBC AUTOMATED: CPT | Performed by: NURSE PRACTITIONER

## 2025-04-01 PROCEDURE — 83615 LACTATE (LD) (LDH) ENZYME: CPT | Performed by: NURSE PRACTITIONER

## 2025-04-01 PROCEDURE — 85045 AUTOMATED RETICULOCYTE COUNT: CPT | Performed by: NURSE PRACTITIONER

## 2025-04-01 PROCEDURE — 25000003 PHARM REV CODE 250: Performed by: PHYSICIAN ASSISTANT

## 2025-04-01 PROCEDURE — 63600175 PHARM REV CODE 636 W HCPCS: Performed by: PHYSICIAN ASSISTANT

## 2025-04-01 PROCEDURE — 85025 COMPLETE CBC W/AUTO DIFF WBC: CPT | Performed by: PHYSICIAN ASSISTANT

## 2025-04-01 PROCEDURE — 21400001 HC TELEMETRY ROOM

## 2025-04-01 PROCEDURE — 83735 ASSAY OF MAGNESIUM: CPT | Performed by: PHYSICIAN ASSISTANT

## 2025-04-01 PROCEDURE — A4216 STERILE WATER/SALINE, 10 ML: HCPCS | Performed by: PHYSICIAN ASSISTANT

## 2025-04-01 PROCEDURE — 80053 COMPREHEN METABOLIC PANEL: CPT | Performed by: PHYSICIAN ASSISTANT

## 2025-04-01 PROCEDURE — 36415 COLL VENOUS BLD VENIPUNCTURE: CPT | Performed by: NURSE PRACTITIONER

## 2025-04-01 PROCEDURE — 63600175 PHARM REV CODE 636 W HCPCS: Mod: JZ,TB | Performed by: NURSE PRACTITIONER

## 2025-04-01 PROCEDURE — 36415 COLL VENOUS BLD VENIPUNCTURE: CPT | Performed by: PHYSICIAN ASSISTANT

## 2025-04-01 RX ORDER — METHYLPREDNISOLONE SOD SUCC 125 MG
125 VIAL (EA) INJECTION DAILY
Status: DISCONTINUED | OUTPATIENT
Start: 2025-04-01 | End: 2025-04-03

## 2025-04-01 RX ADMIN — FOLIC ACID 1 MG: 1 TABLET ORAL at 08:04

## 2025-04-01 RX ADMIN — METHYLPREDNISOLONE SODIUM SUCCINATE 125 MG: 125 INJECTION, POWDER, FOR SOLUTION INTRAMUSCULAR; INTRAVENOUS at 06:04

## 2025-04-01 RX ADMIN — PREDNISONE 60 MG: 50 TABLET ORAL at 08:04

## 2025-04-01 RX ADMIN — PANTOPRAZOLE SODIUM 40 MG: 40 TABLET, DELAYED RELEASE ORAL at 08:04

## 2025-04-01 RX ADMIN — SODIUM CHLORIDE, PRESERVATIVE FREE 10 ML: 5 INJECTION INTRAVENOUS at 05:04

## 2025-04-01 NOTE — SUBJECTIVE & OBJECTIVE
Interval History:  Ms Davila denies any further bleeding.  No improvement in labs.  Hematology reconsulted.  New recommendations ordered.  Updated patient plan.    Review of Systems   Constitutional:  Negative for fatigue and fever.   Cardiovascular:  Negative for chest pain and palpitations.   Gastrointestinal:  Negative for blood in stool.   Genitourinary:  Negative for difficulty urinating, dysuria, hematuria (improving) and menstrual problem.   Musculoskeletal:  Negative for arthralgias and back pain.   Skin:  Negative for color change (conjunctiva) and pallor.   Neurological:  Negative for syncope, weakness and numbness.     Objective:     Vital Signs (Most Recent):  Temp: 98.5 °F (36.9 °C) (04/01/25 1622)  Pulse: 76 (04/01/25 1622)  Resp: 18 (04/01/25 1622)  BP: 100/62 (04/01/25 1622)  SpO2: 97 % (04/01/25 1622) Vital Signs (24h Range):  Temp:  [98.1 °F (36.7 °C)-98.7 °F (37.1 °C)] 98.5 °F (36.9 °C)  Pulse:  [56-76] 76  Resp:  [18-20] 18  SpO2:  [97 %-100 %] 97 %  BP: ()/(54-62) 100/62     Weight: 78 kg (171 lb 14.4 oz)  Body mass index is 26.92 kg/m².    Intake/Output Summary (Last 24 hours) at 4/1/2025 1654  Last data filed at 4/1/2025 1204  Gross per 24 hour   Intake 300 ml   Output 1100 ml   Net -800 ml         Physical Exam  Vitals and nursing note reviewed.   Constitutional:       General: She is not in acute distress.     Appearance: She is well-developed and normal weight.   HENT:      Head: Normocephalic and atraumatic.   Eyes:      General: Scleral icterus (improved from admission) present.   Neck:      Trachea: No tracheal deviation.   Cardiovascular:      Rate and Rhythm: Normal rate and regular rhythm.   Pulmonary:      Effort: Pulmonary effort is normal. No respiratory distress.   Abdominal:      General: Abdomen is flat.   Skin:     General: Skin is warm and dry.   Neurological:      General: No focal deficit present.      Mental Status: She is alert.      Motor: No abnormal muscle tone.  "  Psychiatric:         Mood and Affect: Mood normal.         Behavior: Behavior normal.               Significant Labs: All pertinent labs within the past 24 hours have been reviewed.  Blood Culture: No results for input(s): "LABBLOO" in the last 48 hours.  BMP:   Recent Labs   Lab 04/01/25  0428      K 3.8      CO2 24   BUN 12   CREATININE 0.8   CALCIUM 8.5*   MG 2.0     CBC:   Recent Labs   Lab 03/31/25  0607 04/01/25  0428   WBC 9.00 7.13   HGB 7.4* 6.6*   HCT 25.8* 22.2*   * 92*     CMP:   Recent Labs   Lab 03/31/25  0608 04/01/25  0428    140   K 4.1 3.8    109   CO2 23 24   BUN 13 12   CREATININE 0.9 0.8   CALCIUM 9.0 8.5*   ALBUMIN 3.3* 2.9*   BILITOT 4.3* 2.1*   ALKPHOS 77 71   AST 50* 30   ALT 18 18   ANIONGAP 8 7*       Significant Imaging: I have reviewed all pertinent imaging results/findings within the past 24 hours.  "

## 2025-04-01 NOTE — CONSULTS
[facility] ([facility] Medicine  Consultation Note    Patient Name: Giovanni Pena  MRN: [id number]  Patient Class: IP- Inpatient   Admission Date: 3/29/2025  Length of Stay: 1 days  Attending Physician: Zachary Estrada MD  Primary Care Provider: Miquel Mishra MD  Principal Problem: Hemolytic anemia    Subjective   HPI:  Ms. Pena is a 45 yo F w/ hx of gastric bypass in 12/8/2021 (c/b renal vein thrombosis with extension into portal veins/SMV/hepatic veins; tx with 6 months of AC) and AUB (known fibroids). She presented on 3/29/2025 for jaundice after receiving 3 units pRBCs on 3/18 for Hgb of 5.6 in setting of heavy menstrual bleeding. Hematology consulted for management of delayed hemolytic transfusion reaction.    She has had 4 total blood transfusions in her lifetime, each complicated by delayed hemolytic transfusion reaction (first 9/2023, treated with IV steroids) and currently (received 3 units on 3/18/2025 for Hgb of 5.6, ferritin: 7, in setting of significant menstrual bleeding; IUD was removed).      In the ED, VSS, labs significant for Hgb: 7.2, platelets: 93K, total bilirubin: 8.4 (indirect), LDH: 1391, retic: 2.6%, DINORAH negative, haptoglobin <10, smear with scattered fragmented red cell forms; started on IV methylprednisolone 120 mg IV q 8 hrs on 3/30 at 5 AM.     Interval History:   Currently, she admits to feeling weak and fatigued, mild ROWE. She denies bleeding - LMP started 3/18, finished 3/27. She has very heavy periods; her periods have always been heavy but worse over the last 6 months, last about 1.5 weeks with clots; pending future hysterectomy. No other current sites of bleeding.    Scheduled Meds:   ferric gluconate  125 mg Intravenous Twice Weekly    folic acid  1 mg Oral Daily    methylPREDNISolone injection (PEDS and ADULTS)  125 mg Intravenous Daily    pantoprazole  40 mg Oral Daily     Continuous Infusions:  PRN Meds:.  Current Facility-Administered Medications:      acetaminophen, 650 mg, Oral, Q6H PRN    dextrose 50%, 12.5 g, Intravenous, PRN    dextrose 50%, 25 g, Intravenous, PRN    glucagon (human recombinant), 1 mg, Intramuscular, PRN    glucose, 16 g, Oral, PRN    glucose, 24 g, Oral, PRN    iohexoL, 100 mL, Intravenous, ONCE PRN    melatonin, 6 mg, Oral, Nightly PRN    naloxone, 0.02 mg, Intravenous, PRN    ondansetron, 4 mg, Intravenous, Q6H PRN    senna-docusate, 1 tablet, Oral, BID PRN    Past Medical History:   Diagnosis Date    Anemia, unspecified     Embolism and thrombosis of renal vein     RA (rheumatoid arthritis)      Past Surgical History:   Procedure Laterality Date     SECTION  , , ,     CHOLECYSTECTOMY  2014    SLEEVE GASTROPLASTY  2021    in [place]    SURGICAL REMOVAL OF BUNION WITH OSTEOTOMY OF METATARSAL BONE Left 2022    Procedure: BUNIONECTOMY, WITH METATARSAL OSTEOTOMY  ;  Surgeon: Roshan Pyle DPM;  [facility];  Service: Podiatry;  Laterality: Left;  distal osteotomy left 1st metatarsal    TUBAL LIGATION       Family History:  - First cousin: possible hx of DVT, hx of unclear    Objective   Vitals:    25 1622   BP: 100/62   Pulse: 76   Resp: 18   Temp: 98.5 °F (36.9 °C)       Constitutional:       Appearance: Normal appearance.   HENT:      Head: Normocephalic and atraumatic.      Nose: Nose normal.      Mouth/Throat:      Mouth: Mucous membranes are moist.      Pharynx: Oropharynx is clear.   Eyes:      Conjunctiva/sclera: Conjunctivae normal.   Cardiovascular:      Rate and Rhythm: Normal rate and regular rhythm.      Heart sounds: Normal heart sounds.   Pulmonary:      Effort: Pulmonary effort is normal.      Breath sounds: Normal breath sounds.   Abdominal:      General: Abdomen is flat. Bowel sounds are normal.      Palpations: Abdomen is soft.   Musculoskeletal:         General: Normal range of motion.      Cervical back: Normal range of motion and neck supple.   Skin:     General:  Skin is warm and dry.   Neurological:      General: No focal deficit present.      Mental Status: She is alert and oriented to person, place, and time. Mental status is at baseline.   Psychiatric:         Mood and Affect: Mood normal.     Lab Results   Component Value Date    WBC 7.13 04/01/2025    HGB 6.6 (L) 04/01/2025    HCT 22.2 (L) 04/01/2025    MCV 72 (L) 04/01/2025    PLT 92 (L) 04/01/2025     Diagnostic Results:  I have reviewed all pertinent imaging results/findings within the past 24 hours.    Assessment & Plan   Ms. Pena is a 43 yo F w/ hx of gastric bypass in 12/8/2021 (c/b renal vein thrombosis with extension into portal veins/SMV/hepatic veins; tx with 6 months of AC) and AUB (known fibroids). She presented on 3/29/2025 for jaundice after receiving 3 units pRBCs on 3/18 for Hgb of 5.6, ferritin of 7, in setting of heavy menstrual bleeding. Hematology consulted for management of delayed hemolytic transfusion reaction.    # Delayed Hemolytic Transfusion Reaction  - Hemoglobin has decreased to 6.6 today from 7.4 yesterday, after transitioning to 60 mg oral prednisone on 3/31 (last received IV methylprednisolone on 3/31 at 6 AM; started 3/30).   - Most recent labs with total bilirubin of 2.1 (from 8.4), retic: 3.7 (3.8), haptoglobin <10, LDH: 1100 (1400), negative DINORAH on 3/29  - Bilirubin improving but hemoglobin decreasing today c/f ongoing hemolysis as she is no longer menstruating (albeit does have a hx of GI bleed in 2021, possibly due to Brielle Hopson tear)    RECOMMENDATIONS:  Resume IV steroids (GI ppx) and continue to trend daily hemolysis labs (daily CBC, CMP, haptoglobin, LDH, reticulocytes); please also obtain peripheral smear, type and screen and another DINORAH on next lab draw  Regarding SOCORRO 2/2 AUB: to help decrease future need for transfusions, please consult gynecology to discuss options while awaiting hysterectomy, continue IV iron (messaged outpatient hematologist to coordinate continuing  once discharged; received IV ferric gluconate at 930 AM on 3/31), start oral iron   I have notified blood bank who is arranging blood transfusion reaction evaluation per protocol - need to flag her chart so this does not happen in the future  CT A/P on 3/29/2025 showing thickening of ascending colon and moderate hiatal hernia - consider GI referral to discuss EGD and colonoscopy  Splenmegaly - continue to monitor as an outpatient    Hematology to follow closely. Please page with questions.     Luz Maria An MD  Department of Hospital Medicine   [facility] ([place])

## 2025-04-01 NOTE — PROGRESS NOTES
"[facility], [place], operated by [facility] Medicine  Progress Note    Patient Name: Giovanni Pena  MRN: [id number]  Patient Class: IP- Inpatient   Admission Date: 3/29/2025  Length of Stay: 1 days  Attending Physician: Zachary Estrada MD  Primary Care Provider: Miquel Mishra MD        Subjective     Principal Problem:Hemolytic anemia        HPI:  Ms. Giovanni Pena is a 44 y.o. female, with PMH of microcytic hypochromic anemia,/SOCORRO, renal vein embolism/thrombosis, portal vein thrombosis, splenic infarct, s/p gastric sleeve, GERD, RA, Eczema, who presented to [facility] ED on 3/29/25 due to generalized abdominal pain with red/brown colored urine x 3 days. She states the pain is a "tightness" in the middle of the abdomen. She had a blood transfusion one week ago. She states she needed the transfusion as she has uterine fibroids, and heavy menses (LMP 3/14/25). She states her GYN team is planning for a partial hysterectomy. She states she feels dehydrated, weak, and notes yellow eyes. She states this occurred after a blood transfusion years ago. She denies chest pain, shortness breath, fever, vomiting, diarrhea, or any other associated symptoms. She was evaluated in the ED with labs showing anemia with H&H of 7.2/24.4, thrombocytopenia with PLT of 93. Retic count was 2.6. A metabolic panel showed albumin of 3.2, Tbili of 8.4 (without elevation of direct bilirubin), and AST of 73. CPK was elevated at 307, and LDH was 1391. A UA additionally showed a nirite positive UTI, with 30 WBC/hpf, many bacteria and 1+ leuk esterase. She was treated in the ED with rocephin. She was placed on observation.       Overview/Hospital Course:  Giovanni Pena was admitted for anemia, most likely hemolytic anemia secondary to transfusion. Hgb 7.2 > 6.8 > 7.4 (Close to her baseline). LDH 1391, Haptoglobin < 10 and T. Bili 8.1, consistent with hemolytic anemia. Started on IV Solumedrol. Hematology consulted, recommending IV iron " and transition to PO prednisone. Continue to trend labs (hgb stable, T. Bili downtrending)    Interval History:  Ms Davila denies any further bleeding.  No improvement in labs.  Hematology reconsulted.  New recommendations ordered.  Updated patient plan.    Review of Systems   Constitutional:  Negative for fatigue and fever.   Cardiovascular:  Negative for chest pain and palpitations.   Gastrointestinal:  Negative for blood in stool.   Genitourinary:  Negative for difficulty urinating, dysuria, hematuria (improving) and menstrual problem.   Musculoskeletal:  Negative for arthralgias and back pain.   Skin:  Negative for color change (conjunctiva) and pallor.   Neurological:  Negative for syncope, weakness and numbness.     Objective:     Vital Signs (Most Recent):  Temp: 98.5 °F (36.9 °C) (04/01/25 1622)  Pulse: 76 (04/01/25 1622)  Resp: 18 (04/01/25 1622)  BP: 100/62 (04/01/25 1622)  SpO2: 97 % (04/01/25 1622) Vital Signs (24h Range):  Temp:  [98.1 °F (36.7 °C)-98.7 °F (37.1 °C)] 98.5 °F (36.9 °C)  Pulse:  [56-76] 76  Resp:  [18-20] 18  SpO2:  [97 %-100 %] 97 %  BP: ()/(54-62) 100/62     Weight: 78 kg (171 lb 14.4 oz)  Body mass index is 26.92 kg/m².    Intake/Output Summary (Last 24 hours) at 4/1/2025 1654  Last data filed at 4/1/2025 1204  Gross per 24 hour   Intake 300 ml   Output 1100 ml   Net -800 ml         Physical Exam  Vitals and nursing note reviewed.   Constitutional:       General: She is not in acute distress.     Appearance: She is well-developed and normal weight.   HENT:      Head: Normocephalic and atraumatic.   Eyes:      General: Scleral icterus (improved from admission) present.   Neck:      Trachea: No tracheal deviation.   Cardiovascular:      Rate and Rhythm: Normal rate and regular rhythm.   Pulmonary:      Effort: Pulmonary effort is normal. No respiratory distress.   Abdominal:      General: Abdomen is flat.   Skin:     General: Skin is warm and dry.   Neurological:      General: No  "focal deficit present.      Mental Status: She is alert.      Motor: No abnormal muscle tone.   Psychiatric:         Mood and Affect: Mood normal.         Behavior: Behavior normal.               Significant Labs: All pertinent labs within the past 24 hours have been reviewed.  Blood Culture: No results for input(s): "LABBLOO" in the last 48 hours.  BMP:   Recent Labs   Lab 04/01/25  0428      K 3.8      CO2 24   BUN 12   CREATININE 0.8   CALCIUM 8.5*   MG 2.0     CBC:   Recent Labs   Lab 03/31/25  0607 04/01/25  0428   WBC 9.00 7.13   HGB 7.4* 6.6*   HCT 25.8* 22.2*   * 92*     CMP:   Recent Labs   Lab 03/31/25  0608 04/01/25  0428    140   K 4.1 3.8    109   CO2 23 24   BUN 13 12   CREATININE 0.9 0.8   CALCIUM 9.0 8.5*   ALBUMIN 3.3* 2.9*   BILITOT 4.3* 2.1*   ALKPHOS 77 71   AST 50* 30   ALT 18 18   ANIONGAP 8 7*       Significant Imaging: I have reviewed all pertinent imaging results/findings within the past 24 hours.      Assessment & Plan  Hemolytic anemia  - Ms. Giovanni Pena presents with jaundice and abdominal pain   - she has h/o transfusion reaction and subsequent hemolytic anemia, SOCORRO, s/p gastric sleeve   - recently admitted with GYN team, last transfusion 3/19  - labs are very similar to prior hospitalization where she was diagnosed w/ hemolytic anemia and she is post-blood transfusion again   - labs ordered including: peripheral smear, direct antiglobulin test, LDH, PT-INR, aPTT, haptoglobin, fibrinogen, Iron studies, type and screen, hepatitis panel   - suspect hemolysis as she has elevated Tbili, elevated LDH 1391, haptoglobin <10  - no active uterine bleeding at present  - she has plans to have partial hysterectomy with GYN  - will start IV solu-medrol 120 mg [id number]   - trend hgb BID: stable: 7.2 > 6.8> 7.4  - Hem-Onc consult placed, appreciate assistance (referral placed for outpatient as well)   - IV iron started 3/31   - transition to PO prednisone 3/31   - " "Monitor daily , cbc,cmp, LDH and retic   - Reconsulted Hem-Onc 4/1: appreciate recs including: change to high dose IV steroids and repeat hemolytic labs    Menorrhagia with irregular cycle  Iron deficiency anemia due to chronic blood loss  Recently admitted to GYN team, discharged 3/19; admitted for "AUB and symptomatic anemia. She was admitted for observation, started on TXA, and received 3u pRBC with appropriate rise in H/H. On day of discharge, TVUS revealed malpositioned IUD which was removed. She was discharged home in stable condition and has follow-up scheduled in one week [facility] for close monitoring and to discuss possible IUD replacement/Nexplanon."    - LMP was during that admission  - follows w/ GYN   - plans for partial hysterectomy 2/2 menorrhagia / uterine fibroids     History of Portal vein thrombosis  Splenomegaly  Splenic Infarct  Per chart review-  Prior history of thromboembolism portal vein thrombosis SMV and splenic vein thrombosis gastric sleeve  Was on eliquis 6m  Had hypercoag work up in 2021/2022 with elevated factor VIII assay  Had repeat imaging of portal vein with resolution      VTE Risk Mitigation (From admission, onward)           Ordered     Reason for No Pharmacological VTE Prophylaxis  Once        Question:  Reasons:  Answer:  Thrombocytopenia    03/29/25 2249     IP VTE HIGH RISK PATIENT  Once         03/29/25 2249     Place sequential compression device  Until discontinued         03/29/25 2249                    Discharge Planning   ZACHARY: 4/4/2025     Code Status: Full Code   Medical Readiness for Discharge Date:   Discharge Plan A: Home                        Kelly Paez DNP  Department of Hospital Medicine   [facility] ([place])    "

## 2025-04-01 NOTE — ASSESSMENT & PLAN NOTE
- Ms. Giovanni Pena presents with jaundice and abdominal pain   - she has h/o transfusion reaction and subsequent hemolytic anemia, SOCORRO, s/p gastric sleeve   - recently admitted with GYN team, last transfusion 3/19  - labs are very similar to prior hospitalization where she was diagnosed w/ hemolytic anemia and she is post-blood transfusion again   - labs ordered including: peripheral smear, direct antiglobulin test, LDH, PT-INR, aPTT, haptoglobin, fibrinogen, Iron studies, type and screen, hepatitis panel   - suspect hemolysis as she has elevated Tbili, elevated LDH 1391, haptoglobin <10  - no active uterine bleeding at present  - she has plans to have partial hysterectomy with GYN  - will start IV solu-medrol 120 mg [id number]   - trend hgb BID: stable: 7.2 > 6.8> 7.4  - Hem-Onc consult placed, appreciate assistance (referral placed for outpatient as well)   - IV iron started 3/31   - transition to PO prednisone 3/31   - Monitor daily , cbc,cmp, LDH and retic   - Reconsulted Hem-Onc 4/1: appreciate recs including: change to high dose IV steroids and repeat hemolytic labs

## 2025-04-02 LAB
ABSOLUTE EOSINOPHIL (OHS): 0 K/UL
ABSOLUTE MONOCYTE (OHS): 0.32 K/UL (ref 0.3–1)
ABSOLUTE NEUTROPHIL COUNT (OHS): 5.63 K/UL (ref 1.8–7.7)
ALBUMIN SERPL BCP-MCNC: 3.2 G/DL (ref 3.5–5.2)
ALP SERPL-CCNC: 69 UNIT/L (ref 40–150)
ALT SERPL W/O P-5'-P-CCNC: 16 UNIT/L (ref 10–44)
ANION GAP (OHS): 6 MMOL/L (ref 8–16)
AST SERPL-CCNC: 24 UNIT/L (ref 11–45)
BASOPHILS # BLD AUTO: 0 K/UL
BASOPHILS NFR BLD AUTO: 0 %
BILIRUB SERPL-MCNC: 1.7 MG/DL (ref 0.1–1)
BUN SERPL-MCNC: 11 MG/DL (ref 6–20)
CALCIUM SERPL-MCNC: 8.6 MG/DL (ref 8.7–10.5)
CHLORIDE SERPL-SCNC: 107 MMOL/L (ref 95–110)
CO2 SERPL-SCNC: 25 MMOL/L (ref 23–29)
CREAT SERPL-MCNC: 0.8 MG/DL (ref 0.5–1.4)
DIRECT COOMBS (DAT): NORMAL
ERYTHROCYTE [DISTWIDTH] IN BLOOD BY AUTOMATED COUNT: 27.1 % (ref 11.5–14.5)
ERYTHROCYTE [DISTWIDTH] IN BLOOD BY AUTOMATED COUNT: 27.9 % (ref 11.5–14.5)
GFR SERPLBLD CREATININE-BSD FMLA CKD-EPI: >60 ML/MIN/1.73/M2
GLUCOSE SERPL-MCNC: 102 MG/DL (ref 70–110)
HAPTOGLOB SERPL-MCNC: <10 MG/DL (ref 30–250)
HCT VFR BLD AUTO: 24.9 % (ref 37–48.5)
HCT VFR BLD AUTO: 25.1 % (ref 37–48.5)
HGB BLD-MCNC: 7.3 GM/DL (ref 12–16)
HGB BLD-MCNC: 7.3 GM/DL (ref 12–16)
IMM GRANULOCYTES # BLD AUTO: 0.09 K/UL (ref 0–0.04)
IMM GRANULOCYTES NFR BLD AUTO: 1.4 % (ref 0–0.5)
INDIRECT COOMBS: NORMAL
LDH SERPL-CCNC: 1041 U/L (ref 110–260)
LYMPHOCYTES # BLD AUTO: 0.55 K/UL (ref 1–4.8)
MCH RBC QN AUTO: 21.3 PG (ref 27–31)
MCH RBC QN AUTO: 21.5 PG (ref 27–31)
MCHC RBC AUTO-ENTMCNC: 29.1 G/DL (ref 32–36)
MCHC RBC AUTO-ENTMCNC: 29.3 G/DL (ref 32–36)
MCV RBC AUTO: 73 FL (ref 82–98)
MCV RBC AUTO: 74 FL (ref 82–98)
NUCLEATED RBC (/100WBC) (OHS): 0 /100 WBC
PLATELET # BLD AUTO: 92 K/UL (ref 150–450)
PLATELET # BLD AUTO: 97 K/UL (ref 150–450)
PMV BLD AUTO: ABNORMAL FL
PMV BLD AUTO: ABNORMAL FL
POTASSIUM SERPL-SCNC: 3.9 MMOL/L (ref 3.5–5.1)
PROT SERPL-MCNC: 7.4 GM/DL (ref 6–8.4)
RBC # BLD AUTO: 3.4 M/UL (ref 4–5.4)
RBC # BLD AUTO: 3.42 M/UL (ref 4–5.4)
RELATIVE EOSINOPHIL (OHS): 0 %
RELATIVE LYMPHOCYTE (OHS): 8.3 % (ref 18–48)
RELATIVE MONOCYTE (OHS): 4.9 % (ref 4–15)
RELATIVE NEUTROPHIL (OHS): 85.4 % (ref 38–73)
RH BLD: NORMAL
SODIUM SERPL-SCNC: 138 MMOL/L (ref 136–145)
SPECIMEN OUTDATE: NORMAL
WBC # BLD AUTO: 6.59 K/UL (ref 3.9–12.7)
WBC # BLD AUTO: 7.84 K/UL (ref 3.9–12.7)

## 2025-04-02 PROCEDURE — 25000003 PHARM REV CODE 250: Performed by: PHYSICIAN ASSISTANT

## 2025-04-02 PROCEDURE — 85027 COMPLETE CBC AUTOMATED: CPT | Performed by: NURSE PRACTITIONER

## 2025-04-02 PROCEDURE — 86901 BLOOD TYPING SEROLOGIC RH(D): CPT | Performed by: NURSE PRACTITIONER

## 2025-04-02 PROCEDURE — 99232 SBSQ HOSP IP/OBS MODERATE 35: CPT | Mod: ,,, | Performed by: INTERNAL MEDICINE

## 2025-04-02 PROCEDURE — 85025 COMPLETE CBC W/AUTO DIFF WBC: CPT | Performed by: NURSE PRACTITIONER

## 2025-04-02 PROCEDURE — 21400001 HC TELEMETRY ROOM

## 2025-04-02 PROCEDURE — 63600175 PHARM REV CODE 636 W HCPCS: Mod: JZ,TB | Performed by: NURSE PRACTITIONER

## 2025-04-02 PROCEDURE — 83010 ASSAY OF HAPTOGLOBIN QUANT: CPT | Performed by: NURSE PRACTITIONER

## 2025-04-02 PROCEDURE — 86880 COOMBS TEST DIRECT: CPT | Performed by: NURSE PRACTITIONER

## 2025-04-02 PROCEDURE — 82247 BILIRUBIN TOTAL: CPT | Performed by: NURSE PRACTITIONER

## 2025-04-02 PROCEDURE — 36415 COLL VENOUS BLD VENIPUNCTURE: CPT | Performed by: NURSE PRACTITIONER

## 2025-04-02 PROCEDURE — 83615 LACTATE (LD) (LDH) ENZYME: CPT | Performed by: NURSE PRACTITIONER

## 2025-04-02 PROCEDURE — 25000003 PHARM REV CODE 250: Performed by: NURSE PRACTITIONER

## 2025-04-02 RX ORDER — DOCUSATE SODIUM 100 MG/1
100 CAPSULE, LIQUID FILLED ORAL DAILY
Status: DISCONTINUED | OUTPATIENT
Start: 2025-04-02 | End: 2025-04-03 | Stop reason: HOSPADM

## 2025-04-02 RX ORDER — LANOLIN ALCOHOL/MO/W.PET/CERES
1 CREAM (GRAM) TOPICAL DAILY
Status: DISCONTINUED | OUTPATIENT
Start: 2025-04-02 | End: 2025-04-03 | Stop reason: HOSPADM

## 2025-04-02 RX ADMIN — PANTOPRAZOLE SODIUM 40 MG: 40 TABLET, DELAYED RELEASE ORAL at 08:04

## 2025-04-02 RX ADMIN — METHYLPREDNISOLONE SODIUM SUCCINATE 125 MG: 125 INJECTION, POWDER, FOR SOLUTION INTRAMUSCULAR; INTRAVENOUS at 08:04

## 2025-04-02 RX ADMIN — FERROUS SULFATE TAB 325 MG (65 MG ELEMENTAL FE) 1 EACH: 325 (65 FE) TAB at 05:04

## 2025-04-02 RX ADMIN — DOCUSATE SODIUM 100 MG: 100 CAPSULE, LIQUID FILLED ORAL at 05:04

## 2025-04-02 RX ADMIN — FOLIC ACID 1 MG: 1 TABLET ORAL at 08:04

## 2025-04-02 NOTE — PROGRESS NOTES
"[facility] Medicine  Progress Note    Patient Name: Giovanni Pena  MRN: [id number]  Patient Class: IP- Inpatient   Admission Date: 3/29/2025  Length of Stay: 2 days  Attending Physician: Zachary Estrada MD  Primary Care Provider: Miquel Mishra MD        Subjective     Principal Problem:Hemolytic anemia        HPI:  Ms. Giovanni Pena is a 44 y.o. female, with PMH of microcytic hypochromic anemia,/SOCORRO, renal vein embolism/thrombosis, portal vein thrombosis, splenic infarct, s/p gastric sleeve, GERD, RA, Eczema, who presented to [facility] ED on 3/29/25 due to generalized abdominal pain with red/brown colored urine x 3 days. She states the pain is a "tightness" in the middle of the abdomen. She had a blood transfusion one week ago. She states she needed the transfusion as she has uterine fibroids, and heavy menses (LMP 3/14/25). She states her GYN team is planning for a partial hysterectomy. She states she feels dehydrated, weak, and notes yellow eyes. She states this occurred after a blood transfusion years ago. She denies chest pain, shortness breath, fever, vomiting, diarrhea, or any other associated symptoms. She was evaluated in the ED with labs showing anemia with H&H of 7.2/24.4, thrombocytopenia with PLT of 93. Retic count was 2.6. A metabolic panel showed albumin of 3.2, Tbili of 8.4 (without elevation of direct bilirubin), and AST of 73. CPK was elevated at 307, and LDH was 1391. A UA additionally showed a nirite positive UTI, with 30 WBC/hpf, many bacteria and 1+ leuk esterase. She was treated in the ED with rocephin. She was placed on observation.       Overview/Hospital Course:  Giovanni Pena was admitted for anemia, most likely hemolytic anemia secondary to transfusion. Hgb 7.2 > 6.8 > 7.4 (Close to her baseline). LDH 1391, Haptoglobin < 10 and T. Bili 8.1, consistent with hemolytic anemia. Started on IV Solumedrol. Hematology consulted, recommending IV iron " and transition to PO prednisone. Continue to trend labs (hgb stable, T. Bili downtrending)    Interval History: Pt tolerating steroids. Labs stable. Discussed with Hematology.     Review of Systems   Constitutional:  Negative for fatigue and fever.   Cardiovascular:  Negative for chest pain and palpitations.   Gastrointestinal:  Negative for blood in stool.   Genitourinary:  Negative for difficulty urinating, dysuria, hematuria and menstrual problem.   Musculoskeletal:  Negative for arthralgias and back pain.   Skin:  Negative for color change (conjunctiva) and pallor.   Neurological:  Negative for syncope, weakness and numbness.     Objective:     Vital Signs (Most Recent):  Temp: 98.3 °F (36.8 °C) (04/02/25 1557)  Pulse: 70 (04/02/25 1557)  Resp: 18 (04/02/25 1557)  BP: (!) 106/58 (04/02/25 1557)  SpO2: 100 % (04/02/25 1557) Vital Signs (24h Range):  Temp:  [98.2 °F (36.8 °C)-98.5 °F (36.9 °C)] 98.3 °F (36.8 °C)  Pulse:  [57-75] 70  Resp:  [16-20] 18  SpO2:  [98 %-100 %] 100 %  BP: ()/(56-62) 106/58     Weight: 77.4 kg (170 lb 10.2 oz)  Body mass index is 26.73 kg/m².    Intake/Output Summary (Last 24 hours) at 4/2/2025 1714  Last data filed at 4/2/2025 0927  Gross per 24 hour   Intake 480 ml   Output 800 ml   Net -320 ml         Physical Exam  Vitals and nursing note reviewed.   Constitutional:       General: She is not in acute distress.     Appearance: She is well-developed and normal weight.   HENT:      Head: Normocephalic and atraumatic.   Eyes:      General: Scleral icterus (improved from admission) present.   Neck:      Trachea: No tracheal deviation.   Cardiovascular:      Rate and Rhythm: Normal rate and regular rhythm.   Pulmonary:      Effort: Pulmonary effort is normal. No respiratory distress.   Abdominal:      General: Abdomen is flat.   Skin:     General: Skin is warm and dry.   Neurological:      General: No focal deficit present.      Mental Status: She is alert.      Motor: No abnormal  "muscle tone.   Psychiatric:         Mood and Affect: Mood normal.         Behavior: Behavior normal.               Significant Labs: All pertinent labs within the past 24 hours have been reviewed.  BMP:   Recent Labs   Lab 04/01/25  0428 04/02/25  0420    138   K 3.8 3.9    107   CO2 24 25   BUN 12 11   CREATININE 0.8 0.8   CALCIUM 8.5* 8.6*   MG 2.0  --      CBC:   Recent Labs   Lab 04/01/25  1717 04/02/25  0420 04/02/25  1621   WBC 6.47 6.59 7.84   HGB 7.6* 7.3* 7.3*   HCT 26.6* 24.9* 25.1*   * 92* 97*       Significant Imaging: I have reviewed all pertinent imaging results/findings within the past 24 hours.      Assessment & Plan  Hemolytic anemia  - Ms. Giovanni Pena presents with jaundice and abdominal pain   - she has h/o transfusion reaction and subsequent hemolytic anemia, SOCORRO, s/p gastric sleeve   - recently admitted with GYN team, last transfusion 3/19  - labs are very similar to prior hospitalization where she was diagnosed w/ hemolytic anemia and she is post-blood transfusion again   - labs ordered including: peripheral smear, direct antiglobulin test, LDH, PT-INR, aPTT, haptoglobin, fibrinogen, Iron studies, type and screen, hepatitis panel   - suspect hemolysis as she has elevated Tbili, elevated LDH 1391, haptoglobin <10  - no active uterine bleeding at present  - she has plans to have partial hysterectomy with GYN  - will start IV solu-medrol 120 mg [id number]   - trend hgb BID: stable: 7.2 > 6.8> 7.4  - Hem-Onc consult placed, appreciate assistance (referral placed for outpatient as well)   - IV iron started 3/31   - transition to PO prednisone 3/31   - Monitor daily , cbc,cmp, LDH and retic   - Reconsulted Hem-Onc 4/1: appreciate recs including: change to high dose IV steroids and repeat hemolytic labs    Menorrhagia with irregular cycle  Iron deficiency anemia due to chronic blood loss  Recently admitted to GYN team, discharged 3/19; admitted for "AUB and symptomatic anemia. " "She was admitted for observation, started on TXA, and received 3u pRBC with appropriate rise in H/H. On day of discharge, TVUS revealed malpositioned IUD which was removed. She was discharged home in stable condition and has follow-up scheduled in one week with [facility] for close monitoring and to discuss possible IUD replacement/Nexplanon."    - LMP was during that admission  - follows w/ GYN   - plans for partial hysterectomy 2/2 menorrhagia / uterine fibroids     History of Portal vein thrombosis  Splenomegaly  Splenic Infarct  Per chart review-  Prior history of thromboembolism portal vein thrombosis SMV and splenic vein thrombosis gastric sleeve  Was on eliquis 6m  Had hypercoag work up in 2021/2022 with elevated factor VIII assay  Had repeat imaging of portal vein with resolution      Hyperbilirubinemia      VTE Risk Mitigation (From admission, onward)           Ordered     Reason for No Pharmacological VTE Prophylaxis  Once        Question:  Reasons:  Answer:  Thrombocytopenia    03/29/25 2249     IP VTE HIGH RISK PATIENT  Once         03/29/25 2249     Place sequential compression device  Until discontinued         03/29/25 2249                    Discharge Planning   ZACHARY: 4/4/2025     Code Status: Full Code   Medical Readiness for Discharge Date:   Discharge Plan A: Home                        Kelly Paez DNP  Department of Hospital Medicine   [facility] ([place])    "

## 2025-04-02 NOTE — ASSESSMENT & PLAN NOTE
- Ms. Giovanni Pena presents with jaundice and abdominal pain   - she has h/o transfusion reaction and subsequent hemolytic anemia, SOCORRO, s/p gastric sleeve   - recently admitted with GYN team, last transfusion 3/19  - labs are very similar to prior hospitalization where she was diagnosed w/ hemolytic anemia and she is post-blood transfusion again   - labs ordered including: peripheral smear, direct antiglobulin test, LDH, PT-INR, aPTT, haptoglobin, fibrinogen, Iron studies, type and screen, hepatitis panel   - suspect hemolysis as she has elevated Tbili, elevated LDH 1391, haptoglobin <10  - no active uterine bleeding at present  - she has plans to have partial hysterectomy with GYN  - will start IV solu-medrol 120 mg [id number]   - trend hgb BID: stable: 7.2 > 6.8> 7.4  - Hem-Onc consult placed, appreciate assistance (referral placed for outpatient as well)   - IV iron started 3/31   - transition to PO prednisone 3/31   - Monitor daily , cbc,cmp, LDH and retic   - Reconsulted Hem-Onc 4/1: appreciate recs including: change to high dose IV steroids and repeat hemolytic labs

## 2025-04-02 NOTE — SUBJECTIVE & OBJECTIVE
Interval History: Pt tolerating steroids. Labs stable. Discussed with Hematology.     Review of Systems   Constitutional:  Negative for fatigue and fever.   Cardiovascular:  Negative for chest pain and palpitations.   Gastrointestinal:  Negative for blood in stool.   Genitourinary:  Negative for difficulty urinating, dysuria, hematuria and menstrual problem.   Musculoskeletal:  Negative for arthralgias and back pain.   Skin:  Negative for color change (conjunctiva) and pallor.   Neurological:  Negative for syncope, weakness and numbness.     Objective:     Vital Signs (Most Recent):  Temp: 98.3 °F (36.8 °C) (04/02/25 1557)  Pulse: 70 (04/02/25 1557)  Resp: 18 (04/02/25 1557)  BP: (!) 106/58 (04/02/25 1557)  SpO2: 100 % (04/02/25 1557) Vital Signs (24h Range):  Temp:  [98.2 °F (36.8 °C)-98.5 °F (36.9 °C)] 98.3 °F (36.8 °C)  Pulse:  [57-75] 70  Resp:  [16-20] 18  SpO2:  [98 %-100 %] 100 %  BP: ()/(56-62) 106/58     Weight: 77.4 kg (170 lb 10.2 oz)  Body mass index is 26.73 kg/m².    Intake/Output Summary (Last 24 hours) at 4/2/2025 1714  Last data filed at 4/2/2025 0927  Gross per 24 hour   Intake 480 ml   Output 800 ml   Net -320 ml         Physical Exam  Vitals and nursing note reviewed.   Constitutional:       General: She is not in acute distress.     Appearance: She is well-developed and normal weight.   HENT:      Head: Normocephalic and atraumatic.   Eyes:      General: Scleral icterus (improved from admission) present.   Neck:      Trachea: No tracheal deviation.   Cardiovascular:      Rate and Rhythm: Normal rate and regular rhythm.   Pulmonary:      Effort: Pulmonary effort is normal. No respiratory distress.   Abdominal:      General: Abdomen is flat.   Skin:     General: Skin is warm and dry.   Neurological:      General: No focal deficit present.      Mental Status: She is alert.      Motor: No abnormal muscle tone.   Psychiatric:         Mood and Affect: Mood normal.         Behavior: Behavior  normal.               Significant Labs: All pertinent labs within the past 24 hours have been reviewed.  BMP:   Recent Labs   Lab 04/01/25  0428 04/02/25  0420    138   K 3.8 3.9    107   CO2 24 25   BUN 12 11   CREATININE 0.8 0.8   CALCIUM 8.5* 8.6*   MG 2.0  --      CBC:   Recent Labs   Lab 04/01/25  1717 04/02/25  0420 04/02/25  1621   WBC 6.47 6.59 7.84   HGB 7.6* 7.3* 7.3*   HCT 26.6* 24.9* 25.1*   * 92* 97*       Significant Imaging: I have reviewed all pertinent imaging results/findings within the past 24 hours.

## 2025-04-03 ENCOUNTER — TELEPHONE (OUTPATIENT)
Dept: HEMATOLOGY/ONCOLOGY | Facility: CLINIC | Age: 45
End: 2025-04-03
Payer: COMMERCIAL

## 2025-04-03 VITALS
DIASTOLIC BLOOD PRESSURE: 66 MMHG | HEIGHT: 67 IN | SYSTOLIC BLOOD PRESSURE: 105 MMHG | BODY MASS INDEX: 26.92 KG/M2 | HEART RATE: 59 BPM | WEIGHT: 171.5 LBS | OXYGEN SATURATION: 100 % | TEMPERATURE: 98 F | RESPIRATION RATE: 17 BRPM

## 2025-04-03 LAB
ABSOLUTE EOSINOPHIL (OHS): 0 K/UL
ABSOLUTE MONOCYTE (OHS): 0.49 K/UL (ref 0.3–1)
ABSOLUTE NEUTROPHIL COUNT (OHS): 4.6 K/UL (ref 1.8–7.7)
ALBUMIN SERPL BCP-MCNC: 3 G/DL (ref 3.5–5.2)
ALP SERPL-CCNC: 70 UNIT/L (ref 40–150)
ALT SERPL W/O P-5'-P-CCNC: 18 UNIT/L (ref 10–44)
ANION GAP (OHS): 6 MMOL/L (ref 8–16)
AST SERPL-CCNC: 21 UNIT/L (ref 11–45)
BASOPHILS # BLD AUTO: 0 K/UL
BASOPHILS NFR BLD AUTO: 0 %
BILIRUB SERPL-MCNC: 1.4 MG/DL (ref 0.1–1)
BUN SERPL-MCNC: 13 MG/DL (ref 6–20)
CALCIUM SERPL-MCNC: 8.1 MG/DL (ref 8.7–10.5)
CHLORIDE SERPL-SCNC: 108 MMOL/L (ref 95–110)
CO2 SERPL-SCNC: 25 MMOL/L (ref 23–29)
CREAT SERPL-MCNC: 0.8 MG/DL (ref 0.5–1.4)
ERYTHROCYTE [DISTWIDTH] IN BLOOD BY AUTOMATED COUNT: 28.6 % (ref 11.5–14.5)
GFR SERPLBLD CREATININE-BSD FMLA CKD-EPI: >60 ML/MIN/1.73/M2
GLUCOSE SERPL-MCNC: 87 MG/DL (ref 70–110)
HCT VFR BLD AUTO: 24.6 % (ref 37–48.5)
HGB BLD-MCNC: 7.2 GM/DL (ref 12–16)
IMM GRANULOCYTES # BLD AUTO: 0.11 K/UL (ref 0–0.04)
IMM GRANULOCYTES NFR BLD AUTO: 1.8 % (ref 0–0.5)
LYMPHOCYTES # BLD AUTO: 0.93 K/UL (ref 1–4.8)
MCH RBC QN AUTO: 21.6 PG (ref 27–31)
MCHC RBC AUTO-ENTMCNC: 29.3 G/DL (ref 32–36)
MCV RBC AUTO: 74 FL (ref 82–98)
NUCLEATED RBC (/100WBC) (OHS): 1 /100 WBC
PLATELET # BLD AUTO: 91 K/UL (ref 150–450)
PMV BLD AUTO: ABNORMAL FL
POTASSIUM SERPL-SCNC: 3.5 MMOL/L (ref 3.5–5.1)
PROT SERPL-MCNC: 6.8 GM/DL (ref 6–8.4)
RBC # BLD AUTO: 3.34 M/UL (ref 4–5.4)
RELATIVE EOSINOPHIL (OHS): 0 %
RELATIVE LYMPHOCYTE (OHS): 15.2 % (ref 18–48)
RELATIVE MONOCYTE (OHS): 8 % (ref 4–15)
RELATIVE NEUTROPHIL (OHS): 75 % (ref 38–73)
RETICS/RBC NFR AUTO: 4.7 % (ref 0.5–2.5)
SODIUM SERPL-SCNC: 139 MMOL/L (ref 136–145)
WBC # BLD AUTO: 6.13 K/UL (ref 3.9–12.7)

## 2025-04-03 PROCEDURE — 25000003 PHARM REV CODE 250: Performed by: PHYSICIAN ASSISTANT

## 2025-04-03 PROCEDURE — 63600175 PHARM REV CODE 636 W HCPCS: Performed by: INTERNAL MEDICINE

## 2025-04-03 PROCEDURE — 80053 COMPREHEN METABOLIC PANEL: CPT | Performed by: NURSE PRACTITIONER

## 2025-04-03 PROCEDURE — 85025 COMPLETE CBC W/AUTO DIFF WBC: CPT | Performed by: NURSE PRACTITIONER

## 2025-04-03 PROCEDURE — 85045 AUTOMATED RETICULOCYTE COUNT: CPT | Performed by: INTERNAL MEDICINE

## 2025-04-03 PROCEDURE — 36415 COLL VENOUS BLD VENIPUNCTURE: CPT | Performed by: NURSE PRACTITIONER

## 2025-04-03 PROCEDURE — 63600175 PHARM REV CODE 636 W HCPCS: Mod: JZ,TB | Performed by: NURSE PRACTITIONER

## 2025-04-03 PROCEDURE — 25000003 PHARM REV CODE 250: Performed by: NURSE PRACTITIONER

## 2025-04-03 RX ORDER — MEDROXYPROGESTERONE ACETATE 10 MG/1
20 TABLET ORAL DAILY
Qty: 84 TABLET | Refills: 0 | Status: SHIPPED | OUTPATIENT
Start: 2025-04-03 | End: 2026-04-03

## 2025-04-03 RX ADMIN — SODIUM FERRIC GLUCONATE COMPLEX IN SUCROSE 125 MG: 12.5 INJECTION INTRAVENOUS at 09:04

## 2025-04-03 RX ADMIN — FERROUS SULFATE TAB 325 MG (65 MG ELEMENTAL FE) 1 EACH: 325 (65 FE) TAB at 08:04

## 2025-04-03 RX ADMIN — PANTOPRAZOLE SODIUM 40 MG: 40 TABLET, DELAYED RELEASE ORAL at 08:04

## 2025-04-03 RX ADMIN — FOLIC ACID 1 MG: 1 TABLET ORAL at 08:04

## 2025-04-03 RX ADMIN — DOCUSATE SODIUM 100 MG: 100 CAPSULE, LIQUID FILLED ORAL at 08:04

## 2025-04-03 RX ADMIN — METHYLPREDNISOLONE SODIUM SUCCINATE 125 MG: 125 INJECTION, POWDER, FOR SOLUTION INTRAMUSCULAR; INTRAVENOUS at 08:04

## 2025-04-03 NOTE — TELEPHONE ENCOUNTER
Lvm asking pt to call of to r/s appt time per pt request  pt bas canceled/no show appts 6x        ----- Message from  Sherin sent at 4/3/2025  9:53 AM CDT -----  Regarding: Appointment time for Hospital follow up  Hi, This patient is discharging from [facility] today. She needs a follow up within 10 days with Dr. Khoobehi. I scheduled her for 3/14 at 9 AM but she says that is too early and needs an appointment after 9:30 AM. Can you please review? Thank you, Sherin Owusu LMSW

## 2025-04-03 NOTE — DISCHARGE SUMMARY
"[facility], [place], operated by [facility]  Discharge Summary      Patient Name: Giovanni Pena  MRN: [id number]  JACQUELINE: [id number]  Patient Class: IP- Inpatient  Admission Date: 3/29/2025  Hospital Length of Stay: 3 days  Discharge Date and Time: 04/03/2025 9:51 AM  Attending Physician: Zachary Estrada MD   Discharging Provider: Kelly Paez DNP  Primary Care Provider: Miquel Mishra MD    Primary Care Team: Networked reference to record PCT     HPI:   Ms. Giovanni Pena is a 44 y.o. female, with PMH of microcytic hypochromic anemia,/SOCORRO, renal vein embolism/thrombosis, portal vein thrombosis, splenic infarct, s/p gastric sleeve, GERD, RA, Eczema, who presented to [facility] ED on 3/29/25 due to generalized abdominal pain with red/brown colored urine x 3 days. She states the pain is a "tightness" in the middle of the abdomen. She had a blood transfusion one week ago. She states she needed the transfusion as she has uterine fibroids, and heavy menses (LMP 3/14/25). She states her GYN team is planning for a partial hysterectomy. She states she feels dehydrated, weak, and notes yellow eyes. She states this occurred after a blood transfusion years ago. She denies chest pain, shortness breath, fever, vomiting, diarrhea, or any other associated symptoms. She was evaluated in the ED with labs showing anemia with H&H of 7.2/24.4, thrombocytopenia with PLT of 93. Retic count was 2.6. A metabolic panel showed albumin of 3.2, Tbili of 8.4 (without elevation of direct bilirubin), and AST of 73. CPK was elevated at 307, and LDH was 1391. A UA additionally showed a nirite positive UTI, with 30 WBC/hpf, many bacteria and 1+ leuk esterase. She was treated in the ED with rocephin. She was placed on observation.       * No surgery found *      Hospital Course:   Giovanni Pena was admitted for anemia, most likely hemolytic anemia secondary to transfusion. Hgb 7.2 > 6.8 > 7.4 (Close to her baseline). LDH 1391, " Haptoglobin < 10 and T. Bili 8.1, consistent with hemolytic anemia. Started on IV Solumedrol. Hematology consulted, recommending IV iron and transition to PO prednisone. Labs improving and patient stopped having vaginal bleeding (hgb stable, T. Bili downtrending).  Gyn recommended patient is starting Provera for decreased monthly bleeding.  On discharge day she was seen and examined.  Ms. Davila denies any weakness, pain or further bleeding.  Hematology will continue outpatient workup and recommend oral iron supplementation.  Patient will be discharged home with outpatient labs and outpatient follow-up within 10 days.  She is in agreement with the plan.     Goals of Care Treatment Preferences:  Code Status: Full Code          Social Drivers of Health with Concerns     Food Insecurity: Food Insecurity Present (3/30/2025)        Consults:   Consults (From admission, onward)          Status Ordering Provider     Inpatient consult to Hematology/Oncology  Once        Provider:  Luz Maria An MD    Completed JESSICA SANTIAGO     Inpatient consult to Hematology/Oncology  Once        Provider:  Diamond Mckeon MD    Acknowledged JESSICA MCCABE            Assessment & Plan  Hemolytic anemia  - Ms. Giovanni Pena presents with jaundice and abdominal pain   - she has h/o transfusion reaction and subsequent hemolytic anemia, SOCORRO, s/p gastric sleeve   - recently admitted with GYN team, last transfusion 3/19  - labs are very similar to prior hospitalization where she was diagnosed w/ hemolytic anemia and she is post-blood transfusion again   - labs ordered including: peripheral smear, direct antiglobulin test, LDH, PT-INR, aPTT, haptoglobin, fibrinogen, Iron studies, type and screen, hepatitis panel   - suspect hemolysis as she has elevated Tbili, elevated LDH 1391, haptoglobin <10  - no active uterine bleeding at present  - she has plans to have partial hysterectomy with GYN  - will start IV solu-medrol  "120 mg [id number]   - trend hgb BID: stable: 7.2 > 6.8> 7.4  - Hem-Onc consult placed, appreciate assistance (referral placed for outpatient as well)   - IV iron started 3/31   - transition to PO prednisone 3/31   - Monitor daily , cbc,cmp, LDH and retic   - Reconsulted Hem-Onc 4/1: appreciate recs including: change to high dose IV steroids and repeat hemolytic labs  - discussed discharge with Hematology.  No further need for steroids.    Menorrhagia with irregular cycle  Iron deficiency anemia due to chronic blood loss  Recently admitted to GYN team, discharged 3/19; admitted for "AUB and symptomatic anemia. She was admitted for observation, started on TXA, and received 3u pRBC with appropriate rise in H/H. On day of discharge, TVUS revealed malpositioned IUD which was removed. She was discharged home in stable condition and has follow-up scheduled in one week [facility] for close monitoring and to discuss possible IUD replacement/Nexplanon."    - LMP was during that admission  - follows w/ GYN   - plans for partial hysterectomy 2/2 menorrhagia / uterine fibroids     History of Portal vein thrombosis  Splenomegaly  Splenic Infarct  Per chart review-  Prior history of thromboembolism portal vein thrombosis SM[facility] and splenic vein thrombosis gastric sleeve  Was on eliquis 6m  Had hypercoag work up in 2021/2022 with elevated factor VIII assay  Had repeat imaging of portal vein with resolution      Hyperbilirubinemia      Final Active Diagnoses:    Diagnosis Date Noted POA    PRINCIPAL PROBLEM:  Hemolytic anemia [D58.9] 03/30/2025 Yes    Hyperbilirubinemia [E80.6] 04/01/2025 Yes    Splenomegaly [R16.1] 03/30/2025 Yes    Menorrhagia with irregular cycle [N92.1] 04/25/2024 Yes    Iron deficiency anemia due to chronic blood loss [D50.0] 10/05/2023 Yes    History of Portal vein thrombosis [I81] 12/30/2021 Yes      Problems Resolved During this Admission:       Discharged Condition: good    Disposition: Home or Self " Care    Follow Up:    Patient Instructions:      CBC Without Differential   Standing Status: Future Standing Exp. Date: 07/02/26     Order Specific Question Answer Comments   Send normal result to authorizing provider's In Basket if patient is active on MyChart: Yes      COMPREHENSIVE METABOLIC PANEL   Standing Status: Future Standing Exp. Date: 07/02/26     Order Specific Question Answer Comments   Send normal result to authorizing provider's In Basket if patient is active on MyChart: Yes      Ambulatory referral/consult to Hematology / Oncology   Standing Status: Future   Referral Priority: Routine Referral Type: Consultation   Referral Reason: Specialty Services Required   Requested Specialty: Hematology and Oncology   Number of Visits Requested: 1     Ambulatory referral/consult to Hematology / Oncology   Standing Status: Future   Referral Priority: Routine Referral Type: Consultation   Referral Reason: Specialty Services Required   Requested Specialty: Hematology and Oncology   Number of Visits Requested: 1     Diet Adult Regular     Notify your health care provider if you experience any of the following:  temperature >100.4     Notify your health care provider if you experience any of the following:  redness, tenderness, or signs of infection (pain, swelling, redness, odor or green/yellow discharge around incision site)     Notify your health care provider if you experience any of the following:  persistent dizziness, light-headedness, or visual disturbances     Activity as tolerated       Significant Diagnostic Studies: N/A    Pending Diagnostic Studies:       Procedure Component Value Units Date/Time    PNH,High Sensitivity WBC,RBC [[id number]] Collected: 03/31/25 0607    Order Status: Sent Lab Status: In process Updated: 03/31/25 0614    Specimen: Blood            Medications:  Reconciled Home Medications:      Medication List        START taking these medications      medroxyPROGESTERone 10 MG  tablet  Commonly known as: PROVERA  Take 2 tablets (20 mg total) by mouth once daily. Take 2 tabs (20 mg) daily to prevent a heavy cycle. During your menses, take 2 tablets (20 mg) three times a day.            CONTINUE taking these medications      docusate sodium 100 MG capsule  Commonly known as: COLACE  Take 1 capsule (100 mg total) by mouth 2 (two) times daily.     esomeprazole 40 MG capsule  Commonly known as: NEXIUM  Take 1 capsule (40 mg total) by mouth 2 (two) times daily.     ferrous sulfate 325 mg (65 mg iron) Tab tablet  Commonly known as: IRON  Take 1 tablet (325 mg total) by mouth daily with breakfast.            STOP taking these medications      levonorgestreL 52 mg IUD  Commonly known as: Mirena     sucralfate 1 gram tablet  Commonly known as: CARAFATE              Indwelling Lines/Drains at time of discharge:   Lines/Drains/Airways       None                   Time spent on the discharge of patient: 30 minutes         Kelly Paez DNP  Department of Hospital Medicine  [facility] ([place])

## 2025-04-03 NOTE — PROGRESS NOTES
"Mrs Pena was resting comfortably this visit. She denies having bleeding. She has no hematuria or dark urine. No lightheadedness or dizziness. She denies having hemoglobinopathy. No CP or dyspnea    BP (!) 98/53 (BP Location: Right arm, Patient Position: Lying)   Pulse 63   Temp 98.2 °F (36.8 °C) (Oral)   Resp 20   Ht 5' 7" (1.702 m)   Wt 77.4 kg (170 lb 10.2 oz)   LMP 03/24/2025 (Exact Date)   SpO2 99%   Breastfeeding No   BMI 26.73 kg/m²   Gen: Awake and Alert, NAD  HEENT: NCAT, PERRLA,  EOMI, Moist MM, OP clear  Heart: RRR   Lungs: CTAB  Abd: Soft, NT,ND, NABS, No HSM  Ext: No peripheral edema. No cyanosis  Neuro: Awake and alert. CN II-XII intact    WBC 7.84     H/H 7.3/25.1     MCV 74     Plts 97   DINORAH Neg  T bili 1.7  Alb 3,2  TP 7.4  LDH 1041 yesterday  Haptoglobin <10 yesterday  Abs Retic yesterday: 3.64x10^6 x 0.037= 134K    A/P  Hemolytic Anemia   - While timing suggests delayed transfusion reaction, DINORAH and indirect Ab negative.    - Smear from 3/28 reviewed with scatterred spherocytes and mild polychromatophilia and enlarged plts. Rare schistocytes noted   - No evidence of G6PD deficiency   - Distant labs showing near normal H/H   - No evidence of microangiopathy      "

## 2025-04-03 NOTE — ASSESSMENT & PLAN NOTE
- Ms. Giovanni Pena presents with jaundice and abdominal pain   - she has h/o transfusion reaction and subsequent hemolytic anemia, SOCORRO, s/p gastric sleeve   - recently admitted with GYN team, last transfusion 3/19  - labs are very similar to prior hospitalization where she was diagnosed w/ hemolytic anemia and she is post-blood transfusion again   - labs ordered including: peripheral smear, direct antiglobulin test, LDH, PT-INR, aPTT, haptoglobin, fibrinogen, Iron studies, type and screen, hepatitis panel   - suspect hemolysis as she has elevated Tbili, elevated LDH 1391, haptoglobin <10  - no active uterine bleeding at present  - she has plans to have partial hysterectomy with GYN  - will start IV solu-medrol 120 mg [id number]   - trend hgb BID: stable: 7.2 > 6.8> 7.4  - Hem-Onc consult placed, appreciate assistance (referral placed for outpatient as well)   - IV iron started 3/31   - transition to PO prednisone 3/31   - Monitor daily , cbc,cmp, LDH and retic   - Reconsulted Hem-Onc 4/1: appreciate recs including: change to high dose IV steroids and repeat hemolytic labs  - discussed discharge with Hematology.  No further need for steroids.

## 2025-04-03 NOTE — PLAN OF CARE
Case Management Final Discharge Note    Discharge Disposition: Home    New DME ordered / company name: NA    Relevant SDOH / Transition of Care Barriers:  None    Person available to provide assistance at home when needed and their contact information: Mother     Scheduled followup appointment: Shila HERMAN and PCP. Patient stated the shila appointment does not work for her. Sw sent a message to [facility] staff requesting a different time and to contact the patient.     Referrals placed: NA    Transportation: Mother     Patient educated on discharge services and updated on DC plan. Bedside RN notified, patient clear to discharge from Case Management Perspective.   [facility] ([place]  Discharge Final Note    Primary Care Provider: Miquel Mishra MD    Expected Discharge Date: 4/3/2025    Final Discharge Note (most recent)       Final Note - 04/03/25 0955          Final Note    Assessment Type Final Discharge Note (P)      Anticipated Discharge Disposition Home or Self Care (P)      Hospital Resources/Appts/Education Provided Provided patient/caregiver with written discharge plan information;Appointments scheduled and added to AVS (P)         Post-Acute Status    Post-Acute Authorization Other (P)      Other Status No Post-Acute Service Needs (P)      Discharge Delays None known at this time (P)

## 2025-04-03 NOTE — ASSESSMENT & PLAN NOTE
"Recently admitted to GYN team, discharged 3/19; admitted for "AUB and symptomatic anemia. She was admitted for observation, started on TXA, and received 3u pRBC with appropriate rise in H/H. On day of discharge, TVUS revealed malpositioned IUD which was removed. She was discharged home in stable condition and has follow-up scheduled in one week with [facility] for close monitoring and to discuss possible IUD replacement/Nexplanon."    - LMP was during that admission  - follows w/ GYN   - plans for partial hysterectomy 2/2 menorrhagia / uterine fibroids     " Patient up to bathroom with assist x 2, Lashell Mullet used. Voided 175 ml. Patient transferred to mother/baby room 353 per wheelchair in stable condition with baby and personal belongings. Accompanied by significant other and staff. Report given to mother/baby RN.

## 2025-04-03 NOTE — PLAN OF CARE
Discharge orders noted. AVS prepared with medication list, importance of medication compliance, follow up appointments, diet, home care instructions, treatment plan, self management, and when to seek medical attention. Detailed clinical reference list attached. AVS printed and handed to patient by bedside nurse. VN reviewed discharge instructions with patient using teachback method.  Allowed time for questions, all questions answered.  Patient verbalized complete understanding of discharge instructions and voices no concerns.      Discharge instructions complete.  Bedside delivery done, Transport wheelchair not requested,  pt waiting on family and work letter.  Bedside nurse notified.

## 2025-04-03 NOTE — PLAN OF CARE
Problem: Adult Inpatient Plan of Care  Goal: Plan of Care Review  Outcome: Progressing  Flowsheets (Taken 4/3/2025 1807)  Plan of Care Reviewed With: patient

## 2025-04-07 ENCOUNTER — OFFICE VISIT (OUTPATIENT)
Dept: PRIMARY CARE CLINIC | Facility: CLINIC | Age: 45
End: 2025-04-07
Payer: COMMERCIAL

## 2025-04-07 VITALS
OXYGEN SATURATION: 100 % | HEIGHT: 67 IN | BODY MASS INDEX: 27.34 KG/M2 | HEART RATE: 60 BPM | SYSTOLIC BLOOD PRESSURE: 108 MMHG | DIASTOLIC BLOOD PRESSURE: 68 MMHG | WEIGHT: 174.19 LBS

## 2025-04-07 DIAGNOSIS — Z79.899 OTHER LONG TERM (CURRENT) DRUG THERAPY: ICD-10-CM

## 2025-04-07 DIAGNOSIS — D50.9 MICROCYTIC HYPOCHROMIC ANEMIA: ICD-10-CM

## 2025-04-07 DIAGNOSIS — Z09 HOSPITAL DISCHARGE FOLLOW-UP: Primary | ICD-10-CM

## 2025-04-07 DIAGNOSIS — K21.9 GASTROESOPHAGEAL REFLUX DISEASE, UNSPECIFIED WHETHER ESOPHAGITIS PRESENT: ICD-10-CM

## 2025-04-07 DIAGNOSIS — Z00.00 ANNUAL PHYSICAL EXAM: ICD-10-CM

## 2025-04-07 PROCEDURE — 99999 PR PBB SHADOW E&M-EST. PATIENT-LVL III: CPT | Mod: PBBFAC,,, | Performed by: STUDENT IN AN ORGANIZED HEALTH CARE EDUCATION/TRAINING PROGRAM

## 2025-04-07 NOTE — PROGRESS NOTES
Assessment:       1. Hospital discharge follow-up    2. Microcytic hypochromic anemia    3. Gastroesophageal reflux disease, unspecified whether esophagitis present    4. Annual physical exam    5. Other long term (current) drug therapy           Plan:     Assessment & Plan    Z09 Hospital discharge follow-up  D50.9 Microcytic hypochromic anemia  K21.9 Gastroesophageal reflux disease, unspecified whether esophagitis present  Z00.00 Annual physical exam  Z79.899 Other long term (current) drug therapy    PLAN SUMMARY:  - Continue Nexium for GERD, timing based on when reflux symptoms are most bothersome  - Continue oral iron supplement daily for anemia  - Ordered lipid panel, to be done with other fasting labs if occurring in next couple of months  - Follow-up in 6 months    Z09 HOSPITAL DISCHARGE FOLLOW-UP:  - Reviewed hospital discharge summary and recent lab results.  - Hemoglobin stable at 7.2, improved from 6.6 to 7.6 during hospitalization.  - Elevated reticulocyte count (4.7), indicating active RBC production.  - Bilirubin levels decreased from 8.4 to 4.3, potentially related to hemolysis.    D50.9 MICROCYTIC HYPOCHROMIC ANEMIA:  - Hemoglobin stable at 7.2, improved from 6.6 to 7.6 during hospitalization.  - Elevated reticulocyte count (4.7), indicating active RBC production.  - Explained importance of vitamin C in improving iron absorption.  - Continued oral iron supplement daily.    K21.9 GASTROESOPHAGEAL REFLUX DISEASE, UNSPECIFIED WHETHER ESOPHAGITIS PRESENT:  - Discussed timing of proton pump inhibitor (PPI) administration based on symptom occurrence with 24-hour efficacy, stronger effects in first 12 hours.  - Continued Nexium, timing based on when reflux symptoms are most bothersome.    Z00.00 ANNUAL PHYSICAL EXAM:  - TSH consistently on the lower end of normal range.  - Cholesterol levels excellent and not requiring immediate attention.  - Ordered lipid panel, to be done with other fasting labs if  "occurring in next couple of months.  - Follow up in 6 months.    Z79.899 OTHER LONG TERM (CURRENT) DRUG THERAPY:  - Continued oral iron supplement daily for anemia.  - Continued Nexium for GERD, timing based on when reflux symptoms are most bothersome.             Hospital discharge follow-up    Microcytic hypochromic anemia    Gastroesophageal reflux disease, unspecified whether esophagitis present    Annual physical exam  -     Lipid Panel; Future; Expected date: 04/07/2025    Other long term (current) drug therapy  -     Lipid Panel; Future; Expected date: 04/07/2025                This note was generated with the assistance of ambient listening technology. Verbal consent was obtained by the patient and accompanying visitor(s) for the recording of patient appointment to facilitate this note. I attest to having reviewed and edited the generated note for accuracy, though some syntax or spelling errors may persist. Please contact the author of this note for any clarification.      Subjective:           Patient ID: Giovanni Pena   Age:  44 y.o.  Sex: female     Chief Complaint:   Follow-up (Hospital f/u)      History of Present Illness:    Giovanni Pena is a 44 y.o. female who presents today with a chief complaint of Follow-up (Hospital f/u)  .    Hospital D/c Note:  "Admission Date: 3/29/2025  Hospital Length of Stay: 3 days  Discharge Date and Time: 04/03/2025 9:51 AM  Attending Physician: Zachary Estrada MD   Discharging Provider: Kelly Paez DNP  Primary Care Provider: Miquel Mishra MD     Primary Care Team: Networked reference to record PCT      HPI:   Ms. Giovanni Pena is a 44 y.o. female, with PMH of microcytic hypochromic anemia,/SOCORRO, renal vein embolism/thrombosis, portal vein thrombosis, splenic infarct, s/p gastric sleeve, GERD, RA, Eczema, who presented to [facility] ED on 3/29/25 due to generalized abdominal pain with red/brown colored urine x 3 days. She states the pain is a " ""tightness" in the middle of the abdomen. She had a blood transfusion one week ago. She states she needed the transfusion as she has uterine fibroids, and heavy menses (LMP 3/14/25). She states her GYN team is planning for a partial hysterectomy. She states she feels dehydrated, weak, and notes yellow eyes. She states this occurred after a blood transfusion years ago. She denies chest pain, shortness breath, fever, vomiting, diarrhea, or any other associated symptoms. She was evaluated in the ED with labs showing anemia with H&H of 7.2/24.4, thrombocytopenia with PLT of 93. Retic count was 2.6. A metabolic panel showed albumin of 3.2, Tbili of 8.4 (without elevation of direct bilirubin), and AST of 73. CPK was elevated at 307, and LDH was 1391. A UA additionally showed a nirite positive UTI, with 30 WBC/hpf, many bacteria and 1+ leuk esterase. She was treated in the ED with rocephin. She was placed on observation.         * No surgery found *       Hospital Course:   Giovanni Pena was admitted for anemia, most likely hemolytic anemia secondary to transfusion. Hgb 7.2 > 6.8 > 7.4 (Close to her baseline). LDH 1391, Haptoglobin < 10 and T. Bili 8.1, consistent with hemolytic anemia. Started on IV Solumedrol. Hematology consulted, recommending IV iron and transition to PO prednisone. Labs improving and patient stopped having vaginal bleeding (hgb stable, T. Bili downtrending).  Gyn recommended patient is starting Provera for decreased monthly bleeding.  On discharge day she was seen and examined.  Ms. Davila denies any weakness, pain or further bleeding.  Hematology will continue outpatient workup and recommend oral iron supplementation.  Patient will be discharged home with outpatient labs and outpatient follow-up within 10 days.  She is in agreement with the plan.      Goals of Care Treatment Preferences:  Code Status: Full Code   "    "Hemolytic anemia  - Ms. Giovanni Pena presents with jaundice and " "abdominal pain   - she has h/o transfusion reaction and subsequent hemolytic anemia, SOCORRO, s/p gastric sleeve   - recently admitted with GYN team, last transfusion 3/19  - labs are very similar to prior hospitalization where she was diagnosed w/ hemolytic anemia and she is post-blood transfusion again   - labs ordered including: peripheral smear, direct antiglobulin test, LDH, PT-INR, aPTT, haptoglobin, fibrinogen, Iron studies, type and screen, hepatitis panel   - suspect hemolysis as she has elevated Tbili, elevated LDH 1391, haptoglobin <10  - no active uterine bleeding at present  - she has plans to have partial hysterectomy with GYN  - will start IV solu-medrol 120 mg [id number]   - trend hgb BID: stable: 7.2 > 6.8> 7.4  - Hem-Onc consult placed, appreciate assistance (referral placed for outpatient as well)              - IV iron started 3/31              - transition to PO prednisone 3/31              - Monitor daily , cbc,cmp, LDH and retic   - Reconsulted Hem-Onc 4/1: appreciate recs including: change to high dose IV steroids and repeat hemolytic labs  - discussed discharge with Hematology.  No further need for steroids.     Menorrhagia with irregular cycle  Iron deficiency anemia due to chronic blood loss  Recently admitted to GYN team, discharged 3/19; admitted for "AUB and symptomatic anemia. She was admitted for observation, started on TXA, and received 3u pRBC with appropriate rise in H/H. On day of discharge, TVUS revealed malpositioned IUD which was removed. She was discharged home in stable condition and has follow-up scheduled in one week with [facility] for close monitoring and to discuss possible IUD replacement/Nexplanon."     - LMP was during that admission  - follows w/ GYN   - plans for partial hysterectomy 2/2 menorrhagia / uterine fibroids      History of Portal vein thrombosis  Splenomegaly  Splenic Infarct  Per chart review-  Prior history of thromboembolism portal vein thrombosis " "SMV and splenic vein thrombosis gastric sleeve  Was on eliquis 6m  Had hypercoag work up in 2021/2022 with elevated factor VIII assay  Had repeat imaging of portal vein with resolution  "    Has an appt with hematology on 4/14/25, has stable Hb, increased reticulocytes.  Did have very elevated bilirubin and cut in half while in hospital but was still elevated.    States she is feeling well at this time.     Taking meds as directed.  Steroids were all stopped.  Got IV iron in hospital, will be starting out pt.  Taking oral iron.      Is taking Pantoprazole currently, has been using with success but prefers Nexium which insurance will not cover it until May.           History of Present Illness    CHIEF COMPLAINT:  Giovanni presents today for hospital follow up.    RECENT HOSPITALIZATION AND CURRENT STATUS:  She was recently hospitalized for 3-4 days following 3 days of reddish-brown colored urine and abdominal pain. She required blood transfusion one week before the hospitalization. Since discharge, she reports improved energy levels and normal urination with resolution of previously reported urinary symptoms. Sleep is described as "so-so." She denies shortness of breath, wheezing, respiratory concerns, chest pain, and palpitations.    ANEMIA:  Her most recent hemoglobin was 7.2 on the third, which has remained fairly stable. She is taking oral iron supplements but experiencing significant immediate stomach discomfort upon ingestion, regardless of timing with meals. She has attempted various methods to improve tolerance without success. She has an upcoming appointment for IV iron treatment on the 14th.    GYNECOLOGICAL:  She has uterine fibroids with heavy menses requiring blood transfusions. She is planning a partial hysterectomy pending improvement of blood levels, with a gynecology appointment scheduled on the 10th to discuss the procedure.    MEDICATIONS:  She reports Colace is effective for managing constipation. " "She is currently unable to take Nexium due to insurance coverage issues until May.    PREVENTIVE CARE:  Mammogram was completed approximately two weeks ago.      ROS:  General: +increased energy levels, +sleep disturbances  Cardiovascular: -chest pain, -palpitations, -lower extremity edema  Respiratory: -shortness of breath           Review of Systems   Constitutional:  Positive for fatigue (improving since leaving the hospital). Negative for activity change, chills, diaphoresis, fever and unexpected weight change.   HENT:  Negative for congestion, nosebleeds, sinus pressure and sneezing.    Respiratory:  Negative for cough, chest tightness, shortness of breath and wheezing.    Cardiovascular:  Negative for chest pain, palpitations and leg swelling.   Gastrointestinal:  Positive for abdominal pain (some discomfort with iron supplement, tolerating.) and constipation. Negative for abdominal distention, diarrhea, nausea and vomiting.   Genitourinary:  Negative for difficulty urinating, dyspareunia, dysuria and frequency.   Musculoskeletal:  Positive for arthralgias (foot pain). Negative for back pain and gait problem.   Skin:  Negative for pallor, rash and wound.   Neurological:  Negative for weakness, numbness and headaches.   Psychiatric/Behavioral:  Negative for agitation and sleep disturbance. The patient is nervous/anxious.            Objective:        Vitals:    04/07/25 0923   BP: 108/68   BP Location: Right arm   Patient Position: Sitting   Pulse: 60   SpO2: 100%   Weight: 79 kg (174 lb 2.6 oz)   Height: 5' 7" (1.702 m)       Body mass index is 27.28 kg/m².      Physical Exam  Vitals reviewed.   Constitutional:       General: She is not in acute distress.     Appearance: Normal appearance.      Comments: As per BMI.   HENT:      Head: Normocephalic.      Right Ear: External ear normal.      Left Ear: External ear normal.      Mouth/Throat:      Mouth: Mucous membranes are moist.   Eyes:      Extraocular " "Movements: Extraocular movements intact.      Conjunctiva/sclera: Conjunctivae normal.   Cardiovascular:      Rate and Rhythm: Normal rate and regular rhythm.      Pulses: Normal pulses.      Heart sounds: No murmur heard.  Pulmonary:      Effort: Pulmonary effort is normal. No respiratory distress.      Breath sounds: Normal breath sounds. No wheezing.   Abdominal:      General: Abdomen is flat. Bowel sounds are normal. There is no distension.      Palpations: Abdomen is soft.   Musculoskeletal:         General: No swelling or deformity.      Right lower leg: No edema.      Left lower leg: No edema.   Skin:     General: Skin is warm.      Capillary Refill: Capillary refill takes less than 2 seconds.      Coloration: Skin is not jaundiced.   Neurological:      General: No focal deficit present.      Mental Status: She is alert and oriented to person, place, and time.      Motor: No weakness.   Psychiatric:         Mood and Affect: Mood normal.         Physical Exam    Vitals: Blood pressure: 108/68. SpO2: 100%.  Cardiovascular: Heart sounds clear, no murmur. Capillary refill less than 3 seconds. Strong pulses.               Past Medical History[1]    Lab Results   Component Value Date     04/03/2025     03/18/2025    K 3.5 04/03/2025    K 3.5 03/18/2025     04/03/2025     03/18/2025    CO2 25 04/03/2025    CO2 22 (L) 03/18/2025    BUN 13 04/03/2025    CREATININE 0.8 04/03/2025    GLUCOSE 87 04/03/2025    GLUCOSE 74 10/12/2023    ANIONGAP 6 (L) 04/03/2025     Lab Results   Component Value Date    HGBA1C 5.2 03/18/2025     No results found for: "BNP", "BNPTRIAGEBLO"    Lab Results   Component Value Date    WBC 6.13 04/03/2025    HGB 7.2 (L) 04/03/2025    HGB 8.0 (L) 03/19/2025    HCT 24.6 (L) 04/03/2025    HCT 26.6 (L) 03/19/2025    PLT 91 (L) 04/03/2025     (L) 03/19/2025    GRAN 2.6 03/19/2025    GRAN 71.2 03/19/2025     Lab Results   Component Value Date    CHOL 146 03/18/2025    HDL " 59 03/18/2025    LDLCALC 77.6 03/18/2025    TRIG 47 03/18/2025        Encounter Medications[2]              [1]   Past Medical History:  Diagnosis Date    Anemia, unspecified     Embolism and thrombosis of renal vein     RA (rheumatoid arthritis) 2017   [2]   Outpatient Encounter Medications as of 4/7/2025   Medication Sig Dispense Refill    docusate sodium (COLACE) 100 MG capsule Take 1 capsule (100 mg total) by mouth 2 (two) times daily. 60 capsule 0    esomeprazole (NEXIUM) 40 MG capsule Take 1 capsule (40 mg total) by mouth 2 (two) times daily. 60 capsule 11    ferrous sulfate (IRON) 325 mg (65 mg iron) Tab tablet Take 1 tablet (325 mg total) by mouth daily with breakfast. 30 tablet 11    medroxyPROGESTERone (PROVERA) 10 MG tablet Take 2 tablets (20 mg total) by mouth once daily. Take 2 tabs (20 mg) daily to prevent a heavy cycle. During your menses, take 2 tablets (20 mg) three times a day. 84 tablet 0     No facility-administered encounter medications on file as of 4/7/2025.

## 2025-04-07 NOTE — PATIENT INSTRUCTIONS
Assessment & Plan    Z09 Hospital discharge follow-up  D50.9 Microcytic hypochromic anemia  K21.9 Gastroesophageal reflux disease, unspecified whether esophagitis present  Z00.00 Annual physical exam  Z79.899 Other long term (current) drug therapy    PLAN SUMMARY:  - Continue Nexium for GERD, timing based on when reflux symptoms are most bothersome  - Continue oral iron supplement daily for anemia  - Ordered lipid panel, to be done with other fasting labs if occurring in next couple of months  - Follow-up in 6 months    Z09 HOSPITAL DISCHARGE FOLLOW-UP:  - Reviewed hospital discharge summary and recent lab results.  - Hemoglobin stable at 7.2, improved from 6.6 to 7.6 during hospitalization.  - Elevated reticulocyte count (4.7), indicating active RBC production.  - Bilirubin levels decreased from 8.4 to 4.3, potentially related to hemolysis.    D50.9 MICROCYTIC HYPOCHROMIC ANEMIA:  - Hemoglobin stable at 7.2, improved from 6.6 to 7.6 during hospitalization.  - Elevated reticulocyte count (4.7), indicating active RBC production.  - Explained importance of vitamin C in improving iron absorption.  - Continued oral iron supplement daily.    K21.9 GASTROESOPHAGEAL REFLUX DISEASE, UNSPECIFIED WHETHER ESOPHAGITIS PRESENT:  - Discussed timing of proton pump inhibitor (PPI) administration based on symptom occurrence with 24-hour efficacy, stronger effects in first 12 hours.  - Continued Nexium, timing based on when reflux symptoms are most bothersome.    Z00.00 ANNUAL PHYSICAL EXAM:  - TSH consistently on the lower end of normal range.  - Cholesterol levels excellent and not requiring immediate attention.  - Ordered lipid panel, to be done with other fasting labs if occurring in next couple of months.  - Follow up in 6 months.    Z79.899 OTHER LONG TERM (CURRENT) DRUG THERAPY:  - Continued oral iron supplement daily for anemia.  - Continued Nexium for GERD, timing based on when reflux symptoms are most bothersome.

## 2025-04-10 ENCOUNTER — OFFICE VISIT (OUTPATIENT)
Dept: OBSTETRICS AND GYNECOLOGY | Facility: CLINIC | Age: 45
End: 2025-04-10
Payer: COMMERCIAL

## 2025-04-10 VITALS
HEIGHT: 67 IN | DIASTOLIC BLOOD PRESSURE: 72 MMHG | BODY MASS INDEX: 27.34 KG/M2 | WEIGHT: 174.19 LBS | SYSTOLIC BLOOD PRESSURE: 112 MMHG

## 2025-04-10 DIAGNOSIS — N92.0 MENORRHAGIA WITH REGULAR CYCLE: Primary | ICD-10-CM

## 2025-04-10 PROCEDURE — 58100 BIOPSY OF UTERUS LINING: CPT | Mod: S$GLB,,, | Performed by: OBSTETRICS & GYNECOLOGY

## 2025-04-10 PROCEDURE — 99999 PR PBB SHADOW E&M-EST. PATIENT-LVL III: CPT | Mod: PBBFAC,,,

## 2025-04-10 NOTE — PROCEDURES
Endometrial biopsy    Date/Time: 4/10/2025 2:40 PM    Performed by: Juan Pablo Potts MD  Authorized by: Juan Pablo Potts MD    Consent:     Prior to procedure the appropriate consent was completed and verified      Consent given by:  Patient    Patient questions answered: yes      Patient agrees, verbalizes understanding, and wants to proceed: yes      Educational handouts given: no      Instructions and paperwork completed: no    Indication:     Indications: Menorrhagia    Pre-procedure:     Pre-procedure timeout performed: yes    Procedure:     Procedure: endometrial biopsy with Pipelle      Cervix cleaned and prepped: yes      A paracervical block was performed: no      An intracervical block was performed: no      The cervix was dilated using cervical dilators: no      Use of single-tooth tenaculum: yes      Uterus sounded: yes      Uterus sound depth (cm):  8    Curettes used:  1    Specimen collected: specimen collected and sent to pathology      Patient tolerated procedure well with no complications: yes        Juan Pablo Potts MD MS  OB/Gyn  PGY-2

## 2025-04-10 NOTE — PROGRESS NOTES
Past medical, surgical, social, family, and obstetric histories; medications; prior records and results; and available outside records were reviewed and updated in the EMR.  Pertinent findings were noted below.    Reason for Visit     Heavy menstrual bleeding    HPI   44 y.o. female  with history of SOCORRO, RA, portal vein thrombosis, GERD presents to GYN clinic for menorrhagia follow-up consulted during on hospitalization 3/29/25. Today she reports continued symptoms of fatigue, generalized weakness and intermittent nausea, which she contributes to her long-standing SOCORRO. She received a blood transfusion during her last hospitalization and subsequently had a hemolytic transfusion reaction with symptoms of yellowing of the eyes and dark urine. Since then, these symptoms have resolved. However, the fatigue persists. Denies recent fever, chills, vomiting, dysuria, hematuria, headaches, syncopal episodes. Denies other symptoms.    During her hospitalization, she was educated on other options to alleviate her menorrhagia, including ablation/hysterectomy. She arrives today interested to pursue these options.     She is currently on day 2 of her menstrual cycle and reports less bleeding compared to her usual heavy periods.     Rx: PPI, provera, iron, colace  Was prescribed Provera on 4/3 and has been taking as prescribed.     Patient's last menstrual period was 2025 (exact date).    Contraception: None  Pap:  Done today  Mammogram: 3/28/25 BIRADS1, T-C=11.18%    Answers submitted by the patient for this visit:  Vaginal Bleeding Questionnaire  (Submitted on 2025)  Chief Complaint: Vaginal bleeding  Chronicity: recurrent  Onset: today  Frequency: constantly  Progression since onset: unchanged  Pain severity: mild  Affected side: both  Pregnant now?: No  abdominal pain: No  anorexia: No  back pain: No  chills: No  constipation: No  diarrhea: No  discolored urine: No  dysuria: No  fever: No  flank pain:  "No  frequency: No  headaches: No  hematuria: No  nausea: No  painful intercourse: No  rash: No  urgency: No  Vaginal bleeding: typical of menses  Passing clots?: No  Passing tissue?: No  Aggravated by: nothing  treatments tried: nothing  Improvement on treatment: no relief  Sexual activity: not sexually active  Partner with STD symptoms: no  Birth control: oral contraceptives, tubal ligation  STD: No  abdominal surgery: Yes   section: Yes  Ectopic pregnancy: No  Endometriosis: No  herpes simplex: No  gynecological surgery: No  menorrhagia: Yes  metrorrhagia: No  miscarriage: No  ovarian cysts: No  perineal abscess: No  PID: No  terminated pregnancy: No  vaginosis: Yes    Exam   /72   Ht 5' 7" (1.702 m)   Wt 79 kg (174 lb 2.6 oz)   LMP 2025 (Exact Date)   BMI 27.28 kg/m²     Physical Exam  Constitutional:       General: She is not in acute distress.     Appearance: Normal appearance. She is normal weight.     Genitourinary:    Vulva, vagina, uterus, right adnexa and left adnexa normal.      Pelvic exam was performed with patient lithotomy exam.   The external female genitalia was normal.   Genitalia hair distrobution normal .     Labial bartholins normal.There is no rash, tenderness, lesion or injury on the right labia. There is no rash, tenderness, lesion or injury on the left labia. There is no rash, tenderness, lesion, skin changes and Bartholin's cyst on the right vulva.There is no rash, tenderness, lesion, skin changes and Bartholin's cyst on the left vulva.No erythema, vaginal discharge, tenderness, bleeding or vaginal ulcerations in the vagina. Right adnexum displays no mass, no tenderness and no fullness. Left adnexum displays no mass, no tenderness and no fullness. Cervix is normal.   HENT:      Head: Normocephalic and atraumatic.   Cardiovascular:      Rate and Rhythm: Normal rate.   Pulmonary:      Effort: No respiratory distress.   Abdominal:      General: Abdomen is flat.      " Palpations: Abdomen is soft.   Musculoskeletal:         General: Normal range of motion.      Cervical back: Normal range of motion.   Neurological:      General: No focal deficit present.      Mental Status: She is alert and oriented to person, place, and time.   Skin:     General: Skin is warm and dry.   Psychiatric:         Mood and Affect: Mood normal.         Behavior: Behavior normal.         Thought Content: Thought content normal.         Judgment: Judgment normal.   Vitals and nursing note reviewed. Exam conducted with a chaperone present.       TVUS (3/19/25):  FINDINGS:  Uterus:  Size: 10.1 x 5 x 6.8 cm  Multiple intramural and subserosal leiomyomas.  Subserosal leiomyoma on the right, 2.9 cm (previously 2.3 cm).  Posterior intramural leiomyoma, 3.1 cm.  Subserosal leiomyoma on the left, 2.6 cm.  Intramural leiomyoma lower uterine segment, 2.9 cm.  Intrauterine device resides along the endometrial canal of the lower uterine segment and cervical canal.    Right ovary:  Size: 3.4 x 2.7 x 3.2 cm    Left ovary:  Size: 2.8 x 1.6 x 2.9 cm     Free Fluid:  None.     Impression:  Multiple intramural and subserosal leiomyomas measure up to 3.1 cm.  Intrauterine device is low lying, residing along the endometrial canal of the lower uterine segment and cervical canal.  Normal sonographic appearance of the ovaries.  This report was flagged in Epic as abnormal.    Assessment and Plan   Menorrhagia with regular cycle      R/B/A to medical vs surgical management discussed.  Explained in depth the merits of endometrial ablation vs proceeding with hysterectomy.  Pt prefers to attempt ablation first and will reserve hysterectomy if bleeding/pain persists.  Explicitly discussed possibility of ablation not improving bleeding as well as possibility of post-ablative pain syndrome.  Also discussed need for hysteroscopy at time of procedure and variance in ablative effect based on shape of uterine cavity.  Discussed possibility  of hysteroscopic myomectomy.  Discussed need for updated embx.  Endometrial biopsy performed today in clinic; last done in 2023 and demonstrated fragments of secretory tissue negative for malignancy.  Pap also collected today, last NILM HPV -ve  Will add to GYN resident surgery book for discussion next week at pre-op conference; discussed with GYN staff and in agreement.  Will wait to place case request and pre-op orders pending further discussion.  Tentatively planning for Hscope/ablation week of April 21-25    Patient is to have diagnostic hysteroscopy and endometrial ablation for abnormal uterine bleeding as above    - Labs: reviewed  - CXR/EKG: not obtained  - Medical clearance required: No  - Anticoagulation : Patient is NOT on antiocoagulation.  - I have discussed the risks, benefits, indications, and alternatives of the procedure in detail.  The patient verbalizes her understanding.  All questions answered.  Consents to be signed day of surgery.  The patient agrees to proceed to proceed as planned.      Juan Pablo Potts MD MS  OB/Gyn  PGY-2

## 2025-04-11 ENCOUNTER — HOSPITAL ENCOUNTER (OUTPATIENT)
Facility: HOSPITAL | Age: 45
Discharge: HOME | End: 2025-04-11
Attending: INTERNAL MEDICINE | Admitting: STUDENT IN AN ORGANIZED HEALTH CARE EDUCATION/TRAINING PROGRAM
Payer: COMMERCIAL

## 2025-04-11 ENCOUNTER — ANESTHESIA (OUTPATIENT)
Dept: ENDOSCOPY | Facility: HOSPITAL | Age: 45
End: 2025-04-11
Payer: COMMERCIAL

## 2025-04-11 ENCOUNTER — RESULTS FOLLOW-UP (OUTPATIENT)
Dept: GASTROENTEROLOGY | Facility: HOSPITAL | Age: 45
End: 2025-04-11

## 2025-04-11 ENCOUNTER — ANESTHESIA EVENT (OUTPATIENT)
Dept: ENDOSCOPY | Facility: HOSPITAL | Age: 45
End: 2025-04-11
Payer: COMMERCIAL

## 2025-04-11 DIAGNOSIS — D50.9 IDA (IRON DEFICIENCY ANEMIA): ICD-10-CM

## 2025-04-11 DIAGNOSIS — K21.9 GASTROESOPHAGEAL REFLUX DISEASE, UNSPECIFIED WHETHER ESOPHAGITIS PRESENT: ICD-10-CM

## 2025-04-11 DIAGNOSIS — Z98.84 GASTRIC BYPASS STATUS FOR OBESITY: Primary | ICD-10-CM

## 2025-04-11 LAB
ABSOLUTE EOSINOPHIL (SMH): 0.05 K/UL
ABSOLUTE MONOCYTE (SMH): 0.34 K/UL (ref 0.3–1)
ABSOLUTE NEUTROPHIL COUNT (SMH): 2.3 K/UL (ref 1.8–7.7)
B-HCG UR QL: NEGATIVE
BASOPHILS # BLD AUTO: 0.02 K/UL
BASOPHILS NFR BLD AUTO: 0.6 %
CTP QC/QA: YES
DACRYOCYTES BLD QL SMEAR: ABNORMAL
ERYTHROCYTE [DISTWIDTH] IN BLOOD BY AUTOMATED COUNT: ABNORMAL %
HCT VFR BLD AUTO: 25.2 % (ref 37–48.5)
HGB BLD-MCNC: 7.5 GM/DL (ref 12–16)
HYPOCHROMIA BLD QL SMEAR: ABNORMAL
IMM GRANULOCYTES # BLD AUTO: 0 K/UL (ref 0–0.04)
IMM GRANULOCYTES NFR BLD AUTO: 0 % (ref 0–0.5)
LYMPHOCYTES # BLD AUTO: 0.62 K/UL (ref 1–4.8)
MCH RBC QN AUTO: 23.8 PG (ref 27–31)
MCHC RBC AUTO-ENTMCNC: 29.8 G/DL (ref 32–36)
MCV RBC AUTO: 80 FL (ref 82–98)
NUCLEATED RBC (/100WBC) (SMH): 0 /100 WBC
OVALOCYTES BLD QL SMEAR: ABNORMAL
PLATELET # BLD AUTO: 101 K/UL (ref 150–450)
PLATELET BLD QL SMEAR: ABNORMAL
PMV BLD AUTO: ABNORMAL FL
RBC # BLD AUTO: 3.15 M/UL (ref 4–5.4)
RELATIVE EOSINOPHIL (SMH): 1.5 % (ref 0–8)
RELATIVE LYMPHOCYTE (SMH): 18.6 % (ref 18–48)
RELATIVE MONOCYTE (SMH): 10.2 % (ref 4–15)
RELATIVE NEUTROPHIL (SMH): 69.1 % (ref 38–73)
SCHISTOCYTES BLD QL SMEAR: ABNORMAL
WBC # BLD AUTO: 3.33 K/UL (ref 3.9–12.7)

## 2025-04-11 PROCEDURE — 43239 EGD BIOPSY SINGLE/MULTIPLE: CPT | Performed by: INTERNAL MEDICINE

## 2025-04-11 PROCEDURE — 81025 URINE PREGNANCY TEST: CPT | Performed by: INTERNAL MEDICINE

## 2025-04-11 PROCEDURE — 25000003 PHARM REV CODE 250: Performed by: INTERNAL MEDICINE

## 2025-04-11 PROCEDURE — 45378 DIAGNOSTIC COLONOSCOPY: CPT | Performed by: INTERNAL MEDICINE

## 2025-04-11 PROCEDURE — 27201012 HC FORCEPS, HOT/COLD, DISP: Performed by: INTERNAL MEDICINE

## 2025-04-11 PROCEDURE — 43239 EGD BIOPSY SINGLE/MULTIPLE: CPT | Mod: 51,,, | Performed by: INTERNAL MEDICINE

## 2025-04-11 PROCEDURE — 36415 COLL VENOUS BLD VENIPUNCTURE: CPT | Performed by: INTERNAL MEDICINE

## 2025-04-11 PROCEDURE — 37000009 HC ANESTHESIA EA ADD 15 MINS: Performed by: INTERNAL MEDICINE

## 2025-04-11 PROCEDURE — 63600175 PHARM REV CODE 636 W HCPCS: Performed by: NURSE ANESTHETIST, CERTIFIED REGISTERED

## 2025-04-11 PROCEDURE — 37000008 HC ANESTHESIA 1ST 15 MINUTES: Performed by: INTERNAL MEDICINE

## 2025-04-11 PROCEDURE — 45378 DIAGNOSTIC COLONOSCOPY: CPT | Mod: ,,, | Performed by: INTERNAL MEDICINE

## 2025-04-11 PROCEDURE — 85025 COMPLETE CBC W/AUTO DIFF WBC: CPT | Performed by: INTERNAL MEDICINE

## 2025-04-11 RX ORDER — SODIUM CHLORIDE 9 MG/ML
INJECTION, SOLUTION INTRAVENOUS CONTINUOUS
Status: DISCONTINUED | OUTPATIENT
Start: 2025-04-11 | End: 2025-04-11 | Stop reason: HOSPADM

## 2025-04-11 RX ORDER — SUCRALFATE 1 G/1
1 TABLET ORAL
Qty: 56 TABLET | Refills: 0 | Status: SHIPPED | OUTPATIENT
Start: 2025-04-11 | End: 2025-04-25

## 2025-04-11 RX ORDER — NADOLOL 20 MG/1
20 TABLET ORAL DAILY
Qty: 30 TABLET | Refills: 11 | Status: SHIPPED | OUTPATIENT
Start: 2025-04-11 | End: 2026-04-11

## 2025-04-11 RX ORDER — LIDOCAINE HYDROCHLORIDE 20 MG/ML
INJECTION INTRAVENOUS
Status: DISCONTINUED | OUTPATIENT
Start: 2025-04-11 | End: 2025-04-11

## 2025-04-11 RX ORDER — PROPOFOL 10 MG/ML
VIAL (ML) INTRAVENOUS
Status: DISCONTINUED | OUTPATIENT
Start: 2025-04-11 | End: 2025-04-11

## 2025-04-11 RX ORDER — ESOMEPRAZOLE MAGNESIUM 40 MG/1
40 CAPSULE, DELAYED RELEASE ORAL 2 TIMES DAILY
Qty: 60 CAPSULE | Refills: 11 | Status: SHIPPED | OUTPATIENT
Start: 2025-04-11 | End: 2026-04-11

## 2025-04-11 RX ADMIN — PROPOFOL 40 MG: 10 INJECTION, EMULSION INTRAVENOUS at 01:04

## 2025-04-11 RX ADMIN — GLYCOPYRROLATE 0.2 MG: 0.2 INJECTION, SOLUTION INTRAMUSCULAR; INTRAVITREAL at 01:04

## 2025-04-11 RX ADMIN — PROPOFOL 80 MG: 10 INJECTION, EMULSION INTRAVENOUS at 01:04

## 2025-04-11 RX ADMIN — SODIUM CHLORIDE: 9 INJECTION, SOLUTION INTRAVENOUS at 12:04

## 2025-04-11 RX ADMIN — LIDOCAINE HYDROCHLORIDE 100 MG: 20 INJECTION, SOLUTION INTRAVENOUS at 01:04

## 2025-04-11 NOTE — LETTER
April 11, 2025         [address] [place] [address] DR FRANKIE ESTRADA [id number]  [phone]       Patient: Giovanni Pena   YOB: 1980  Date of Visit: 04/11/2025    To Whom It May Concern:    Barbie Pena  was at [facility] on 04/11/2025. She may return to work/school on 04/14/25 with no restrictions. If you have any questions or concerns, or if I can be of further assistance, please do not hesitate to contact me.    Sincerely,    Iza Fuentes RN

## 2025-04-11 NOTE — PROVATION PATIENT INSTRUCTIONS
Discharge Summary/Instructions after an Endoscopic Procedure  Patient Name: Giovanni Pena  Patient MRN: [id number]  Patient YOB: 1980 Friday, April 11, 2025  Kelly Betancourt MD  Dear patient,  As a result of recent federal legislation (The Federal Cures Act), you may   receive lab or pathology results from your procedure in your [facility]   account before your physician is able to contact you. Your physician or   their representative will relay the results to you with their   recommendations at their soonest availability.  Thank you,  RESTRICTIONS:  During your procedure today, you received medications for sedation.  These   medications may affect your judgment, balance and coordination.  Therefore,   for 24 hours, you have the following restrictions:   - DO NOT drive a car, operate machinery, make legal/financial decisions,   sign important papers or drink alcohol.    ACTIVITY:  Today: no heavy lifting, straining or running due to procedural   sedation/anesthesia.  The following day: return to full activity including work.  DIET:  Eat and drink normally unless instructed otherwise.     TREATMENT FOR COMMON SIDE EFFECTS:  - Mild abdominal pain, nausea, belching, bloating or excessive gas:  rest,   eat lightly and use a heating pad.  - Sore Throat: treat with throat lozenges and/or gargle with warm salt   water.  - Because air was used during the procedure, expelling large amounts of air   from your rectum or belching is normal.  - If a bowel prep was taken, you may not have a bowel movement for 1-3 days.    This is normal.  SYMPTOMS TO WATCH FOR AND REPORT TO YOUR PHYSICIAN:  1. Abdominal pain or bloating, other than gas cramps.  2. Chest pain.  3. Back pain.  4. Signs of infection such as: chills or fever occurring within 24 hours   after the procedure.  5. Rectal bleeding, which would show as bright red, maroon, or black stools.   (A tablespoon of blood from the rectum is not serious,  especially if   hemorrhoids are present.)  6. Vomiting.  7. Weakness or dizziness.  GO DIRECTLY TO THE NEAREST EMERGENCY ROOM IF YOU HAVE ANY OF THE FOLLOWING:      Difficulty breathing              Chills and/or fever over 101 F   Persistent vomiting and/or vomiting blood   Severe abdominal pain   Severe chest pain   Black, tarry stools   Bleeding- more than one tablespoon   Any other symptom or condition that you feel may need urgent attention  Your doctor recommends these additional instructions:  If any biopsies were taken, your doctors clinic will contact you in 1 to 2   weeks with any results.  - Discharge patient to home (with escort).   - Patient has a contact number available for emergencies.  The signs and   symptoms of potential delayed complications were discussed with the   patient.  Return to normal activities tomorrow.  Written discharge   instructions were provided to the patient.   - Resume previous diet.   - Continue present medications.   - Repeat colonoscopy in 10 years for screening purposes.   - Return to my office PRN.  For questions, problems or results please call your physician - Kelly Betancourt MD at Work:  [phone].  [facility], EMERGENCY ROOM PHONE NUMBER: [phone]  IF A COMPLICATION OR EMERGENCY SITUATION ARISES AND YOU ARE UNABLE TO REACH   YOUR PHYSICIAN - GO DIRECTLY TO THE EMERGENCY ROOM.  Kelly Betancourt MD  4/11/2025 2:06:36 PM  This report has been verified and signed electronically.  Dear patient,  As a result of recent federal legislation (The Federal Cures Act), you may   receive lab or pathology results from your procedure in your [facility]   account before your physician is able to contact you. Your physician or   their representative will relay the results to you with their   recommendations at their soonest availability.  Thank you,  PROVATION

## 2025-04-11 NOTE — TRANSFER OF CARE
"Anesthesia Transfer of Care Note    Patient: Giovanni Pena    Procedure(s) Performed: Procedure(s) (LRB):  EGD (ESOPHAGOGASTRODUODENOSCOPY) (N/A)  COLONOSCOPY   **JULIANN PAINTING APPT (N/A)    Patient location: PACU    Anesthesia Type: general    Transport from OR: Transported from OR on room air with adequate spontaneous ventilation    Post pain: adequate analgesia    Post assessment: no apparent anesthetic complications and tolerated procedure well    Post vital signs: stable    Level of consciousness: sedated and responds to stimulation    Nausea/Vomiting: no nausea/vomiting    Complications: none    Transfer of care protocol was followed      Last vitals: Visit Vitals  /68 (BP Location: Left arm, Patient Position: Lying)   Pulse 72   Temp 37.5 °C (99.5 °F) (Skin)   Resp 16   Ht 5' 7" (1.702 m)   Wt 79.4 kg (175 lb)   LMP 04/09/2025 (Exact Date)   SpO2 100%   Breastfeeding No   BMI 27.41 kg/m²     "

## 2025-04-11 NOTE — PLAN OF CARE
Awake alert and oriented  Vs stable  Denies pain, nausea, dizziness  Iv dc'd per policy, cath intact  Printed discharge instructions given to patient and reviewed.  She v/u instructions  Pt discharged to home in NAD  Escorted to car via wc per staff  Mother to drive her home

## 2025-04-11 NOTE — ANESTHESIA POSTPROCEDURE EVALUATION
Anesthesia Post Evaluation    Patient: Giovanni Pena    Procedure(s) Performed: Procedure(s) (LRB):  EGD (ESOPHAGOGASTRODUODENOSCOPY) (N/A)  COLONOSCOPY   **WANTS EARILER APPT (N/A)    Final Anesthesia Type: general      Patient location during evaluation: PACU  Patient participation: Yes- Able to Participate  Level of consciousness: awake  Post-procedure vital signs: reviewed and stable  Pain management: adequate  Airway patency: patent    PONV status at discharge: No PONV  Anesthetic complications: no      Cardiovascular status: blood pressure returned to baseline  Respiratory status: unassisted  Hydration status: euvolemic  Follow-up not needed.              Vitals Value Taken Time   /77 04/11/25 14:35   Temp 36.7 °C (98.1 °F) 04/11/25 14:15   Pulse 80 04/11/25 14:40   Resp 23 04/11/25 14:40   SpO2 100 % 04/11/25 14:40         Event Time   Out of Recovery 14:50:00         Pain/Jim Score: Jim Score: 10 (4/11/2025  2:45 PM)

## 2025-04-11 NOTE — PROVATION PATIENT INSTRUCTIONS
Discharge Summary/Instructions after an Endoscopic Procedure  Patient Name: Giovanni Pena  Patient MRN: [id number]  Patient YOB: 1980 Friday, April 11, 2025  Kelly Betancourt MD  Dear patient,  As a result of recent federal legislation (The Federal Cures Act), you may   receive lab or pathology results from your procedure in your [facility]   account before your physician is able to contact you. Your physician or   their representative will relay the results to you with their   recommendations at their soonest availability.  Thank you,  RESTRICTIONS:  During your procedure today, you received medications for sedation.  These   medications may affect your judgment, balance and coordination.  Therefore,   for 24 hours, you have the following restrictions:   - DO NOT drive a car, operate machinery, make legal/financial decisions,   sign important papers or drink alcohol.    ACTIVITY:  Today: no heavy lifting, straining or running due to procedural   sedation/anesthesia.  The following day: return to full activity including work.  DIET:  Eat and drink normally unless instructed otherwise.     TREATMENT FOR COMMON SIDE EFFECTS:  - Mild abdominal pain, nausea, belching, bloating or excessive gas:  rest,   eat lightly and use a heating pad.  - Sore Throat: treat with throat lozenges and/or gargle with warm salt   water.  - Because air was used during the procedure, expelling large amounts of air   from your rectum or belching is normal.  - If a bowel prep was taken, you may not have a bowel movement for 1-3 days.    This is normal.  SYMPTOMS TO WATCH FOR AND REPORT TO YOUR PHYSICIAN:  1. Abdominal pain or bloating, other than gas cramps.  2. Chest pain.  3. Back pain.  4. Signs of infection such as: chills or fever occurring within 24 hours   after the procedure.  5. Rectal bleeding, which would show as bright red, maroon, or black stools.   (A tablespoon of blood from the rectum is not serious,  especially if   hemorrhoids are present.)  6. Vomiting.  7. Weakness or dizziness.  GO DIRECTLY TO THE NEAREST EMERGENCY ROOM IF YOU HAVE ANY OF THE FOLLOWING:      Difficulty breathing              Chills and/or fever over 101 F   Persistent vomiting and/or vomiting blood   Severe abdominal pain   Severe chest pain   Black, tarry stools   Bleeding- more than one tablespoon   Any other symptom or condition that you feel may need urgent attention  Your doctor recommends these additional instructions:  If any biopsies were taken, your doctors clinic will contact you in 1 to 2   weeks with any results.  - Await pathology results.   - Discharge patient to home (with escort).   - Patient has a contact number available for emergencies.  The signs and   symptoms of potential delayed complications were discussed with the   patient.  Return to normal activities tomorrow.  Written discharge   instructions were provided to the patient.   - Resume previous diet.   - PPI BID + Carafate for esophagitis   -Begin Nadolol 20 mg daily to be titrated as outpatient  -Refer to hepatology (will message for close follow up) and keep follow up   with hematology  -Repeat EGD should be performed at [facility]/higher level of care given multiple   medical problems/complex history  For questions, problems or results please call your physician - Kelly Betancourt MD at Work:  [phone].  [facility] PHONE NUMBER: [phone]  IF A COMPLICATION OR EMERGENCY SITUATION ARISES AND YOU ARE UNABLE TO REACH   YOUR PHYSICIAN - GO DIRECTLY TO THE EMERGENCY ROOM.  Kelly Betancourt MD  4/11/2025 2:12:54 PM  This report has been verified and signed electronically.  Dear patient,  As a result of recent federal legislation (The Federal Cures Act), you may   receive lab or pathology results from your procedure in your [facility]   account before your physician is able to contact you. Your physician or   their  representative will relay the results to you with their   recommendations at their soonest availability.  Thank you,  PROVATION

## 2025-04-11 NOTE — ANESTHESIA PREPROCEDURE EVALUATION
04/11/2025  Giovanni Pena is a 44 y.o., female.      Pre-op Assessment    I have reviewed the Patient Summary Reports.    I have reviewed the NPO Status.   I have reviewed the Medications.     Review of Systems  Anesthesia Hx:  No problems with previous Anesthesia                Hematology/Oncology:       -- Anemia:                                  Cardiovascular:  Cardiovascular Normal                                              Hepatic/GI:     GERD   S/P Bariatric surg      Gerd          Neurological:  Neurology Normal                                          Physical Exam  General: Well nourished    Airway:  Mallampati: II   Mouth Opening: Normal  TM Distance: Normal  Neck ROM: Normal ROM        Anesthesia Plan  Type of Anesthesia, risks & benefits discussed:    Anesthesia Type: Gen Natural Airway  Intra-op Monitoring Plan: Standard ASA Monitors  Induction:  IV  Informed Consent: Informed consent signed with the Patient and all parties understand the risks and agree with anesthesia plan.  All questions answered.   ASA Score: 2    Ready For Surgery From Anesthesia Perspective.     .

## 2025-04-12 DIAGNOSIS — K21.9 GASTROESOPHAGEAL REFLUX DISEASE, UNSPECIFIED WHETHER ESOPHAGITIS PRESENT: ICD-10-CM

## 2025-04-12 RX ORDER — PANTOPRAZOLE SODIUM 40 MG/1
40 TABLET, DELAYED RELEASE ORAL
Qty: 90 TABLET | Refills: 3 | OUTPATIENT
Start: 2025-04-12

## 2025-04-12 NOTE — TELEPHONE ENCOUNTER
Refill Decision Note   Giovanni Pena  is requesting a refill authorization.  Brief Assessment and Rationale for Refill:  Quick Discontinue     Medication Therapy Plan:  discontinued on 3/18/2025 by Leia Day FNP-BC for the following reason: Alternate therapy.      Comments:     Note composed:3:58 PM 04/12/2025

## 2025-04-12 NOTE — TELEPHONE ENCOUNTER
No care due was identified.  [facility] Embedded Care Due Messages. Reference number: [id number].   4/12/2025 6:56:20 AM CDT

## 2025-04-14 ENCOUNTER — TELEPHONE (OUTPATIENT)
Dept: OBSTETRICS AND GYNECOLOGY | Facility: HOSPITAL | Age: 45
End: 2025-04-14
Payer: COMMERCIAL

## 2025-04-14 ENCOUNTER — OFFICE VISIT (OUTPATIENT)
Dept: HEMATOLOGY/ONCOLOGY | Facility: CLINIC | Age: 45
End: 2025-04-14
Payer: COMMERCIAL

## 2025-04-14 VITALS
TEMPERATURE: 98 F | HEART RATE: 80 BPM | DIASTOLIC BLOOD PRESSURE: 77 MMHG | HEIGHT: 67 IN | RESPIRATION RATE: 23 BRPM | BODY MASS INDEX: 27.47 KG/M2 | SYSTOLIC BLOOD PRESSURE: 121 MMHG | OXYGEN SATURATION: 100 % | WEIGHT: 175 LBS

## 2025-04-14 DIAGNOSIS — D50.0 IRON DEFICIENCY ANEMIA DUE TO CHRONIC BLOOD LOSS: Primary | ICD-10-CM

## 2025-04-14 DIAGNOSIS — R16.1 SPLENOMEGALY: ICD-10-CM

## 2025-04-14 DIAGNOSIS — D59.10 AUTOIMMUNE HEMOLYTIC ANEMIA: ICD-10-CM

## 2025-04-14 PROCEDURE — 98006 SYNCH AUDIO-VIDEO EST MOD 30: CPT | Mod: 95,,, | Performed by: INTERNAL MEDICINE

## 2025-04-14 NOTE — PROGRESS NOTES
Service Date:  25    Chief Complaint: Anemia    Giovanni Pena is a 44 y.o. female here with anemia.  Recently hospitalized with hemolytic anemia.  Given IV prednisone.  Has not taken any at home.  Feeling less jaundiced than when she went into the hospital.  Recently had EGD and colonoscopy which were negative for bleeding, although she did have esophageal varices.  Also had a gastric band procedure found.  No other complaints me.      Review of Systems   Constitutional: Negative.  Negative for appetite change and unexpected weight change.   HENT: Negative.  Negative for mouth sores.    Eyes: Negative.  Negative for visual disturbance.   Respiratory: Negative.  Negative for cough and shortness of breath.    Cardiovascular: Negative.  Negative for chest pain.   Gastrointestinal:  Positive for abdominal pain (Right upper quadrant). Negative for diarrhea.   Endocrine: Negative.    Genitourinary: Negative.  Negative for frequency.   Musculoskeletal: Negative.  Negative for back pain.   Integumentary:  Negative for rash. Negative.   Neurological: Negative.  Negative for headaches.   Hematological: Negative.  Negative for adenopathy.   Psychiatric/Behavioral: Negative.  The patient is not nervous/anxious.         Current Outpatient Medications   Medication Instructions    docusate sodium (COLACE) 100 mg, Oral, 2 times daily    esomeprazole (NEXIUM) 40 mg, Oral, 2 times daily    ferrous sulfate (IRON) 325 mg, Oral, With breakfast    medroxyPROGESTERone (PROVERA) 20 mg, Oral, Daily, Take 2 tabs (20 mg) daily to prevent a heavy cycle. During your menses, take 2 tablets (20 mg) three times a day.    nadoloL (CORGARD) 20 mg, Oral, Daily    sucralfate (CARAFATE) 1 g, Oral, Before meals & nightly        Past Medical History:   Diagnosis Date    Anemia, unspecified     Embolism and thrombosis of renal vein     RA (rheumatoid arthritis)         Past Surgical History:   Procedure Laterality Date     SECTION   1997, 2002, 2004, 2014    CHOLECYSTECTOMY  2014    COLONOSCOPY N/A 4/11/2025    Procedure: COLONOSCOPY   **WANTS EARILER APPT;  Surgeon: Kelly Betancourt MD;  Location: [facility];  Service: Endoscopy;  Laterality: N/A;    ESOPHAGOGASTRODUODENOSCOPY N/A 4/11/2025    Procedure: EGD (ESOPHAGOGASTRODUODENOSCOPY);  Surgeon: Kelly Betancourt MD;  Location: [facility];  Service: Endoscopy;  Laterality: N/A;    SLEEVE GASTROPLASTY  12/08/2021    in [place]    SURGICAL REMOVAL OF BUNION WITH OSTEOTOMY OF METATARSAL BONE Left 8/17/2022    Procedure: BUNIONECTOMY, WITH METATARSAL OSTEOTOMY  ;  Surgeon: Roshan Pyle DPM;  Location: [facility];  Service: Podiatry;  Laterality: Left;  distal osteotomy left 1st metatarsal    TUBAL LIGATION  2014        Family History   Problem Relation Name Age of Onset    Breast cancer Maternal Aunt      Hypertension Maternal Grandmother      Coronary artery disease Maternal Grandmother      Colon cancer Neg Hx      Ovarian cancer Neg Hx      Endometrial cancer Neg Hx         Social History     Tobacco Use    Smoking status: Never    Smokeless tobacco: Never   Substance Use Topics    Alcohol use: Not Currently     Comment: socially    Drug use: Never         There were no vitals filed for this visit.       Physical Exam:  LMP 04/09/2025 (Exact Date)     Physical Exam  Constitutional:       Appearance: Normal appearance.   HENT:      Head: Normocephalic and atraumatic.      Nose: Nose normal.      Mouth/Throat:      Mouth: Mucous membranes are moist.      Pharynx: Oropharynx is clear.   Eyes:      Conjunctiva/sclera: Conjunctivae normal.   Cardiovascular:      Rate and Rhythm: Normal rate and regular rhythm.      Heart sounds: Normal heart sounds.   Pulmonary:      Effort: Pulmonary effort is normal.      Breath sounds: Normal breath sounds.   Abdominal:      General: Abdomen is flat. Bowel sounds are normal.      Palpations: Abdomen is soft.   Musculoskeletal:         General: Normal range of  motion.      Cervical back: Normal range of motion and neck supple.   Skin:     General: Skin is warm and dry.   Neurological:      General: No focal deficit present.      Mental Status: She is alert and oriented to person, place, and time. Mental status is at baseline.   Psychiatric:         Mood and Affect: Mood normal.          Labs:  Lab Results   Component Value Date    WBC 3.33 (L) 04/11/2025    RBC 3.15 (L) 04/11/2025    HGB 7.5 (L) 04/11/2025    HCT 25.2 (L) 04/11/2025    MCV 80 (L) 04/11/2025    MCH 23.8 (L) 04/11/2025    MCHC 29.8 (L) 04/11/2025    RDW  04/11/2025      Comment:      Result Not Available     (L) 04/11/2025    MPV  04/11/2025      Comment:      Result Not Available    GRAN 2.6 03/19/2025    GRAN 71.2 03/19/2025    LYMPH 15.2 (L) 04/03/2025    LYMPH 0.93 (L) 04/03/2025    MONO 8.0 04/03/2025    MONO 0.49 04/03/2025    EOS 0.0 04/03/2025    EOS 0.00 04/03/2025    BASO 0.03 03/19/2025    EOSINOPHIL 2.2 03/19/2025    BASOPHIL 0.0 04/03/2025    BASOPHIL 0.00 04/03/2025     Sodium   Date Value Ref Range Status   04/03/2025 139 136 - 145 mmol/L Final   03/18/2025 138 136 - 145 mmol/L Final     Potassium   Date Value Ref Range Status   04/03/2025 3.5 3.5 - 5.1 mmol/L Final   03/18/2025 3.5 3.5 - 5.1 mmol/L Final     Chloride   Date Value Ref Range Status   04/03/2025 108 95 - 110 mmol/L Final   03/18/2025 108 95 - 110 mmol/L Final     CO2   Date Value Ref Range Status   04/03/2025 25 23 - 29 mmol/L Final   03/18/2025 22 (L) 23 - 29 mmol/L Final     Glucose   Date Value Ref Range Status   03/18/2025 85 70 - 110 mg/dL Final     BUN   Date Value Ref Range Status   04/03/2025 13 6 - 20 mg/dL Final     Creatinine   Date Value Ref Range Status   04/03/2025 0.8 0.5 - 1.4 mg/dL Final     Calcium   Date Value Ref Range Status   04/03/2025 8.1 (L) 8.7 - 10.5 mg/dL Final   03/18/2025 8.2 (L) 8.7 - 10.5 mg/dL Final     Total Protein   Date Value Ref Range Status   03/18/2025 7.8 6.0 - 8.4 g/dL Final      Albumin   Date Value Ref Range Status   04/03/2025 3.0 (L) 3.5 - 5.2 g/dL Final   03/18/2025 3.6 3.5 - 5.2 g/dL Final     Total Bilirubin   Date Value Ref Range Status   03/18/2025 1.1 (H) 0.1 - 1.0 mg/dL Final     Comment:     For infants and newborns, interpretation of results should be based  on gestational age, weight and in agreement with clinical  observations.    Premature Infant recommended reference ranges:  Up to 24 hours.............<8.0 mg/dL  Up to 48 hours............<12.0 mg/dL  3-5 days..................<15.0 mg/dL  6-29 days.................<15.0 mg/dL       Bilirubin Total   Date Value Ref Range Status   04/03/2025 1.4 (H) 0.1 - 1.0 mg/dL Final     Comment:     For infants and newborns, interpretation of results should be based   on gestational age, weight and in agreement with clinical   observations.    Premature Infant recommended reference ranges:   0-24 hours:  <8.0 mg/dL   24-48 hours: <12.0 mg/dL   3-5 days:    <15.0 mg/dL   6-29 days:   <15.0 mg/dL     Alkaline Phosphatase   Date Value Ref Range Status   03/18/2025 80 40 - 150 U/L Final     ALP   Date Value Ref Range Status   04/03/2025 70 40 - 150 unit/L Final     AST   Date Value Ref Range Status   04/03/2025 21 11 - 45 unit/L Final   03/18/2025 14 10 - 40 U/L Final     ALT   Date Value Ref Range Status   04/03/2025 18 10 - 44 unit/L Final   03/18/2025 11 10 - 44 U/L Final     Anion Gap   Date Value Ref Range Status   04/03/2025 6 (L) 8 - 16 mmol/L Final     eGFR if    Date Value Ref Range Status   07/19/2022 >60.0 >60 mL/min/1.73 m^2 Final     eGFR if non    Date Value Ref Range Status   07/19/2022 >60.0 >60 mL/min/1.73 m^2 Final     Comment:     Calculation used to obtain the estimated glomerular filtration  rate (eGFR) is the CKD-EPI equation.          A/P:    Iron deficiency anemia and hemolytic anemia   -recently found to have hemolytic anemia and was given IV prednisone but has not taken any at  home.  Feels that she has less jaundice.  I will check hemolysis labs.  Given her history of iron deficiency from menorrhagia and previous iron infusions, I will check iron levels as well.  -will call with results.    Splenomegaly   -monitor      Aurash Khoobehi, MD  Hematology and Oncology

## 2025-04-15 ENCOUNTER — RESULTS FOLLOW-UP (OUTPATIENT)
Dept: OBSTETRICS AND GYNECOLOGY | Facility: CLINIC | Age: 45
End: 2025-04-15

## 2025-04-15 DIAGNOSIS — N92.0 MENORRHAGIA WITH REGULAR CYCLE: Primary | ICD-10-CM

## 2025-04-15 RX ORDER — MUPIROCIN 20 MG/G
OINTMENT TOPICAL
Status: CANCELLED | OUTPATIENT
Start: 2025-04-15

## 2025-04-15 RX ORDER — SODIUM CHLORIDE 9 MG/ML
INJECTION, SOLUTION INTRAVENOUS CONTINUOUS
Status: CANCELLED | OUTPATIENT
Start: 2025-04-15

## 2025-04-15 RX ORDER — FAMOTIDINE 20 MG/1
20 TABLET, FILM COATED ORAL
Status: CANCELLED | OUTPATIENT
Start: 2025-04-15

## 2025-04-15 NOTE — TELEPHONE ENCOUNTER
Called pt to discuss endometrial biopsy result (no endometrial cells/tissue).  Asked if pt available to return to clinic Thursday for repeat biopsy, which she is amenable to.  Appt request sent to clinic staff for Thursday 4/17/25 at 1:00pm.    Juan Pablo Potts MD MS  OB/Gyn  PGY-2

## 2025-04-16 DIAGNOSIS — D50.0 IRON DEFICIENCY ANEMIA DUE TO CHRONIC BLOOD LOSS: ICD-10-CM

## 2025-04-16 DIAGNOSIS — D59.10 AUTOIMMUNE HEMOLYTIC ANEMIA: Primary | ICD-10-CM

## 2025-04-17 ENCOUNTER — TELEPHONE (OUTPATIENT)
Dept: HEMATOLOGY/ONCOLOGY | Facility: CLINIC | Age: 45
End: 2025-04-17
Payer: COMMERCIAL

## 2025-04-17 ENCOUNTER — OFFICE VISIT (OUTPATIENT)
Dept: OBSTETRICS AND GYNECOLOGY | Facility: CLINIC | Age: 45
End: 2025-04-17
Payer: COMMERCIAL

## 2025-04-17 VITALS — DIASTOLIC BLOOD PRESSURE: 62 MMHG | SYSTOLIC BLOOD PRESSURE: 112 MMHG

## 2025-04-17 DIAGNOSIS — N92.1 MENORRHAGIA WITH IRREGULAR CYCLE: Primary | ICD-10-CM

## 2025-04-17 LAB
B-HCG UR QL: NEGATIVE
CTP QC/QA: YES

## 2025-04-17 PROCEDURE — 99999 PR PBB SHADOW E&M-EST. PATIENT-LVL III: CPT | Mod: PBBFAC,,,

## 2025-04-17 NOTE — H&P
Subjective     Patient ID: Giovanni Pena is a 44 y.o. female.    Chief Complaint:  Abnormal uterine bleeding    History of Present Illness   with history of SOCORRO, RA, portal vein thrombosis, GERD presents to GYN clinic for menorrhagia follow-up consulted during on hospitalization 3/29/25. Reports continued symptoms of fatigue, generalized weakness and intermittent nausea, which she contributes to her long-standing SOCORRO. She received a blood transfusion during her last hospitalization and subsequently had a hemolytic transfusion reaction with symptoms of yellowing of the eyes and dark urine. Since then, these symptoms have resolved. However, the fatigue persists. Denies recent fever, chills, vomiting, dysuria, hematuria, headaches, syncopal episodes. Denies other symptoms.     During her hospitalization, she was educated on other options to alleviate her menorrhagia, including ablation/hysterectomy.  After being seen in clinic and initially inconclusive endometrial biopsy, she presents for repeat biopsy.  Attempt at second biopsy by staff confirms presence of posterior fibroid that distorts internal cervical os and which will likely limit feasibility of endometrial ablation.  After further discussion and counseling, Ms. Pena would like to pursue total laparoscopic hysterectomy.    GYN & OB History  Patient's last menstrual period was 2025 (exact date). ; currently bleeding  Date of Last Pap: 4/10/2025    OB History    Para Term  AB Living   5 4 4  1    SAB IAB Ectopic Multiple Live Births    1         # Outcome Date GA Lbr Lonnie/2nd Weight Sex Type Anes PTL Lv   5 IAB            4 Term      CS-Unspec      3 Term      CS-Unspec      2 Term      CS-Unspec      1 Term      CS-Unspec        Review of Systems  Review of Systems   Constitutional:  Negative for activity change, appetite change, chills, fatigue and fever.   Respiratory:  Negative for cough, shortness of breath and wheezing.     Cardiovascular:  Negative for chest pain, palpitations and leg swelling.   Gastrointestinal:  Negative for nausea and vomiting.   Genitourinary:  Positive for vaginal bleeding. Negative for dysuria, hematuria, pelvic pain, vaginal discharge, vaginal pain, vaginal dryness and vaginal odor.   Neurological:  Negative for headaches.        Objective   Physical Exam:   Constitutional: She is oriented to person, place, and time. She appears well-developed and well-nourished. No distress.    HENT:   Head: Normocephalic and atraumatic.      Cardiovascular:  Normal rate.             Pulmonary/Chest: Effort normal. No respiratory distress.        Abdominal: Soft. She exhibits no distension. There is no abdominal tenderness.     Genitourinary:    Pelvic exam was performed with patient supine.   The external female genitalia was normal.   Genitalia hair distrobution normal .     Labial bartholins normal.There is no rash, tenderness, lesion or injury on the right labia. There is no rash, tenderness, lesion or injury on the left labia. Cervix is normal. There is bleeding in the vagina. Uterus is hosting fibroids.           Musculoskeletal: Normal range of motion.       Neurological: She is alert and oriented to person, place, and time.    Skin: Skin is warm and dry. She is not diaphoretic.    Psychiatric: She has a normal mood and affect. Her behavior is normal. Judgment and thought content normal.      TVUS (3/19/25):    FINDINGS:  Uterus:  Size: 10.1 x 5 x 6.8 cm     Multiple intramural and subserosal leiomyomas.  Subserosal leiomyoma on the right, 2.9 cm (previously 2.3 cm).  Posterior intramural leiomyoma, 3.1 cm.  Subserosal leiomyoma on the left, 2.6 cm.  Intramural leiomyoma lower uterine segment, 2.9 cm.     Intrauterine device resides along the endometrial canal of the lower uterine segment and cervical canal.     Right ovary:  Size: 3.4 x 2.7 x 3.2 cm     Left ovary:  Size: 2.8 x 1.6 x 2.9 cm     Free Fluid: None.      Impression:  Multiple intramural and subserosal leiomyomas measure up to 3.1 cm.  Intrauterine device is low lying, residing along the endometrial canal of the lower uterine segment and cervical canal.        Assessment and Plan     1. Menorrhagia with irregular cycle         Plan:  To OR for TLH    I have discussed the risks, benefits, indications, and alternatives of the procedure in detail.  The patient verbalizes her understanding, all questions answered. Consents for total laparoscopic hysterectomy signed. Explicitly discussed possibility of conversion to open procedure.  Explicitly discussed increased risk of bladder injury given h/o C/Sx4.  Patient understands and is in agreement.     Pre Op Checklist:   - Co-morbidities include SOCORRO, RA, portal vein thrombosis and GERD.  Pre-op clearance already scheduled.   - Anticoagulation : Patient is NOT on prophylactic/therapeutic antiocoagulation.  - Caprini VTE risk: High Risk, warrants SCDs and pharmaceutical VTE ppx; will discuss with GYN staff on day of surgery  - Patient does not require prophylactic anti-coagulation post operatively.    - Labs: reviewed  - CXR/EKG: not obtained  - PAP: Collected 4/10/25 and pending; last NILM/HPV negative 12/30/2020  - EMBX Yes - Collected today and sent for pathologic review  - Case request placed, H&P and pre-op orders completed       Juan Pablo Potts MD MS  OB/Gyn  PGY-2        denies pain/discomfort

## 2025-04-17 NOTE — PROCEDURES
Giovanni Pena is a 44 y.o. female patient.    BP: 112/62 (04/17/25 1312)       Endometrial biopsy    Date/Time: 4/17/2025 1:00 PM    Performed by: Juan Pablo Potts MD  Authorized by: Juan Pablo Potts MD    Consent:     Consent given by:  Patient    Patient questions answered: yes      Patient agrees, verbalizes understanding, and wants to proceed: yes    Indication:     Indications: Menorrhagia    Procedure:     Procedure: endometrial biopsy with Pipelle      Cervix cleaned and prepped: yes      Use of single-tooth tenaculum: yes      Uterus sounded: yes      Uterus sound depth (cm):  10    Curettes used:  1    Specimen collected: specimen collected and sent to pathology      Patient tolerated procedure well with no complications: yes        4/17/2025      Juan Pablo Potts MD MS  OB/Gyn  PGY-2

## 2025-04-17 NOTE — H&P (VIEW-ONLY)
Subjective     Patient ID: Giovanni Pena is a 44 y.o. female.    Chief Complaint:  Abnormal uterine bleeding    History of Present Illness   with history of SOCORRO, RA, portal vein thrombosis, GERD presents to GYN clinic for menorrhagia follow-up consulted during on hospitalization 3/29/25. Reports continued symptoms of fatigue, generalized weakness and intermittent nausea, which she contributes to her long-standing SOCORRO. She received a blood transfusion during her last hospitalization and subsequently had a hemolytic transfusion reaction with symptoms of yellowing of the eyes and dark urine. Since then, these symptoms have resolved. However, the fatigue persists. Denies recent fever, chills, vomiting, dysuria, hematuria, headaches, syncopal episodes. Denies other symptoms.     During her hospitalization, she was educated on other options to alleviate her menorrhagia, including ablation/hysterectomy.  After being seen in clinic and initially inconclusive endometrial biopsy, she presents for repeat biopsy.  Attempt at second biopsy by staff confirms presence of posterior fibroid that distorts internal cervical os and which will likely limit feasibility of endometrial ablation.  After further discussion and counseling, Ms. Pena would like to pursue total laparoscopic hysterectomy.    GYN & OB History  Patient's last menstrual period was 2025 (exact date). ; currently bleeding  Date of Last Pap: 4/10/2025    OB History    Para Term  AB Living   5 4 4  1    SAB IAB Ectopic Multiple Live Births    1         # Outcome Date GA Lbr Lonnie/2nd Weight Sex Type Anes PTL Lv   5 IAB            4 Term      CS-Unspec      3 Term      CS-Unspec      2 Term      CS-Unspec      1 Term      CS-Unspec        Review of Systems  Review of Systems   Constitutional:  Negative for activity change, appetite change, chills, fatigue and fever.   Respiratory:  Negative for cough, shortness of breath and wheezing.     Cardiovascular:  Negative for chest pain, palpitations and leg swelling.   Gastrointestinal:  Negative for nausea and vomiting.   Genitourinary:  Positive for vaginal bleeding. Negative for dysuria, hematuria, pelvic pain, vaginal discharge, vaginal pain, vaginal dryness and vaginal odor.   Neurological:  Negative for headaches.        Objective   Physical Exam:   Constitutional: She is oriented to person, place, and time. She appears well-developed and well-nourished. No distress.    HENT:   Head: Normocephalic and atraumatic.      Cardiovascular:  Normal rate.             Pulmonary/Chest: Effort normal. No respiratory distress.        Abdominal: Soft. She exhibits no distension. There is no abdominal tenderness.     Genitourinary:    Pelvic exam was performed with patient supine.   The external female genitalia was normal.   Genitalia hair distrobution normal .     Labial bartholins normal.There is no rash, tenderness, lesion or injury on the right labia. There is no rash, tenderness, lesion or injury on the left labia. Cervix is normal. There is bleeding in the vagina. Uterus is hosting fibroids.           Musculoskeletal: Normal range of motion.       Neurological: She is alert and oriented to person, place, and time.    Skin: Skin is warm and dry. She is not diaphoretic.    Psychiatric: She has a normal mood and affect. Her behavior is normal. Judgment and thought content normal.      TVUS (3/19/25):    FINDINGS:  Uterus:  Size: 10.1 x 5 x 6.8 cm     Multiple intramural and subserosal leiomyomas.  Subserosal leiomyoma on the right, 2.9 cm (previously 2.3 cm).  Posterior intramural leiomyoma, 3.1 cm.  Subserosal leiomyoma on the left, 2.6 cm.  Intramural leiomyoma lower uterine segment, 2.9 cm.     Intrauterine device resides along the endometrial canal of the lower uterine segment and cervical canal.     Right ovary:  Size: 3.4 x 2.7 x 3.2 cm     Left ovary:  Size: 2.8 x 1.6 x 2.9 cm     Free Fluid: None.      Impression:  Multiple intramural and subserosal leiomyomas measure up to 3.1 cm.  Intrauterine device is low lying, residing along the endometrial canal of the lower uterine segment and cervical canal.        Assessment and Plan     1. Menorrhagia with irregular cycle         Plan:  To OR for TLH    I have discussed the risks, benefits, indications, and alternatives of the procedure in detail.  The patient verbalizes her understanding, all questions answered. Consents for total laparoscopic hysterectomy signed. Explicitly discussed possibility of conversion to open procedure.  Explicitly discussed increased risk of bladder injury given h/o C/Sx4.  Patient understands and is in agreement.     Pre Op Checklist:   - Co-morbidities include SOCORRO, RA, portal vein thrombosis and GERD.  Pre-op clearance already scheduled.   - Anticoagulation : Patient is NOT on prophylactic/therapeutic antiocoagulation.  - Caprini VTE risk: High Risk, warrants SCDs and pharmaceutical VTE ppx; will discuss with GYN staff on day of surgery  - Patient does not require prophylactic anti-coagulation post operatively.    - Labs: reviewed  - CXR/EKG: not obtained  - PAP: Collected 4/10/25 and pending; last NILM/HPV negative 12/30/2020  - EMBX Yes - Collected today and sent for pathologic review  - Case request placed, H&P and pre-op orders completed       Juan Pablo Potts MD MS  OB/Gyn  PGY-2

## 2025-04-17 NOTE — TELEPHONE ENCOUNTER
Spoke with pt to advise that per Dr Khoobehi,  her labs were okay but he needs to keep a close eye on her.  Notified that he would like to repeat everything in 2 weeks and see her at that time as well. Appointments scheduled. Pt verbalized understanding.

## 2025-04-21 ENCOUNTER — TELEPHONE (OUTPATIENT)
Dept: OBSTETRICS AND GYNECOLOGY | Facility: CLINIC | Age: 45
End: 2025-04-21
Payer: COMMERCIAL

## 2025-04-21 NOTE — TELEPHONE ENCOUNTER
----- Message from Stephenie sent at 2025  3:43 PM CDT -----  [phone]  Type: Patient Call BackWho called: self What is the request in detail: pt asked for a call back regarding her procedure, no further details were given. Can the clinic reply by [facility]? noWould the patient rather a call back or a response via [facility]? Call back Best call back number: [phone]

## 2025-04-22 ENCOUNTER — ANESTHESIA EVENT (OUTPATIENT)
Dept: SURGERY | Facility: OTHER | Age: 45
End: 2025-04-22
Payer: COMMERCIAL

## 2025-04-23 ENCOUNTER — HOSPITAL ENCOUNTER (OUTPATIENT)
Dept: PREADMISSION TESTING | Facility: OTHER | Age: 45
Discharge: HOME | End: 2025-04-23
Attending: STUDENT IN AN ORGANIZED HEALTH CARE EDUCATION/TRAINING PROGRAM
Payer: COMMERCIAL

## 2025-04-23 ENCOUNTER — PATIENT MESSAGE (OUTPATIENT)
Dept: OBSTETRICS AND GYNECOLOGY | Facility: CLINIC | Age: 45
End: 2025-04-23
Payer: COMMERCIAL

## 2025-04-23 VITALS
BODY MASS INDEX: 27.47 KG/M2 | HEART RATE: 75 BPM | WEIGHT: 175 LBS | DIASTOLIC BLOOD PRESSURE: 71 MMHG | SYSTOLIC BLOOD PRESSURE: 117 MMHG | TEMPERATURE: 98 F | RESPIRATION RATE: 16 BRPM | OXYGEN SATURATION: 100 % | HEIGHT: 67 IN

## 2025-04-23 DIAGNOSIS — N93.9 ABNORMAL UTERINE BLEEDING: ICD-10-CM

## 2025-04-23 DIAGNOSIS — Z01.818 PREOP TESTING: ICD-10-CM

## 2025-04-23 DIAGNOSIS — Z01.810 PREOPERATIVE CARDIOVASCULAR EXAMINATION: Primary | ICD-10-CM

## 2025-04-23 LAB
ABSOLUTE EOSINOPHIL (OHS): 0.07 K/UL
ABSOLUTE MONOCYTE (OHS): 0.24 K/UL (ref 0.3–1)
ABSOLUTE NEUTROPHIL COUNT (OHS): 1.6 K/UL (ref 1.8–7.7)
ANION GAP (OHS): 5 MMOL/L (ref 8–16)
ANISOCYTOSIS BLD QL SMEAR: SLIGHT
BASOPHILS # BLD AUTO: 0.02 K/UL
BASOPHILS NFR BLD AUTO: 0.7 %
BUN SERPL-MCNC: 8 MG/DL (ref 6–20)
CALCIUM SERPL-MCNC: 8.8 MG/DL (ref 8.7–10.5)
CHLORIDE SERPL-SCNC: 111 MMOL/L (ref 95–110)
CO2 SERPL-SCNC: 23 MMOL/L (ref 23–29)
CREAT SERPL-MCNC: 0.8 MG/DL (ref 0.5–1.4)
ERYTHROCYTE [DISTWIDTH] IN BLOOD BY AUTOMATED COUNT: ABNORMAL %
GFR SERPLBLD CREATININE-BSD FMLA CKD-EPI: >60 ML/MIN/1.73/M2
GLUCOSE SERPL-MCNC: 80 MG/DL (ref 70–110)
HCT VFR BLD AUTO: 26.9 % (ref 37–48.5)
HGB BLD-MCNC: 7.9 GM/DL (ref 12–16)
IMM GRANULOCYTES # BLD AUTO: 0.01 K/UL (ref 0–0.04)
IMM GRANULOCYTES NFR BLD AUTO: 0.4 % (ref 0–0.5)
INDIRECT COOMBS: NORMAL
LYMPHOCYTES # BLD AUTO: 0.78 K/UL (ref 1–4.8)
MCH RBC QN AUTO: 23.6 PG (ref 27–31)
MCHC RBC AUTO-ENTMCNC: 29.4 G/DL (ref 32–36)
MCV RBC AUTO: 80 FL (ref 82–98)
NUCLEATED RBC (/100WBC) (OHS): 0 /100 WBC
OVALOCYTES BLD QL SMEAR: NORMAL
PLATELET # BLD AUTO: 123 K/UL (ref 150–450)
PLATELET BLD QL SMEAR: NORMAL
PMV BLD AUTO: 10.2 FL (ref 9.2–12.9)
POIKILOCYTOSIS BLD QL SMEAR: SLIGHT
POLYCHROMASIA BLD QL SMEAR: NORMAL
POTASSIUM SERPL-SCNC: 3.9 MMOL/L (ref 3.5–5.1)
RBC # BLD AUTO: 3.35 M/UL (ref 4–5.4)
RELATIVE EOSINOPHIL (OHS): 2.6 %
RELATIVE LYMPHOCYTE (OHS): 28.7 % (ref 18–48)
RELATIVE MONOCYTE (OHS): 8.8 % (ref 4–15)
RELATIVE NEUTROPHIL (OHS): 58.8 % (ref 38–73)
RH BLD: NORMAL
SODIUM SERPL-SCNC: 139 MMOL/L (ref 136–145)
SPECIMEN OUTDATE: NORMAL
WBC # BLD AUTO: 2.72 K/UL (ref 3.9–12.7)

## 2025-04-23 PROCEDURE — 86850 RBC ANTIBODY SCREEN: CPT

## 2025-04-23 PROCEDURE — 85025 COMPLETE CBC W/AUTO DIFF WBC: CPT

## 2025-04-23 PROCEDURE — 82374 ASSAY BLOOD CARBON DIOXIDE: CPT

## 2025-04-23 RX ORDER — SODIUM CHLORIDE, SODIUM LACTATE, POTASSIUM CHLORIDE, CALCIUM CHLORIDE 600; 310; 30; 20 MG/100ML; MG/100ML; MG/100ML; MG/100ML
INJECTION, SOLUTION INTRAVENOUS CONTINUOUS
Status: CANCELLED | OUTPATIENT
Start: 2025-04-23

## 2025-04-23 RX ORDER — SODIUM CHLORIDE 9 MG/ML
INJECTION, SOLUTION INTRAVENOUS CONTINUOUS
Status: CANCELLED | OUTPATIENT
Start: 2025-04-23

## 2025-04-23 RX ORDER — HYDROCODONE BITARTRATE AND ACETAMINOPHEN 500; 5 MG/1; MG/1
TABLET ORAL
Status: CANCELLED | OUTPATIENT
Start: 2025-04-23

## 2025-04-23 RX ORDER — FAMOTIDINE 20 MG/1
20 TABLET, FILM COATED ORAL
Status: DISCONTINUED | OUTPATIENT
Start: 2025-04-23 | End: 2025-04-23 | Stop reason: HOSPADM

## 2025-04-23 RX ORDER — LIDOCAINE HYDROCHLORIDE 10 MG/ML
0.5 INJECTION, SOLUTION EPIDURAL; INFILTRATION; INTRACAUDAL; PERINEURAL ONCE
Status: CANCELLED | OUTPATIENT
Start: 2025-04-23 | End: 2025-04-23

## 2025-04-23 RX ORDER — MUPIROCIN 20 MG/G
OINTMENT TOPICAL
Status: CANCELLED | OUTPATIENT
Start: 2025-04-23

## 2025-04-23 NOTE — PROGRESS NOTES
Called patient to discuss surgery timing. Instructed patient to arrive to [facility] at 0600 tomorrow for surgery. Discussed NPO after midnight. Patient voiced understanding and all questions answered.    Kami Riley MD  OB/GYN PGY-1

## 2025-04-23 NOTE — ANESTHESIA PREPROCEDURE EVALUATION
04/22/2025  Giovanni Pena is a 44 y.o., female.      Pre-op Assessment    I have reviewed the Patient Summary Reports.     I have reviewed the Nursing Notes. I have reviewed the NPO Status.   I have reviewed the Medications.     Review of Systems  Anesthesia Hx:             Denies Family Hx of Anesthesia complications.    Denies Personal Hx of Anesthesia complications.                    Social:  Non-Smoker       Hematology/Oncology:    Oncology Normal    -- Anemia (requiring recent transfustion. hx of transfusion reaction):               Hematology Comments: pcp note reported workup for clotting showed elevated factor VIII assay                    EENT/Dental:  EENT/Dental Normal           Cardiovascular:  Cardiovascular Normal                                              Pulmonary:  Pulmonary Normal                       Renal/:  Renal/ Normal    Hx of renal vein embolism/thrombosis, previously on eliquis but has not been on it in years             Hepatic/GI:    Hiatal Hernia, GERD, well controlled   Hx of portal vein and SMV, and splenic thrombosis. -previously on eliquis but has not been on it in years  -also hx of splenomegaly    Hx of hyperbilirubinemia, believed to be due to hemolytic anemia, possibly as a reaction to transfusion (happened in 04/2025 as well as 2023)    Hx of gastric sleeve    esophageal varices             Musculoskeletal:  Arthritis (rhematoid arthritis)               Neurological:  Neurology Normal                                      Endocrine:  Endocrine Normal            Dermatological:  Skin Normal    Psych:  Psychiatric Normal                  Physical Exam  General: Well nourished, Cooperative, Alert and Oriented    Airway:  Mallampati: II   Mouth Opening: Normal  TM Distance: Normal  Tongue: Normal  Neck ROM: Normal ROM    Dental:  Veneers  Veneers to all of her  teeth      Anesthesia Plan  Type of Anesthesia, risks & benefits discussed:    Anesthesia Type: Gen ETT  Intra-op Monitoring Plan: Standard ASA Monitors  Post Op Pain Control Plan: multimodal analgesia  Induction:  IV  Airway Plan: Video, Post-Induction  ASA Score: 3  Day of Surgery Review of History & Physical: H&P Update referred to the surgeon/provider.  Anesthesia Plan Notes: CBC, BMP [id number] in epic, K was low will repeat K. T&S  *Hx of hemolytic anemia, believed to be due to blood transfusion reaction, happened 03/2025 as well as in 2023*  Had heavy mensis recently, which ended 04/22/25  *Per Gastro she has esophageal varices and is on nadolol* has hx of portal vein thrombosis.     Ready For Surgery From Anesthesia Perspective.     .

## 2025-04-23 NOTE — DISCHARGE INSTRUCTIONS
Information to Prepare you for your Surgery    PRE-ADMIT TESTING   [address]  [place] [address]  [address]     Your surgery has been scheduled at [facility]. We are pleased to have the opportunity to serve you. For Further Information please call [phone].    On the day of surgery please report to Registration on the 1st floor of the [facility].    CONTACT YOUR PHYSICIAN'S OFFICE THE DAY PRIOR TO YOUR SURGERY TO OBTAIN YOUR ARRIVAL TIME.     The evening before surgery do not eat anything after 9 p.m. ( this includes hard candy, chewing gum and mints).  You may only have GATORADE, POWERADE AND WATER  from 9 p.m. until you leave your home.   DO NOT DRINK ANY LIQUIDS ON THE WAY TO THE HOSPITAL.      Why does your anesthesiologist allow you to drink Gatorade/Powerade before surgery?  Gatorade/Powerade helps to increase your comfort before surgery and to decrease your nausea after surgery.   The carbohydrates in Gatorade/Powerade help reduce your body's stress response to surgery.  If you are a diabetic-drink only water prior to surgery.    Outpatient Surgery- May allow 2 adults (18 and older)/ Support Persons (1 being the designated ) for all surgical/procedural patients. A breastfeeding mother will be allowed her infant and 2 adult Support Persons. No one under the age of 18 will be allowed in the building.    MEDICATION INSTRUCTIONS: TAKE medications checked off by the Anesthesiologist on your Medication List.    Surgery Patients:  If you take ASPIRIN - Your PHYSICIAN/SURGEON will need to inform you IF/OR when you need to stop taking aspirin prior to your surgery.     Starting the week prior to surgery, do not take any medications containing IBUPROFEN or NSAIDS (Advil, Aleve, BC, Celebrex, Goody's, Ketorolac, Meloxicam, Mobic, Motrin, Naproxen, Toradol, etc).  If you are not sure if you should take a medicine please call your surgeon's office.  You may take Tylenol.    Do  Not Wear any make-up (especially eye make-up) to surgery. Please remove any false eyelashes or eyelash extensions. If you arrive the day of surgery with makeup/eyelashes on you will be required to remove prior to surgery. (There is a risk of corneal abrasions if eye makeup/eyelash extensions are not removed)    Leave all valuables at home.   Do Not wear any jewelry or watches, including any metal in body piercings. Jewelry must be removed prior to coming to the hospital.  There is a possibility that rings that are unable to be removed may be cut off if they are on the surgical extremity.    Please remove all hair extensions, wigs, clips and any other metal accessories/ ornaments from your hair.  These items may pose a flammable/fire risk in Surgery and must be removed.    Do not shave your surgical area at least 5 days prior to your surgery. The surgical prep will be performed at the hospital according to Infection Control regulations.    Contact Lens must be removed before surgery. Either do not wear the contact lens or bring a case and solution for storage.  Please bring a container for eyeglasses or dentures as required.  Bring any paperwork your physician has provided, such as consent forms,  history and physicals, doctor's orders, etc.   Bring comfortable clothes that are loose fitting to wear upon discharge. Take into consideration the type of surgery being performed.  Maintain your diet as advised per your physician the day prior to surgery.    Adequate rest the night before surgery is advised.   Park in the Parking lot behind the hospital or in the [facility] across the street from the parking lot. Parking is complimentary.  If you will be discharged the same day as your procedure, please arrange for a responsible adult to drive you home or to accompany you if traveling by taxi.   YOU WILL NOT BE PERMITTED TO DRIVE OR TO LEAVE THE HOSPITAL ALONE AFTER SURGERY.   If you are being discharged the  same day, it is strongly recommended that you arrange for someone to remain with you for the first 24 hrs following your surgery.    The Surgeon will speak to your family/visitor after your surgery regarding the outcome of your surgery and post op care.  The Surgeon may speak to you after your surgery, but there is a possibility you may not remember the details.  Please check with your family members regarding the conversation with the Surgeon.    We strongly recommend whoever is bringing you home be present for discharge instructions.  This will ensure a thorough understanding for your post op home care.              Bathing Instructions with Hibiclens  Shower the evening before and morning of your procedure with Chlorhexidine (Hibiclens)    Do not use Chlorhexidine on your face or genitals. Do not get in your eyes.  Wash your face with water and your regular face wash/soap  Use your regular shampoo  Apply Chlorhexidine (Hibiclens) directly on your skin or on a wet washcloth and wash gently. When showering: Move away from the shower stream when applying Chlorhexidine (Hibiclens) to avoid rinsing off too soon.  Rinse thoroughly with warm water  Do not dilute Chlorhexidine (Hibiclens)   Dry off as usual, do not use any deodorant, powder, body lotions, perfume, after shave or cologne.     If the patient has fever, cough, or signs/symptoms of Flu or Covid please do not come in for your surgery.   Contact your surgeon and your primary care physician for further instructions.   Please also call [facility] Outpatient Surgery [phone]. The unit opens at 5 AM.    If applicable, please bring your blood pressure & diabetes medications the day of surgery.

## 2025-04-24 ENCOUNTER — HOSPITAL ENCOUNTER (INPATIENT)
Facility: OTHER | Age: 45
LOS: 1 days | Discharge: HOME | DRG: 743 | End: 2025-04-25
Attending: STUDENT IN AN ORGANIZED HEALTH CARE EDUCATION/TRAINING PROGRAM | Admitting: STUDENT IN AN ORGANIZED HEALTH CARE EDUCATION/TRAINING PROGRAM
Payer: COMMERCIAL

## 2025-04-24 ENCOUNTER — ANESTHESIA (OUTPATIENT)
Dept: SURGERY | Facility: OTHER | Age: 45
End: 2025-04-24
Payer: COMMERCIAL

## 2025-04-24 DIAGNOSIS — Z90.710 STATUS POST LAPAROSCOPIC HYSTERECTOMY: ICD-10-CM

## 2025-04-24 DIAGNOSIS — Z01.818 PREOP TESTING: ICD-10-CM

## 2025-04-24 DIAGNOSIS — N93.9 ABNORMAL UTERINE BLEEDING (AUB): ICD-10-CM

## 2025-04-24 DIAGNOSIS — D50.0 IRON DEFICIENCY ANEMIA DUE TO CHRONIC BLOOD LOSS: ICD-10-CM

## 2025-04-24 DIAGNOSIS — N92.1 MENORRHAGIA WITH IRREGULAR CYCLE: Primary | ICD-10-CM

## 2025-04-24 DIAGNOSIS — N93.9 ABNORMAL UTERINE BLEEDING: ICD-10-CM

## 2025-04-24 LAB
ABO + RH BLD: NORMAL
ABSOLUTE EOSINOPHIL (OHS): 0 K/UL
ABSOLUTE EOSINOPHIL (OHS): 0.01 K/UL
ABSOLUTE MONOCYTE (OHS): 0.04 K/UL (ref 0.3–1)
ABSOLUTE MONOCYTE (OHS): 0.39 K/UL (ref 0.3–1)
ABSOLUTE NEUTROPHIL COUNT (OHS): 2.65 K/UL (ref 1.8–7.7)
ABSOLUTE NEUTROPHIL COUNT (OHS): 3.45 K/UL (ref 1.8–7.7)
ALBUMIN SERPL BCP-MCNC: 3.1 G/DL (ref 3.5–5.2)
ALP SERPL-CCNC: 66 UNIT/L (ref 40–150)
ALT SERPL W/O P-5'-P-CCNC: 13 UNIT/L (ref 10–44)
ANION GAP (OHS): 8 MMOL/L (ref 8–16)
ANISOCYTOSIS BLD QL SMEAR: SLIGHT
AST SERPL-CCNC: 19 UNIT/L (ref 11–45)
B-HCG UR QL: NEGATIVE
BASOPHILS # BLD AUTO: 0 K/UL
BASOPHILS # BLD AUTO: 0.01 K/UL
BASOPHILS NFR BLD AUTO: 0 %
BASOPHILS NFR BLD AUTO: 0.3 %
BILIRUB SERPL-MCNC: 1.1 MG/DL (ref 0.1–1)
BLD PROD TYP BPU: NORMAL
BLOOD UNIT EXPIRATION DATE: NORMAL
BLOOD UNIT TYPE CODE: 600
BUN SERPL-MCNC: 8 MG/DL (ref 6–20)
CALCIUM SERPL-MCNC: 8.2 MG/DL (ref 8.7–10.5)
CHLORIDE SERPL-SCNC: 110 MMOL/L (ref 95–110)
CO2 SERPL-SCNC: 21 MMOL/L (ref 23–29)
CREAT SERPL-MCNC: 0.7 MG/DL (ref 0.5–1.4)
CROSSMATCH INTERPRETATION: NORMAL
CTP QC/QA: YES
DISPENSE STATUS: NORMAL
ERYTHROCYTE [DISTWIDTH] IN BLOOD BY AUTOMATED COUNT: 28.1 % (ref 11.5–14.5)
ERYTHROCYTE [DISTWIDTH] IN BLOOD BY AUTOMATED COUNT: ABNORMAL %
GFR SERPLBLD CREATININE-BSD FMLA CKD-EPI: >60 ML/MIN/1.73/M2
GLUCOSE SERPL-MCNC: 84 MG/DL (ref 70–110)
HAPTOGLOB SERPL-MCNC: 48 MG/DL (ref 30–250)
HCT VFR BLD AUTO: 21.4 % (ref 37–48.5)
HCT VFR BLD AUTO: 24.2 % (ref 37–48.5)
HGB BLD-MCNC: 6.5 GM/DL (ref 12–16)
HGB BLD-MCNC: 7.6 GM/DL (ref 12–16)
IMM GRANULOCYTES # BLD AUTO: 0.02 K/UL (ref 0–0.04)
IMM GRANULOCYTES # BLD AUTO: 0.02 K/UL (ref 0–0.04)
IMM GRANULOCYTES NFR BLD AUTO: 0.5 % (ref 0–0.5)
IMM GRANULOCYTES NFR BLD AUTO: 0.7 % (ref 0–0.5)
INDIRECT COOMBS: NORMAL
LDH SERPL-CCNC: 180 U/L (ref 110–260)
LDH SERPL-CCNC: 247 U/L (ref 110–260)
LYMPHOCYTES # BLD AUTO: 0.3 K/UL (ref 1–4.8)
LYMPHOCYTES # BLD AUTO: 0.3 K/UL (ref 1–4.8)
MCH RBC QN AUTO: 24.3 PG (ref 27–31)
MCH RBC QN AUTO: 25 PG (ref 27–31)
MCHC RBC AUTO-ENTMCNC: 30.4 G/DL (ref 32–36)
MCHC RBC AUTO-ENTMCNC: 31.4 G/DL (ref 32–36)
MCV RBC AUTO: 80 FL (ref 82–98)
MCV RBC AUTO: 80 FL (ref 82–98)
NUCLEATED RBC (/100WBC) (OHS): 0 /100 WBC
NUCLEATED RBC (/100WBC) (OHS): 0 /100 WBC
OVALOCYTES BLD QL SMEAR: ABNORMAL
PLATELET # BLD AUTO: 113 K/UL (ref 150–450)
PLATELET # BLD AUTO: 114 K/UL (ref 150–450)
PLATELET BLD QL SMEAR: ABNORMAL
PMV BLD AUTO: 10.2 FL (ref 9.2–12.9)
PMV BLD AUTO: 10.5 FL (ref 9.2–12.9)
POCT GLUCOSE: 95 MG/DL (ref 70–110)
POLYCHROMASIA BLD QL SMEAR: ABNORMAL
POTASSIUM SERPL-SCNC: 3.5 MMOL/L (ref 3.5–5.1)
PROT SERPL-MCNC: 6.5 GM/DL (ref 6–8.4)
RBC # BLD AUTO: 2.67 M/UL (ref 4–5.4)
RBC # BLD AUTO: 3.04 M/UL (ref 4–5.4)
RELATIVE EOSINOPHIL (OHS): 0 %
RELATIVE EOSINOPHIL (OHS): 0.3 %
RELATIVE LYMPHOCYTE (OHS): 7.2 % (ref 18–48)
RELATIVE LYMPHOCYTE (OHS): 9.9 % (ref 18–48)
RELATIVE MONOCYTE (OHS): 1.3 % (ref 4–15)
RELATIVE MONOCYTE (OHS): 9.4 % (ref 4–15)
RELATIVE NEUTROPHIL (OHS): 82.9 % (ref 38–73)
RELATIVE NEUTROPHIL (OHS): 87.5 % (ref 38–73)
RETICS/RBC NFR AUTO: 2.2 % (ref 0.5–2.5)
RH BLD: NORMAL
SODIUM SERPL-SCNC: 139 MMOL/L (ref 136–145)
SPECIMEN OUTDATE: NORMAL
UNIT NUMBER: NORMAL
WBC # BLD AUTO: 3.03 K/UL (ref 3.9–12.7)
WBC # BLD AUTO: 4.16 K/UL (ref 3.9–12.7)

## 2025-04-24 PROCEDURE — 94799 UNLISTED PULMONARY SVC/PX: CPT

## 2025-04-24 PROCEDURE — 36415 COLL VENOUS BLD VENIPUNCTURE: CPT

## 2025-04-24 PROCEDURE — P9045 ALBUMIN (HUMAN), 5%, 250 ML: HCPCS | Mod: JZ,TB | Performed by: STUDENT IN AN ORGANIZED HEALTH CARE EDUCATION/TRAINING PROGRAM

## 2025-04-24 PROCEDURE — 63600175 PHARM REV CODE 636 W HCPCS

## 2025-04-24 PROCEDURE — 63600175 PHARM REV CODE 636 W HCPCS: Mod: JZ,TB | Performed by: STUDENT IN AN ORGANIZED HEALTH CARE EDUCATION/TRAINING PROGRAM

## 2025-04-24 PROCEDURE — 88302 TISSUE EXAM BY PATHOLOGIST: CPT | Mod: TC | Performed by: STUDENT IN AN ORGANIZED HEALTH CARE EDUCATION/TRAINING PROGRAM

## 2025-04-24 PROCEDURE — 81025 URINE PREGNANCY TEST: CPT

## 2025-04-24 PROCEDURE — 0UT74ZZ RESECTION OF BILATERAL FALLOPIAN TUBES, PERCUTANEOUS ENDOSCOPIC APPROACH: ICD-10-PCS | Performed by: STUDENT IN AN ORGANIZED HEALTH CARE EDUCATION/TRAINING PROGRAM

## 2025-04-24 PROCEDURE — 36000710: Performed by: STUDENT IN AN ORGANIZED HEALTH CARE EDUCATION/TRAINING PROGRAM

## 2025-04-24 PROCEDURE — 63600175 PHARM REV CODE 636 W HCPCS: Performed by: STUDENT IN AN ORGANIZED HEALTH CARE EDUCATION/TRAINING PROGRAM

## 2025-04-24 PROCEDURE — 25000003 PHARM REV CODE 250

## 2025-04-24 PROCEDURE — 27201423 OPTIME MED/SURG SUP & DEVICES STERILE SUPPLY: Performed by: STUDENT IN AN ORGANIZED HEALTH CARE EDUCATION/TRAINING PROGRAM

## 2025-04-24 PROCEDURE — 71000039 HC RECOVERY, EACH ADD'L HOUR: Performed by: STUDENT IN AN ORGANIZED HEALTH CARE EDUCATION/TRAINING PROGRAM

## 2025-04-24 PROCEDURE — 71000033 HC RECOVERY, INTIAL HOUR: Performed by: STUDENT IN AN ORGANIZED HEALTH CARE EDUCATION/TRAINING PROGRAM

## 2025-04-24 PROCEDURE — 86920 COMPATIBILITY TEST SPIN: CPT | Performed by: ANESTHESIOLOGY

## 2025-04-24 PROCEDURE — 37000008 HC ANESTHESIA 1ST 15 MINUTES: Performed by: STUDENT IN AN ORGANIZED HEALTH CARE EDUCATION/TRAINING PROGRAM

## 2025-04-24 PROCEDURE — 83615 LACTATE (LD) (LDH) ENZYME: CPT

## 2025-04-24 PROCEDURE — 76937 US GUIDE VASCULAR ACCESS: CPT | Mod: 26,,, | Performed by: ANESTHESIOLOGY

## 2025-04-24 PROCEDURE — 85025 COMPLETE CBC W/AUTO DIFF WBC: CPT

## 2025-04-24 PROCEDURE — 94761 N-INVAS EAR/PLS OXIMETRY MLT: CPT

## 2025-04-24 PROCEDURE — 37000009 HC ANESTHESIA EA ADD 15 MINS: Performed by: STUDENT IN AN ORGANIZED HEALTH CARE EDUCATION/TRAINING PROGRAM

## 2025-04-24 PROCEDURE — 99900035 HC TECH TIME PER 15 MIN (STAT)

## 2025-04-24 PROCEDURE — 63600175 PHARM REV CODE 636 W HCPCS: Performed by: ANESTHESIOLOGY

## 2025-04-24 PROCEDURE — 85045 AUTOMATED RETICULOCYTE COUNT: CPT

## 2025-04-24 PROCEDURE — 80053 COMPREHEN METABOLIC PANEL: CPT

## 2025-04-24 PROCEDURE — 86922 COMPATIBILITY TEST ANTIGLOB: CPT | Performed by: ANESTHESIOLOGY

## 2025-04-24 PROCEDURE — 20000000 HC ICU ROOM

## 2025-04-24 PROCEDURE — 0TJB8ZZ INSPECTION OF BLADDER, VIA NATURAL OR ARTIFICIAL OPENING ENDOSCOPIC: ICD-10-PCS | Performed by: STUDENT IN AN ORGANIZED HEALTH CARE EDUCATION/TRAINING PROGRAM

## 2025-04-24 PROCEDURE — 86922 COMPATIBILITY TEST ANTIGLOB: CPT

## 2025-04-24 PROCEDURE — 36620 INSERTION CATHETER ARTERY: CPT | Mod: 59,,, | Performed by: ANESTHESIOLOGY

## 2025-04-24 PROCEDURE — 83010 ASSAY OF HAPTOGLOBIN QUANT: CPT

## 2025-04-24 PROCEDURE — P9040 RBC LEUKOREDUCED IRRADIATED: HCPCS | Performed by: ANESTHESIOLOGY

## 2025-04-24 PROCEDURE — 58571 TLH W/T/O 250 G OR LESS: CPT | Mod: ,,, | Performed by: STUDENT IN AN ORGANIZED HEALTH CARE EDUCATION/TRAINING PROGRAM

## 2025-04-24 PROCEDURE — 82962 GLUCOSE BLOOD TEST: CPT | Performed by: STUDENT IN AN ORGANIZED HEALTH CARE EDUCATION/TRAINING PROGRAM

## 2025-04-24 PROCEDURE — 85025 COMPLETE CBC W/AUTO DIFF WBC: CPT | Performed by: STUDENT IN AN ORGANIZED HEALTH CARE EDUCATION/TRAINING PROGRAM

## 2025-04-24 PROCEDURE — 0UT94ZZ RESECTION OF UTERUS, PERCUTANEOUS ENDOSCOPIC APPROACH: ICD-10-PCS | Performed by: STUDENT IN AN ORGANIZED HEALTH CARE EDUCATION/TRAINING PROGRAM

## 2025-04-24 PROCEDURE — 86900 BLOOD TYPING SEROLOGIC ABO: CPT

## 2025-04-24 PROCEDURE — 25000003 PHARM REV CODE 250: Performed by: STUDENT IN AN ORGANIZED HEALTH CARE EDUCATION/TRAINING PROGRAM

## 2025-04-24 PROCEDURE — 36000711: Performed by: STUDENT IN AN ORGANIZED HEALTH CARE EDUCATION/TRAINING PROGRAM

## 2025-04-24 PROCEDURE — 30233N1 TRANSFUSION OF NONAUTOLOGOUS RED BLOOD CELLS INTO PERIPHERAL VEIN, PERCUTANEOUS APPROACH: ICD-10-PCS | Performed by: STUDENT IN AN ORGANIZED HEALTH CARE EDUCATION/TRAINING PROGRAM

## 2025-04-24 RX ORDER — ADHESIVE BANDAGE
30 BANDAGE TOPICAL 2 TIMES DAILY
Status: DISCONTINUED | OUTPATIENT
Start: 2025-04-24 | End: 2025-04-25 | Stop reason: HOSPADM

## 2025-04-24 RX ORDER — OXYCODONE HYDROCHLORIDE 10 MG/1
10 TABLET ORAL EVERY 4 HOURS PRN
Status: DISCONTINUED | OUTPATIENT
Start: 2025-04-24 | End: 2025-04-25 | Stop reason: HOSPADM

## 2025-04-24 RX ORDER — MUPIROCIN 20 MG/G
OINTMENT TOPICAL 2 TIMES DAILY
Status: DISCONTINUED | OUTPATIENT
Start: 2025-04-24 | End: 2025-04-25 | Stop reason: HOSPADM

## 2025-04-24 RX ORDER — OXYCODONE HYDROCHLORIDE 5 MG/1
5 TABLET ORAL EVERY 4 HOURS PRN
Status: DISCONTINUED | OUTPATIENT
Start: 2025-04-24 | End: 2025-04-25 | Stop reason: HOSPADM

## 2025-04-24 RX ORDER — OXYCODONE HYDROCHLORIDE 5 MG/1
5 TABLET ORAL EVERY 4 HOURS PRN
Status: DISCONTINUED | OUTPATIENT
Start: 2025-04-24 | End: 2025-04-24

## 2025-04-24 RX ORDER — SODIUM CHLORIDE 0.9 % (FLUSH) 0.9 %
3 SYRINGE (ML) INJECTION
Status: DISCONTINUED | OUTPATIENT
Start: 2025-04-24 | End: 2025-04-24

## 2025-04-24 RX ORDER — DIPHENHYDRAMINE HYDROCHLORIDE 50 MG/ML
12.5 INJECTION, SOLUTION INTRAMUSCULAR; INTRAVENOUS EVERY 30 MIN PRN
Status: DISCONTINUED | OUTPATIENT
Start: 2025-04-24 | End: 2025-04-24

## 2025-04-24 RX ORDER — MIDAZOLAM HYDROCHLORIDE 1 MG/ML
INJECTION INTRAMUSCULAR; INTRAVENOUS
Status: DISCONTINUED | OUTPATIENT
Start: 2025-04-24 | End: 2025-04-24

## 2025-04-24 RX ORDER — PROCHLORPERAZINE EDISYLATE 5 MG/ML
5 INJECTION INTRAMUSCULAR; INTRAVENOUS EVERY 30 MIN PRN
Status: DISCONTINUED | OUTPATIENT
Start: 2025-04-24 | End: 2025-04-24

## 2025-04-24 RX ORDER — LANOLIN ALCOHOL/MO/W.PET/CERES
1 CREAM (GRAM) TOPICAL DAILY
Status: CANCELLED | OUTPATIENT
Start: 2025-04-24

## 2025-04-24 RX ORDER — ACETAMINOPHEN 500 MG
1000 TABLET ORAL EVERY 6 HOURS
Status: DISCONTINUED | OUTPATIENT
Start: 2025-04-24 | End: 2025-04-25 | Stop reason: HOSPADM

## 2025-04-24 RX ORDER — SODIUM CHLORIDE, SODIUM LACTATE, POTASSIUM CHLORIDE, CALCIUM CHLORIDE 600; 310; 30; 20 MG/100ML; MG/100ML; MG/100ML; MG/100ML
INJECTION, SOLUTION INTRAVENOUS CONTINUOUS
Status: DISCONTINUED | OUTPATIENT
Start: 2025-04-24 | End: 2025-04-24

## 2025-04-24 RX ORDER — ONDANSETRON HYDROCHLORIDE 2 MG/ML
INJECTION, SOLUTION INTRAMUSCULAR; INTRAVENOUS
Status: DISCONTINUED | OUTPATIENT
Start: 2025-04-24 | End: 2025-04-24

## 2025-04-24 RX ORDER — FENTANYL CITRATE 50 UG/ML
INJECTION, SOLUTION INTRAMUSCULAR; INTRAVENOUS
Status: DISCONTINUED | OUTPATIENT
Start: 2025-04-24 | End: 2025-04-24

## 2025-04-24 RX ORDER — HYDROMORPHONE HYDROCHLORIDE 2 MG/ML
0.4 INJECTION, SOLUTION INTRAMUSCULAR; INTRAVENOUS; SUBCUTANEOUS
Status: DISCONTINUED | OUTPATIENT
Start: 2025-04-24 | End: 2025-04-25 | Stop reason: HOSPADM

## 2025-04-24 RX ORDER — SODIUM CHLORIDE, SODIUM LACTATE, POTASSIUM CHLORIDE, CALCIUM CHLORIDE 600; 310; 30; 20 MG/100ML; MG/100ML; MG/100ML; MG/100ML
INJECTION, SOLUTION INTRAVENOUS CONTINUOUS
Status: DISCONTINUED | OUTPATIENT
Start: 2025-04-24 | End: 2025-04-25 | Stop reason: HOSPADM

## 2025-04-24 RX ORDER — PROCHLORPERAZINE EDISYLATE 5 MG/ML
5 INJECTION INTRAMUSCULAR; INTRAVENOUS EVERY 6 HOURS PRN
Status: DISCONTINUED | OUTPATIENT
Start: 2025-04-24 | End: 2025-04-25 | Stop reason: HOSPADM

## 2025-04-24 RX ORDER — SENNOSIDES 8.6 MG/1
8.6 TABLET ORAL 2 TIMES DAILY
Status: DISCONTINUED | OUTPATIENT
Start: 2025-04-24 | End: 2025-04-25 | Stop reason: HOSPADM

## 2025-04-24 RX ORDER — TRAMADOL HYDROCHLORIDE 50 MG/1
50 TABLET ORAL EVERY 6 HOURS
Status: DISCONTINUED | OUTPATIENT
Start: 2025-04-24 | End: 2025-04-25 | Stop reason: HOSPADM

## 2025-04-24 RX ORDER — ONDANSETRON HYDROCHLORIDE 2 MG/ML
4 INJECTION, SOLUTION INTRAVENOUS EVERY 6 HOURS PRN
Status: DISCONTINUED | OUTPATIENT
Start: 2025-04-24 | End: 2025-04-25 | Stop reason: HOSPADM

## 2025-04-24 RX ORDER — FAMOTIDINE 10 MG/ML
INJECTION, SOLUTION INTRAVENOUS
Status: DISCONTINUED | OUTPATIENT
Start: 2025-04-24 | End: 2025-04-24

## 2025-04-24 RX ORDER — PROPOFOL 10 MG/ML
VIAL (ML) INTRAVENOUS
Status: DISCONTINUED | OUTPATIENT
Start: 2025-04-24 | End: 2025-04-24

## 2025-04-24 RX ORDER — NALOXONE HCL 0.4 MG/ML
0.02 VIAL (ML) INJECTION
Status: DISCONTINUED | OUTPATIENT
Start: 2025-04-24 | End: 2025-04-25 | Stop reason: HOSPADM

## 2025-04-24 RX ORDER — GLUCAGON 1 MG
1 KIT INJECTION
Status: DISCONTINUED | OUTPATIENT
Start: 2025-04-24 | End: 2025-04-24

## 2025-04-24 RX ORDER — LIDOCAINE HYDROCHLORIDE 10 MG/ML
0.5 INJECTION, SOLUTION EPIDURAL; INFILTRATION; INTRACAUDAL; PERINEURAL ONCE
Status: DISCONTINUED | OUTPATIENT
Start: 2025-04-24 | End: 2025-04-24

## 2025-04-24 RX ORDER — HEPARIN SODIUM 5000 [USP'U]/ML
5000 INJECTION, SOLUTION INTRAVENOUS; SUBCUTANEOUS ONCE
Status: COMPLETED | OUTPATIENT
Start: 2025-04-24 | End: 2025-04-24

## 2025-04-24 RX ORDER — ROCURONIUM BROMIDE 10 MG/ML
INJECTION, SOLUTION INTRAVENOUS
Status: DISCONTINUED | OUTPATIENT
Start: 2025-04-24 | End: 2025-04-24

## 2025-04-24 RX ORDER — SODIUM CHLORIDE 0.9 % (FLUSH) 0.9 %
10 SYRINGE (ML) INJECTION
Status: DISCONTINUED | OUTPATIENT
Start: 2025-04-24 | End: 2025-04-25 | Stop reason: HOSPADM

## 2025-04-24 RX ORDER — DIPHENHYDRAMINE HYDROCHLORIDE 50 MG/ML
INJECTION, SOLUTION INTRAMUSCULAR; INTRAVENOUS
Status: DISCONTINUED | OUTPATIENT
Start: 2025-04-24 | End: 2025-04-24

## 2025-04-24 RX ORDER — MUPIROCIN 20 MG/G
OINTMENT TOPICAL
Status: DISCONTINUED | OUTPATIENT
Start: 2025-04-24 | End: 2025-04-24

## 2025-04-24 RX ORDER — DEXAMETHASONE SODIUM PHOSPHATE 4 MG/ML
INJECTION, SOLUTION INTRA-ARTICULAR; INTRALESIONAL; INTRAMUSCULAR; INTRAVENOUS; SOFT TISSUE
Status: DISCONTINUED | OUTPATIENT
Start: 2025-04-24 | End: 2025-04-24

## 2025-04-24 RX ORDER — DEXTROMETHORPHAN HYDROBROMIDE, GUAIFENESIN 5; 100 MG/5ML; MG/5ML
650 LIQUID ORAL EVERY 6 HOURS
Qty: 60 TABLET | Refills: 1 | Status: SHIPPED | OUTPATIENT
Start: 2025-04-24

## 2025-04-24 RX ORDER — LIDOCAINE HYDROCHLORIDE 20 MG/ML
INJECTION INTRAVENOUS
Status: DISCONTINUED | OUTPATIENT
Start: 2025-04-24 | End: 2025-04-24

## 2025-04-24 RX ORDER — OXYCODONE HYDROCHLORIDE 5 MG/1
5 TABLET ORAL EVERY 4 HOURS PRN
Qty: 15 TABLET | Refills: 0 | Status: SHIPPED | OUTPATIENT
Start: 2025-04-24 | End: 2025-04-25

## 2025-04-24 RX ORDER — SODIUM CHLORIDE 9 MG/ML
INJECTION, SOLUTION INTRAVENOUS CONTINUOUS
Status: DISCONTINUED | OUTPATIENT
Start: 2025-04-24 | End: 2025-04-24

## 2025-04-24 RX ORDER — HEPARIN SODIUM 5000 [USP'U]/ML
5000 INJECTION, SOLUTION INTRAVENOUS; SUBCUTANEOUS EVERY 8 HOURS
Status: DISCONTINUED | OUTPATIENT
Start: 2025-04-24 | End: 2025-04-25 | Stop reason: HOSPADM

## 2025-04-24 RX ORDER — ZOLPIDEM TARTRATE 5 MG/1
5 TABLET ORAL NIGHTLY PRN
Status: DISCONTINUED | OUTPATIENT
Start: 2025-04-24 | End: 2025-04-25 | Stop reason: HOSPADM

## 2025-04-24 RX ORDER — CLINDAMYCIN PHOSPHATE 900 MG/50ML
900 INJECTION, SOLUTION INTRAVENOUS
Status: COMPLETED | OUTPATIENT
Start: 2025-04-24 | End: 2025-04-24

## 2025-04-24 RX ORDER — ALBUMIN HUMAN 50 G/1000ML
SOLUTION INTRAVENOUS
Status: DISCONTINUED | OUTPATIENT
Start: 2025-04-24 | End: 2025-04-24

## 2025-04-24 RX ORDER — HYDROMORPHONE HYDROCHLORIDE 2 MG/ML
0.4 INJECTION, SOLUTION INTRAMUSCULAR; INTRAVENOUS; SUBCUTANEOUS EVERY 5 MIN PRN
Status: DISCONTINUED | OUTPATIENT
Start: 2025-04-24 | End: 2025-04-24

## 2025-04-24 RX ADMIN — FENTANYL CITRATE 50 MCG: 50 INJECTION, SOLUTION INTRAMUSCULAR; INTRAVENOUS at 09:04

## 2025-04-24 RX ADMIN — FENTANYL CITRATE 100 MCG: 50 INJECTION, SOLUTION INTRAMUSCULAR; INTRAVENOUS at 08:04

## 2025-04-24 RX ADMIN — HEPARIN SODIUM 5000 UNITS: 5000 INJECTION INTRAVENOUS; SUBCUTANEOUS at 09:04

## 2025-04-24 RX ADMIN — MUPIROCIN: 20 OINTMENT TOPICAL at 06:04

## 2025-04-24 RX ADMIN — HYDROMORPHONE HYDROCHLORIDE 0.4 MG: 2 INJECTION INTRAMUSCULAR; INTRAVENOUS; SUBCUTANEOUS at 02:04

## 2025-04-24 RX ADMIN — ROCURONIUM BROMIDE 20 MG: 10 SOLUTION INTRAVENOUS at 11:04

## 2025-04-24 RX ADMIN — ACETAMINOPHEN 1000 MG: 500 TABLET ORAL at 06:04

## 2025-04-24 RX ADMIN — DEXAMETHASONE SODIUM PHOSPHATE 8 MG: 4 INJECTION, SOLUTION INTRAMUSCULAR; INTRAVENOUS at 08:04

## 2025-04-24 RX ADMIN — FENTANYL CITRATE 50 MCG: 50 INJECTION, SOLUTION INTRAMUSCULAR; INTRAVENOUS at 11:04

## 2025-04-24 RX ADMIN — ROCURONIUM BROMIDE 50 MG: 10 SOLUTION INTRAVENOUS at 08:04

## 2025-04-24 RX ADMIN — HYDROMORPHONE HYDROCHLORIDE 0.4 MG: 2 INJECTION INTRAMUSCULAR; INTRAVENOUS; SUBCUTANEOUS at 04:04

## 2025-04-24 RX ADMIN — ALBUMIN (HUMAN) 500 ML: 12.5 SOLUTION INTRAVENOUS at 09:04

## 2025-04-24 RX ADMIN — GENTAMICIN SULFATE 343.5 MG: 40 INJECTION, SOLUTION INTRAMUSCULAR; INTRAVENOUS at 08:04

## 2025-04-24 RX ADMIN — LIDOCAINE HYDROCHLORIDE 60 MG: 20 INJECTION, SOLUTION INTRAVENOUS at 08:04

## 2025-04-24 RX ADMIN — HYDROMORPHONE HYDROCHLORIDE 0.4 MG: 2 INJECTION INTRAMUSCULAR; INTRAVENOUS; SUBCUTANEOUS at 05:04

## 2025-04-24 RX ADMIN — OXYCODONE HYDROCHLORIDE 5 MG: 5 TABLET ORAL at 03:04

## 2025-04-24 RX ADMIN — SODIUM CHLORIDE, POTASSIUM CHLORIDE, SODIUM LACTATE AND CALCIUM CHLORIDE: 600; 310; 30; 20 INJECTION, SOLUTION INTRAVENOUS at 06:04

## 2025-04-24 RX ADMIN — ROCURONIUM BROMIDE 20 MG: 10 SOLUTION INTRAVENOUS at 10:04

## 2025-04-24 RX ADMIN — TRAMADOL HYDROCHLORIDE 50 MG: 50 TABLET, COATED ORAL at 11:04

## 2025-04-24 RX ADMIN — TRAMADOL HYDROCHLORIDE 50 MG: 50 TABLET, COATED ORAL at 06:04

## 2025-04-24 RX ADMIN — OXYCODONE HYDROCHLORIDE 5 MG: 5 TABLET ORAL at 09:04

## 2025-04-24 RX ADMIN — SODIUM CHLORIDE, SODIUM LACTATE, POTASSIUM CHLORIDE, AND CALCIUM CHLORIDE: 600; 310; 30; 20 INJECTION, SOLUTION INTRAVENOUS at 08:04

## 2025-04-24 RX ADMIN — MAGNESIUM HYDROXIDE 2400 MG: 400 SUSPENSION ORAL at 08:04

## 2025-04-24 RX ADMIN — ROCURONIUM BROMIDE 20 MG: 10 SOLUTION INTRAVENOUS at 09:04

## 2025-04-24 RX ADMIN — ALBUMIN (HUMAN) 500 ML: 12.5 SOLUTION INTRAVENOUS at 10:04

## 2025-04-24 RX ADMIN — ACETAMINOPHEN 1000 MG: 500 TABLET ORAL at 11:04

## 2025-04-24 RX ADMIN — DIPHENHYDRAMINE HYDROCHLORIDE 50 MG: 50 INJECTION, SOLUTION INTRAMUSCULAR; INTRAVENOUS at 08:04

## 2025-04-24 RX ADMIN — CLINDAMYCIN PHOSPHATE 900 MG: 18 INJECTION, SOLUTION INTRAVENOUS at 08:04

## 2025-04-24 RX ADMIN — ONDANSETRON 4 MG: 2 INJECTION INTRAMUSCULAR; INTRAVENOUS at 08:04

## 2025-04-24 RX ADMIN — MIDAZOLAM HYDROCHLORIDE 2 MG: 1 INJECTION, SOLUTION INTRAMUSCULAR; INTRAVENOUS at 08:04

## 2025-04-24 RX ADMIN — SENNOSIDES 8.6 MG: 8.6 TABLET, FILM COATED ORAL at 08:04

## 2025-04-24 RX ADMIN — MUPIROCIN: 20 OINTMENT TOPICAL at 08:04

## 2025-04-24 RX ADMIN — HEPARIN SODIUM 5000 UNITS: 5000 INJECTION INTRAVENOUS; SUBCUTANEOUS at 07:04

## 2025-04-24 RX ADMIN — SUGAMMADEX 200 MG: 100 INJECTION, SOLUTION INTRAVENOUS at 01:04

## 2025-04-24 RX ADMIN — PROPOFOL 150 MG: 10 INJECTION, EMULSION INTRAVENOUS at 08:04

## 2025-04-24 RX ADMIN — FAMOTIDINE 20 MG: 10 INJECTION, SOLUTION INTRAVENOUS at 08:04

## 2025-04-24 NOTE — INTERVAL H&P NOTE
Giovanni Pena is 44 y.o.  presenting for scheduled TLH/BS.  She reports no changes to her overall health since last being seen in clinic, nor has she started any new medications.  We again discussed possibility of conversion to open procedure given past surgical history.  We further discussed h/o hemolytic anemia and possibility of requiring transfusion.  Pt voiced understanding and is amenable to transfusion if needed.    Temp:  [98.2 °F (36.8 °C)-99.3 °F (37.4 °C)] 99.3 °F (37.4 °C)  Pulse:  [75-76] 76  Resp:  [16-18] 18  SpO2:  [99 %-100 %] 99 %  BP: (106-117)/(61-71) 106/61    General: NAD, alert, oriented, cooperative  HEENT: NCAT, EOM grossly intact  Lungs: Normal WOB  Heart: regular rate  Abdomen: soft, nondistended, nontender, no rebound or guarding    Consents in chart. Pre-operative heparin ordered but not yet administered. All questions answered and concerns addressed. To OR for planned procedure.      Juan Pablo Potts MD MS  OB/Gyn  PGY-2   Heterosexual

## 2025-04-24 NOTE — CONSULTS
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 Hb noted to be 6.5 in the procedure, give 1u pRBC. Coming to ICU to monitor for transfusion reaction. Normotensive. No acute issues. Obtain hemolysis labs in am and follow CBC.     Full consult to follow.       Nori Latham MD  [employer] Pulmonary and Critical Care Fellow  04/24/2025 6:30 PM

## 2025-04-24 NOTE — ANESTHESIA PROCEDURE NOTES
Arterial    Diagnosis: Anemia    Patient location during procedure: done in OR  Procedure end time: 4/24/2025 8:44 AM    Staffing  Authorizing Provider: Ángel Granado MD  Performing Provider: Ángel Granado MD    Staffing  Performed by: Ángel Granado MD  Authorized by: Ángel Granado MD    Anesthesiologist was present at the time of the procedure.    Preanesthetic Checklist  Completed: patient identified, IV checked, site marked, risks and benefits discussed, surgical consent, monitors and equipment checked, pre-op evaluation, timeout performed and anesthesia consent givenArterial  Skin Prep: chlorhexidine gluconate  Orientation: right  Location: radial    Catheter Size: 20 G  Catheter placement by Ultrasound guidance. Heme positive aspiration all ports.   Vessel Caliber: medium, patent, compressibility normal  Needle advanced into vessel with real time Ultrasound guidance.  Guidewire confirmed in vessel.  Image recorded and saved.  Assessment  Dressing: secured with tape and tegaderm and secured with tape  Patient: Tolerated well

## 2025-04-24 NOTE — OR NURSING
PRBCs complete.  VSS HR 73, R 16, /72, T 98.3. Urine is clear yellow at this time.   No s/s of transfusion reaction noted.

## 2025-04-24 NOTE — ANESTHESIA POSTPROCEDURE EVALUATION
Anesthesia Post Evaluation    Patient: Giovanni Pena    Procedure(s) Performed: Procedure(s) (LRB):  HYSTERECTOMY, TOTAL, LAPAROSCOPIC with bilateral salpingectomy (N/A)  LYSIS, ADHESIONS, LAPAROSCOPIC (N/A)  CYSTOSCOPY (N/A)    Final Anesthesia Type: general      Patient location during evaluation: PACU  Patient participation: Yes- Able to Participate  Level of consciousness: awake and alert  Post-procedure vital signs: reviewed and stable  Pain management: adequate  Airway patency: patent    PONV status at discharge: No PONV  Anesthetic complications: no      Cardiovascular status: blood pressure returned to baseline  Respiratory status: unassisted and spontaneous ventilation  Hydration status: euvolemic  Follow-up not needed.              Vitals Value Taken Time   /68 04/24/25 15:02   Temp 36.8 °C (98.3 °F) 04/24/25 14:30   Pulse 77 04/24/25 15:12   Resp 16 04/24/25 14:38   SpO2 98 % 04/24/25 15:12   Vitals shown include unfiled device data.      No case tracking events are documented in the log.      Pain/Jim Score: Pain Rating Prior to Med Admin: 6 (4/24/2025  2:38 PM)  Jim Score: 8 (4/24/2025  2:10 PM)

## 2025-04-24 NOTE — LETTER
April 25, 2025         [address]  [facility] [address]  Phone: [phone]  Fax: [phone]       Patient: Giovanni Pena   YOB: 1980  Date of Visit: 04/25/2025    To Whom It May Concern:    Barbie Pena  was at [facility] on 04/25/2025. The patient may return to work/school on *** {With/no:[id number]} restrictions. If you have any questions or concerns, or if I can be of further assistance, please do not hesitate to contact me.    Sincerely,    Kemal Owusu RN

## 2025-04-24 NOTE — ANESTHESIA PROCEDURE NOTES
Intubation    Date/Time: 4/24/2025 8:35 AM    Performed by: Jared Bajwa CRNA  Authorized by: Ángel Granado MD    Intubation:     Induction:  Intravenous    Intubated:  Postinduction    Mask Ventilation:  Easy mask    Attempts:  1    Attempted By:  CRNA    Method of Intubation:  Video laryngoscopy    Blade:  Hartman 3    Laryngeal View Grade: Grade I - full view of cords      Difficult Airway Encountered?: No      Complications:  None    Airway Device:  Oral endotracheal tube    Airway Device Size:  7.0    Style/Cuff Inflation:  Cuffed (inflated to minimal occlusive pressure)    Tube secured:  22    Secured at:  The lips    Placement Verified By:  Capnometry    Complicating Factors:  None    Findings Post-Intubation:  BS equal bilateral and atraumatic/condition of teeth unchanged

## 2025-04-24 NOTE — EICU
"Virtual ICU Admission    Admit Date: 2025  LOS: 0  Code Status: Prior   : 1980  Bed: BICU 01/BICU 01A:     Diagnosis: s/p Hysterectomy    Patient  has a past medical history of Acid reflux, Anemia, unspecified, Embolism and thrombosis of renal vein, Esophageal varices, Hemolytic anemia, and RA (rheumatoid arthritis).    Last VS: BP (!) 108/59   Pulse 76   Temp 98.2 °F (36.8 °C)   Resp 14   Ht 5' 7" (1.702 m)   Wt 79.4 kg (175 lb)   LMP 2025 (Exact Date)   SpO2 (!) 94%   Breastfeeding No   BMI 27.41 kg/m²       VICU Review    VICU nurse assessment :  [place]angin completed and VTE prophylaxis review                  "

## 2025-04-24 NOTE — OP NOTE
OPERATIVE REPORT     DATE: 04/24/2025       PREOPERATIVE DIAGNOSIS  1. AUB-L   2. Anemia     POSTOPERATIVE DIAGNOSIS  S/p TLH/BS/TOMMY/Cystoscopy     PROCEDURE:  1. Total Laparoscopic Hysterectomy  2. Bilateral salpingectomy  3. Lysis of adhesions  4. Cystoscopy     SURGEON: Estefani Pedro MD     ASSISTANTS: Annelise Montgomery MD - PGY4 & Juan Pablo Potts MD - PGY 2     ANESTHESIA: General     COMPLICATIONS: None     EBL: 250 cc     IV FLUIDS: See anesthesia report     URINE OUTPUT: See anesthesia report     FINDINGS:   Uterus sounded to 9 cm   Laparoscopically: thick omental adhesions noted to umbilicus, thick adhesions noted from the uterus to the anterior abdominal wall the entire length of the uterus. Evidence of previous tubal ligation. Fallopian tube clip on left appeared to be embedded into the left anterior peritoneum and not on the fallopian tube. No fallopian tube clip present on there right, but evidence of removal of portion of fallopian tube noted. Normal appearing ovaries BL.   On cystoscopy, efflux noted from BL ureters. Normal bladder without evidence of injury   TLH/BS/TOMMY performed without complication  Hemostasis at conclusion of case      PROCEDURE IN DETAIL:  Patient was taken to the operating room where general anesthesia was administered and found to be adequate. She was placed in the dorsal lithotomy position using yellow fin stirrups and prepped and draped in the usual sterile fashion. A surgical timeout was performed with patient's name, date of birth, allergies, and procedure to be performed verbalized. All OR staff were in agreement. Preoperative antibiotics clindamycin and gentamicin were administered.    Attention was then turned to the vagina. Hatfield was placed. A weighted sterile speculum and right angle retractor were placed in the vagina. The anterior lip of the cervix was grasped with a single tooth tenaculum. The uterus was sounded to approximately 9 cm. A stay suture of 0-Vicryl was  placed at 12 o'clock and 6 o'clock on the cervix. A JUDY manipulator was placed inside the uterine cavity, and vagina was occluded.      Gloves were changed. Attempts were made to enter the abdomen using a cut down technique in a 2 cm supraumbilical vertical incision but were unsuccessful. After the fascia was reached, a Veress needle was inserted into the umbilicus under tenting of the anterior abdominal wall. Placement into the peritoneal cavity was confirmed via saline drop test. The abdomen was insufflated to 15mm Hg using Carbon dioxide. A 10 mm trocar was advanced through the previously made incision. Good hemostasis was noted. The patient was placed in deep Trendelenburg. An 5 mm left lateral skin incision was made with a scalpel and a 5 mm trocar was advanced through this incision under visualization of the camera. A 5 mm right lateral skin incision was made with the scalpel. A 5 mm trocar was advanced through this incision under visualization of the camera. Good hemostasis was noted. Survey of the abdomen and pelvis revealed findings as described above.     The midline omental adhesions to the anterior abdominal wall were taken down carefully with both blunt dissection and the Ligasure. Hemostasis was noted.     Attention was turned to the right round ligament. This was cauterized and transected and dissected down towards the bladder. The right ureter was identified and noted to be well out of the operative field. The right fallopian tube was then grasped at the fimbriated end and elevated, and the mesosalpingx was serially cauterized and transected until the end of the remaining fallopian tube segment was reached. Good hemostasis was then noted. The portion of the right fallopian tube was removed from the body and sent to pathology. The right uteroovarian ligament was then identified, doubly cauterized using the Ligasure, and transected.     Attention was then turned to the left side. The left ureter was  identified noted to be well out of the operative field. The left fallopian tube was then grasped at the fimbriated end and elevated, and the mesosalpingx was serially cauterized and transected using the Ligasure until the uterine cornua was reached. Attention was turned to the left round ligament. It was cauterized and transected. The left uteroovarian ligament was then identified, doubly cauterized using the Ligasure, and transected.     Attention was then turned to the adhesions between the uterus and anterior abdominal wall. These adhesions were carefully taken down using a combination of sharp and blunt dissection as well as dissection with the Ligasure. During this portion of the case, the bladder was intermittently back filled to allow for better delineation of the plane between the bladder and the uterus.     The posterior leaf of the left broad ligament was serially cauterized and transected and carried down to the level of the uterine vessels. The same was performed on the right. Additional dissection was needed to continue to free the bladder adhesions from the uterus. The bladder was then further dissected off the cervix once a safe plane was created. The uterine vessels were then cauterized and transected bilaterally.     Anterior colpotomy was created and carried around circumferentially until the colpotomy was finished.     The uterus, cervix, and bilateral tubes were removed vaginally. The laparoscopic camera was removed from the abdomen and attention was turned vaginally. The vaginal cuff was grasped with Allis clamps. The right and left angle of the vaginal cuff were closed with interrupted suture using 0 Vicryl. The remainder of the cuff was closed using 0 Vicryl suture in figure of 8 stitches. Hemostasis was noted at the cuff.    Hatfield was then removed for cystoscopy. The cystoscope was introduced into the bladder and inspection of the dome showed no evidence of cystotomy or injury. Good efflux of  urine was noted from bilateral ureteral orifices. Cystoscope was withdrawn, and ramirez was replaced.     Gloves were changed. The laparoscopic camera was then reinserted into the abdomen. The pelvis was copiously irrigated and suctioned. Excellent hemostasis was noted. Hemoblast was added to the pelvis for additional hemostasis.     Intra-operative CBC revealed a H/h of 6.5/21.4 and decision was made to transfusion 1 u pRBC intraoperatively.    Attention was turned to the anterior abdominal wall. The fascia at the 10 mm port was closed with an interrupted suture of 0 vicryl using a Nate-Michael device. The abdomen was deflated and all trocars were removed. The skin incisions were closed with 4-0 Monocryl in a subcuticular fashion. Sponge, lap, and needle counts were correct x 2. The patient was taken to the recovery room in stable condition with the ramirez draining urine.       Annelise Montgomery MD  PGY-4 OB/GYN

## 2025-04-24 NOTE — BRIEF OP NOTE
[facility])  Brief Operative Note    SUMMARY     Surgery Date: 4/24/2025     Surgeons and Role:     * Estefani Pedro MD - Primary     * Annelise Montgomery MD - Resident - Assisting     * Juan Pablo Potts MD - Resident - Assisting     * Kami Riley MD - Resident - Observing    Pre-op Diagnosis:  Menorrhagia with irregular cycle [N92.1]    Post-op Diagnosis:  Post-Op Diagnosis Codes:     * Menorrhagia with irregular cycle [N92.1]    Procedure(s) (LRB):  HYSTERECTOMY, TOTAL, LAPAROSCOPIC with bilateral salpingectomy (N/A)  LYSIS, ADHESIONS, LAPAROSCOPIC (N/A)  CYSTOSCOPY (N/A)    Anesthesia: Choice    Operative Findings: Please see full operative note for further details.    Estimated Blood Loss: 250 mL         Specimens:   Specimen (24h ago, onward)       Start     Ordered    04/24/25 1000  Specimen to Pathology Gynecology and Obstetrics  RELEASE UPON ORDERING        References:    Click here for ordering Quick Tip   Question:  Release to patient  Answer:  Immediate    04/24/25 1000                  ID Type Source Tests Collected by Time Destination   1 :  Tissue Fallopian Tube, Right SPECIMEN TO PATHOLOGY Estefani Pedro MD 4/24/2025 1000    2 : uterus, cervix, and left fallopian tube Tissue Uterus, Cervix, Left Fallopian Tube SPECIMEN TO PATHOLOGY Estefani Pedro MD 4/24/2025 1214    3 : paratubal cyst Tissue Fallopian Tube, Left SPECIMEN TO PATHOLOGY Estefani Pedro MD 4/24/2025 1218        Juan Pablo Potts MD MS  OB/Gyn  PGY-2

## 2025-04-24 NOTE — OR NURSING
Received report and assumed care of pt from OR.  1st unit PRBCs currently infusing.  No s/s of hemolytic reaction noted at this time

## 2025-04-24 NOTE — PROGRESS NOTES
Resident to bedside to discuss that her EMBx pathology from last week has not resulted. Discussed that I reached out to the pathologist yesterday and he has reviewed some of her slides but has not made it through all of them. Of the slides he has reviewed he has not seen any atypia or carcinoma but has not finished examining all the slides. Discussed that we can proceed with her surgery today but discussed possible risk of needing a second surgery depending on the final pathology report/EMBx pathology. Patient stated understanding and desires to proceed with hysterectomy today.    Annelise Montgomery MD  OBGYN Resident PGY 4

## 2025-04-24 NOTE — OR NURSING
Pt transported to ICU.  Report given to RN.  Pt awake and alert.  Reports pain 4/10.  VSS.  Hatfield draining clear yellow urine.  No s/s of hemolytic transfusion reaction at this time

## 2025-04-24 NOTE — TRANSFER OF CARE
"Anesthesia Transfer of Care Note    Patient: Giovanni Pena    Procedure(s) Performed: Procedure(s) (LRB):  HYSTERECTOMY, TOTAL, LAPAROSCOPIC with bilateral salpingectomy (N/A)  LYSIS, ADHESIONS, LAPAROSCOPIC (N/A)  CYSTOSCOPY (N/A)    Patient location: PACU    Anesthesia Type: general    Transport from OR: Transported from OR on 6-10 L/min O2 by face mask with adequate spontaneous ventilation    Post pain: adequate analgesia    Post assessment: no apparent anesthetic complications and tolerated procedure well    Post vital signs: stable    Level of consciousness: sedated and responds to stimulation    Nausea/Vomiting: no nausea/vomiting    Complications: none    Transfer of care protocol was followed      Last vitals: Visit Vitals  /66 (BP Location: Right arm, Patient Position: Lying)   Pulse 78   Temp 36.7 °C (98 °F) (Skin)   Resp 16   Ht 5' 7" (1.702 m)   Wt 79.4 kg (175 lb)   LMP 04/09/2025 (Exact Date)   SpO2 100%   Breastfeeding No   BMI 27.41 kg/m²     "

## 2025-04-25 ENCOUNTER — RESULTS FOLLOW-UP (OUTPATIENT)
Dept: OBSTETRICS AND GYNECOLOGY | Facility: CLINIC | Age: 45
End: 2025-04-25

## 2025-04-25 VITALS
WEIGHT: 175 LBS | BODY MASS INDEX: 27.47 KG/M2 | DIASTOLIC BLOOD PRESSURE: 56 MMHG | HEIGHT: 67 IN | TEMPERATURE: 98 F | SYSTOLIC BLOOD PRESSURE: 97 MMHG | OXYGEN SATURATION: 100 % | RESPIRATION RATE: 18 BRPM | HEART RATE: 67 BPM

## 2025-04-25 DIAGNOSIS — D59.19 OTHER AUTOIMMUNE HEMOLYTIC ANEMIA: Primary | ICD-10-CM

## 2025-04-25 LAB
ABSOLUTE EOSINOPHIL (OHS): 0 K/UL
ABSOLUTE MONOCYTE (OHS): 0.33 K/UL (ref 0.3–1)
ABSOLUTE NEUTROPHIL COUNT (OHS): 3.54 K/UL (ref 1.8–7.7)
ALBUMIN SERPL BCP-MCNC: 3.3 G/DL (ref 3.5–5.2)
ALP SERPL-CCNC: 61 UNIT/L (ref 40–150)
ALT SERPL W/O P-5'-P-CCNC: 14 UNIT/L (ref 10–44)
ANION GAP (OHS): 7 MMOL/L (ref 8–16)
AST SERPL-CCNC: 22 UNIT/L (ref 11–45)
BASOPHILS # BLD AUTO: 0.02 K/UL
BASOPHILS NFR BLD AUTO: 0.5 %
BILIRUB SERPL-MCNC: 3.5 MG/DL (ref 0.1–1)
BUN SERPL-MCNC: 13 MG/DL (ref 6–20)
CALCIUM SERPL-MCNC: 7.9 MG/DL (ref 8.7–10.5)
CHLORIDE SERPL-SCNC: 109 MMOL/L (ref 95–110)
CO2 SERPL-SCNC: 20 MMOL/L (ref 23–29)
CREAT SERPL-MCNC: 0.7 MG/DL (ref 0.5–1.4)
DIRECT COOMBS (DAT): NORMAL
ERYTHROCYTE [DISTWIDTH] IN BLOOD BY AUTOMATED COUNT: 27.9 % (ref 11.5–14.5)
FERRITIN SERPL-MCNC: 33 NG/ML (ref 20–300)
FIBRINOGEN PPP-MCNC: 269 MG/DL (ref 182–400)
GFR SERPLBLD CREATININE-BSD FMLA CKD-EPI: >60 ML/MIN/1.73/M2
GLUCOSE SERPL-MCNC: 111 MG/DL (ref 70–110)
HAPTOGLOB SERPL-MCNC: <10 MG/DL (ref 30–250)
HCT VFR BLD AUTO: 24.1 % (ref 37–48.5)
HGB BLD-MCNC: 7.6 GM/DL (ref 12–16)
IMM GRANULOCYTES # BLD AUTO: 0.01 K/UL (ref 0–0.04)
IMM GRANULOCYTES NFR BLD AUTO: 0.2 % (ref 0–0.5)
IRON SATN MFR SERPL: 46 % (ref 20–50)
IRON SERPL-MCNC: 133 UG/DL (ref 30–160)
LDH SERPL-CCNC: 258 U/L (ref 110–260)
LYMPHOCYTES # BLD AUTO: 0.36 K/UL (ref 1–4.8)
MCH RBC QN AUTO: 25.1 PG (ref 27–31)
MCHC RBC AUTO-ENTMCNC: 31.5 G/DL (ref 32–36)
MCV RBC AUTO: 80 FL (ref 82–98)
NUCLEATED RBC (/100WBC) (OHS): 0 /100 WBC
PLATELET # BLD AUTO: 112 K/UL (ref 150–450)
PMV BLD AUTO: 10.3 FL (ref 9.2–12.9)
POTASSIUM SERPL-SCNC: 3.4 MMOL/L (ref 3.5–5.1)
PROT SERPL-MCNC: 6.4 GM/DL (ref 6–8.4)
RBC # BLD AUTO: 3.03 M/UL (ref 4–5.4)
RELATIVE EOSINOPHIL (OHS): 0 %
RELATIVE LYMPHOCYTE (OHS): 8.5 % (ref 18–48)
RELATIVE MONOCYTE (OHS): 7.7 % (ref 4–15)
RELATIVE NEUTROPHIL (OHS): 83.1 % (ref 38–73)
SODIUM SERPL-SCNC: 136 MMOL/L (ref 136–145)
TIBC SERPL-MCNC: 287 UG/DL (ref 250–450)
TRANSFERRIN SERPL-MCNC: 194 MG/DL (ref 200–375)
WBC # BLD AUTO: 4.26 K/UL (ref 3.9–12.7)

## 2025-04-25 PROCEDURE — 83010 ASSAY OF HAPTOGLOBIN QUANT: CPT | Performed by: STUDENT IN AN ORGANIZED HEALTH CARE EDUCATION/TRAINING PROGRAM

## 2025-04-25 PROCEDURE — 85025 COMPLETE CBC W/AUTO DIFF WBC: CPT

## 2025-04-25 PROCEDURE — 63600175 PHARM REV CODE 636 W HCPCS

## 2025-04-25 PROCEDURE — 99223 1ST HOSP IP/OBS HIGH 75: CPT | Mod: ,,, | Performed by: STUDENT IN AN ORGANIZED HEALTH CARE EDUCATION/TRAINING PROGRAM

## 2025-04-25 PROCEDURE — 25000003 PHARM REV CODE 250

## 2025-04-25 PROCEDURE — 80053 COMPREHEN METABOLIC PANEL: CPT | Performed by: STUDENT IN AN ORGANIZED HEALTH CARE EDUCATION/TRAINING PROGRAM

## 2025-04-25 PROCEDURE — 82728 ASSAY OF FERRITIN: CPT | Performed by: STUDENT IN AN ORGANIZED HEALTH CARE EDUCATION/TRAINING PROGRAM

## 2025-04-25 PROCEDURE — 86880 COOMBS TEST DIRECT: CPT | Performed by: STUDENT IN AN ORGANIZED HEALTH CARE EDUCATION/TRAINING PROGRAM

## 2025-04-25 PROCEDURE — 84466 ASSAY OF TRANSFERRIN: CPT | Performed by: STUDENT IN AN ORGANIZED HEALTH CARE EDUCATION/TRAINING PROGRAM

## 2025-04-25 PROCEDURE — 99239 HOSP IP/OBS DSCHRG MGMT >30: CPT | Mod: ,,, | Performed by: STUDENT IN AN ORGANIZED HEALTH CARE EDUCATION/TRAINING PROGRAM

## 2025-04-25 PROCEDURE — 83615 LACTATE (LD) (LDH) ENZYME: CPT | Performed by: STUDENT IN AN ORGANIZED HEALTH CARE EDUCATION/TRAINING PROGRAM

## 2025-04-25 PROCEDURE — 85384 FIBRINOGEN ACTIVITY: CPT | Performed by: STUDENT IN AN ORGANIZED HEALTH CARE EDUCATION/TRAINING PROGRAM

## 2025-04-25 RX ORDER — OXYCODONE HYDROCHLORIDE 5 MG/1
5 TABLET ORAL EVERY 4 HOURS PRN
Qty: 10 TABLET | Refills: 0 | Status: SHIPPED | OUTPATIENT
Start: 2025-04-25

## 2025-04-25 RX ORDER — TRAMADOL HYDROCHLORIDE 50 MG/1
50 TABLET ORAL EVERY 6 HOURS
Qty: 20 TABLET | Refills: 0 | Status: SHIPPED | OUTPATIENT
Start: 2025-04-25

## 2025-04-25 RX ORDER — DOCUSATE SODIUM 100 MG/1
100 CAPSULE, LIQUID FILLED ORAL 2 TIMES DAILY
Qty: 30 CAPSULE | Refills: 0 | Status: SHIPPED | OUTPATIENT
Start: 2025-04-25

## 2025-04-25 RX ADMIN — OXYCODONE HYDROCHLORIDE 10 MG: 10 TABLET ORAL at 01:04

## 2025-04-25 RX ADMIN — OXYCODONE HYDROCHLORIDE 5 MG: 5 TABLET ORAL at 10:04

## 2025-04-25 RX ADMIN — SENNOSIDES 8.6 MG: 8.6 TABLET, FILM COATED ORAL at 10:04

## 2025-04-25 RX ADMIN — HEPARIN SODIUM 5000 UNITS: 5000 INJECTION INTRAVENOUS; SUBCUTANEOUS at 05:04

## 2025-04-25 RX ADMIN — ACETAMINOPHEN 1000 MG: 500 TABLET ORAL at 05:04

## 2025-04-25 RX ADMIN — ACETAMINOPHEN 1000 MG: 500 TABLET ORAL at 12:04

## 2025-04-25 RX ADMIN — TRAMADOL HYDROCHLORIDE 50 MG: 50 TABLET, COATED ORAL at 05:04

## 2025-04-25 RX ADMIN — TRAMADOL HYDROCHLORIDE 50 MG: 50 TABLET, COATED ORAL at 12:04

## 2025-04-25 RX ADMIN — MAGNESIUM HYDROXIDE 2400 MG: 400 SUSPENSION ORAL at 10:04

## 2025-04-25 NOTE — PLAN OF CARE
Case Management Assessment     PCP: Miquel Mishra MD  Pharmacy: bedside    Patient Arrived From: Home  Existing Help at Home: Daughter    Barriers to Discharge: None    Discharge Plan:    A. Home with family    B. Home with [facility], [place], operated by [facility] ([place]  Initial Discharge Assessment       Primary Care Provider: Miquel Mishra MD    Admission Diagnosis: Menorrhagia with irregular cycle [N92.1]  Menorrhagia with regular cycle [N92.0]  Abnormal uterine bleeding [N93.9]  S/P laparoscopic hysterectomy [Z90.710]    Admission Date: 4/24/2025  Expected Discharge Date: 4/25/2025    Transition of Care Barriers: (P) None    Payor: AETNA / Plan: [employer]ET[employer] OPEN ACCESS MANAGED CHOICE / Product Type: Commercial /     Extended Emergency Contact Information  Primary Emergency Contact: Ruchi Pleitez  Mobile Phone: [phone]  Relation: Mother  Preferred language: English   needed? No    Discharge Plan A: (P) Home with family  Discharge Plan B: (P) Home Health      [id number] [place] - [address]  [address]  [place] [address]  Phone: [phone] Fax: [phone]      Initial Assessment (most recent)       Adult Discharge Assessment - 04/25/25 0859          Discharge Assessment    Assessment Type Discharge Planning Assessment (P)      Confirmed/corrected address, phone number and insurance Yes (P)      Confirmed Demographics Correct on Facesheet (P)      Source of Information patient;health record (P)      People in Home child(ponce), adult (P)      Do you expect to return to your current living situation? Yes (P)      Do you have help at home or someone to help you manage your care at home? Yes (P)      Who are your caregiver(s) and their phone number(s)? Mother (P)      Prior to hospitilization cognitive status: Alert/Oriented;No Deficits (P)      Current cognitive status: Alert/Oriented;No Deficits (P)      Equipment Currently Used at Home none (P)      Readmission within 30  days? Yes (P)      Patient currently being followed by outpatient case management? No (P)      Do you currently have service(s) that help you manage your care at home? No (P)      Do you take prescription medications? Yes (P)      Do you have prescription coverage? Yes (P)      Do you have any problems affording any of your prescribed medications? No (P)      Is the patient taking medications as prescribed? yes (P)      Who is going to help you get home at discharge? Mother (P)      How do you get to doctors appointments? car, drives self;family or friend will provide (P)      Are you on dialysis? No (P)      Do you take coumadin? No (P)      Discharge Plan A Home with family (P)      Discharge Plan B Home Health (P)      Discharge Plan discussed with: Patient (P)      Transition of Care Barriers None (P)

## 2025-04-25 NOTE — CARE UPDATE
04/24/25 1939   Patient Assessment/Suction   Level of Consciousness (AVPU) alert   Respiratory Effort Normal;Unlabored   Expansion/Accessory Muscles/Retractions expansion symmetric;no retractions;no use of accessory muscles   All Lung Fields Breath Sounds clear;equal bilaterally   Rhythm/Pattern, Respiratory unlabored;pattern regular;depth regular   PRE-TX-O2   Device (Oxygen Therapy) room air   SpO2 98 %   Pulse Oximetry Type Continuous   $ Pulse Oximetry - Multiple Charge Pulse Oximetry - Multiple   Pulse 63   Resp (!) 9   Incentive Spirometer   $ Incentive Spirometer Charges postop instruction;proper technique demonstrated   Incentive Spirometer Predicted Level (mL) 2500   Administration (IS) instruction provided, initial   Number of Repetitions (IS) 5   Level Incentive Spirometer (mL) 1000   Patient Tolerance (IS) fair;no adverse signs/symptoms present   Respiratory Evaluation   $ Care Plan Tech Time 15 min   $ Respiratory Evaluation Complete   Evaluation For New Orders   Admitting Diagnosis Cystoscopy   Cardiac Diagnosis N/A   Pulmonary Diagnosis N/A   Current Surgeries Gastric Bypass   Home Oxygen   Has Home Oxygen? No   Home Aerosol, MDI, DPI, and Other Treatments/Therapies   Home Respiratory Therapy Per Patient/Review of Chart No   Oxygen Care Plan   Oxygen Care Plan Per Protocol   SPO2 Goal (%) 92% non-cardiac   Rationale SpO2 is <92% (Non-cardiac Pt.)   Bronchodilator Care Plan   Rationale No Rationale found   Atelectasis Care Plan   Atelectasis Care Plan Incentive Spiromentry   Frequency QID   I.S. Goal (ml) 2500 ml   I.S. Minimum (ml) 1500 ml   Rationale Post-op abdominal   Airway Clearance Care Plan   Rationale No rationale found

## 2025-04-25 NOTE — EICU
VICU Night Rounds Checklist  24H Vital Sign Range:  Temp:  [97.6 °F (36.4 °C)-99.3 °F (37.4 °C)]   Pulse:  [62-87]   Resp:  [7-18]   BP: ()/(54-72)   SpO2:  [94 %-100 %]   Arterial Line BP: ()/(45-79)     Video rounds        Nursing orders placed : IP KAMILLE Peripheral IV Access and [place] Panel

## 2025-04-25 NOTE — PLAN OF CARE
Case Management Final Discharge Note    Discharge Disposition: Home    New DME ordered / company name: NA    Relevant SDOH / Transition of Care Barriers:  None    Person available to provide assistance at home when needed and their contact information: Self    Scheduled followup appointment: 2 weeks     Referrals placed: NA    Transportation: Mother    Patient educated on discharge services and updated on DC plan. Bedside RN notified, patient clear to discharge from Case Management Perspective.   [facility]  Discharge Final Note    Primary Care Provider: Miquel Mishra MD    Expected Discharge Date: 4/25/2025    Final Discharge Note (most recent)       Final Note - 04/25/25 0955          Final Note    Assessment Type Final Discharge Note (P)      Anticipated Discharge Disposition Home or Self Care (P)      Hospital Resources/Appts/Education Provided Provided patient/caregiver with written discharge plan information;Appointments scheduled and added to AVS (P)         Post-Acute Status    Post-Acute Authorization Other (P)      Other Status No Post-Acute Service Needs (P)      Discharge Delays None known at this time (P)                      Contact Info       Gyn Resident, [facility]        Next Steps: Follow up in 2 week(s)    Instructions: Post op follow up appointment

## 2025-04-25 NOTE — PROGRESS NOTES
[facility] Pulmonary/Critical Care Fellow Progress Note:      Primary Attending Physician: Dr. Pedro  ICU Attending: Dr. 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 Hb noted to be 6.5 in the procedure, give 1u pRBC. Coming to ICU to monitor for transfusion reaction.     Subjective:   No acute events overnight.  Patient feeling fine this morning and has been afebrile and hemodynamically stable.  Tells me that most of her hemolytic reactions have begun on the 2nd or 3rd day following transfusion.    Objective:   Last 24 Hour Vital Signs:  BP  Min: 94/52  Max: 123/72  Temp  Av.1 °F (36.7 °C)  Min: 97.6 °F (36.4 °C)  Max: 98.3 °F (36.8 °C)  Pulse  Av.6  Min: 62  Max: 87  Resp  Av.9  Min: 7  Max: 18  SpO2  Av.3 %  Min: 94 %  Max: 100 %  I/O last 3 completed shifts:  In: 2332.5 [P.O.:400; I.V.:1482.5; Blood:300; IV Piggyback:150]  Out: 1325 [Urine:1075; Blood:250]    Physical Examination:  Physical Exam  Constitutional:       Appearance: Normal appearance. She is not ill-appearing.   HENT:      Head: Normocephalic and atraumatic.      Nose: Nose normal.   Eyes:      General: No scleral icterus.     Extraocular Movements: Extraocular movements intact.   Cardiovascular:      Rate and Rhythm: Normal rate and regular rhythm.   Pulmonary:      Effort: Pulmonary effort is normal.   Musculoskeletal:      Right lower leg: No edema.      Left lower leg: No edema.   Skin:     General: Skin is warm.   Neurological:      General: No focal deficit present.      Mental Status: She is alert and oriented to person, place, and time.   Psychiatric:         Mood and Affect: Mood normal.    "      Behavior: Behavior normal.           Laboratory:  Trended Lab Data:  Recent Labs     04/23/25  1128 04/24/25  0742 04/24/25  1226 04/24/25  1943 04/25/25  0339   WBC 2.72*  --  3.03* 4.16 4.26   HGB 7.9*  --  6.5* 7.6* 7.6*   HCT 26.9*  --  21.4* 24.2* 24.1*   *  --  113* 114* 112*    139  --   --  136   K 3.9 3.5  --   --  3.4*   * 110  --   --  109   CO2 23 21*  --   --  20*   BUN 8 8  --   --  13   CREATININE 0.8 0.7  --   --  0.7   BILITOT  --  1.1*  --   --  3.5*   AST  --  19  --   --  22   ALT  --  13  --   --  14   ALKPHOS  --  66  --   --  61   CALCIUM 8.8 8.2*  --   --  7.9*   ALBUMIN  --  3.1*  --   --  3.3*       Cardiac: No results for input(s): "TROPONINI", "CKTOTAL", "CKMB", "BNP" in the last 168 hours.    Urinalysis:   Lab Results   Component Value Date    LABURIN 10,000 - 49,999 cfu/ml Diphtheroids (A) 03/29/2025    COLORU Red (A) 03/29/2025    CLARITYU Clear 08/03/2021    SPECGRAV 1.015 03/29/2025    NITRITE Positive (A) 03/29/2025    KETONESU Negative 03/18/2025    UROBILINOGEN 4.0-6.0 (A) 03/29/2025     I have personally reviewed the above labs and imaging.    Current Medications:     Infusions:   lactated ringers   Intravenous Continuous 40 mL/hr at 04/25/25 0605 Rate Verify at 04/25/25 0605        Scheduled:   acetaminophen  1,000 mg Oral Q6H    heparin (porcine)  5,000 Units Subcutaneous Q8H    magnesium hydroxide 400 mg/5 ml  30 mL Oral BID    mupirocin   Nasal BID    senna  8.6 mg Oral BID    traMADoL  50 mg Oral Q6H        PRN:    Current Facility-Administered Medications:     HYDROmorphone, 0.4 mg, Intravenous, Q2H PRN    naloxone, 0.02 mg, Intravenous, PRN    ondansetron, 4 mg, Intravenous, Q6H PRN    oxyCODONE, 5 mg, Oral, Q4H PRN    oxyCODONE, 10 mg, Oral, Q4H PRN    prochlorperazine, 5 mg, Intravenous, Q6H PRN    sodium chloride 0.9%, 10 mL, Intravenous, PRN    zolpidem, 5 mg, Oral, Nightly PRN    Assessment:     Giovanni Tashia Pena is a 44 y.o. 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 on 04/24.  Hemoglobin had appropriate response in his stable this morning.  Haptoglobin is undetectable unless there is some degree of hemolysis ongoing.  As delayed hemolytic reactions are typically less severe with no definitive treatment out the thin holding recurrent transfusion and clinically she looks fine, discharge today with close follow-up and lab recheck would be okay however watching her would also be appropriate.  This could take place out of the ICU.    Pulmonary and critical care team will sign off.  Please do not hesitate to reach out if we can be of further assistance.    Hever Toledo MD  [facility] Fellow    Case discussed with Dr. Tobin    Portions of the record may have been created with voice-recognition software. Occasional wrong-word or sound-a-like substitutions may have occurred due to the inherent limitations of voice-recognition software. Read the chart carefully and recognize, using context, where substitutions have occurred.

## 2025-04-25 NOTE — PLAN OF CARE
Problem: Adult Inpatient Plan of Care  Goal: Plan of Care Review  Outcome: Progressing  Goal: Absence of Hospital-Acquired Illness or Injury  Outcome: Progressing  Goal: Optimal Comfort and Wellbeing  Outcome: Progressing  Goal: Readiness for Transition of Care  Outcome: Progressing     Problem: Infection  Goal: Absence of Infection Signs and Symptoms  Outcome: Progressing     Problem: Wound  Goal: Optimal Coping  Outcome: Progressing  Goal: Skin Health and Integrity  Outcome: Progressing

## 2025-04-25 NOTE — DISCHARGE SUMMARY
Discharge Summary  Gynecology      Admit Date: 2025    Discharge Date and Time: 2025     Attending Physician: Estefani Pedro MD    Principal Diagnoses:   Status post laparoscopic hysterectomy    Active Hospital Problems    Diagnosis  POA    *S/p TLH/BS/TOMMY/Cystoscopy [Z90.710]  No      Resolved Hospital Problems   No resolved problems to display.       Procedures:   Procedure(s) (LRB):  HYSTERECTOMY, TOTAL, LAPAROSCOPIC with bilateral salpingectomy (N/A)  LYSIS, ADHESIONS, LAPAROSCOPIC (N/A)  CYSTOSCOPY (N/A)    Discharged Condition: good    Hospital Course:   Giovanni Pena is a 44 y.o.  female who presented on 2025 for the above-listed procedures for the treatment of AUB-L. PMH is significant for AUB, symptomatic anemia, h/o hemolytic anemia following blood transfusion, h/o portal/renal vein thrombosis, h/o gastric sleeve, rheumatoid arthritis, eczema, GERD, . Patient tolerated the procedure well and was admitted for post-operative care. She received 1u pRBC intra-operatively due to acute on chronic anemia, and she was transferred to the ICU post-operatively for close monitoring given her history of hemolytic anemia with blood transfusions. Post-operative course was uncomplicated.    On day of discharge (POD#1), patient was in stable condition, having met all post-operative milestones. She was urinating spontaneously, ambulating, and tolerating a regular diet without nausea/vomiting. Pain was well-controlled on oral medication. She was discharged with medications and follow up as listed below. Patient to follow up with outpatient labs on Monday and in two weeks in gynecology clinic.    Consults: None    Significant Diagnostic Studies:  Recent Labs   Lab 25  1226 25  1943 25  0339   WBC 3.03* 4.16 4.26   HGB 6.5* 7.6* 7.6*   HCT 21.4* 24.2* 24.1*   MCV 80* 80* 80*   * 114* 112*        Treatments:   1. Surgery as above  2. Transfusion of 1u  [facility]    Disposition: Home or Self Care    Patient Instructions:   Current Discharge Medication List        START taking these medications    Details   acetaminophen (TYLENOL) 650 MG TbSR Take 1 tablet (650 mg total) by mouth every 6 (six) hours.  Qty: 60 tablet, Refills: 1      !! docusate sodium (COLACE) 100 MG capsule Take 1 capsule (100 mg total) by mouth 2 (two) times daily.  Qty: 30 capsule, Refills: 0      oxyCODONE (ROXICODONE) 5 MG immediate release tablet Take 1 tablet (5 mg total) by mouth every 4 (four) hours as needed for Pain.  Qty: 10 tablet, Refills: 0    Comments: Quantity prescribed more than 7 day supply? No  Associated Diagnoses: Status post laparoscopic hysterectomy      traMADoL (ULTRAM) 50 mg tablet Take 1 tablet (50 mg total) by mouth every 6 (six) hours.  Qty: 20 tablet, Refills: 0    Comments: Quantity prescribed more than 7 day supply? No  Associated Diagnoses: Status post laparoscopic hysterectomy       !! - Potential duplicate medications found. Please discuss with provider.        CONTINUE these medications which have NOT CHANGED    Details   !! docusate sodium (COLACE) 100 MG capsule Take 1 capsule (100 mg total) by mouth 2 (two) times daily.  Qty: 60 capsule, Refills: 0      esomeprazole (NEXIUM) 40 MG capsule Take 1 capsule (40 mg total) by mouth 2 (two) times daily.  Qty: 60 capsule, Refills: 11    Associated Diagnoses: Gastroesophageal reflux disease, unspecified whether esophagitis present      ferrous sulfate (IRON) 325 mg (65 mg iron) Tab tablet Take 1 tablet (325 mg total) by mouth daily with breakfast.  Qty: 30 tablet, Refills: 11      nadoloL (CORGARD) 20 MG tablet Take 1 tablet (20 mg total) by mouth once daily.  Qty: 30 tablet, Refills: 11    Comments: .      sucralfate (CARAFATE) 1 gram tablet Take 1 tablet (1 g total) by mouth 4 (four) times daily before meals and nightly. for 14 days  Qty: 56 tablet, Refills: 0       !! - Potential duplicate medications found. Please  discuss with provider.        STOP taking these medications       medroxyPROGESTERone (PROVERA) 10 MG tablet Comments:   Reason for Stopping:               Discharge Procedure Orders   Diet general     Lifting restrictions   Order Comments: No lifting greater than 15 pounds for six weeks.     Other restrictions (specify):   Order Comments: PELVIC REST:  No douching, tampons, or intercourse for 9 weeks.    If prescribed, vaginal estrogen cream may be used during the postoperative period.     DRIVING:  No driving while on narcotics. Driving may be resumed initially with a competent passenger one to two weeks after surgery if no longer taking narcotics.     EXERCISE:  For six weeks your exercise should be limited to walking. You may walk as far as you wish, as long as you increase your level of exertion gradually and avoid slippery surfaces. You may climb stairs as needed to get around, but should not use stair climbing for exercise.     Remove dressing in 24 hours   Order Comments: If you have a bandage on wound, you may remove it the day after dismissal.  If you had steri-strips remove them once they begin to peel off (usually 2 weeks). Keep incision clean and dry.  Inspect the incision daily for signs and symptoms of infection.     Wound care routine (specify)   Order Comments: WOUND CARE:  If you have a band-aid or bandage on your wound, you may remove it the day after dismissal.  If you had steri-strips remove them once they begin to peel off (usually 2 weeks).  If your steri-strips still haven't come off in 2 weeks, please remove them. You may wash the wound with mild soap and water.   You may shower at any time but should avoid immersing any abdominal incisions in water for at least two weeks after surgery or until the wound is completely healed. If given, please shower with Hibiclens soap until bottle is completely finished. Keep your wound clean and dry.  You should observe your incision for signs of infection  which include redness, warmth, drainage or fever.     Call MD for:  temperature >100.4     Call MD for:  persistent nausea and vomiting     Call MD for:  severe uncontrolled pain     Call MD for:  difficulty breathing, headache or visual disturbances     Call MD for:  redness, tenderness, or signs of infection (pain, swelling, redness, odor or green/yellow discharge around incision site)     Call MD for:  hives     Call MD for:   Order Comments: inability to urinate, urine is ketchup colored or you have large clots, vaginal bleeding is heavier than a period.    VAGINAL DISCHARGE: You may develop a vaginal discharge and intermittent vaginal spotting after surgery and up to 6 weeks postoperatively.  The discharge may have an odor and may change in color but it is normal.  This is due to dissolving stiches.  Contact your surgical team if you develop vaginal or vulvar irritation along with a discharge.  Also contact your surgical team if you have vaginal discharge that smells like urine or stool.    CONSTIPATION REMEDIES: Patients are often constipated after surgery or with use of oral narcotic medicine. You should continue to take the stool softener, Senokot-S during the next six weeks, and consume adequate amounts of water.  If you have not had a bowel movement for 3 days after dismissal, or are uncomfortable and unable to pass stool, please try one or all of the following measures:  1.  Milk of Magnesia - 30 cc by mouth every 12 hours   2.  Dulcolax suppository - One suppository per rectum every 4-6 hours   3.  Metamucil, Fibercon or other bulk former - use as directed  4.  Fleets Enema        PAIN MEDICATIONS:     Take your pain medications as instructed. It is best to take pain medications before your pain becomes severe. This will allow you to take less medication yet have better pain relief. For the first 2 or 3 days it may be helpful to take your pain medications on a regular schedule (e.g. every 4 to 6 hours).  This will help you to keep your pain under better control. You should then begin to take fewer medications each day until you no longer need them. Do not take pain medication on an empty stomach. This may lead to nausea and vomiting.     Activity as tolerated   Order Comments: Return to normal activity slowly as you feel able.  For 6 weeks your exercise should be limited to walking.  You may walk as far as you wish, as long as you increase your level of exertion gradually and avoid slippery surfaces.    Do not insert anything in your vagina for 9 weeks unless instructed otherwise by your doctor.     Shower on day dressing removed (No bath)   Order Comments: You may shower at any time but should avoid immersing any abdominal incisions in water for at least 2 weeks after surgery or until the wound is completely healed.  If given, please shower with Hibaclens soap until bottle is completely finished        Follow-up Information       GYN RESIDENT, [facility] Follow up in 2 week(s).    Why: Post op follow up appointment                         Kami Riley MD  OB/GYN PGY-1

## 2025-04-25 NOTE — PLAN OF CARE
04/25/25 0901   Readmission   Was this a planned readmission? No   Why were you readmitted? Planned readmission;Related to previous admission   When you left the hospital where did you go? Home with Family   Did patient/caregiver refused recommended DC plan? No   Did you try to manage your symptoms your self? No   Did you call anyone? No   Did you try to see or did see a doctor or nurse before you came? No   Did you have  a follow-up appointment on discharge? Yes   Did you go? Yes

## 2025-04-25 NOTE — PROGRESS NOTES
[facility]  Obstetrics & Gynecology  Progress Note    Patient Name: Giovanni Pena  MRN: [id number]  Admission Date: 4/24/2025  Primary Care Provider: Miquel Mishra MD  Principal Problem: s/p laparoscopic hysterectomy, lysis of adhesions and cystoscopy requiring 1u PRBCs intraop    Subjective:     Interval History: NAEON.  Reports pain controlled with scheduled acetaminophen and tramadol.  Tolerating PO intake w/o N/V.  Hatfield catheter remains in place.  Denies flatus nor BM.  No further concerns at this time.    Scheduled Meds:   acetaminophen  1,000 mg Oral Q6H    heparin (porcine)  5,000 Units Subcutaneous Q8H    magnesium hydroxide 400 mg/5 ml  30 mL Oral BID    mupirocin   Nasal BID    senna  8.6 mg Oral BID    traMADoL  50 mg Oral Q6H     Continuous Infusions:   lactated ringers   Intravenous Continuous 40 mL/hr at 04/25/25 0605 Rate Verify at 04/25/25 0605     PRN Meds:  Current Facility-Administered Medications:     HYDROmorphone, 0.4 mg, Intravenous, Q2H PRN    naloxone, 0.02 mg, Intravenous, PRN    ondansetron, 4 mg, Intravenous, Q6H PRN    oxyCODONE, 5 mg, Oral, Q4H PRN    oxyCODONE, 10 mg, Oral, Q4H PRN    prochlorperazine, 5 mg, Intravenous, Q6H PRN    sodium chloride 0.9%, 10 mL, Intravenous, PRN    zolpidem, 5 mg, Oral, Nightly PRN    Review of patient's allergies indicates:   Allergen Reactions    Penicillin Hives     Objective:     Vital Signs (Most Recent):  Temp: 98.1 °F (36.7 °C) (04/25/25 0340)  Pulse: 68 (04/25/25 0600)  Resp: 15 (04/25/25 0600)  BP: (!) 94/52 (04/25/25 0600)  SpO2: 99 % (04/25/25 0600) Vital Signs (24h Range):  Temp:  [97.6 °F (36.4 °C)-98.3 °F (36.8 °C)] 98.1 °F (36.7 °C)  Pulse:  [62-87] 68  Resp:  [7-18] 15  SpO2:  [94 %-100 %] 99 %  BP: ()/(52-72) 94/52  Arterial Line BP: ()/(45-79) 117/56     Weight: 79.4 kg (175 lb)  Body mass index is 27.41 kg/m².  Patient's last menstrual period was 04/09/2025 (exact date).    I&O (Last  24H):    Intake/Output Summary (Last 24 hours) at 4/25/2025 0632  Last data filed at 4/25/2025 0605  Gross per 24 hour   Intake 2332.49 ml   Output 1325 ml   Net 1007.49 ml     Physical Exam:   Constitutional: She is oriented to person, place, and time. She appears well-developed and well-nourished. No distress.    HENT:   Head: Normocephalic and atraumatic.      Cardiovascular:  Normal rate.             Pulmonary/Chest: Effort normal. No respiratory distress.        Abdominal: Soft. She exhibits abdominal incision (small VMI (mini-lap) and two lateral ports c/d/I). She exhibits no distension and no mass. There is no abdominal tenderness. There is no rebound and no guarding.             Musculoskeletal: Normal range of motion.       Neurological: She is alert and oriented to person, place, and time.    Skin: Skin is warm and dry. She is not diaphoretic.    Psychiatric: She has a normal mood and affect. Her behavior is normal. Judgment normal.     Laboratory:  Recent Lab Results         04/25/25  0339   04/24/25  1943   04/24/25  1226   04/24/25  0742        Albumin 3.3       3.1       ALP 61       66       ALT 14       13       Anion Gap 7       8       Aniso     Slight         AST 22       19       Baso # 0.02   0.00   0.01         Basophil % 0.5   0.0   0.3         BILIRUBIN TOTAL 3.5  Comment: For infants and newborns, interpretation of results should be based   on gestational age, weight and in agreement with clinical   observations.    Premature Infant recommended reference ranges:   0-24 hours:  <8.0 mg/dL   24-48 hours: <12.0 mg/dL   3-5 days:    <15.0 mg/dL   6-29 days:   <15.0 mg/dL       1.1  Comment: For infants and newborns, interpretation of results should be based   on gestational age, weight and in agreement with clinical   observations.    Premature Infant recommended reference ranges:   0-24 hours:  <8.0 mg/dL   24-48 hours: <12.0 mg/dL   3-5 days:    <15.0 mg/dL   6-29 days:   <15.0 mg/dL       BUN  13       8       Calcium 7.9       8.2       Chloride 109       110       CO2 20       21       Creatinine 0.7       0.7       Direct Román (DINORAH) NEG             eGFR >60  Comment: Estimated GFR calculated using the CKD-EPI creatinine (2021) equation.       >60  Comment: Estimated GFR calculated using the CKD-EPI creatinine (2021) equation.       Eos # 0.00   0.00   0.01         Eos % 0.0   0.0   0.3         Fibrinogen 269             Glucose 111       84       Gran # (ANC) 3.54   3.45   2.65         Group & Rh   A NEG           Haptoglobin       48       Hematocrit 24.1   24.2   21.4         Hemoglobin 7.6   7.6   6.5         Immature Grans (Abs) 0.01  Comment: Mild elevation in immature granulocytes is non specific and can be seen in a variety of conditions including stress response, acute inflammation, trauma and pregnancy. Correlation with other laboratory and clinical findings is essential.   0.02  Comment: Mild elevation in immature granulocytes is non specific and can be seen in a variety of conditions including stress response, acute inflammation, trauma and pregnancy. Correlation with other laboratory and clinical findings is essential.   0.02  Comment: Mild elevation in immature granulocytes is non specific and can be seen in a variety of conditions including stress response, acute inflammation, trauma and pregnancy. Correlation with other laboratory and clinical findings is essential.         Immature Granulocytes 0.2   0.5   0.7         INDIRECT ROMÁN   NEG           Lactate Dehydrogenase 258   247     180       Lymph # 0.36   0.30   0.30         Lymph % 8.5   7.2   9.9         MCH 25.1   25.0   24.3         MCHC 31.5   31.4   30.4         MCV 80   80   80         Mono # 0.33   0.39   0.04         Mono % 7.7   9.4   1.3         MPV 10.3   10.2   10.5         Neut % 83.1   82.9   87.5         nRBC 0   0   0         Ovalocytes     Occasional         Platelet Estimate     Decreased         Platelet Count  112   114   113         Poly     Occasional         Potassium 3.4       3.5       PROTEIN TOTAL 6.4       6.5       RBC 3.03   3.04   2.67         RDW 27.9   28.1     Comment: Result Not Available         Retic       2.2       Sodium 136       139       Specimen Outdate   04/27/2025 23:59           WBC 4.26   4.16   3.03               Diagnostic Results:  none    Assessment/Plan:     Active Diagnoses:    Diagnosis Date Noted POA    S/p TLH/BS/TOMMY/Cystoscopy [Z90.710] 04/24/2025 No      Problems Resolved During this Admission:     POD 1 s/p TLH/BS/Tommy and cystoscopy  - Doing well  - Procedure complications: required 1u PRBCs intraop  - EBL: 250 cc  - Tolerating PO  - Diet: regular  - Pain control: well controlled with scheduled and PRN analgesia  - In/Out: Hatfield in place draining clear urine; 350 cc over last 12 hr shift (0.47 cc/kg/hr); d/c this morning  - Bowel function: -flatus/-BM   - PRN: simethicone, zofran, phenergan, benadryl  - Heme: Preop H/h: 7.6/24 (4/24 PM) > 7.6/24 (7/25 AM); LDH wnl giving low concern for acute hemolysis  - PPX: ambulation encouraged, IS encouraged, TEDs/SCDs in place    Disposition: Anticipate discharge later today      Juan Pablo Potts MD  Obstetrics & Gynecology  PGY 2  [facility]  Intensive [facility] ([place])

## 2025-04-26 LAB
ABO + RH BLD: NORMAL
BLD PROD TYP BPU: NORMAL
BLOOD UNIT EXPIRATION DATE: NORMAL
BLOOD UNIT TYPE CODE: 600
CROSSMATCH INTERPRETATION: NORMAL
DISPENSE STATUS: NORMAL
UNIT NUMBER: NORMAL

## 2025-04-28 LAB
ESTROGEN SERPL-MCNC: NORMAL PG/ML
INSULIN SERPL-ACNC: NORMAL U[IU]/ML
LAB AP CLINICAL INFORMATION: NORMAL
LAB AP DIAGNOSIS CATEGORY: NORMAL
LAB AP GROSS DESCRIPTION: NORMAL
LAB AP PERFORMING LOCATION(S): NORMAL
LAB AP REPORT FOOTNOTES: NORMAL

## 2025-04-29 ENCOUNTER — LAB VISIT (OUTPATIENT)
Dept: LAB | Facility: HOSPITAL | Age: 45
End: 2025-04-29
Attending: INTERNAL MEDICINE
Payer: COMMERCIAL

## 2025-04-29 DIAGNOSIS — D50.0 IRON DEFICIENCY ANEMIA DUE TO CHRONIC BLOOD LOSS: ICD-10-CM

## 2025-04-29 DIAGNOSIS — D59.10 AUTOIMMUNE HEMOLYTIC ANEMIA: ICD-10-CM

## 2025-04-29 LAB
ABSOLUTE EOSINOPHIL (SMH): 0.12 K/UL
ABSOLUTE MONOCYTE (SMH): 0.62 K/UL (ref 0.3–1)
ABSOLUTE NEUTROPHIL COUNT (SMH): 5.2 K/UL (ref 1.8–7.7)
ANISOCYTOSIS BLD QL SMEAR: NORMAL
BASOPHILS # BLD AUTO: 0.02 K/UL
BASOPHILS NFR BLD AUTO: 0.3 %
DACRYOCYTES BLD QL SMEAR: NORMAL
ERYTHROCYTE [DISTWIDTH] IN BLOOD BY AUTOMATED COUNT: 28.4 % (ref 11.5–14.5)
FERRITIN SERPL-MCNC: 43.8 NG/ML (ref 20–300)
HCT VFR BLD AUTO: 27.7 % (ref 37–48.5)
HGB BLD-MCNC: 8.4 GM/DL (ref 12–16)
IMM GRANULOCYTES # BLD AUTO: 0.05 K/UL (ref 0–0.04)
IMM GRANULOCYTES NFR BLD AUTO: 0.8 % (ref 0–0.5)
IRON SATN MFR SERPL: <10 % (ref 20–50)
IRON SERPL-MCNC: 16 UG/DL (ref 30–160)
LDH SERPL-CCNC: 258 U/L (ref 110–260)
LYMPHOCYTES # BLD AUTO: 0.63 K/UL (ref 1–4.8)
MCH RBC QN AUTO: 24.7 PG (ref 27–31)
MCHC RBC AUTO-ENTMCNC: 30.3 G/DL (ref 32–36)
MCV RBC AUTO: 82 FL (ref 82–98)
NUCLEATED RBC (/100WBC) (SMH): 0 /100 WBC
OVALOCYTES BLD QL SMEAR: NORMAL
PLATELET # BLD AUTO: 144 K/UL (ref 150–450)
PLATELET BLD QL SMEAR: NORMAL
PMV BLD AUTO: 9.9 FL (ref 9.2–12.9)
POIKILOCYTOSIS BLD QL SMEAR: SLIGHT
RBC # BLD AUTO: 3.4 M/UL (ref 4–5.4)
RELATIVE EOSINOPHIL (SMH): 1.8 % (ref 0–8)
RELATIVE LYMPHOCYTE (SMH): 9.5 % (ref 18–48)
RELATIVE MONOCYTE (SMH): 9.4 % (ref 4–15)
RELATIVE NEUTROPHIL (SMH): 78.2 % (ref 38–73)
TIBC SERPL-MCNC: 286 UG/DL (ref 250–450)
TRANSFERRIN SERPL-MCNC: 204 MG/DL (ref 200–375)
WBC # BLD AUTO: 6.62 K/UL (ref 3.9–12.7)

## 2025-04-29 PROCEDURE — 85025 COMPLETE CBC W/AUTO DIFF WBC: CPT

## 2025-04-29 PROCEDURE — 83615 LACTATE (LD) (LDH) ENZYME: CPT

## 2025-04-29 PROCEDURE — 36415 COLL VENOUS BLD VENIPUNCTURE: CPT

## 2025-04-29 PROCEDURE — 83010 ASSAY OF HAPTOGLOBIN QUANT: CPT

## 2025-04-29 PROCEDURE — 82728 ASSAY OF FERRITIN: CPT

## 2025-04-29 PROCEDURE — 83540 ASSAY OF IRON: CPT

## 2025-04-30 ENCOUNTER — OFFICE VISIT (OUTPATIENT)
Dept: HEMATOLOGY/ONCOLOGY | Facility: CLINIC | Age: 45
End: 2025-04-30
Payer: COMMERCIAL

## 2025-04-30 DIAGNOSIS — D59.10 AUTOIMMUNE HEMOLYTIC ANEMIA: ICD-10-CM

## 2025-04-30 DIAGNOSIS — D50.0 IRON DEFICIENCY ANEMIA DUE TO CHRONIC BLOOD LOSS: Primary | ICD-10-CM

## 2025-04-30 PROCEDURE — 98006 SYNCH AUDIO-VIDEO EST MOD 30: CPT | Mod: 95,,, | Performed by: INTERNAL MEDICINE

## 2025-04-30 RX ORDER — SODIUM CHLORIDE 0.9 % (FLUSH) 0.9 %
10 SYRINGE (ML) INJECTION
OUTPATIENT
Start: 2025-04-30

## 2025-04-30 RX ORDER — EPINEPHRINE 0.3 MG/.3ML
0.3 INJECTION SUBCUTANEOUS ONCE AS NEEDED
OUTPATIENT
Start: 2025-04-30

## 2025-04-30 RX ORDER — HEPARIN 100 UNIT/ML
500 SYRINGE INTRAVENOUS
OUTPATIENT
Start: 2025-04-30

## 2025-04-30 NOTE — PROGRESS NOTES
The patient location is: home  Visit type: Virtual visit with synchronous audio and video  Face-to-face or time spent with patient on the encounter: 25 min  Total time spent on and for  this encounter which includes non face-to-face time preparing to see patient, review of tests, obtaining and or reviewing separately obtained records documenting clinical information in the electronic or other health records, independently interpreting results which is not separately reported ,and communicating results to the patient/family/caregiver and in care coordination and treatment planning/communicating with pharmacy for prescriptions/addressing social needs/arranging follow-up and or referrals : 25 min     Each patient I provide medical services by telemedicine is:  (1) informed of the relationship between the physician and patient and the respective role of any other health care provider with respect to management of the patient; and (2) notified that he or she may decline to receive medical services by telemedicine and may withdraw from such care at any time.  This is a video visit therefore some elements of the physical exam such as vital signs, heart sounds are breath sounds are not included and may be included if found in recent clinic notes of other providers assessing same patient. Any symptoms or signs that were visualized were stated by the patient may be included in this note.    Service Date:  4/30/25    Chief Complaint: Anemia    Giovanni Pena is a 44 y.o. female here with anemia.  Doing well.  Recently had a hysterectomy.  No complaints to me.  Recovering from her surgery.  Getting a little bit stronger.    Review of Systems   Constitutional: Negative.  Negative for appetite change and unexpected weight change.   HENT: Negative.  Negative for mouth sores.    Eyes: Negative.  Negative for visual disturbance.   Respiratory: Negative.  Negative for cough and shortness of breath.    Cardiovascular: Negative.   Negative for chest pain.   Gastrointestinal:  Negative for abdominal pain and diarrhea.   Endocrine: Negative.    Genitourinary: Negative.  Negative for frequency.   Musculoskeletal: Negative.  Negative for back pain.   Integumentary:  Negative for rash. Negative.   Neurological: Negative.  Negative for headaches.   Hematological: Negative.  Negative for adenopathy.   Psychiatric/Behavioral: Negative.  The patient is not nervous/anxious.         Current Outpatient Medications   Medication Instructions    acetaminophen (TYLENOL) 650 mg, Oral, Every 6 hours    docusate sodium (COLACE) 100 mg, Oral, 2 times daily    docusate sodium (COLACE) 100 mg, Oral, 2 times daily    esomeprazole (NEXIUM) 40 mg, Oral, 2 times daily    ferrous sulfate (IRON) 325 mg, Oral, With breakfast    nadoloL (CORGARD) 20 mg, Oral, Daily    oxyCODONE (ROXICODONE) 5 mg, Oral, Every 4 hours PRN    traMADoL (ULTRAM) 50 mg, Oral, Every 6 hours        Past Medical History:   Diagnosis Date    Acid reflux     Anemia, unspecified     Embolism and thrombosis of renal vein     Esophageal varices     Hemolytic anemia     RA (rheumatoid arthritis)         Past Surgical History:   Procedure Laterality Date     SECTION  , , ,     CHOLECYSTECTOMY      COLONOSCOPY N/A 2025    Procedure: COLONOSCOPY   **WANTS EARBRIAN APPT;  Surgeon: Kelly Betancourt MD;  Location: [facility];  Service: Endoscopy;  Laterality: N/A;    CYSTOSCOPY N/A 2025    Procedure: CYSTOSCOPY;  Surgeon: Estefani Pedro MD;  Location: [facility];  Service: OB/GYN;  Laterality: N/A;    ESOPHAGOGASTRODUODENOSCOPY N/A 2025    Procedure: EGD (ESOPHAGOGASTRODUODENOSCOPY);  Surgeon: Kelly Betancourt MD;  Location: [facility];  Service: Endoscopy;  Laterality: N/A;    LAPAROSCOPIC LYSIS OF ADHESIONS N/A 2025    Procedure: LYSIS, ADHESIONS, LAPAROSCOPIC;  Surgeon: Estefani Pedro MD;  Location: [facility];  Service: OB/GYN;  Laterality:  N/A;    LAPAROSCOPIC TOTAL HYSTERECTOMY N/A 4/24/2025    Procedure: HYSTERECTOMY, TOTAL, LAPAROSCOPIC with bilateral salpingectomy;  Surgeon: Estefani Pedro MD;  Location: [facility];  Service: OB/GYN;  Laterality: N/A;    SLEEVE GASTROPLASTY  12/08/2021    in [place]    SURGICAL REMOVAL OF BUNION WITH OSTEOTOMY OF METATARSAL BONE Left 8/17/2022    Procedure: BUNIONECTOMY, WITH METATARSAL OSTEOTOMY  ;  Surgeon: Roshan Pyle DPM;  Location: [facility];  Service: Podiatry;  Laterality: Left;  distal osteotomy left 1st metatarsal    TUBAL LIGATION  2014        Family History   Problem Relation Name Age of Onset    Breast cancer Maternal Aunt      Hypertension Maternal Grandmother      Coronary artery disease Maternal Grandmother      Colon cancer Neg Hx      Ovarian cancer Neg Hx      Endometrial cancer Neg Hx         Social History     Tobacco Use    Smoking status: Never    Smokeless tobacco: Never   Substance Use Topics    Alcohol use: Yes     Comment: socially    Drug use: Never         There were no vitals filed for this visit.       Physical Exam:  LMP 04/09/2025 (Exact Date)     Physical Exam  Constitutional:       Appearance: Normal appearance.   HENT:      Head: Normocephalic and atraumatic.      Nose: Nose normal.      Mouth/Throat:      Mouth: Mucous membranes are moist.      Pharynx: Oropharynx is clear.   Eyes:      Conjunctiva/sclera: Conjunctivae normal.   Cardiovascular:      Rate and Rhythm: Normal rate and regular rhythm.      Heart sounds: Normal heart sounds.   Pulmonary:      Effort: Pulmonary effort is normal.      Breath sounds: Normal breath sounds.   Abdominal:      General: Abdomen is flat. Bowel sounds are normal.      Palpations: Abdomen is soft.   Musculoskeletal:         General: Normal range of motion.      Cervical back: Normal range of motion and neck supple.   Skin:     General: Skin is warm and dry.   Neurological:      General: No focal deficit present.      Mental Status: She is  alert and oriented to person, place, and time. Mental status is at baseline.   Psychiatric:         Mood and Affect: Mood normal.          Labs:  Lab Results   Component Value Date    WBC 6.62 04/29/2025    RBC 3.40 (L) 04/29/2025    HGB 8.4 (L) 04/29/2025    HCT 27.7 (L) 04/29/2025    MCV 82 04/29/2025    MCH 24.7 (L) 04/29/2025    MCHC 30.3 (L) 04/29/2025    RDW 28.4 (H) 04/29/2025     (L) 04/29/2025    MPV 9.9 04/29/2025    GRAN 2.6 03/19/2025    GRAN 71.2 03/19/2025    LYMPH 8.5 (L) 04/25/2025    LYMPH 0.36 (L) 04/25/2025    MONO 7.7 04/25/2025    MONO 0.33 04/25/2025    EOS 0.0 04/25/2025    EOS 0.00 04/25/2025    BASO 0.03 03/19/2025    EOSINOPHIL 2.2 03/19/2025    BASOPHIL 0.5 04/25/2025    BASOPHIL 0.02 04/25/2025     Sodium   Date Value Ref Range Status   04/25/2025 136 136 - 145 mmol/L Final   03/18/2025 138 136 - 145 mmol/L Final     Potassium   Date Value Ref Range Status   04/25/2025 3.4 (L) 3.5 - 5.1 mmol/L Final   03/18/2025 3.5 3.5 - 5.1 mmol/L Final     Chloride   Date Value Ref Range Status   04/25/2025 109 95 - 110 mmol/L Final   03/18/2025 108 95 - 110 mmol/L Final     CO2   Date Value Ref Range Status   04/25/2025 20 (L) 23 - 29 mmol/L Final   03/18/2025 22 (L) 23 - 29 mmol/L Final     Glucose   Date Value Ref Range Status   04/25/2025 111 (H) 70 - 110 mg/dL Final   03/18/2025 85 70 - 110 mg/dL Final     BUN   Date Value Ref Range Status   04/25/2025 13 6 - 20 mg/dL Final     Creatinine   Date Value Ref Range Status   04/25/2025 0.7 0.5 - 1.4 mg/dL Final     Calcium   Date Value Ref Range Status   04/25/2025 7.9 (L) 8.7 - 10.5 mg/dL Final   03/18/2025 8.2 (L) 8.7 - 10.5 mg/dL Final     Protein Total   Date Value Ref Range Status   04/25/2025 6.4 6.0 - 8.4 gm/dL Final     Total Protein   Date Value Ref Range Status   03/18/2025 7.8 6.0 - 8.4 g/dL Final     Albumin   Date Value Ref Range Status   04/25/2025 3.3 (L) 3.5 - 5.2 g/dL Final   03/18/2025 3.6 3.5 - 5.2 g/dL Final     Total  Bilirubin   Date Value Ref Range Status   03/18/2025 1.1 (H) 0.1 - 1.0 mg/dL Final     Comment:     For infants and newborns, interpretation of results should be based  on gestational age, weight and in agreement with clinical  observations.    Premature Infant recommended reference ranges:  Up to 24 hours.............<8.0 mg/dL  Up to 48 hours............<12.0 mg/dL  3-5 days..................<15.0 mg/dL  6-29 days.................<15.0 mg/dL       Bilirubin Total   Date Value Ref Range Status   04/25/2025 3.5 (H) 0.1 - 1.0 mg/dL Final     Comment:     For infants and newborns, interpretation of results should be based   on gestational age, weight and in agreement with clinical   observations.    Premature Infant recommended reference ranges:   0-24 hours:  <8.0 mg/dL   24-48 hours: <12.0 mg/dL   3-5 days:    <15.0 mg/dL   6-29 days:   <15.0 mg/dL     Alkaline Phosphatase   Date Value Ref Range Status   03/18/2025 80 40 - 150 U/L Final     ALP   Date Value Ref Range Status   04/25/2025 61 40 - 150 unit/L Final     AST   Date Value Ref Range Status   04/25/2025 22 11 - 45 unit/L Final   03/18/2025 14 10 - 40 U/L Final     ALT   Date Value Ref Range Status   04/25/2025 14 10 - 44 unit/L Final   03/18/2025 11 10 - 44 U/L Final     Anion Gap   Date Value Ref Range Status   04/25/2025 7 (L) 8 - 16 mmol/L Final     eGFR if    Date Value Ref Range Status   07/19/2022 >60.0 >60 mL/min/1.73 m^2 Final     eGFR if non    Date Value Ref Range Status   07/19/2022 >60.0 >60 mL/min/1.73 m^2 Final     Comment:     Calculation used to obtain the estimated glomerular filtration  rate (eGFR) is the CKD-EPI equation.          A/P:    Iron deficiency anemia and hemolytic anemia   -with normal LDH, I doubt that she is currently in hemolysis  -her iron level is low, likely from blood loss from her hysterectomy, so I will give her about 100 mg of Venofer  -I will repeat blood work again 4 weeks  after    Splenomegaly   -monitor      Aurash Khoobehi, MD  Hematology and Oncology

## 2025-05-02 LAB — HAPTOGLOB SERPL-MCNC: <10 MG/DL (ref 42–296)

## 2025-05-05 ENCOUNTER — PATIENT MESSAGE (OUTPATIENT)
Dept: OBSTETRICS AND GYNECOLOGY | Facility: HOSPITAL | Age: 45
End: 2025-05-05
Payer: COMMERCIAL

## 2025-05-07 NOTE — PROGRESS NOTES
GYN Progress Note      SUBJECTIVE:     Chief Complaint: Post-op Evaluation       History of Present Illness:  Giovanni Pena is a 44 y.o.  female who is s/p TLH/ Lap Cystoscopy, bilateral salpingectomy, and TOMMY on   for  AUB-L. PMH is significant for AUB, symptomatic anemia, h/o hemolytic anemia following blood transfusion, h/o portal/renal vein thrombosis, h/o gastric sleeve, rheumatoid arthritis, eczema, GERD. Patient was observed in the ICU after pRBCs in the setting of previous transfusion reaction.     Today, patient reports she is doing well. She is tolerating PO without nausea or vomiting, ambulating without difficulty, voiding without difficulty, passing flatus, and having bowel movements. Denies vaginal bleeding, itching, irritation, or discharge.     Active medications, medical history, surgical history, family history, social history, and allergies all reviewed and updated in chart.  OB History          5    Para   4    Term   4            AB   1    Living             SAB        IAB   1    Ectopic        Multiple        Live Births                     Review of Systems   Constitutional:  Negative for appetite change, diaphoresis, fever and unexpected weight change.   Eyes:  Negative for visual disturbance.   Respiratory:  Negative for cough.    Cardiovascular:  Negative for chest pain and leg swelling.   Gastrointestinal:  Negative for abdominal pain, constipation, diarrhea, nausea and vomiting.   Genitourinary:  Negative for bladder incontinence, dysuria, frequency, pelvic pain, urgency, vaginal bleeding, vaginal discharge, vaginal pain, urinary incontinence, vaginal dryness and vaginal odor.   Musculoskeletal:  Negative for arthralgias and back pain.   Neurological:  Negative for headaches.   Psychiatric/Behavioral:  The patient is not nervous/anxious.          OBJECTIVE:   /71 (BP Location: Right arm, Patient Position: Sitting)   Pulse 87   Wt 73.8 kg (162 lb 11.2  oz)   LMP 04/09/2025 (Exact Date)    Physical Exam  Constitutional:       Appearance: Normal appearance. She is normal weight.     Genitourinary:    Vagina normal.   The external female genitalia was normal.   There is no rash, tenderness, lesion or injury on the right labia. There is no rash, tenderness, lesion or injury on the left labia. There is no rash, tenderness, lesion and skin changes on the right vulva.There is no rash, tenderness, lesion and skin changes on the left vulva.No erythema, vaginal discharge, tenderness, bleeding, vaginal ulcerations, vaginal cuff induration, granulation tissue or lesions in the vagina.    No lesions in the vagina.      Vaginal exam comments: Vaginal cuff with sutures visualized (0-vicryl used for closure), no granulation tissue noted, palpated and noted to be completely intact. .   Right adnexum displays no mass, no tenderness and no fullness. Left adnexum displays no mass, no tenderness and no fullness. Cervix is absent.Uterus is absent.   HENT:      Head: Normocephalic.   Eyes:      Extraocular Movements: Extraocular movements intact.      Pupils: Pupils are equal, round, and reactive to light.   Pulmonary:      Effort: Pulmonary effort is normal.   Abdominal:      General: Abdomen is flat. There is no distension.      Tenderness: There is no abdominal tenderness. There is no guarding or rebound.   Musculoskeletal:         General: Normal range of motion.      Cervical back: Normal range of motion.   Neurological:      Mental Status: She is alert and oriented to person, place, and time.   Psychiatric:         Mood and Affect: Mood normal.         Behavior: Behavior normal.         Thought Content: Thought content normal.         Judgment: Judgment normal.          ASSESSMENT:   The encounter diagnosis was S/p TLH/BS/TOMMY/Cystoscopy.      Plan:   Giovanni was seen today for post-op evaluation.    Diagnoses and all orders for this visit:    S/p TLH/BS/TOMMY/Cystoscopy        Status  Post Procedure Above:   - Patient meeting all postoperative milestones.   - Exam: benign   - Will continue pelvic rest for 6-8 weeks   - To return to clinic for postoperative follow-up in 6-8 weeks     Bri Moran MD/MPH  OB/GYN PGY-4  [facility]        I have reviewed and concur with the resident's history, physical, assessment, and plan.        Beth Stephenson MD  [facility] - Obstetrics and Gynecology  05/09/2025

## 2025-05-08 ENCOUNTER — OFFICE VISIT (OUTPATIENT)
Dept: OBSTETRICS AND GYNECOLOGY | Facility: CLINIC | Age: 45
End: 2025-05-08
Payer: COMMERCIAL

## 2025-05-08 VITALS
BODY MASS INDEX: 25.48 KG/M2 | WEIGHT: 162.69 LBS | SYSTOLIC BLOOD PRESSURE: 107 MMHG | HEART RATE: 87 BPM | DIASTOLIC BLOOD PRESSURE: 71 MMHG

## 2025-05-08 DIAGNOSIS — Z90.710 STATUS POST LAPAROSCOPIC HYSTERECTOMY: Primary | ICD-10-CM

## 2025-05-08 PROCEDURE — 99024 POSTOP FOLLOW-UP VISIT: CPT | Mod: GE,S$GLB,, | Performed by: OBSTETRICS & GYNECOLOGY

## 2025-05-08 PROCEDURE — 99999 PR PBB SHADOW E&M-EST. PATIENT-LVL III: CPT | Mod: PBBFAC,,,

## 2025-05-22 ENCOUNTER — INFUSION (OUTPATIENT)
Dept: INFUSION THERAPY | Facility: HOSPITAL | Age: 45
End: 2025-05-22
Attending: INTERNAL MEDICINE
Payer: COMMERCIAL

## 2025-05-22 VITALS
RESPIRATION RATE: 18 BRPM | OXYGEN SATURATION: 100 % | TEMPERATURE: 98 F | HEIGHT: 67 IN | BODY MASS INDEX: 26.68 KG/M2 | SYSTOLIC BLOOD PRESSURE: 102 MMHG | HEART RATE: 72 BPM | WEIGHT: 170 LBS | DIASTOLIC BLOOD PRESSURE: 50 MMHG

## 2025-05-22 DIAGNOSIS — D50.0 IRON DEFICIENCY ANEMIA DUE TO CHRONIC BLOOD LOSS: Primary | ICD-10-CM

## 2025-05-22 PROCEDURE — 63600175 PHARM REV CODE 636 W HCPCS: Performed by: INTERNAL MEDICINE

## 2025-05-22 PROCEDURE — 96374 THER/PROPH/DIAG INJ IV PUSH: CPT

## 2025-05-22 PROCEDURE — 25000003 PHARM REV CODE 250: Performed by: INTERNAL MEDICINE

## 2025-05-22 PROCEDURE — A4216 STERILE WATER/SALINE, 10 ML: HCPCS | Performed by: INTERNAL MEDICINE

## 2025-05-22 RX ORDER — HEPARIN 100 UNIT/ML
500 SYRINGE INTRAVENOUS
OUTPATIENT
Start: 2025-05-29

## 2025-05-22 RX ORDER — EPINEPHRINE 0.3 MG/.3ML
0.3 INJECTION SUBCUTANEOUS ONCE AS NEEDED
OUTPATIENT
Start: 2025-05-29

## 2025-05-22 RX ORDER — HEPARIN 100 UNIT/ML
500 SYRINGE INTRAVENOUS
Status: DISCONTINUED | OUTPATIENT
Start: 2025-05-22 | End: 2025-05-22 | Stop reason: HOSPADM

## 2025-05-22 RX ORDER — EPINEPHRINE 0.3 MG/.3ML
0.3 INJECTION SUBCUTANEOUS ONCE AS NEEDED
Status: DISCONTINUED | OUTPATIENT
Start: 2025-05-22 | End: 2025-05-22 | Stop reason: HOSPADM

## 2025-05-22 RX ORDER — SODIUM CHLORIDE 0.9 % (FLUSH) 0.9 %
10 SYRINGE (ML) INJECTION
OUTPATIENT
Start: 2025-05-29

## 2025-05-22 RX ORDER — SODIUM CHLORIDE 0.9 % (FLUSH) 0.9 %
10 SYRINGE (ML) INJECTION
Status: DISCONTINUED | OUTPATIENT
Start: 2025-05-22 | End: 2025-05-22 | Stop reason: HOSPADM

## 2025-05-22 RX ADMIN — IRON SUCROSE 200 MG: 20 INJECTION, SOLUTION INTRAVENOUS at 11:05

## 2025-05-22 RX ADMIN — Medication 10 ML: at 11:05

## 2025-05-22 NOTE — PLAN OF CARE
Problem: Adult Inpatient Plan of Care  Goal: Optimal Comfort and Wellbeing  Intervention: Monitor Pain and Promote Comfort  Flowsheets (Taken 5/22/2025 1134)  Pain Management Interventions:   quiet environment facilitated   relaxation techniques promoted  Intervention: Provide Person-Centered Care  Flowsheets (Taken 5/22/2025 1134)  Trust Relationship/Rapport:   choices provided   reassurance provided   care explained   thoughts/feelings acknowledged   emotional support provided   empathic listening provided   questions answered   questions encouraged

## 2025-05-22 NOTE — PLAN OF CARE
1048 pt called and stated checked into [facility] on way here now  1115 Pt here for infusion tx, placed to chair, vss.  1142 Therapy plan administered. Tolerated well. D/c instructions given including s/s of reaction and to notify administering MD or go to ER. D/c'd to home with starla.

## 2025-05-23 ENCOUNTER — TELEPHONE (OUTPATIENT)
Dept: HEPATOLOGY | Facility: CLINIC | Age: 45
End: 2025-05-23
Payer: COMMERCIAL

## 2025-05-23 NOTE — TELEPHONE ENCOUNTER
Called the patient to schedule a hepatology consult from referral.  Scheduled to the next available appt 7/14/2025 with Dr. Quinones. Patient confirmed and agreed with the schedule.  Reminder letter mailed.

## 2025-05-23 NOTE — TELEPHONE ENCOUNTER
----- Message from Pillo Irving MD sent at 5/23/2025  3:00 PM CDT -----  Regarding: soon appointment  MRN [id number] Cordon we find a soon appointment for this lady?MD only but it doesn't have to be me.ThanksGT  ----- Message -----  From: Kelly Betancourt MD  Sent: 4/11/2025   3:45 PM CDT  To: Pillo Irving MD    Hey!I was hoping you could help me expedite this nice young lady into hepatology clinic (probably needs to see an MD) - she has a complicated history, most recently with hemolytic anemia on top of chronic SOCORRO due to heavy menses. She had a gastric sleeve in [place] years ago and after that developed extensive clots which were treated with anticoagulation it looks like for 6 months- she had very large esophageal varices on EGD today (I did not band I put on nadolol) but I'm thinking with all the things she has going on she needs close follow up and because of her complicated history if she needs banding it should be done at [facility] (we don't even have an ICU in our hospital anymore). I put in a hepatology referral. Thanks!Kelly

## 2025-05-29 ENCOUNTER — PATIENT MESSAGE (OUTPATIENT)
Dept: OBSTETRICS AND GYNECOLOGY | Facility: CLINIC | Age: 45
End: 2025-05-29
Payer: COMMERCIAL

## 2025-06-05 ENCOUNTER — INFUSION (OUTPATIENT)
Dept: INFUSION THERAPY | Facility: HOSPITAL | Age: 45
End: 2025-06-05
Attending: INTERNAL MEDICINE
Payer: COMMERCIAL

## 2025-06-05 VITALS
SYSTOLIC BLOOD PRESSURE: 112 MMHG | RESPIRATION RATE: 18 BRPM | DIASTOLIC BLOOD PRESSURE: 56 MMHG | TEMPERATURE: 98 F | HEIGHT: 67 IN | BODY MASS INDEX: 26.71 KG/M2 | WEIGHT: 170.19 LBS | HEART RATE: 70 BPM | OXYGEN SATURATION: 100 %

## 2025-06-05 DIAGNOSIS — D50.0 IRON DEFICIENCY ANEMIA DUE TO CHRONIC BLOOD LOSS: Primary | ICD-10-CM

## 2025-06-05 PROCEDURE — 96374 THER/PROPH/DIAG INJ IV PUSH: CPT

## 2025-06-05 PROCEDURE — 25000003 PHARM REV CODE 250: Performed by: INTERNAL MEDICINE

## 2025-06-05 PROCEDURE — A4216 STERILE WATER/SALINE, 10 ML: HCPCS | Performed by: INTERNAL MEDICINE

## 2025-06-05 PROCEDURE — 63600175 PHARM REV CODE 636 W HCPCS: Performed by: INTERNAL MEDICINE

## 2025-06-05 RX ORDER — SODIUM CHLORIDE 0.9 % (FLUSH) 0.9 %
10 SYRINGE (ML) INJECTION
OUTPATIENT
Start: 2025-06-12

## 2025-06-05 RX ORDER — EPINEPHRINE 0.3 MG/.3ML
0.3 INJECTION SUBCUTANEOUS ONCE AS NEEDED
OUTPATIENT
Start: 2025-06-12

## 2025-06-05 RX ORDER — SODIUM CHLORIDE 0.9 % (FLUSH) 0.9 %
10 SYRINGE (ML) INJECTION
Status: DISCONTINUED | OUTPATIENT
Start: 2025-06-05 | End: 2025-06-05 | Stop reason: HOSPADM

## 2025-06-05 RX ORDER — EPINEPHRINE 0.3 MG/.3ML
0.3 INJECTION SUBCUTANEOUS ONCE AS NEEDED
Status: DISCONTINUED | OUTPATIENT
Start: 2025-06-05 | End: 2025-06-05 | Stop reason: HOSPADM

## 2025-06-05 RX ORDER — HEPARIN 100 UNIT/ML
500 SYRINGE INTRAVENOUS
Status: DISCONTINUED | OUTPATIENT
Start: 2025-06-05 | End: 2025-06-05 | Stop reason: HOSPADM

## 2025-06-05 RX ORDER — HEPARIN 100 UNIT/ML
500 SYRINGE INTRAVENOUS
OUTPATIENT
Start: 2025-06-12

## 2025-06-05 RX ADMIN — Medication 10 ML: at 11:06

## 2025-06-05 RX ADMIN — IRON SUCROSE 200 MG: 20 INJECTION, SOLUTION INTRAVENOUS at 11:06

## 2025-06-10 ENCOUNTER — OFFICE VISIT (OUTPATIENT)
Dept: PRIMARY CARE CLINIC | Facility: CLINIC | Age: 45
End: 2025-06-10
Payer: COMMERCIAL

## 2025-06-10 DIAGNOSIS — N30.00 ACUTE CYSTITIS WITHOUT HEMATURIA: Primary | ICD-10-CM

## 2025-06-10 RX ORDER — SULFAMETHOXAZOLE AND TRIMETHOPRIM 800; 160 MG/1; MG/1
1 TABLET ORAL 2 TIMES DAILY
Qty: 6 TABLET | Refills: 0 | Status: SHIPPED | OUTPATIENT
Start: 2025-06-10 | End: 2025-06-13

## 2025-06-10 NOTE — PROGRESS NOTES
Chief Complaint  Chief Complaint   Patient presents with    Urinary Tract Infection       The patient location is: [place]   The chief complaint leading to consultation is: UTI symptoms    Visit type: audiovisual    Face to Face time with patient: 5  10 minutes of total time spent on the encounter, which includes face to face time and non-face to face time preparing to see the patient (eg, review of tests), Obtaining and/or reviewing separately obtained history, Documenting clinical information in the electronic or other health record, Independently interpreting results (not separately reported) and communicating results to the patient/family/caregiver, or Care coordination (not separately reported).         Each patient to whom he or she provides medical services by telemedicine is:  (1) informed of the relationship between the physician and patient and the respective role of any other health care provider with respect to management of the patient; and (2) notified that he or she may decline to receive medical services by telemedicine and may withdraw from such care at any time.    Notes:    YANNI Pena is a 45 y.o. female that presents for UTI.       Onset?: started last week   Pain with urination?: Yes, Dysuria   Increased frequency/urgency?: Yes both frequency and urgency  Recurrent?: None recurrent UTIs  Bubble bath, bath bomb, douching?: None   Urine color?: Yellow in color   Blood in urine?: None  Constipation?: None   Interventions/medications?: Nothing   Denies abdominal pain, Denies nausea , denies flank pain   Denies sexual activity due to recent partial hysterectomy     PAST MEDICAL HISTORY:  Past Medical History:   Diagnosis Date    Acid reflux     Anemia, unspecified     Embolism and thrombosis of renal vein     Esophageal varices     Hemolytic anemia     RA (rheumatoid arthritis)        PAST SURGICAL HISTORY:  Past Surgical History:   Procedure Laterality Date     SECTION   1997, 2002, 2004, 2014    CHOLECYSTECTOMY  2014    COLONOSCOPY N/A 4/11/2025    Procedure: COLONOSCOPY   **WANTS EARILER APPT;  Surgeon: Kelly Betancourt MD;  Location: [facility];  Service: Endoscopy;  Laterality: N/A;    CYSTOSCOPY N/A 4/24/2025    Procedure: CYSTOSCOPY;  Surgeon: Estefani Pedro MD;  Location: [facility];  Service: OB/GYN;  Laterality: N/A;    ESOPHAGOGASTRODUODENOSCOPY N/A 4/11/2025    Procedure: EGD (ESOPHAGOGASTRODUODENOSCOPY);  Surgeon: Kelly Betancourt MD;  Location: [facility];  Service: Endoscopy;  Laterality: N/A;    LAPAROSCOPIC LYSIS OF ADHESIONS N/A 4/24/2025    Procedure: LYSIS, ADHESIONS, LAPAROSCOPIC;  Surgeon: Estefani Pedro MD;  Location: [facility];  Service: OB/GYN;  Laterality: N/A;    LAPAROSCOPIC TOTAL HYSTERECTOMY N/A 4/24/2025    Procedure: HYSTERECTOMY, TOTAL, LAPAROSCOPIC with bilateral salpingectomy;  Surgeon: Estefani Pedro MD;  Location: [facility];  Service: OB/GYN;  Laterality: N/A;    SLEEVE GASTROPLASTY  12/08/2021    in [place]    SURGICAL REMOVAL OF BUNION WITH OSTEOTOMY OF METATARSAL BONE Left 8/17/2022    Procedure: BUNIONECTOMY, WITH METATARSAL OSTEOTOMY  ;  Surgeon: Roshan Pyle DPM;  Location: [facility];  Service: Podiatry;  Laterality: Left;  distal osteotomy left 1st metatarsal    TUBAL LIGATION  2014       SOCIAL HISTORY:  Social History[1]    FAMILY HISTORY:  Family History   Problem Relation Name Age of Onset    Breast cancer Maternal Aunt      Hypertension Maternal Grandmother      Coronary artery disease Maternal Grandmother      Colon cancer Neg Hx      Ovarian cancer Neg Hx      Endometrial cancer Neg Hx         ALLERGIES AND MEDICATIONS: updated and reviewed.  Review of patient's allergies indicates:   Allergen Reactions    Penicillin Hives     Current Medications[2]      ROS  Review of Systems   Constitutional:  Negative for appetite change, fatigue, fever and unexpected weight change.   HENT:  Negative for hearing loss and sore  throat.    Eyes:  Negative for pain and visual disturbance.   Respiratory:  Negative for cough, shortness of breath and wheezing.    Cardiovascular:  Negative for chest pain, palpitations and leg swelling.   Gastrointestinal:  Negative for abdominal pain, blood in stool, constipation, diarrhea, nausea and vomiting.   Genitourinary:  Positive for dysuria, frequency and urgency. Negative for flank pain and hematuria.   Musculoskeletal:  Negative for arthralgias.   Neurological:  Negative for dizziness and headaches.   Psychiatric/Behavioral:  Negative for suicidal ideas.            PHYSICAL EXAM    Physical Exam  Constitutional:       General: She is not in acute distress.     Appearance: Normal appearance. She is not ill-appearing.   HENT:      Head: Normocephalic and atraumatic.   Eyes:      General:         Right eye: No discharge.         Left eye: No discharge.      Conjunctiva/sclera: Conjunctivae normal.   Pulmonary:      Effort: Pulmonary effort is normal.   Musculoskeletal:         General: No deformity.   Skin:     Coloration: Skin is not jaundiced or pale.   Neurological:      General: No focal deficit present.      Mental Status: She is alert and oriented to person, place, and time.   Psychiatric:         Mood and Affect: Mood normal.         Behavior: Behavior normal.           Health Maintenance         Date Due Completion Date    COVID-19 Vaccine (3 - 2024-25 season) 03/18/2026 (Originally 9/1/2024) 4/22/2021    Influenza Vaccine (Season Ended) 09/01/2025 ---    Mammogram 03/28/2026 3/28/2025    Hemoglobin A1c (Diabetic Prevention Screening) 03/18/2028 3/18/2025    TETANUS VACCINE 07/19/2032 7/19/2022    Colorectal Cancer Screening 04/11/2035 4/11/2025    RSV Vaccine (Age 60+ and Pregnant patients) (1 - 1-dose 75+ series) 05/28/2057 ---              Assessment & Plan    UTI prevention sent via portal   Urinalysis specimen to be obtained at lab  Tx with Bactrim at this time pending labs     Problem List  Items Addressed This Visit    None  Visit Diagnoses         Acute cystitis without hematuria    -  Primary    Relevant Medications    sulfamethoxazole-trimethoprim 800-160mg (BACTRIM DS) 800-160 mg Tab    Other Relevant Orders    Urinalysis, Reflex to Urine Culture Urine, Clean Catch            Follow-up: Follow up if symptoms worsen or fail to improve.    Leia Day    Medication List with Changes/Refills   New Medications    SULFAMETHOXAZOLE-TRIMETHOPRIM 800-160MG (BACTRIM DS) 800-160 MG TAB    Take 1 tablet by mouth 2 (two) times daily. Take with food for 3 days   Current Medications    ACETAMINOPHEN (TYLENOL) 650 MG TBSR    Take 1 tablet (650 mg total) by mouth every 6 (six) hours.    DOCUSATE SODIUM (COLACE) 100 MG CAPSULE    Take 1 capsule (100 mg total) by mouth 2 (two) times daily.    DOCUSATE SODIUM (COLACE) 100 MG CAPSULE    Take 1 capsule (100 mg total) by mouth 2 (two) times daily.    ESOMEPRAZOLE (NEXIUM) 40 MG CAPSULE    Take 1 capsule (40 mg total) by mouth 2 (two) times daily.    FERROUS SULFATE (IRON) 325 MG (65 MG IRON) TAB TABLET    Take 1 tablet (325 mg total) by mouth daily with breakfast.    NADOLOL (CORGARD) 20 MG TABLET    Take 1 tablet (20 mg total) by mouth once daily.    OXYCODONE (ROXICODONE) 5 MG IMMEDIATE RELEASE TABLET    Take 1 tablet (5 mg total) by mouth every 4 (four) hours as needed for Pain.    TRAMADOL (ULTRAM) 50 MG TABLET    Take 1 tablet (50 mg total) by mouth every 6 (six) hours.            [1]   Social History  Socioeconomic History    Marital status:    Tobacco Use    Smoking status: Never    Smokeless tobacco: Never   Substance and Sexual Activity    Alcohol use: Yes     Comment: socially    Drug use: Never    Sexual activity: Not Currently     Partners: Male     Social Drivers of Health     Financial Resource Strain: Medium Risk (3/30/2025)    Overall Financial Resource Strain (CARDIA)     Difficulty of Paying Living Expenses: Somewhat hard   Food  Insecurity: Food Insecurity Present (3/30/2025)    Hunger Vital Sign     Worried About Running Out of Food in the Last Year: Sometimes true     Ran Out of Food in the Last Year: Sometimes true   Transportation Needs: No Transportation Needs (3/30/2025)    PRAPARE - Transportation     Lack of Transportation (Medical): No     Lack of Transportation (Non-Medical): No   Physical Activity: Insufficiently Active (3/30/2025)    Exercise Vital Sign     Days of Exercise per Week: 3 days     Minutes of Exercise per Session: 30 min   Stress: No Stress Concern Present (3/30/2025)    [employer] - Occupational Stress Questionnaire     Feeling of Stress : Not at all   Housing Stability: Low Risk  (3/30/2025)    Housing Stability Vital Sign     Unable to Pay for Housing in the Last Year: No     Homeless in the Last Year: No   [2]   Current Outpatient Medications   Medication Sig Dispense Refill    acetaminophen (TYLENOL) 650 MG TbSR Take 1 tablet (650 mg total) by mouth every 6 (six) hours. 60 tablet 1    docusate sodium (COLACE) 100 MG capsule Take 1 capsule (100 mg total) by mouth 2 (two) times daily. 60 capsule 0    docusate sodium (COLACE) 100 MG capsule Take 1 capsule (100 mg total) by mouth 2 (two) times daily. 30 capsule 0    esomeprazole (NEXIUM) 40 MG capsule Take 1 capsule (40 mg total) by mouth 2 (two) times daily. 60 capsule 11    ferrous sulfate (IRON) 325 mg (65 mg iron) Tab tablet Take 1 tablet (325 mg total) by mouth daily with breakfast. 30 tablet 11    nadoloL (CORGARD) 20 MG tablet Take 1 tablet (20 mg total) by mouth once daily. 30 tablet 11    oxyCODONE (ROXICODONE) 5 MG immediate release tablet Take 1 tablet (5 mg total) by mouth every 4 (four) hours as needed for Pain. 10 tablet 0    sulfamethoxazole-trimethoprim 800-160mg (BACTRIM DS) 800-160 mg Tab Take 1 tablet by mouth 2 (two) times daily. Take with food for 3 days 6 tablet 0    traMADoL (ULTRAM) 50 mg tablet Take 1 tablet  (50 mg total) by mouth every 6 (six) hours. 20 tablet 0     No current facility-administered medications for this visit.

## 2025-06-30 ENCOUNTER — TELEPHONE (OUTPATIENT)
Dept: HEMATOLOGY/ONCOLOGY | Facility: CLINIC | Age: 45
End: 2025-06-30

## 2025-07-07 ENCOUNTER — OFFICE VISIT (OUTPATIENT)
Dept: HEMATOLOGY/ONCOLOGY | Facility: CLINIC | Age: 45
End: 2025-07-07

## 2025-07-07 DIAGNOSIS — D50.0 IRON DEFICIENCY ANEMIA DUE TO CHRONIC BLOOD LOSS: Primary | ICD-10-CM

## 2025-07-07 PROCEDURE — 98006 SYNCH AUDIO-VIDEO EST MOD 30: CPT | Mod: 95,,, | Performed by: INTERNAL MEDICINE

## 2025-07-07 RX ORDER — HEPARIN 100 UNIT/ML
500 SYRINGE INTRAVENOUS
OUTPATIENT
Start: 2025-07-07

## 2025-07-07 RX ORDER — SODIUM CHLORIDE 0.9 % (FLUSH) 0.9 %
10 SYRINGE (ML) INJECTION
OUTPATIENT
Start: 2025-07-07

## 2025-07-07 RX ORDER — EPINEPHRINE 0.3 MG/.3ML
0.3 INJECTION SUBCUTANEOUS ONCE AS NEEDED
OUTPATIENT
Start: 2025-07-07

## 2025-07-07 NOTE — PROGRESS NOTES
The patient location is: [place]  The chief complaint leading to consultation is: anemia    Visit type: audiovisual    Face to Face time with patient: 10 mins  30 minutes of total time spent on the encounter, which includes face to face time and non-face to face time preparing to see the patient (eg, review of tests), Obtaining and/or reviewing separately obtained history, Documenting clinical information in the electronic or other health record, Independently interpreting results (not separately reported) and communicating results to the patient/family/caregiver, or Care coordination (not separately reported).         Each patient to whom he or she provides medical services by telemedicine is:  (1) informed of the relationship between the physician and patient and the respective role of any other health care provider with respect to management of the patient; and (2) notified that he or she may decline to receive medical services by telemedicine and may withdraw from such care at any time.    Service Date:  7/7/25    Chief Complaint: Anemia    Giovanni Pena is a 45 y.o. female here with anemia.  Did not feel much different after 1 dose of Venofer.  In fact iron level is lower.  Anemia did improve.  Still anemic however.  Iron still low despite the hysterectomy.    Review of Systems   Constitutional: Negative.  Negative for appetite change and unexpected weight change.   HENT: Negative.  Negative for mouth sores.    Eyes: Negative.  Negative for visual disturbance.   Respiratory: Negative.  Negative for cough and shortness of breath.    Cardiovascular: Negative.  Negative for chest pain.   Gastrointestinal:  Negative for abdominal pain and diarrhea.   Endocrine: Negative.    Genitourinary: Negative.  Negative for frequency.   Musculoskeletal: Negative.  Negative for back pain.   Integumentary:  Negative for rash. Negative.   Neurological: Negative.  Negative for headaches.   Hematological: Negative.   Negative for adenopathy.   Psychiatric/Behavioral: Negative.  The patient is not nervous/anxious.         Current Outpatient Medications   Medication Instructions    acetaminophen (TYLENOL) 650 mg, Oral, Every 6 hours    docusate sodium (COLACE) 100 mg, Oral, 2 times daily    docusate sodium (COLACE) 100 mg, Oral, 2 times daily    esomeprazole (NEXIUM) 40 mg, Oral, 2 times daily    ferrous sulfate (IRON) 325 mg, Oral, With breakfast    nadoloL (CORGARD) 20 mg, Oral, Daily    oxyCODONE (ROXICODONE) 5 mg, Oral, Every 4 hours PRN    traMADoL (ULTRAM) 50 mg, Oral, Every 6 hours        Past Medical History:   Diagnosis Date    Acid reflux     Anemia, unspecified     Embolism and thrombosis of renal vein     Esophageal varices     Hemolytic anemia     RA (rheumatoid arthritis)         Past Surgical History:   Procedure Laterality Date     SECTION  , , ,     CHOLECYSTECTOMY      COLONOSCOPY N/A 2025    Procedure: COLONOSCOPY   **WANTS EARILER APPT;  Surgeon: Kelly Betancourt MD;  Location: [facility];  Service: Endoscopy;  Laterality: N/A;    CYSTOSCOPY N/A 2025    Procedure: CYSTOSCOPY;  Surgeon: Estefani Pedro MD;  Location: [facility];  Service: OB/GYN;  Laterality: N/A;    ESOPHAGOGASTRODUODENOSCOPY N/A 2025    Procedure: EGD (ESOPHAGOGASTRODUODENOSCOPY);  Surgeon: Kelly Betancourt MD;  Location: [facility];  Service: Endoscopy;  Laterality: N/A;    LAPAROSCOPIC LYSIS OF ADHESIONS N/A 2025    Procedure: LYSIS, ADHESIONS, LAPAROSCOPIC;  Surgeon: Estefani Pedro MD;  Location: [facility];  Service: OB/GYN;  Laterality: N/A;    LAPAROSCOPIC TOTAL HYSTERECTOMY N/A 2025    Procedure: HYSTERECTOMY, TOTAL, LAPAROSCOPIC with bilateral salpingectomy;  Surgeon: Estefani Pedro MD;  Location: [facility];  Service: OB/GYN;  Laterality: N/A;    SLEEVE GASTROPLASTY  2021    in [place]    SURGICAL REMOVAL OF BUNION WITH OSTEOTOMY OF METATARSAL BONE Left  8/17/2022    Procedure: BUNIONECTOMY, WITH METATARSAL OSTEOTOMY  ;  Surgeon: Roshan Pyle DPM;  Location: [facility];  Service: Podiatry;  Laterality: Left;  distal osteotomy left 1st metatarsal    TUBAL LIGATION  2014        Family History   Problem Relation Name Age of Onset    Breast cancer Maternal Aunt      Hypertension Maternal Grandmother      Coronary artery disease Maternal Grandmother      Colon cancer Neg Hx      Ovarian cancer Neg Hx      Endometrial cancer Neg Hx         Social History     Tobacco Use    Smoking status: Never    Smokeless tobacco: Never   Substance Use Topics    Alcohol use: Yes     Comment: socially    Drug use: Never         There were no vitals filed for this visit.       Physical Exam:  There were no vitals taken for this visit.    Physical Exam  Constitutional:       Appearance: Normal appearance.   HENT:      Head: Normocephalic and atraumatic.      Nose: Nose normal.      Mouth/Throat:      Mouth: Mucous membranes are moist.      Pharynx: Oropharynx is clear.   Eyes:      Conjunctiva/sclera: Conjunctivae normal.   Cardiovascular:      Rate and Rhythm: Normal rate and regular rhythm.      Heart sounds: Normal heart sounds.   Pulmonary:      Effort: Pulmonary effort is normal.      Breath sounds: Normal breath sounds.   Abdominal:      General: Abdomen is flat. Bowel sounds are normal.      Palpations: Abdomen is soft.   Musculoskeletal:         General: Normal range of motion.      Cervical back: Normal range of motion and neck supple.   Skin:     General: Skin is warm and dry.   Neurological:      General: No focal deficit present.      Mental Status: She is alert and oriented to person, place, and time. Mental status is at baseline.   Psychiatric:         Mood and Affect: Mood normal.          Labs:  Lab Results   Component Value Date    WBC 2.83 (L) 07/02/2025    RBC 3.65 (L) 07/02/2025    HGB 9.4 (L) 07/02/2025    HCT 29.8 (L) 07/02/2025    MCV 82 07/02/2025    MCH 25.8 (L)  07/02/2025    MCHC 31.5 (L) 07/02/2025    RDW 18.5 (H) 07/02/2025     (L) 07/02/2025    MPV 10.7 07/02/2025    GRAN 2.6 03/19/2025    GRAN 71.2 03/19/2025    LYMPH 24.4 07/02/2025    LYMPH 0.69 (L) 07/02/2025    MONO 7.8 07/02/2025    MONO 0.22 (L) 07/02/2025    EOS 2.1 07/02/2025    EOS 0.06 07/02/2025    BASO 0.03 03/19/2025    EOSINOPHIL 2.2 03/19/2025    BASOPHIL 1.1 07/02/2025    BASOPHIL 0.03 07/02/2025     Sodium   Date Value Ref Range Status   04/25/2025 136 136 - 145 mmol/L Final   03/18/2025 138 136 - 145 mmol/L Final     Potassium   Date Value Ref Range Status   04/25/2025 3.4 (L) 3.5 - 5.1 mmol/L Final   03/18/2025 3.5 3.5 - 5.1 mmol/L Final     Chloride   Date Value Ref Range Status   04/25/2025 109 95 - 110 mmol/L Final   03/18/2025 108 95 - 110 mmol/L Final     CO2   Date Value Ref Range Status   04/25/2025 20 (L) 23 - 29 mmol/L Final   03/18/2025 22 (L) 23 - 29 mmol/L Final     Glucose   Date Value Ref Range Status   04/25/2025 111 (H) 70 - 110 mg/dL Final   03/18/2025 85 70 - 110 mg/dL Final     BUN   Date Value Ref Range Status   04/25/2025 13 6 - 20 mg/dL Final     Creatinine   Date Value Ref Range Status   04/25/2025 0.7 0.5 - 1.4 mg/dL Final     Calcium   Date Value Ref Range Status   04/25/2025 7.9 (L) 8.7 - 10.5 mg/dL Final   03/18/2025 8.2 (L) 8.7 - 10.5 mg/dL Final     Protein Total   Date Value Ref Range Status   04/25/2025 6.4 6.0 - 8.4 gm/dL Final     Total Protein   Date Value Ref Range Status   03/18/2025 7.8 6.0 - 8.4 g/dL Final     Albumin   Date Value Ref Range Status   04/25/2025 3.3 (L) 3.5 - 5.2 g/dL Final   03/18/2025 3.6 3.5 - 5.2 g/dL Final     Total Bilirubin   Date Value Ref Range Status   03/18/2025 1.1 (H) 0.1 - 1.0 mg/dL Final     Comment:     For infants and newborns, interpretation of results should be based  on gestational age, weight and in agreement with clinical  observations.    Premature Infant recommended reference ranges:  Up to 24 hours.............<8.0  mg/dL  Up to 48 hours............<12.0 mg/dL  3-5 days..................<15.0 mg/dL  6-29 days.................<15.0 mg/dL       Bilirubin Total   Date Value Ref Range Status   04/25/2025 3.5 (H) 0.1 - 1.0 mg/dL Final     Comment:     For infants and newborns, interpretation of results should be based   on gestational age, weight and in agreement with clinical   observations.    Premature Infant recommended reference ranges:   0-24 hours:  <8.0 mg/dL   24-48 hours: <12.0 mg/dL   3-5 days:    <15.0 mg/dL   6-29 days:   <15.0 mg/dL     Alkaline Phosphatase   Date Value Ref Range Status   03/18/2025 80 40 - 150 U/L Final     ALP   Date Value Ref Range Status   04/25/2025 61 40 - 150 unit/L Final     AST   Date Value Ref Range Status   04/25/2025 22 11 - 45 unit/L Final   03/18/2025 14 10 - 40 U/L Final     ALT   Date Value Ref Range Status   04/25/2025 14 10 - 44 unit/L Final   03/18/2025 11 10 - 44 U/L Final     Anion Gap   Date Value Ref Range Status   04/25/2025 7 (L) 8 - 16 mmol/L Final     eGFR if    Date Value Ref Range Status   07/19/2022 >60.0 >60 mL/min/1.73 m^2 Final     eGFR if non    Date Value Ref Range Status   07/19/2022 >60.0 >60 mL/min/1.73 m^2 Final     Comment:     Calculation used to obtain the estimated glomerular filtration  rate (eGFR) is the CKD-EPI equation.          A/P:    Iron deficiency anemia and hemolytic anemia   -this time we will give 4 doses of Venofer at 200 mg each.  -repeat blood work 3 months from now  -colonoscopy 04/2025 negative    Splenomegaly   -monitor      Aurash Khoobehi, MD  Hematology and Oncology

## 2025-07-14 ENCOUNTER — PROCEDURE VISIT (OUTPATIENT)
Dept: HEPATOLOGY | Facility: CLINIC | Age: 45
End: 2025-07-14

## 2025-07-14 ENCOUNTER — LAB VISIT (OUTPATIENT)
Dept: LAB | Facility: HOSPITAL | Age: 45
End: 2025-07-14
Attending: INTERNAL MEDICINE
Payer: MEDICAID

## 2025-07-14 ENCOUNTER — PATIENT MESSAGE (OUTPATIENT)
Dept: HEPATOLOGY | Facility: CLINIC | Age: 45
End: 2025-07-14

## 2025-07-14 ENCOUNTER — OFFICE VISIT (OUTPATIENT)
Dept: HEPATOLOGY | Facility: CLINIC | Age: 45
End: 2025-07-14

## 2025-07-14 VITALS
SYSTOLIC BLOOD PRESSURE: 107 MMHG | OXYGEN SATURATION: 99 % | HEIGHT: 67 IN | BODY MASS INDEX: 27.75 KG/M2 | RESPIRATION RATE: 18 BRPM | DIASTOLIC BLOOD PRESSURE: 57 MMHG | TEMPERATURE: 98 F | WEIGHT: 176.81 LBS | HEART RATE: 74 BPM

## 2025-07-14 DIAGNOSIS — K76.6 PORTAL HYPERTENSION: ICD-10-CM

## 2025-07-14 DIAGNOSIS — R93.2 ABNORMAL FINDING ON IMAGING OF LIVER: ICD-10-CM

## 2025-07-14 DIAGNOSIS — I85.10 SECONDARY ESOPHAGEAL VARICES WITHOUT BLEEDING: ICD-10-CM

## 2025-07-14 DIAGNOSIS — Z90.3 H/O GASTRIC SLEEVE: ICD-10-CM

## 2025-07-14 DIAGNOSIS — I81 PORTAL VEIN THROMBOSIS: ICD-10-CM

## 2025-07-14 LAB
ALBUMIN SERPL BCP-MCNC: 3.7 G/DL (ref 3.5–5.2)
ALP SERPL-CCNC: 88 UNIT/L (ref 40–150)
ALT SERPL W/O P-5'-P-CCNC: 17 UNIT/L (ref 10–44)
AST SERPL-CCNC: 20 UNIT/L (ref 11–45)
BILIRUB DIRECT SERPL-MCNC: 0.5 MG/DL (ref 0.1–0.3)
BILIRUB SERPL-MCNC: 1.5 MG/DL (ref 0.1–1)
HAV AB SER QL IA: NORMAL
HBV SURFACE AB SER-ACNC: >1000 MIU/ML
HBV SURFACE AB SERPL IA-ACNC: REACTIVE M[IU]/ML
INR PPP: 1.1 (ref 0.8–1.2)
PROT SERPL-MCNC: 7.2 GM/DL (ref 6–8.4)
PROTHROMBIN TIME: 12.2 SECONDS (ref 9–12.5)

## 2025-07-14 PROCEDURE — 85610 PROTHROMBIN TIME: CPT

## 2025-07-14 PROCEDURE — 86706 HEP B SURFACE ANTIBODY: CPT

## 2025-07-14 PROCEDURE — 91200 LIVER ELASTOGRAPHY: CPT | Mod: PBBFAC

## 2025-07-14 PROCEDURE — 36415 COLL VENOUS BLD VENIPUNCTURE: CPT

## 2025-07-14 PROCEDURE — 99214 OFFICE O/P EST MOD 30 MIN: CPT | Mod: PBBFAC,25 | Performed by: INTERNAL MEDICINE

## 2025-07-14 PROCEDURE — 91200 LIVER ELASTOGRAPHY: CPT | Mod: 26,S$PBB,, | Performed by: INTERNAL MEDICINE

## 2025-07-14 PROCEDURE — 86790 VIRUS ANTIBODY NOS: CPT

## 2025-07-14 PROCEDURE — 80321 ALCOHOLS BIOMARKERS 1OR 2: CPT

## 2025-07-14 PROCEDURE — 99999 PR PBB SHADOW E&M-EST. PATIENT-LVL IV: CPT | Mod: PBBFAC,,, | Performed by: INTERNAL MEDICINE

## 2025-07-14 PROCEDURE — 99204 OFFICE O/P NEW MOD 45 MIN: CPT | Mod: S$PBB,,, | Performed by: INTERNAL MEDICINE

## 2025-07-14 PROCEDURE — 80076 HEPATIC FUNCTION PANEL: CPT

## 2025-07-14 RX ORDER — CARVEDILOL 3.12 MG/1
3.12 TABLET ORAL 2 TIMES DAILY WITH MEALS
Qty: 180 TABLET | Refills: 3 | Status: SHIPPED | OUTPATIENT
Start: 2025-07-14 | End: 2026-07-14

## 2025-07-14 NOTE — PROGRESS NOTES
Hepatology Consult Note    Referring provider: Aaareferral Self  PCP: Miquel Mishra MD    Chief complaint: chronic PVT, portal hypertension     HPI:  Giovanni Pena is a 45 y.o. female with chronic PVT with cavernous transformation complicated by portal hypertension, non-bleeding esophageal varices, gastric sleeve (), who was referred to Hepatology Clinic for PVT. Per chart review, patient was diagnosed with PVT after gastric sleeve in . Previously on Eliquis, now off anticoagulation.     Regarding risk factors for liver disease, the patient denies prior history of EtOH abuse, illicit drug use, blood transfusions. Prior tattoos done professionally. Denies history of DM, HTN, HLD, DENISA, PCOS. Denies family history of liver disease or liver cancer.     The patient reports prior episodes of jaundice after blood transfusions. These episodes of jaundice were thought to be transfusion reactions. Denies prior bleeding from varices. Denies prior ascites, paracentesis. The patient denies prior evaluation by hepatologist, liver biopsy.    Upper abdominal surgeries include gastric sleeve in , cholecystectomy in .     Past Medical History:   Diagnosis Date    Acid reflux     Anemia, unspecified     Embolism and thrombosis of renal vein     Esophageal varices     Hemolytic anemia     RA (rheumatoid arthritis)        Past Surgical History:   Procedure Laterality Date     SECTION  , , ,     CHOLECYSTECTOMY      COLONOSCOPY N/A 2025    Procedure: COLONOSCOPY   **MEGHANAS MERT APPT;  Surgeon: Kelly Betancourt MD;  Location: [facility];  Service: Endoscopy;  Laterality: N/A;    CYSTOSCOPY N/A 2025    Procedure: CYSTOSCOPY;  Surgeon: Estefani Pedro MD;  Location: [facility] OR;  Service: OB/GYN;  Laterality: N/A;    ESOPHAGOGASTRODUODENOSCOPY N/A 2025    Procedure: EGD (ESOPHAGOGASTRODUODENOSCOPY);  Surgeon: Kelly Betancourt MD;  Location: [facility];   "Service: Endoscopy;  Laterality: N/A;    LAPAROSCOPIC LYSIS OF ADHESIONS N/A 4/24/2025    Procedure: LYSIS, ADHESIONS, LAPAROSCOPIC;  Surgeon: Estefani Pedro MD;  Location: [facility];  Service: OB/GYN;  Laterality: N/A;    LAPAROSCOPIC TOTAL HYSTERECTOMY N/A 4/24/2025    Procedure: HYSTERECTOMY, TOTAL, LAPAROSCOPIC with bilateral salpingectomy;  Surgeon: Estefani Pedro MD;  Location: [facility] OR;  Service: OB/GYN;  Laterality: N/A;    SLEEVE GASTROPLASTY  12/08/2021    in [place]    SURGICAL REMOVAL OF BUNION WITH OSTEOTOMY OF METATARSAL BONE Left 8/17/2022    Procedure: BUNIONECTOMY, WITH METATARSAL OSTEOTOMY  ;  Surgeon: Roshan Pyle DPM;  Location: [facility];  Service: Podiatry;  Laterality: Left;  distal osteotomy left 1st metatarsal    TUBAL LIGATION  2014       Family History   Problem Relation Name Age of Onset    Breast cancer Maternal Aunt      Hypertension Maternal Grandmother      Coronary artery disease Maternal Grandmother      Colon cancer Neg Hx      Ovarian cancer Neg Hx      Endometrial cancer Neg Hx         Social History[1]    Current Medications[2]    Review of patient's allergies indicates:   Allergen Reactions    Penicillin Hives            Vitals:    07/14/25 0737   BP: (!) 107/57   Pulse: 74   Resp: 18   Temp: 98.2 °F (36.8 °C)   TempSrc: Oral   SpO2: 99%   Weight: 80.2 kg (176 lb 12.9 oz)   Height: 5' 7" (1.702 m)   Body mass index is 27.69 kg/m².    Physical Exam  Constitutional:       General: She is not in acute distress.  Eyes:      General: No scleral icterus.  Cardiovascular:      Rate and Rhythm: Normal rate.   Pulmonary:      Effort: Pulmonary effort is normal.   Abdominal:      General: Abdomen is flat. There is no distension.      Palpations: Abdomen is soft. There is no fluid wave, hepatomegaly or splenomegaly.      Tenderness: There is no abdominal tenderness.   Musculoskeletal:      Right lower leg: No edema.      Left lower leg: No edema.   Skin:     General: Skin is " "warm.      Comments: No spider angiomas. No palmar erythema. No leukonychia.    Neurological:      General: No focal deficit present.      Mental Status: She is alert and oriented to person, place, and time.   Psychiatric:         Behavior: Behavior is cooperative.         Thought Content: Thought content normal.         LABS: I personally reviewed pertinent laboratory findings.    Lab Results   Component Value Date    WBC 2.83 (L) 07/02/2025    HGB 9.4 (L) 07/02/2025    HCT 29.8 (L) 07/02/2025    MCV 82 07/02/2025     (L) 07/02/2025       Lab Results   Component Value Date     04/25/2025    K 3.4 (L) 04/25/2025     04/25/2025    CO2 20 (L) 04/25/2025    BUN 13 04/25/2025    CREATININE 0.7 04/25/2025    CALCIUM 7.9 (L) 04/25/2025    ANIONGAP 7 (L) 04/25/2025    ESTGFRAFRICA >60.0 07/19/2022    EGFRNONAA >60.0 07/19/2022       Lab Results   Component Value Date    ALT 14 04/25/2025    AST 22 04/25/2025    ALKPHOS 61 04/25/2025    BILITOT 3.5 (H) 04/25/2025       Lab Results   Component Value Date    HEPAIGM Non-Reactive 03/29/2025    HEPBIGM Non-Reactive 03/29/2025    HEPCAB Non-Reactive 03/29/2025       No results found for: "LATONYA", "MITOAB", "SMOOTHMUSCAB", "IGG", "CERULOPLSM"     MELD 3.0: 14 at 3/31/2025  6:08 AM  MELD-Na: 12 at 3/31/2025  6:08 AM  Calculated from:  Serum Creatinine: 0.9 mg/dL (Using min of 1 mg/dL) at 3/31/2025  6:08 AM  Serum Sodium: 138 mmol/L (Using max of 137 mmol/L) at 3/31/2025  6:08 AM  Total Bilirubin: 4.3 mg/dL at 3/31/2025  6:08 AM  Serum Albumin: 3.3 g/dL at 3/31/2025  6:08 AM  INR(ratio): 1 at 3/29/2025  9:23 PM  Age at listing (hypothetical): 44 years  Sex: Female at 3/31/2025  6:08 AM       IMAGING: I personally reviewed imaging studies.      Assessment:  Giovanni Pena is a 45 y.o. female with chronic PVT with cavernous transformation complicated by portal hypertension, non-bleeding esophageal varices, gastric sleeve (2021), who was referred to Hepatology " Clinic for PVT. Per chart review, liver enzymes are currently normal. Indirect hyperbilirubinemia is noted. Thrombocytopenia. INR is normal. Acute viral hepatitis panel has been negative. On contrasted CT A/P (3/29/25), the liver appears steatotic, cavernous transformation of portal vein, spleen in enlarged. Contrasted CT A/P (9/21/23), redemonstrates cavernous transformation of portal vein. On EGD (4/11/25), large esophageal varices were seen.    Portal vein thrombosis seems to date back to at least 2021, now with cavernous transformation. Patient has developed portal hypertension with nonbleeding esophageal varices. Suspect PVT is complication from bariatric surgery rather than underlying chronic liver disease.     Plan to obtain non-invasive measures of fibrosis and review imaging in IR conference. Will consider liver biopsy based on FibroScan results, multidisciplinary discussion.    1. Portal vein thrombosis    2. Portal hypertension    3. Secondary esophageal varices without bleeding    4. Abnormal finding on imaging of liver    5. H/O gastric sleeve      Recommendations:  - LFTs, INR  - PETH   - FibroScan today   - Review imaging in IR conference   - Consider liver biopsy    Return to clinic in 3 months.    I have sent communication to the referring physician and/or primary care provider.    Comfort Quinones MD  Staff Physician  Hepatology and Liver Transplant  [facility]  [facility]           [1]   Social History  Tobacco Use    Smoking status: Never    Smokeless tobacco: Never   Substance Use Topics    Alcohol use: Yes     Comment: socially    Drug use: Never   [2]   Current Outpatient Medications   Medication Sig Dispense Refill    acetaminophen (TYLENOL) 650 MG TbSR Take 1 tablet (650 mg total) by mouth every 6 (six) hours. 60 tablet 1    docusate sodium (COLACE) 100 MG capsule Take 1 capsule (100 mg total) by mouth 2 (two) times daily. 60 capsule 0     docusate sodium (COLACE) 100 MG capsule Take 1 capsule (100 mg total) by mouth 2 (two) times daily. 30 capsule 0    esomeprazole (NEXIUM) 40 MG capsule Take 1 capsule (40 mg total) by mouth 2 (two) times daily. 60 capsule 11    ferrous sulfate (IRON) 325 mg (65 mg iron) Tab tablet Take 1 tablet (325 mg total) by mouth daily with breakfast. 30 tablet 11    oxyCODONE (ROXICODONE) 5 MG immediate release tablet Take 1 tablet (5 mg total) by mouth every 4 (four) hours as needed for Pain. 10 tablet 0    traMADoL (ULTRAM) 50 mg tablet Take 1 tablet (50 mg total) by mouth every 6 (six) hours. 20 tablet 0    carvediloL (COREG) 3.125 MG tablet Take 1 tablet (3.125 mg total) by mouth 2 (two) times daily with meals. 180 tablet 3     No current facility-administered medications for this visit.

## 2025-07-15 NOTE — PROCEDURES
FibroScan (Vibration Controlled Transient Elastography)    Date/Time: 7/14/2025 11:15 AM    Performed by: Messi Vargas MA  Authorized by: Comfort Quinones MD    Diagnosis:  Other    Probe:  M    Universal Protocol: Patient's identity, procedure and site were verified, confirmatory pause was performed.  Discussed procedure including risks and potential complications.  Questions answered.  Patient verbalizes understanding and wishes to proceed with VCTE.     Procedure: After providing explanations of the procedure, patient was placed in the supine position with right arm in maximum abduction to allow optimal exposure of right lateral abdomen.  Patient was briefly assessed, Testing was performed in the mid-axillary location, 50Hz Shear Wave pulses were applied and the resulting Shear Wave and Propagation Speed detected with a 3.5 MHz ultrasonic signal, using the FibroScan probe, Skin to liver capsule distance and liver parenchyma were accessed during the entire examination with the FibroScan probe, Patient was instructed to breathe normally and to abstain from sudden movements during the procedure, allowing for random measurements of liver stiffness. At least 10 Shear Waves were produced, Individual measurements of each Shear Wave were calculated.  Patient tolerated the procedure well with no complications.  Meets discharge criteria as was dismissed.  Rates pain 0 out of 10.  Patient will follow up with ordering provider to review results.    Findings  Median liver stiffness score:  3.6  CAP Reading: dB/m:  188    IQR/med %:  7  Interpretation  Fibrosis interpretation is based on medial liver stiffness - Kilopascal (kPa).    Fibrosis Stage:  F 0-1  Steatosis interpretation is based on controlled attenuation parameter - (dB/m).    Steatosis Grade:  <S1

## 2025-07-18 LAB
PLPETH BLD-MCNC: <10 NG/ML
POPETH BLD-MCNC: <10 NG/ML
TOXICOLOGIST REVIEW: NORMAL

## 2025-07-21 ENCOUNTER — PATIENT MESSAGE (OUTPATIENT)
Dept: INFUSION THERAPY | Facility: HOSPITAL | Age: 45
End: 2025-07-21

## 2025-07-22 ENCOUNTER — APPOINTMENT (OUTPATIENT)
Dept: RADIOLOGY | Facility: OTHER | Age: 45
End: 2025-07-22
Attending: PODIATRIST
Payer: MEDICAID

## 2025-07-22 ENCOUNTER — OFFICE VISIT (OUTPATIENT)
Dept: PODIATRY | Facility: CLINIC | Age: 45
End: 2025-07-22
Payer: MEDICAID

## 2025-07-22 VITALS
BODY MASS INDEX: 27.75 KG/M2 | SYSTOLIC BLOOD PRESSURE: 93 MMHG | HEIGHT: 67 IN | WEIGHT: 176.81 LBS | DIASTOLIC BLOOD PRESSURE: 64 MMHG | HEART RATE: 71 BPM

## 2025-07-22 DIAGNOSIS — M20.10 VALGUS DEFORMITY OF GREAT TOE, UNSPECIFIED LATERALITY: Primary | ICD-10-CM

## 2025-07-22 DIAGNOSIS — M21.611 BUNION, RIGHT: ICD-10-CM

## 2025-07-22 DIAGNOSIS — M20.10 VALGUS DEFORMITY OF GREAT TOE, UNSPECIFIED LATERALITY: ICD-10-CM

## 2025-07-22 DIAGNOSIS — M79.671 FOOT PAIN, RIGHT: ICD-10-CM

## 2025-07-22 PROCEDURE — 73630 X-RAY EXAM OF FOOT: CPT | Mod: 26,RT,, | Performed by: RADIOLOGY

## 2025-07-22 PROCEDURE — 3078F DIAST BP <80 MM HG: CPT | Mod: CPTII,,, | Performed by: PODIATRIST

## 2025-07-22 PROCEDURE — 99214 OFFICE O/P EST MOD 30 MIN: CPT | Mod: PBBFAC,PN,25 | Performed by: PODIATRIST

## 2025-07-22 PROCEDURE — 99999 PR PBB SHADOW E&M-EST. PATIENT-LVL IV: CPT | Mod: PBBFAC,,, | Performed by: PODIATRIST

## 2025-07-22 PROCEDURE — 73630 X-RAY EXAM OF FOOT: CPT | Mod: TC,PN,RT

## 2025-07-22 PROCEDURE — 1159F MED LIST DOCD IN RCRD: CPT | Mod: CPTII,,, | Performed by: PODIATRIST

## 2025-07-22 PROCEDURE — 3044F HG A1C LEVEL LT 7.0%: CPT | Mod: CPTII,,, | Performed by: PODIATRIST

## 2025-07-22 PROCEDURE — 3074F SYST BP LT 130 MM HG: CPT | Mod: CPTII,,, | Performed by: PODIATRIST

## 2025-07-22 PROCEDURE — 1160F RVW MEDS BY RX/DR IN RCRD: CPT | Mod: CPTII,,, | Performed by: PODIATRIST

## 2025-07-22 PROCEDURE — 99214 OFFICE O/P EST MOD 30 MIN: CPT | Mod: S$PBB,,, | Performed by: PODIATRIST

## 2025-07-22 PROCEDURE — 3008F BODY MASS INDEX DOCD: CPT | Mod: CPTII,,, | Performed by: PODIATRIST

## 2025-07-22 RX ORDER — LIDOCAINE AND PRILOCAINE 25; 25 MG/G; MG/G
CREAM TOPICAL
Qty: 30 G | Refills: 3 | Status: SHIPPED | OUTPATIENT
Start: 2025-07-22

## 2025-07-22 RX ORDER — MELOXICAM 15 MG/1
15 TABLET ORAL DAILY
Qty: 30 TABLET | Refills: 0 | Status: SHIPPED | OUTPATIENT
Start: 2025-07-22

## 2025-07-22 NOTE — PROGRESS NOTES
Subjective:      Patient ID: Giovanni Pena is a 45 y.o. female.    Chief Complaint: Follow-up (Big toe bone)    Sharp deep aching pain right big toe joint.  Present for years chronically with good and bad periods.  Most recent exacerbation Gradual onset, worsening over past several months, aggravated by increased weight bearing, shoe gear, pressure.  Ibuprofen periodically over-the-counter use no pain relief.  She is tried the previous medical management for the left side of the fullness stretches inserts athletic type shoes none of which have given significant relief.  Patient had prior bunionectomy left with which she is pleased.  Shoe like to have same procedure on the right if possible.    Review of Systems   Constitutional: Negative for chills, diaphoresis, fever, malaise/fatigue and night sweats.   Cardiovascular:  Negative for claudication, cyanosis, leg swelling and syncope.   Skin:  Negative for color change, dry skin, nail changes, rash, suspicious lesions and unusual hair distribution.   Musculoskeletal:  Positive for joint pain. Negative for falls, joint swelling, muscle cramps, muscle weakness and stiffness.   Gastrointestinal:  Negative for constipation, diarrhea, nausea and vomiting.   Neurological:  Negative for brief paralysis, disturbances in coordination, focal weakness, numbness, paresthesias, sensory change and tremors.           Objective:      Physical Exam  Constitutional:       General: She is not in acute distress.     Appearance: She is well-developed. She is not diaphoretic.   Cardiovascular:      Pulses:           Popliteal pulses are 2+ on the right side and 2+ on the left side.        Dorsalis pedis pulses are 2+ on the right side and 2+ on the left side.        Posterior tibial pulses are 2+ on the right side and 2+ on the left side.      Comments: Capillary refill 3 seconds all toes/distal feet, all toes/both feet warm to touch.      Negative lymphadenopathy bilateral popliteal  fossa and tarsal tunnel.      Negavie lower extremity edema bilateral.    Musculoskeletal:      Right ankle: No swelling, deformity, ecchymosis or lacerations. Normal range of motion. Normal pulse.      Right Achilles Tendon: Normal. No defects. Kelley's test negative.      Comments: Visible and palpable bunion with pain at dorsomedial 1st metatarsal head right.  Hallux abducted right partially reducible, tracks laterally without being track bound.  No ecchymosis, erythema, edema, or cardinal signs infection or signs of trauma same foot.    Ankle dorsiflexion decreased at <10 degrees bilateral with moderate increase with knee flexion bilateral.    Hypermobility of the midtarsal joint more than subtalar joints bilateral    Otherwise, Normal angle, base, station of gait. All ten toes without clubbing, cyanosis, or signs of ischemia.  No pain to palpation bilateral lower extremities.  Range of motion, stability, muscle strength, and muscle tone normal bilateral feet and legs.    Lymphadenopathy:      Lower Body: No right inguinal adenopathy. No left inguinal adenopathy.      Comments: Negative lymphadenopathy bilateral popliteal fossa and tarsal tunnel.    Negative lymphangitic streaking bilateral feet/ankles/legs.   Skin:     General: Skin is warm and dry.      Capillary Refill: Capillary refill takes 2 to 3 seconds.      Coloration: Skin is not pale.      Findings: No abrasion, bruising, burn, ecchymosis, erythema, laceration, lesion or rash.      Nails: There is no clubbing.      Comments:   Skin is normal age and health appropriate color, turgor, texture, and temperature bilateral lower extremities without ulceration, hyperpigmentation, discoloration, masses nodules or cords palpated.  No ecchymosis, erythema, edema, or cardinal signs of infection bilateral lower extremities.     Neurological:      Mental Status: She is alert and oriented to person, place, and time.      Sensory: No sensory deficit.      Motor:  No tremor, atrophy or abnormal muscle tone.      Gait: Gait normal.      Deep Tendon Reflexes:      Reflex Scores:       Patellar reflexes are 2+ on the right side and 2+ on the left side.       Achilles reflexes are 2+ on the right side and 2+ on the left side.     Comments: Negative tinel sign to percussion sural, superficial peroneal, deep peroneal, saphenous, and posterior tibial nerves right and left ankles and feet.    Negative allodynia both feet   Psychiatric:         Behavior: Behavior is cooperative.             Assessment:       Encounter Diagnoses   Name Primary?    Valgus deformity of great toe, unspecified laterality Yes    Foot pain, right     Bunion, right          Plan:       Giovanni was seen today for follow-up.    Diagnoses and all orders for this visit:    Valgus deformity of great toe, unspecified laterality  -     X-Ray Foot Complete Right; Future    Foot pain, right  -     X-Ray Foot Complete Right; Future    Bunion, right  -     X-Ray Foot Complete Right; Future    Other orders  -     LIDOcaine-prilocaine (EMLA) cream; Apply topically as needed.  -     meloxicam (MOBIC) 15 MG tablet; Take 1 tablet (15 mg total) by mouth once daily.      I counseled the patient on her conditions, their implications and medical management.        Patient will stretch the tendo achilles complex three times daily as demonstrated in the office.  Literature was dispensed illustrating proper stretching technique.    Patient will obtain over the counter arch supports and wear them in shoes whenever possible.  Athletic shoes intended for walking or running are usually best.    The patient was advised that NSAID-type medications have two very important potential side effects: gastrointestinal irritation including hemorrhage and renal injuries. She was asked to take the medication with food and to stop if she experiences any GI upset. I asked her to call for vomiting, abdominal pain or black/bloody stools. The patient  expresses understanding of these issues and questions were answered.    Discussed conservative treatment with shoes of adequate dimensions, material, and style to alleviate symptoms and delay or prevent surgical intervention.    Meloxicam, EMLA    Xrays right    Patient verbally verified not pregnant, not nursing, not trying.          No follow-ups on file.

## 2025-07-25 ENCOUNTER — INFUSION (OUTPATIENT)
Dept: INFUSION THERAPY | Facility: HOSPITAL | Age: 45
End: 2025-07-25
Attending: INTERNAL MEDICINE
Payer: MEDICAID

## 2025-07-25 VITALS
HEIGHT: 67 IN | OXYGEN SATURATION: 100 % | TEMPERATURE: 99 F | BODY MASS INDEX: 27.76 KG/M2 | HEART RATE: 69 BPM | SYSTOLIC BLOOD PRESSURE: 96 MMHG | RESPIRATION RATE: 16 BRPM | WEIGHT: 176.88 LBS | DIASTOLIC BLOOD PRESSURE: 53 MMHG

## 2025-07-25 DIAGNOSIS — D50.0 IRON DEFICIENCY ANEMIA DUE TO CHRONIC BLOOD LOSS: Primary | ICD-10-CM

## 2025-07-25 PROCEDURE — 25000003 PHARM REV CODE 250: Performed by: INTERNAL MEDICINE

## 2025-07-25 PROCEDURE — A4216 STERILE WATER/SALINE, 10 ML: HCPCS | Performed by: INTERNAL MEDICINE

## 2025-07-25 PROCEDURE — 63600175 PHARM REV CODE 636 W HCPCS: Performed by: INTERNAL MEDICINE

## 2025-07-25 PROCEDURE — 96374 THER/PROPH/DIAG INJ IV PUSH: CPT

## 2025-07-25 RX ORDER — SODIUM CHLORIDE 0.9 % (FLUSH) 0.9 %
10 SYRINGE (ML) INJECTION
OUTPATIENT
Start: 2025-08-01

## 2025-07-25 RX ORDER — HEPARIN 100 UNIT/ML
500 SYRINGE INTRAVENOUS
OUTPATIENT
Start: 2025-08-01

## 2025-07-25 RX ORDER — SODIUM CHLORIDE 0.9 % (FLUSH) 0.9 %
10 SYRINGE (ML) INJECTION
Status: DISCONTINUED | OUTPATIENT
Start: 2025-07-25 | End: 2025-07-25 | Stop reason: HOSPADM

## 2025-07-25 RX ORDER — EPINEPHRINE 0.3 MG/.3ML
0.3 INJECTION SUBCUTANEOUS ONCE AS NEEDED
OUTPATIENT
Start: 2025-08-01

## 2025-07-25 RX ADMIN — Medication 10 ML: at 08:07

## 2025-07-25 RX ADMIN — IRON SUCROSE 200 MG: 20 INJECTION, SOLUTION INTRAVENOUS at 08:07

## 2025-07-25 NOTE — PLAN OF CARE
Problem: Adult Inpatient Plan of Care  Goal: Optimal Comfort and Wellbeing  Outcome: Progressing  Intervention: Provide Person-Centered Care  Flowsheets (Taken 7/25/2025 2918)  Trust Relationship/Rapport:   care explained   thoughts/feelings acknowledged   choices provided   emotional support provided   empathic listening provided   questions answered   questions encouraged   reassurance provided

## 2025-08-18 ENCOUNTER — INFUSION (OUTPATIENT)
Dept: INFUSION THERAPY | Facility: HOSPITAL | Age: 45
End: 2025-08-18
Attending: INTERNAL MEDICINE
Payer: MEDICAID

## 2025-08-18 VITALS
DIASTOLIC BLOOD PRESSURE: 63 MMHG | WEIGHT: 178.88 LBS | TEMPERATURE: 98 F | BODY MASS INDEX: 28.08 KG/M2 | OXYGEN SATURATION: 100 % | HEIGHT: 67 IN | HEART RATE: 73 BPM | SYSTOLIC BLOOD PRESSURE: 111 MMHG | RESPIRATION RATE: 18 BRPM

## 2025-08-18 DIAGNOSIS — D50.0 IRON DEFICIENCY ANEMIA DUE TO CHRONIC BLOOD LOSS: Primary | ICD-10-CM

## 2025-08-18 PROCEDURE — 25000003 PHARM REV CODE 250: Performed by: INTERNAL MEDICINE

## 2025-08-18 PROCEDURE — 96374 THER/PROPH/DIAG INJ IV PUSH: CPT

## 2025-08-18 PROCEDURE — A4216 STERILE WATER/SALINE, 10 ML: HCPCS | Performed by: INTERNAL MEDICINE

## 2025-08-18 PROCEDURE — 63600175 PHARM REV CODE 636 W HCPCS: Performed by: INTERNAL MEDICINE

## 2025-08-18 RX ORDER — SODIUM CHLORIDE 0.9 % (FLUSH) 0.9 %
10 SYRINGE (ML) INJECTION
OUTPATIENT
Start: 2025-08-18

## 2025-08-18 RX ORDER — HEPARIN 100 UNIT/ML
500 SYRINGE INTRAVENOUS
OUTPATIENT
Start: 2025-08-18

## 2025-08-18 RX ORDER — SODIUM CHLORIDE 0.9 % (FLUSH) 0.9 %
10 SYRINGE (ML) INJECTION
Status: DISCONTINUED | OUTPATIENT
Start: 2025-08-18 | End: 2025-08-18 | Stop reason: HOSPADM

## 2025-08-18 RX ORDER — HEPARIN 100 UNIT/ML
500 SYRINGE INTRAVENOUS
Status: DISCONTINUED | OUTPATIENT
Start: 2025-08-18 | End: 2025-08-18 | Stop reason: HOSPADM

## 2025-08-18 RX ORDER — EPINEPHRINE 0.3 MG/.3ML
0.3 INJECTION SUBCUTANEOUS ONCE AS NEEDED
OUTPATIENT
Start: 2025-08-18 | End: 2037-01-14

## 2025-08-18 RX ADMIN — Medication 10 ML: at 03:08

## 2025-08-18 RX ADMIN — IRON SUCROSE 200 MG: 20 INJECTION, SOLUTION INTRAVENOUS at 03:08

## 2025-08-26 ENCOUNTER — APPOINTMENT (OUTPATIENT)
Dept: LAB | Facility: HOSPITAL | Age: 45
End: 2025-08-26
Payer: MEDICAID

## 2025-08-26 ENCOUNTER — PATIENT MESSAGE (OUTPATIENT)
Dept: URGENT CARE | Facility: CLINIC | Age: 45
End: 2025-08-26

## 2025-08-26 ENCOUNTER — ON-DEMAND VIRTUAL (OUTPATIENT)
Dept: URGENT CARE | Facility: CLINIC | Age: 45
End: 2025-08-26
Payer: MEDICAID

## 2025-08-26 DIAGNOSIS — N30.00 ACUTE CYSTITIS WITHOUT HEMATURIA: Primary | ICD-10-CM

## 2025-08-26 PROCEDURE — 98005 SYNCH AUDIO-VIDEO EST LOW 20: CPT | Mod: 95,,, | Performed by: NURSE PRACTITIONER

## 2025-08-26 RX ORDER — CIPROFLOXACIN 250 MG/1
250 TABLET, FILM COATED ORAL 2 TIMES DAILY
Qty: 10 TABLET | Refills: 0 | Status: SHIPPED | OUTPATIENT
Start: 2025-08-26 | End: 2025-08-31

## 2025-08-29 ENCOUNTER — INFUSION (OUTPATIENT)
Dept: INFUSION THERAPY | Facility: HOSPITAL | Age: 45
End: 2025-08-29
Attending: INTERNAL MEDICINE
Payer: MEDICAID

## 2025-08-29 VITALS
RESPIRATION RATE: 18 BRPM | OXYGEN SATURATION: 100 % | WEIGHT: 176 LBS | HEART RATE: 73 BPM | BODY MASS INDEX: 27.57 KG/M2 | SYSTOLIC BLOOD PRESSURE: 101 MMHG | DIASTOLIC BLOOD PRESSURE: 55 MMHG | TEMPERATURE: 98 F

## 2025-08-29 DIAGNOSIS — D50.0 IRON DEFICIENCY ANEMIA DUE TO CHRONIC BLOOD LOSS: Primary | ICD-10-CM

## 2025-08-29 PROCEDURE — 63600175 PHARM REV CODE 636 W HCPCS: Performed by: INTERNAL MEDICINE

## 2025-08-29 PROCEDURE — A4216 STERILE WATER/SALINE, 10 ML: HCPCS | Performed by: INTERNAL MEDICINE

## 2025-08-29 PROCEDURE — 25000003 PHARM REV CODE 250: Performed by: INTERNAL MEDICINE

## 2025-08-29 PROCEDURE — 96374 THER/PROPH/DIAG INJ IV PUSH: CPT

## 2025-08-29 RX ORDER — EPINEPHRINE 0.3 MG/.3ML
0.3 INJECTION SUBCUTANEOUS ONCE AS NEEDED
OUTPATIENT
Start: 2025-08-29 | End: 2037-01-25

## 2025-08-29 RX ORDER — SODIUM CHLORIDE 0.9 % (FLUSH) 0.9 %
10 SYRINGE (ML) INJECTION
Status: DISCONTINUED | OUTPATIENT
Start: 2025-08-29 | End: 2025-08-29 | Stop reason: HOSPADM

## 2025-08-29 RX ORDER — SODIUM CHLORIDE 0.9 % (FLUSH) 0.9 %
10 SYRINGE (ML) INJECTION
OUTPATIENT
Start: 2025-08-29

## 2025-08-29 RX ORDER — HEPARIN 100 UNIT/ML
500 SYRINGE INTRAVENOUS
OUTPATIENT
Start: 2025-08-29

## 2025-08-29 RX ORDER — HEPARIN 100 UNIT/ML
500 SYRINGE INTRAVENOUS
Status: DISCONTINUED | OUTPATIENT
Start: 2025-08-29 | End: 2025-08-29 | Stop reason: HOSPADM

## 2025-08-29 RX ADMIN — IRON SUCROSE 200 MG: 20 INJECTION, SOLUTION INTRAVENOUS at 03:08

## 2025-08-29 RX ADMIN — Medication 10 ML: at 03:08

## 2025-09-03 ENCOUNTER — E-VISIT (OUTPATIENT)
Dept: PRIMARY CARE CLINIC | Facility: CLINIC | Age: 45
End: 2025-09-03
Payer: MEDICAID

## 2025-09-03 DIAGNOSIS — N76.0 ACUTE VAGINITIS: Primary | ICD-10-CM

## 2025-09-05 RX ORDER — FLUCONAZOLE 150 MG/1
TABLET ORAL
Qty: 2 TABLET | Refills: 0 | Status: SHIPPED | OUTPATIENT
Start: 2025-09-05

## (undated) DEVICE — SEE L#120831

## (undated) DEVICE — COVER TABLE REINF 50X90IN

## (undated) DEVICE — SPONGE LAP 18X18 PREWASHED

## (undated) DEVICE — GOWN POLY REINF BRTH SLV LG

## (undated) DEVICE — SPONGE DERMACEA GAUZE 4X4

## (undated) DEVICE — PACK LAPAROSCOPY BAPTIST

## (undated) DEVICE — POSITIONER HEEL FOAM CONVOLTD

## (undated) DEVICE — Device

## (undated) DEVICE — BANDAGE ACE ELASTIC 6"

## (undated) DEVICE — KIT PROCEDURE STER INLET CLOSU

## (undated) DEVICE — SUT VICRYL PLUS 0 CT1 36IN

## (undated) DEVICE — SEALER LIGASURE LAP 37CM 5MM

## (undated) DEVICE — TIP RUMI KOH-EFFICIENT 3.5

## (undated) DEVICE — SOL POVIDONE SCRUB IODINE 4 OZ

## (undated) DEVICE — TIP RUMI BLUE DISPOSABLE 5/BX

## (undated) DEVICE — SYR 10CC LUER LOCK

## (undated) DEVICE — BLADE MEDIUM 9MM X 25MM

## (undated) DEVICE — ELECTRODE REM PLYHSV RETURN 9

## (undated) DEVICE — GLOVE BIOGEL SKINSENSE PI 7.5

## (undated) DEVICE — GLOVE SENSICARE PI GRN 6.5

## (undated) DEVICE — DRESSING N ADH OIL EMUL 3X3

## (undated) DEVICE — SOL POVIDONE PREP IODINE 4 OZ

## (undated) DEVICE — SCISSOR 5MMX35CM DIRECT DRIVE

## (undated) DEVICE — DRESSING ADAPTIC TOUCH 3X4

## (undated) DEVICE — BIT DRILL AO SPEEDGUIDE 2X135M

## (undated) DEVICE — GOWN NONREINF SET-IN SLV XL

## (undated) DEVICE — SUT MCRYL PLUS 4-0 PS2 27IN

## (undated) DEVICE — PENCIL ELECTROSURG HOLST W/BLD

## (undated) DEVICE — SYR 50CC LL

## (undated) DEVICE — DRAPE STERI LONG

## (undated) DEVICE — UNDERGLOVES BIOGEL PI SIZE 7.5

## (undated) DEVICE — SUT 4.0 ETHILON

## (undated) DEVICE — SEE MEDLINE ITEM 157216

## (undated) DEVICE — SPONGE COTTON TRAY 4X4IN

## (undated) DEVICE — BELLOW CANN HEMOBLAST 1.65GR

## (undated) DEVICE — SYS SEE SHARP SCP ANTIFG LNG

## (undated) DEVICE — SOL NORMAL USPCA 0.9%

## (undated) DEVICE — APPLICATOR HEMOBLAST LAPSCP

## (undated) DEVICE — APPLICATOR CHLORAPREP ORN 26ML

## (undated) DEVICE — COUNT NDL FOAM MAGNET 40COUNT

## (undated) DEVICE — SUT 0 8-27IN VICRYL PL CT-1

## (undated) DEVICE — DRAPE U SPLIT SHEET 54X76IN

## (undated) DEVICE — TROCAR KII FIOS 5MM X 100MM

## (undated) DEVICE — DRAPE THREE-QTR REINF 53X77IN

## (undated) DEVICE — BRACE LOWER LEG SIZE 8-9 LARGE

## (undated) DEVICE — SYR 50ML CATH TIP

## (undated) DEVICE — DRAPE C ARM 42 X 120 10/BX

## (undated) DEVICE — NDL INSUFFLATION VERRES 120MM

## (undated) DEVICE — BANDAGE ESMARK ELASTIC ST 4X9

## (undated) DEVICE — BLADE SURG STAINLESS STEEL #15

## (undated) DEVICE — BANDAGE GAUZE 6PLY FLUFF 2X3

## (undated) DEVICE — KIT WING PAD POSITIONING

## (undated) DEVICE — IRRIGATOR ENDOSCOPY DISP.

## (undated) DEVICE — PAD CAST SPECIALIST STRL 4

## (undated) DEVICE — BANDAGE DERMACEA STRETCH 4X1IN

## (undated) DEVICE — SUT VICRYL 3-0 27 RB-1

## (undated) DEVICE — NDL HYPO REG 25G X 1 1/2

## (undated) DEVICE — TOWEL OR DISP STRL BLUE 4/PK

## (undated) DEVICE — SOL IRR SOD CHL .9% POUR

## (undated) DEVICE — TOURNIQUET SB QC DP 18X4IN

## (undated) DEVICE — SUT VICRYL+ 27 UR-6 VIOL

## (undated) DEVICE — COUNTERSINK

## (undated) DEVICE — SUT VICRYL 4-0 18 P-3

## (undated) DEVICE — CONTAINER SPECIMEN OR STER 4OZ

## (undated) DEVICE — BANDAGE MATRIX HK LOOP 4IN 5YD

## (undated) DEVICE — PAD PREP CUFFED NS 24X48IN

## (undated) DEVICE — GLOVE SENSICARE PI SURG 6.5